# Patient Record
Sex: FEMALE | Race: BLACK OR AFRICAN AMERICAN | NOT HISPANIC OR LATINO | URBAN - METROPOLITAN AREA
[De-identification: names, ages, dates, MRNs, and addresses within clinical notes are randomized per-mention and may not be internally consistent; named-entity substitution may affect disease eponyms.]

---

## 2023-12-24 ENCOUNTER — EMERGENCY (EMERGENCY)
Facility: HOSPITAL | Age: 83
LOS: 1 days | End: 2023-12-24
Attending: EMERGENCY MEDICINE
Payer: MEDICARE

## 2023-12-24 VITALS
RESPIRATION RATE: 20 BRPM | HEIGHT: 66 IN | WEIGHT: 108.91 LBS | HEART RATE: 101 BPM | OXYGEN SATURATION: 99 % | DIASTOLIC BLOOD PRESSURE: 114 MMHG | TEMPERATURE: 98 F | SYSTOLIC BLOOD PRESSURE: 235 MMHG

## 2023-12-24 LAB
ALBUMIN SERPL ELPH-MCNC: 3.4 G/DL — SIGNIFICANT CHANGE UP (ref 3.3–5.2)
ALBUMIN SERPL ELPH-MCNC: 3.4 G/DL — SIGNIFICANT CHANGE UP (ref 3.3–5.2)
ALP SERPL-CCNC: 93 U/L — SIGNIFICANT CHANGE UP (ref 40–120)
ALP SERPL-CCNC: 93 U/L — SIGNIFICANT CHANGE UP (ref 40–120)
ALT FLD-CCNC: 6 U/L — SIGNIFICANT CHANGE UP
ALT FLD-CCNC: 6 U/L — SIGNIFICANT CHANGE UP
ANION GAP SERPL CALC-SCNC: 15 MMOL/L — SIGNIFICANT CHANGE UP (ref 5–17)
ANION GAP SERPL CALC-SCNC: 15 MMOL/L — SIGNIFICANT CHANGE UP (ref 5–17)
ANISOCYTOSIS BLD QL: SLIGHT — SIGNIFICANT CHANGE UP
ANISOCYTOSIS BLD QL: SLIGHT — SIGNIFICANT CHANGE UP
AST SERPL-CCNC: 20 U/L — SIGNIFICANT CHANGE UP
AST SERPL-CCNC: 20 U/L — SIGNIFICANT CHANGE UP
BASOPHILS # BLD AUTO: 0.04 K/UL — SIGNIFICANT CHANGE UP (ref 0–0.2)
BASOPHILS # BLD AUTO: 0.04 K/UL — SIGNIFICANT CHANGE UP (ref 0–0.2)
BASOPHILS NFR BLD AUTO: 0.9 % — SIGNIFICANT CHANGE UP (ref 0–2)
BASOPHILS NFR BLD AUTO: 0.9 % — SIGNIFICANT CHANGE UP (ref 0–2)
BILIRUB SERPL-MCNC: 0.3 MG/DL — LOW (ref 0.4–2)
BILIRUB SERPL-MCNC: 0.3 MG/DL — LOW (ref 0.4–2)
BUN SERPL-MCNC: 72.7 MG/DL — HIGH (ref 8–20)
BUN SERPL-MCNC: 72.7 MG/DL — HIGH (ref 8–20)
CALCIUM SERPL-MCNC: 6 MG/DL — CRITICAL LOW (ref 8.4–10.5)
CALCIUM SERPL-MCNC: 6 MG/DL — CRITICAL LOW (ref 8.4–10.5)
CHLORIDE SERPL-SCNC: 105 MMOL/L — SIGNIFICANT CHANGE UP (ref 96–108)
CHLORIDE SERPL-SCNC: 105 MMOL/L — SIGNIFICANT CHANGE UP (ref 96–108)
CO2 SERPL-SCNC: 19 MMOL/L — LOW (ref 22–29)
CO2 SERPL-SCNC: 19 MMOL/L — LOW (ref 22–29)
CREAT SERPL-MCNC: 4.64 MG/DL — HIGH (ref 0.5–1.3)
CREAT SERPL-MCNC: 4.64 MG/DL — HIGH (ref 0.5–1.3)
EGFR: 9 ML/MIN/1.73M2 — LOW
EGFR: 9 ML/MIN/1.73M2 — LOW
EOSINOPHIL # BLD AUTO: 0.26 K/UL — SIGNIFICANT CHANGE UP (ref 0–0.5)
EOSINOPHIL # BLD AUTO: 0.26 K/UL — SIGNIFICANT CHANGE UP (ref 0–0.5)
EOSINOPHIL NFR BLD AUTO: 5.2 % — SIGNIFICANT CHANGE UP (ref 0–6)
EOSINOPHIL NFR BLD AUTO: 5.2 % — SIGNIFICANT CHANGE UP (ref 0–6)
GLUCOSE SERPL-MCNC: 81 MG/DL — SIGNIFICANT CHANGE UP (ref 70–99)
GLUCOSE SERPL-MCNC: 81 MG/DL — SIGNIFICANT CHANGE UP (ref 70–99)
HCT VFR BLD CALC: 19.3 % — CRITICAL LOW (ref 34.5–45)
HCT VFR BLD CALC: 19.3 % — CRITICAL LOW (ref 34.5–45)
HGB BLD-MCNC: 6.1 G/DL — CRITICAL LOW (ref 11.5–15.5)
HGB BLD-MCNC: 6.1 G/DL — CRITICAL LOW (ref 11.5–15.5)
HYPOCHROMIA BLD QL: SIGNIFICANT CHANGE UP
HYPOCHROMIA BLD QL: SIGNIFICANT CHANGE UP
LYMPHOCYTES # BLD AUTO: 1.79 K/UL — SIGNIFICANT CHANGE UP (ref 1–3.3)
LYMPHOCYTES # BLD AUTO: 1.79 K/UL — SIGNIFICANT CHANGE UP (ref 1–3.3)
LYMPHOCYTES # BLD AUTO: 36 % — SIGNIFICANT CHANGE UP (ref 13–44)
LYMPHOCYTES # BLD AUTO: 36 % — SIGNIFICANT CHANGE UP (ref 13–44)
MACROCYTES BLD QL: SLIGHT — SIGNIFICANT CHANGE UP
MACROCYTES BLD QL: SLIGHT — SIGNIFICANT CHANGE UP
MANUAL SMEAR VERIFICATION: SIGNIFICANT CHANGE UP
MANUAL SMEAR VERIFICATION: SIGNIFICANT CHANGE UP
MCHC RBC-ENTMCNC: 30.2 PG — SIGNIFICANT CHANGE UP (ref 27–34)
MCHC RBC-ENTMCNC: 30.2 PG — SIGNIFICANT CHANGE UP (ref 27–34)
MCHC RBC-ENTMCNC: 31.6 GM/DL — LOW (ref 32–36)
MCHC RBC-ENTMCNC: 31.6 GM/DL — LOW (ref 32–36)
MCV RBC AUTO: 95.5 FL — SIGNIFICANT CHANGE UP (ref 80–100)
MCV RBC AUTO: 95.5 FL — SIGNIFICANT CHANGE UP (ref 80–100)
MONOCYTES # BLD AUTO: 0.35 K/UL — SIGNIFICANT CHANGE UP (ref 0–0.9)
MONOCYTES # BLD AUTO: 0.35 K/UL — SIGNIFICANT CHANGE UP (ref 0–0.9)
MONOCYTES NFR BLD AUTO: 7 % — SIGNIFICANT CHANGE UP (ref 2–14)
MONOCYTES NFR BLD AUTO: 7 % — SIGNIFICANT CHANGE UP (ref 2–14)
NEUTROPHILS # BLD AUTO: 2.53 K/UL — SIGNIFICANT CHANGE UP (ref 1.8–7.4)
NEUTROPHILS # BLD AUTO: 2.53 K/UL — SIGNIFICANT CHANGE UP (ref 1.8–7.4)
NEUTROPHILS NFR BLD AUTO: 50.9 % — SIGNIFICANT CHANGE UP (ref 43–77)
NEUTROPHILS NFR BLD AUTO: 50.9 % — SIGNIFICANT CHANGE UP (ref 43–77)
NT-PROBNP SERPL-SCNC: 2651 PG/ML — HIGH (ref 0–300)
NT-PROBNP SERPL-SCNC: 2651 PG/ML — HIGH (ref 0–300)
PLAT MORPH BLD: NORMAL — SIGNIFICANT CHANGE UP
PLAT MORPH BLD: NORMAL — SIGNIFICANT CHANGE UP
PLATELET # BLD AUTO: 171 K/UL — SIGNIFICANT CHANGE UP (ref 150–400)
PLATELET # BLD AUTO: 171 K/UL — SIGNIFICANT CHANGE UP (ref 150–400)
POLYCHROMASIA BLD QL SMEAR: SLIGHT — SIGNIFICANT CHANGE UP
POLYCHROMASIA BLD QL SMEAR: SLIGHT — SIGNIFICANT CHANGE UP
POTASSIUM SERPL-MCNC: 3.6 MMOL/L — SIGNIFICANT CHANGE UP (ref 3.5–5.3)
POTASSIUM SERPL-MCNC: 3.6 MMOL/L — SIGNIFICANT CHANGE UP (ref 3.5–5.3)
POTASSIUM SERPL-SCNC: 3.6 MMOL/L — SIGNIFICANT CHANGE UP (ref 3.5–5.3)
POTASSIUM SERPL-SCNC: 3.6 MMOL/L — SIGNIFICANT CHANGE UP (ref 3.5–5.3)
PROT SERPL-MCNC: 6.7 G/DL — SIGNIFICANT CHANGE UP (ref 6.6–8.7)
PROT SERPL-MCNC: 6.7 G/DL — SIGNIFICANT CHANGE UP (ref 6.6–8.7)
RAPID RVP RESULT: SIGNIFICANT CHANGE UP
RAPID RVP RESULT: SIGNIFICANT CHANGE UP
RBC # BLD: 2.02 M/UL — LOW (ref 3.8–5.2)
RBC # BLD: 2.02 M/UL — LOW (ref 3.8–5.2)
RBC # FLD: 13.2 % — SIGNIFICANT CHANGE UP (ref 10.3–14.5)
RBC # FLD: 13.2 % — SIGNIFICANT CHANGE UP (ref 10.3–14.5)
RBC BLD AUTO: ABNORMAL
RBC BLD AUTO: ABNORMAL
SARS-COV-2 RNA SPEC QL NAA+PROBE: SIGNIFICANT CHANGE UP
SARS-COV-2 RNA SPEC QL NAA+PROBE: SIGNIFICANT CHANGE UP
SODIUM SERPL-SCNC: 139 MMOL/L — SIGNIFICANT CHANGE UP (ref 135–145)
SODIUM SERPL-SCNC: 139 MMOL/L — SIGNIFICANT CHANGE UP (ref 135–145)
WBC # BLD: 4.98 K/UL — SIGNIFICANT CHANGE UP (ref 3.8–10.5)
WBC # BLD: 4.98 K/UL — SIGNIFICANT CHANGE UP (ref 3.8–10.5)
WBC # FLD AUTO: 4.98 K/UL — SIGNIFICANT CHANGE UP (ref 3.8–10.5)
WBC # FLD AUTO: 4.98 K/UL — SIGNIFICANT CHANGE UP (ref 3.8–10.5)

## 2023-12-24 PROCEDURE — 36415 COLL VENOUS BLD VENIPUNCTURE: CPT

## 2023-12-24 PROCEDURE — 85025 COMPLETE CBC W/AUTO DIFF WBC: CPT

## 2023-12-24 PROCEDURE — 99285 EMERGENCY DEPT VISIT HI MDM: CPT | Mod: 25

## 2023-12-24 PROCEDURE — 80053 COMPREHEN METABOLIC PANEL: CPT

## 2023-12-24 PROCEDURE — 71045 X-RAY EXAM CHEST 1 VIEW: CPT

## 2023-12-24 PROCEDURE — 0225U NFCT DS DNA&RNA 21 SARSCOV2: CPT

## 2023-12-24 PROCEDURE — 71045 X-RAY EXAM CHEST 1 VIEW: CPT | Mod: 26

## 2023-12-24 PROCEDURE — 93010 ELECTROCARDIOGRAM REPORT: CPT

## 2023-12-24 PROCEDURE — 80197 ASSAY OF TACROLIMUS: CPT

## 2023-12-24 PROCEDURE — 93005 ELECTROCARDIOGRAM TRACING: CPT

## 2023-12-24 PROCEDURE — 99285 EMERGENCY DEPT VISIT HI MDM: CPT | Mod: GC

## 2023-12-24 PROCEDURE — 83880 ASSAY OF NATRIURETIC PEPTIDE: CPT

## 2023-12-24 RX ORDER — AMLODIPINE BESYLATE 2.5 MG/1
10 TABLET ORAL ONCE
Refills: 0 | Status: COMPLETED | OUTPATIENT
Start: 2023-12-24 | End: 2023-12-24

## 2023-12-24 RX ORDER — HYDRALAZINE HCL 50 MG
10 TABLET ORAL ONCE
Refills: 0 | Status: COMPLETED | OUTPATIENT
Start: 2023-12-24 | End: 2023-12-24

## 2023-12-24 RX ORDER — ATENOLOL 25 MG/1
50 TABLET ORAL ONCE
Refills: 0 | Status: COMPLETED | OUTPATIENT
Start: 2023-12-24 | End: 2023-12-24

## 2023-12-24 RX ADMIN — AMLODIPINE BESYLATE 10 MILLIGRAM(S): 2.5 TABLET ORAL at 19:10

## 2023-12-24 RX ADMIN — Medication 10 MILLIGRAM(S): at 22:08

## 2023-12-24 RX ADMIN — ATENOLOL 50 MILLIGRAM(S): 25 TABLET ORAL at 19:10

## 2023-12-24 NOTE — ED ADULT TRIAGE NOTE - CHIEF COMPLAINT QUOTE
pt sent from urgent care for evaluation of BP, states originally went there for swollen ankles  A&Ox3, resp wnl, /100, HX HTN, ran out of meds x 2 days

## 2023-12-24 NOTE — ED ADULT NURSE NOTE - NSFALLHARMRISKINTERV_ED_ALL_ED
Assistance OOB with selected safe patient handling equipment if applicable/Assistance with ambulation/Communicate risk of Fall with Harm to all staff, patient, and family/Monitor gait and stability/Provide patient with walking aids/Provide visual cue: red socks, yellow wristband, yellow gown, etc/Reinforce activity limits and safety measures with patient and family/Bed in lowest position, wheels locked, appropriate side rails in place/Call bell, personal items and telephone in reach/Instruct patient to call for assistance before getting out of bed/chair/stretcher/Non-slip footwear applied when patient is off stretcher/[place] to call system/Physically safe environment - no spills, clutter or unnecessary equipment/Purposeful Proactive Rounding/Room/bathroom lighting operational, light cord in reach Assistance OOB with selected safe patient handling equipment if applicable/Assistance with ambulation/Communicate risk of Fall with Harm to all staff, patient, and family/Monitor gait and stability/Provide patient with walking aids/Provide visual cue: red socks, yellow wristband, yellow gown, etc/Reinforce activity limits and safety measures with patient and family/Bed in lowest position, wheels locked, appropriate side rails in place/Call bell, personal items and telephone in reach/Instruct patient to call for assistance before getting out of bed/chair/stretcher/Non-slip footwear applied when patient is off stretcher/[place] to call system/Physically safe environment - no spills, clutter or unnecessary equipment/Purposeful Proactive Rounding/Room/bathroom lighting operational, light cord in reach

## 2023-12-24 NOTE — ED PROVIDER NOTE - OBJECTIVE STATEMENT
84yo F PMHx HTN, HLD, Renal transplant 12 years ago presents w/ HTN 2/2 running out of her antihypertensive medications. Per patient and son at bedside, she has intermittent SOB the past 2-3 days, not SOB currently. Ran out of amlodipine and atenolol at home. Pt is compliant with tacrolimus medication. Pt's son states he noticed her b/l leg swelling but has since gone down. Denies HA, neck pain, vision changes, CP, SOB, abd pain, n/v/d/c, denies urinary sxs, denies LE pain, travel, denies sick contacts. No LE swelling on exam.  NKDA.

## 2023-12-24 NOTE — ED ADULT NURSE REASSESSMENT NOTE - NS ED NURSE REASSESS COMMENT FT1
Assumed care of pt from SHAHZAD Moser RN at 2300. Pt is resting comfortably in stretcher. NAD. Pt is A&Ox4. Respirations are even and unlabored. Color is appropriate for race. Pt awaiting transfer to [facility] as pt is a renal transplant pt. Pt NSR on cardiac monitor. Son at bedside. Pt ambulates with cane. Pt updated on plan of care. Assumed care of pt from SHAHZAD Moser RN at 2300. Pt is resting comfortably in stretcher. NAD. Pt is A&Ox4. Respirations are even and unlabored. Color is appropriate for race. Pt awaiting transfer to [facility] as pt is a renal transplant pt. Pt NSR on cardiac monitor. Son at bedside. Pt ambulates with cane. Pt updated on plan of care.

## 2023-12-24 NOTE — ED PROVIDER NOTE - ATTENDING CONTRIBUTION TO CARE
83-year-old  female status post renal transplant 12 years ago at  [place], now followed by private nephrologist complaining of shortness of breath 2 days ago and lower extremity swelling after running out of of her medications 3 days ago.  Patient's son came to visit from out of state and brought patient to  urgent care for evaluation and subsequently was referred to ED.   on triage patient found to be hypertensive  with /114.   patient denies any associated chest pain, shortness of breath, abdominal pain, nausea, vomiting diaphoresis or syncope.  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  patient with received her p.o. medications which she missed Will check labs controlled blood pressure  and reevaluate 83-year-old  female status post renal transplant 12 years ago at  [place], now followed by private nephrologist complaining of shortness of breath 2 days ago and lower extremity swelling after running out of of her medications 3 days ago.  Patient's son came to visit from out of state and brought patient to  urgent care for evaluation and subsequently was referred to ED.   on triage patient found to be hypertensive  with /114.   patient denies any associated chest pain, shortness of breath, abdominal pain, nausea, vomiting diaphoresis or syncope.  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  patient with received her p.o. medications which she missed Will check labs controlled blood pressure  and reevaluate

## 2023-12-24 NOTE — ED ADULT NURSE NOTE - OBJECTIVE STATEMENT
pt referred from  for elevated BP. SBP 200s. pt has ble edema.  AVfistula on KATHERIN. AO4, NAD. hx htn.

## 2023-12-24 NOTE — ED PROVIDER NOTE - CLINICAL SUMMARY MEDICAL DECISION MAKING FREE TEXT BOX
82yo F PMHx HTN, HLD, Renal transplant 12 years ago presents w/ HTN 2/2 running out of her antihypertensive medications. Per patient and son at bedside, she has intermittent SOB the past 2-3 days, not SOB currently. Ran out of amlodipine and atenolol at home. Pt is compliant with tacrolimus medication. Pt's son states he noticed her b/l leg swelling but has since gone down. Denies HA, neck pain, vision changes, CP, SOB, abd pain, n/v/d/c, denies urinary sxs, denies LE pain, travel, denies sick contacts. No LE swelling on exam.    Pending medications, EKG, labs, imaging. Will reassess.

## 2023-12-25 VITALS
OXYGEN SATURATION: 100 % | HEART RATE: 70 BPM | SYSTOLIC BLOOD PRESSURE: 139 MMHG | DIASTOLIC BLOOD PRESSURE: 67 MMHG | TEMPERATURE: 98 F | RESPIRATION RATE: 18 BRPM

## 2023-12-25 LAB
TACROLIMUS SERPL-MCNC: <2 NG/ML — SIGNIFICANT CHANGE UP
TACROLIMUS SERPL-MCNC: <2 NG/ML — SIGNIFICANT CHANGE UP

## 2024-04-18 ENCOUNTER — INPATIENT (INPATIENT)
Facility: HOSPITAL | Age: 84
LOS: 12 days | Discharge: NURSING, REHAB OR LONG-TERM CARE | DRG: 871 | End: 2024-05-01
Attending: STUDENT IN AN ORGANIZED HEALTH CARE EDUCATION/TRAINING PROGRAM | Admitting: INTERNAL MEDICINE
Payer: MEDICARE

## 2024-04-18 VITALS
DIASTOLIC BLOOD PRESSURE: 39 MMHG | OXYGEN SATURATION: 99 % | SYSTOLIC BLOOD PRESSURE: 72 MMHG | HEART RATE: 81 BPM | RESPIRATION RATE: 16 BRPM

## 2024-04-18 LAB — LACTATE BLDV-MCNC: 5.6 MMOL/L — CRITICAL HIGH (ref 0.5–2)

## 2024-04-18 PROCEDURE — 36556 INSERT NON-TUNNEL CV CATH: CPT | Mod: RT

## 2024-04-18 PROCEDURE — 99291 CRITICAL CARE FIRST HOUR: CPT | Mod: FT,25

## 2024-04-18 RX ORDER — PIPERACILLIN AND TAZOBACTAM 4; .5 G/20ML; G/20ML
3.38 INJECTION, POWDER, LYOPHILIZED, FOR SOLUTION INTRAVENOUS ONCE
Refills: 0 | Status: COMPLETED | OUTPATIENT
Start: 2024-04-18 | End: 2024-04-18

## 2024-04-18 RX ORDER — PANTOPRAZOLE SODIUM 20 MG/1
40 TABLET, DELAYED RELEASE ORAL ONCE
Refills: 0 | Status: COMPLETED | OUTPATIENT
Start: 2024-04-18 | End: 2024-04-18

## 2024-04-18 RX ORDER — SODIUM CHLORIDE 9 MG/ML
1000 INJECTION, SOLUTION INTRAVENOUS ONCE
Refills: 0 | Status: COMPLETED | OUTPATIENT
Start: 2024-04-18 | End: 2024-04-18

## 2024-04-18 RX ORDER — VANCOMYCIN HCL 1 G
1000 VIAL (EA) INTRAVENOUS ONCE
Refills: 0 | Status: COMPLETED | OUTPATIENT
Start: 2024-04-18 | End: 2024-04-18

## 2024-04-18 NOTE — ED PROVIDER NOTE - CLINICAL SUMMARY MEDICAL DECISION MAKING FREE TEXT BOX
ASSESSMENT:   LIYZ JOE is a 82yo F who presented with ams in setting of elevated BUN and CR on outside labs. Hypotense, hypothermic on arrival. Rectal w/ melanotic stool.     PLAN: Emergency release blood now given shock state w/ likely upper GI bleed. Protonix. Hypothermic and altered in setting of immunosuppressant use, will treat empirically w/ abx and send cultures.

## 2024-04-18 NOTE — ED PROVIDER NOTE - ATTENDING CONTRIBUTION TO CARE
Natalee: I performed a face to face evaluation of this patient and performed a full history and physical examination on the patient.  I agree with the resident's history, physical examination, and plan of the patient unless otherwise noted. My brief assessment is as follows: hx htn, hld, ckd on dialysis in past but s/p renal transplant on immunosuppression present for elevated bun to 130 and cr to 4 with ams since monday. pt noted to have dark/melena stool here, hypotense and hypothermic. started on protonix, emergency release blood, inr found to be 12 and pt on coumadin, given kcentra and vitamin k, protonix. icu consulted, gi/renal consults, empiric abx given hypothermia and sick though appears most likely from gi bleed. Natalee: I performed a face to face evaluation of this patient and performed a full history and physical examination on the patient.  I agree with the resident's history, physical examination, and plan of the patient unless otherwise noted. My brief assessment is as follows: hx htn, hld, ckd on dialysis in past but s/p renal transplant on immunosuppression present for elevated bun to 130 and cr to 4 with ams since monday. pt noted to have dark/melena stool here, hypotense and hypothermic. started on protonix, emergency release blood, inr found to be 12 and pt on coumadin, given kcentra and vitamin k, protonix. icu consulted, gi/renal consults, empiric abx given hypothermia and sick though appears most likely from gi bleed.    I independently interpreted the pt's cxr without gross infiltrate/effusion. Natalee: I performed a face to face evaluation of this patient and performed a full history and physical examination on the patient.  I agree with the resident's history, physical examination, and plan of the patient unless otherwise noted. My brief assessment is as follows: hx htn, hld, ckd on dialysis in past but s/p renal transplant on immunosuppression present for elevated bun to 130 and cr to 4 with ams since monday. pt noted to have dark/melena stool here, hypotense and hypothermic. started on protonix, emergency release blood with 2 physician consent given poor mental status and unable to contact family, inr found to be 12 and pt on coumadin, given kcentra and vitamin k, protonix. icu consulted, gi/renal consults, empiric abx given hypothermia and sick though appears most likely from gi bleed.    I independently interpreted the pt's cxr without gross infiltrate/effusion.

## 2024-04-18 NOTE — ED PROVIDER NOTE - PHYSICAL EXAMINATION
Gen: ILL APPEARING  Head: NC/AT  Neck: trachea midline  Resp:  No distress. Lungs clear.   Abd: soft, nd +DIFFUSE TTP  Rectal: +Dark stool   Ext: RUE w/ PICC Line. LUE w/ fistula.   Neuro: lethargic. appears non focal.   Skin: Warm and dry as visualized  Psych: Normal affect and mood

## 2024-04-18 NOTE — ED PROVIDER NOTE - PROGRESS NOTE DETAILS
Angela: INtial blood draw including CBC drawn from fresh line before any fluids or medicine was given. Per lab sample "too dilute" for analysis. Repeat CBC drawn but PT has already been given 1u emergency release blood. Natalee: elevated lactate likely from hypovolemic shock, received emergency prbc. hgb initially unable to be ran, eventually showing 2.7, repeat after emergency 1 unit 4.5, continuing blood. k centra/vitamin k. I spoke to micu team including Dr. Reyes, to be admitted to micu. initially given abx but lower suspicion for sepsis, likely hemorrhagic shock.

## 2024-04-18 NOTE — ED ADULT NURSE NOTE - CHIEF COMPLAINT QUOTE
BIBEMS from [facility] with c/o abnormal labs, dark stool and AMS since monday.  creat 4.87. hypotensive at [facility]. md called to bedside. A+O x0. unable to obtain oral temp in triage.

## 2024-04-18 NOTE — ED ADULT TRIAGE NOTE - CHIEF COMPLAINT QUOTE
BIBEMS from [facility] with c/o abnormal labs, dark stool and AMS since monday.  creat 4.87. hypotensive at [facility]. md called to bedside. A+O x0. unable to obtain oral temp in triage.

## 2024-04-18 NOTE — ED ADULT NURSE NOTE - OBJECTIVE STATEMENT
BIBEMS from [facility] with c/o abnormal labs, dark stool and AMS since monday.  creat 4.87. hypotensive at [facility]. PT currently is alerted responding to verbal stimulus. PT breathing equally and unlabored though lethargic.

## 2024-04-18 NOTE — ED PROVIDER NOTE - OBJECTIVE STATEMENT
LIZY JOE is a 84yo Female with PMH CKD s/p transplant on immunosuppressants, HTN, HLD who was sent in by nursing home elevated BUN and CR and AMS. On arrival pt awake but not answering questions not following commands. LIZY JOE is a 82yo Female with PMH CKD s/p transplant on immunosuppressants, HTN, HLD who was sent in by nursing home elevated BUN and CR and AMS. On arrival pt awake but not speaking or following commands.

## 2024-04-19 DIAGNOSIS — K92.2 GASTROINTESTINAL HEMORRHAGE, UNSPECIFIED: ICD-10-CM

## 2024-04-19 LAB
ABO RH CONFIRMATION: SIGNIFICANT CHANGE UP
ACANTHOCYTES BLD QL SMEAR: SLIGHT — SIGNIFICANT CHANGE UP
ALBUMIN SERPL ELPH-MCNC: 2.6 G/DL — LOW (ref 3.3–5.2)
ALBUMIN SERPL ELPH-MCNC: 2.8 G/DL — LOW (ref 3.3–5.2)
ALBUMIN SERPL ELPH-MCNC: 2.9 G/DL — LOW (ref 3.3–5.2)
ALBUMIN SERPL ELPH-MCNC: 3 G/DL — LOW (ref 3.3–5.2)
ALP SERPL-CCNC: 28 U/L — LOW (ref 40–120)
ALP SERPL-CCNC: 38 U/L — LOW (ref 40–120)
ALP SERPL-CCNC: 39 U/L — LOW (ref 40–120)
ALP SERPL-CCNC: 43 U/L — SIGNIFICANT CHANGE UP (ref 40–120)
ALT FLD-CCNC: 10 U/L — SIGNIFICANT CHANGE UP
ALT FLD-CCNC: 10 U/L — SIGNIFICANT CHANGE UP
ALT FLD-CCNC: 7 U/L — SIGNIFICANT CHANGE UP
ALT FLD-CCNC: <5 U/L — SIGNIFICANT CHANGE UP
ANION GAP SERPL CALC-SCNC: 16 MMOL/L — SIGNIFICANT CHANGE UP (ref 5–17)
ANION GAP SERPL CALC-SCNC: 17 MMOL/L — SIGNIFICANT CHANGE UP (ref 5–17)
ANION GAP SERPL CALC-SCNC: 18 MMOL/L — HIGH (ref 5–17)
ANION GAP SERPL CALC-SCNC: 21 MMOL/L — HIGH (ref 5–17)
ANISOCYTOSIS BLD QL: SLIGHT — SIGNIFICANT CHANGE UP
APPEARANCE UR: ABNORMAL
APTT BLD: 25.5 SEC — SIGNIFICANT CHANGE UP (ref 24.5–35.6)
APTT BLD: 46.5 SEC — HIGH (ref 24.5–35.6)
AST SERPL-CCNC: 11 U/L — SIGNIFICANT CHANGE UP
AST SERPL-CCNC: 15 U/L — SIGNIFICANT CHANGE UP
AST SERPL-CCNC: 17 U/L — SIGNIFICANT CHANGE UP
AST SERPL-CCNC: 18 U/L — SIGNIFICANT CHANGE UP
BACTERIA # UR AUTO: ABNORMAL /HPF
BASE EXCESS BLDV CALC-SCNC: -2.4 MMOL/L — LOW (ref -2–3)
BASOPHILS # BLD AUTO: 0 K/UL — SIGNIFICANT CHANGE UP (ref 0–0.2)
BASOPHILS # BLD AUTO: 0.02 K/UL — SIGNIFICANT CHANGE UP (ref 0–0.2)
BASOPHILS NFR BLD AUTO: 0 % — SIGNIFICANT CHANGE UP (ref 0–2)
BASOPHILS NFR BLD AUTO: 0.2 % — SIGNIFICANT CHANGE UP (ref 0–2)
BILIRUB SERPL-MCNC: 0.2 MG/DL — LOW (ref 0.4–2)
BILIRUB SERPL-MCNC: 0.5 MG/DL — SIGNIFICANT CHANGE UP (ref 0.4–2)
BILIRUB SERPL-MCNC: 0.6 MG/DL — SIGNIFICANT CHANGE UP (ref 0.4–2)
BILIRUB SERPL-MCNC: 0.7 MG/DL — SIGNIFICANT CHANGE UP (ref 0.4–2)
BILIRUB UR-MCNC: NEGATIVE — SIGNIFICANT CHANGE UP
BLD GP AB SCN SERPL QL: SIGNIFICANT CHANGE UP
BLOOD GAS COMMENTS, VENOUS: SIGNIFICANT CHANGE UP
BUN SERPL-MCNC: 122.6 MG/DL — HIGH (ref 8–20)
BUN SERPL-MCNC: 136.6 MG/DL — HIGH (ref 8–20)
BUN SERPL-MCNC: 136.6 MG/DL — HIGH (ref 8–20)
BUN SERPL-MCNC: 139.5 MG/DL — HIGH (ref 8–20)
BURR CELLS BLD QL SMEAR: PRESENT — SIGNIFICANT CHANGE UP
CALCIUM SERPL-MCNC: 7.2 MG/DL — LOW (ref 8.4–10.5)
CALCIUM SERPL-MCNC: 7.3 MG/DL — LOW (ref 8.4–10.5)
CALCIUM SERPL-MCNC: 7.5 MG/DL — LOW (ref 8.4–10.5)
CALCIUM SERPL-MCNC: 7.5 MG/DL — LOW (ref 8.4–10.5)
CAST: 21 /LPF — HIGH (ref 0–4)
CHLORIDE SERPL-SCNC: 100 MMOL/L — SIGNIFICANT CHANGE UP (ref 96–108)
CHLORIDE SERPL-SCNC: 102 MMOL/L — SIGNIFICANT CHANGE UP (ref 96–108)
CO2 SERPL-SCNC: 18 MMOL/L — LOW (ref 22–29)
CO2 SERPL-SCNC: 21 MMOL/L — LOW (ref 22–29)
CO2 SERPL-SCNC: 22 MMOL/L — SIGNIFICANT CHANGE UP (ref 22–29)
CO2 SERPL-SCNC: 22 MMOL/L — SIGNIFICANT CHANGE UP (ref 22–29)
COLOR SPEC: YELLOW — SIGNIFICANT CHANGE UP
CREAT SERPL-MCNC: 4.75 MG/DL — HIGH (ref 0.5–1.3)
CREAT SERPL-MCNC: 4.93 MG/DL — HIGH (ref 0.5–1.3)
CREAT SERPL-MCNC: 5.09 MG/DL — HIGH (ref 0.5–1.3)
CREAT SERPL-MCNC: 5.12 MG/DL — HIGH (ref 0.5–1.3)
DIFF PNL FLD: NEGATIVE — SIGNIFICANT CHANGE UP
EGFR: 8 ML/MIN/1.73M2 — LOW
EGFR: 9 ML/MIN/1.73M2 — LOW
ELLIPTOCYTES BLD QL SMEAR: SLIGHT — SIGNIFICANT CHANGE UP
EOSINOPHIL # BLD AUTO: 0 K/UL — SIGNIFICANT CHANGE UP (ref 0–0.5)
EOSINOPHIL # BLD AUTO: 0.01 K/UL — SIGNIFICANT CHANGE UP (ref 0–0.5)
EOSINOPHIL NFR BLD AUTO: 0 % — SIGNIFICANT CHANGE UP (ref 0–6)
EOSINOPHIL NFR BLD AUTO: 0.1 % — SIGNIFICANT CHANGE UP (ref 0–6)
FERRITIN SERPL-MCNC: 3171 NG/ML — HIGH (ref 13–330)
FLUAV AG NPH QL: SIGNIFICANT CHANGE UP
FLUBV AG NPH QL: SIGNIFICANT CHANGE UP
GAS PNL BLDV: SIGNIFICANT CHANGE UP
GLUCOSE SERPL-MCNC: 106 MG/DL — HIGH (ref 70–99)
GLUCOSE SERPL-MCNC: 141 MG/DL — HIGH (ref 70–99)
GLUCOSE SERPL-MCNC: 85 MG/DL — SIGNIFICANT CHANGE UP (ref 70–99)
GLUCOSE SERPL-MCNC: 87 MG/DL — SIGNIFICANT CHANGE UP (ref 70–99)
GLUCOSE UR QL: NEGATIVE MG/DL — SIGNIFICANT CHANGE UP
HCO3 BLDV-SCNC: 22 MMOL/L — SIGNIFICANT CHANGE UP (ref 22–29)
HCT VFR BLD CALC: 14.6 % — CRITICAL LOW (ref 34.5–45)
HCT VFR BLD CALC: 20.8 % — CRITICAL LOW (ref 34.5–45)
HCT VFR BLD CALC: 21.5 % — LOW (ref 34.5–45)
HCT VFR BLD CALC: 23.2 % — LOW (ref 34.5–45)
HGB BLD-MCNC: 4.7 G/DL — CRITICAL LOW (ref 11.5–15.5)
HGB BLD-MCNC: 7.4 G/DL — LOW (ref 11.5–15.5)
HGB BLD-MCNC: 7.6 G/DL — LOW (ref 11.5–15.5)
HGB BLD-MCNC: 8.2 G/DL — LOW (ref 11.5–15.5)
HOROWITZ INDEX BLDV+IHG-RTO: 0.21 — SIGNIFICANT CHANGE UP
IMM GRANULOCYTES NFR BLD AUTO: 2.3 % — HIGH (ref 0–0.9)
INR BLD: 1.19 RATIO — HIGH (ref 0.85–1.18)
INR BLD: 12.19 RATIO — CRITICAL HIGH (ref 0.85–1.18)
INR BLD: 8.38 RATIO — CRITICAL HIGH (ref 0.85–1.18)
IRON SATN MFR SERPL: 175 UG/DL — HIGH (ref 37–145)
IRON SATN MFR SERPL: 83 % — HIGH (ref 14–50)
KETONES UR-MCNC: NEGATIVE MG/DL — SIGNIFICANT CHANGE UP
LACTATE SERPL-SCNC: 2 MMOL/L — SIGNIFICANT CHANGE UP (ref 0.5–2)
LEUKOCYTE ESTERASE UR-ACNC: ABNORMAL
LYMPHOCYTES # BLD AUTO: 0.35 K/UL — LOW (ref 1–3.3)
LYMPHOCYTES # BLD AUTO: 0.56 K/UL — LOW (ref 1–3.3)
LYMPHOCYTES # BLD AUTO: 4.2 % — LOW (ref 13–44)
LYMPHOCYTES # BLD AUTO: 4.4 % — LOW (ref 13–44)
MACROCYTES BLD QL: SLIGHT — SIGNIFICANT CHANGE UP
MAGNESIUM SERPL-MCNC: 1.4 MG/DL — LOW (ref 1.6–2.6)
MANUAL SMEAR VERIFICATION: SIGNIFICANT CHANGE UP
MCHC RBC-ENTMCNC: 29.7 PG — SIGNIFICANT CHANGE UP (ref 27–34)
MCHC RBC-ENTMCNC: 31.4 PG — SIGNIFICANT CHANGE UP (ref 27–34)
MCHC RBC-ENTMCNC: 31.4 PG — SIGNIFICANT CHANGE UP (ref 27–34)
MCHC RBC-ENTMCNC: 31.7 PG — SIGNIFICANT CHANGE UP (ref 27–34)
MCHC RBC-ENTMCNC: 32.2 GM/DL — SIGNIFICANT CHANGE UP (ref 32–36)
MCHC RBC-ENTMCNC: 35.3 GM/DL — SIGNIFICANT CHANGE UP (ref 32–36)
MCHC RBC-ENTMCNC: 35.3 GM/DL — SIGNIFICANT CHANGE UP (ref 32–36)
MCHC RBC-ENTMCNC: 35.6 GM/DL — SIGNIFICANT CHANGE UP (ref 32–36)
MCV RBC AUTO: 88.1 FL — SIGNIFICANT CHANGE UP (ref 80–100)
MCV RBC AUTO: 88.9 FL — SIGNIFICANT CHANGE UP (ref 80–100)
MCV RBC AUTO: 89.6 FL — SIGNIFICANT CHANGE UP (ref 80–100)
MCV RBC AUTO: 92.4 FL — SIGNIFICANT CHANGE UP (ref 80–100)
MONOCYTES # BLD AUTO: 0.21 K/UL — SIGNIFICANT CHANGE UP (ref 0–0.9)
MONOCYTES # BLD AUTO: 0.88 K/UL — SIGNIFICANT CHANGE UP (ref 0–0.9)
MONOCYTES NFR BLD AUTO: 2.6 % — SIGNIFICANT CHANGE UP (ref 2–14)
MONOCYTES NFR BLD AUTO: 6.6 % — SIGNIFICANT CHANGE UP (ref 2–14)
MRSA PCR RESULT.: SIGNIFICANT CHANGE UP
NEUTROPHILS # BLD AUTO: 11.48 K/UL — HIGH (ref 1.8–7.4)
NEUTROPHILS # BLD AUTO: 7.43 K/UL — HIGH (ref 1.8–7.4)
NEUTROPHILS NFR BLD AUTO: 86.6 % — HIGH (ref 43–77)
NEUTROPHILS NFR BLD AUTO: 93 % — HIGH (ref 43–77)
NITRITE UR-MCNC: NEGATIVE — SIGNIFICANT CHANGE UP
OB PNL STL: POSITIVE
OVALOCYTES BLD QL SMEAR: SLIGHT — SIGNIFICANT CHANGE UP
PCO2 BLDV: 35 MMHG — LOW (ref 39–42)
PH BLDV: 7.41 — SIGNIFICANT CHANGE UP (ref 7.32–7.43)
PH UR: 5.5 — SIGNIFICANT CHANGE UP (ref 5–8)
PHOSPHATE SERPL-MCNC: 4.3 MG/DL — SIGNIFICANT CHANGE UP (ref 2.4–4.7)
PLAT MORPH BLD: NORMAL — SIGNIFICANT CHANGE UP
PLATELET # BLD AUTO: 131 K/UL — LOW (ref 150–400)
PLATELET # BLD AUTO: 138 K/UL — LOW (ref 150–400)
PLATELET # BLD AUTO: 139 K/UL — LOW (ref 150–400)
PLATELET # BLD AUTO: 152 K/UL — SIGNIFICANT CHANGE UP (ref 150–400)
PO2 BLDV: 44 MMHG — SIGNIFICANT CHANGE UP (ref 25–45)
POIKILOCYTOSIS BLD QL AUTO: SLIGHT — SIGNIFICANT CHANGE UP
POLYCHROMASIA BLD QL SMEAR: SLIGHT — SIGNIFICANT CHANGE UP
POTASSIUM SERPL-MCNC: 3.7 MMOL/L — SIGNIFICANT CHANGE UP (ref 3.5–5.3)
POTASSIUM SERPL-MCNC: 4.3 MMOL/L — SIGNIFICANT CHANGE UP (ref 3.5–5.3)
POTASSIUM SERPL-MCNC: 4.5 MMOL/L — SIGNIFICANT CHANGE UP (ref 3.5–5.3)
POTASSIUM SERPL-MCNC: 4.5 MMOL/L — SIGNIFICANT CHANGE UP (ref 3.5–5.3)
POTASSIUM SERPL-SCNC: 3.7 MMOL/L — SIGNIFICANT CHANGE UP (ref 3.5–5.3)
POTASSIUM SERPL-SCNC: 4.3 MMOL/L — SIGNIFICANT CHANGE UP (ref 3.5–5.3)
POTASSIUM SERPL-SCNC: 4.5 MMOL/L — SIGNIFICANT CHANGE UP (ref 3.5–5.3)
POTASSIUM SERPL-SCNC: 4.5 MMOL/L — SIGNIFICANT CHANGE UP (ref 3.5–5.3)
PROT SERPL-MCNC: 3.9 G/DL — LOW (ref 6.6–8.7)
PROT SERPL-MCNC: 4.6 G/DL — LOW (ref 6.6–8.7)
PROT SERPL-MCNC: 4.8 G/DL — LOW (ref 6.6–8.7)
PROT SERPL-MCNC: 4.9 G/DL — LOW (ref 6.6–8.7)
PROT UR-MCNC: 100 MG/DL
PROTHROM AB SERPL-ACNC: 125.8 SEC — HIGH (ref 9.5–13)
PROTHROM AB SERPL-ACNC: 13.1 SEC — HIGH (ref 9.5–13)
PROTHROM AB SERPL-ACNC: 87.4 SEC — HIGH (ref 9.5–13)
RBC # BLD: 1.58 M/UL — LOW (ref 3.8–5.2)
RBC # BLD: 2.36 M/UL — LOW (ref 3.8–5.2)
RBC # BLD: 2.4 M/UL — LOW (ref 3.8–5.2)
RBC # BLD: 2.61 M/UL — LOW (ref 3.8–5.2)
RBC # FLD: 14.1 % — SIGNIFICANT CHANGE UP (ref 10.3–14.5)
RBC # FLD: 14.4 % — SIGNIFICANT CHANGE UP (ref 10.3–14.5)
RBC # FLD: 14.8 % — HIGH (ref 10.3–14.5)
RBC # FLD: 15.2 % — HIGH (ref 10.3–14.5)
RBC BLD AUTO: ABNORMAL
RBC CASTS # UR COMP ASSIST: 1 /HPF — SIGNIFICANT CHANGE UP (ref 0–4)
RSV RNA NPH QL NAA+NON-PROBE: SIGNIFICANT CHANGE UP
S AUREUS DNA NOSE QL NAA+PROBE: SIGNIFICANT CHANGE UP
SAO2 % BLDV: 85.3 % — SIGNIFICANT CHANGE UP
SARS-COV-2 RNA SPEC QL NAA+PROBE: SIGNIFICANT CHANGE UP
SODIUM SERPL-SCNC: 138 MMOL/L — SIGNIFICANT CHANGE UP (ref 135–145)
SODIUM SERPL-SCNC: 139 MMOL/L — SIGNIFICANT CHANGE UP (ref 135–145)
SODIUM SERPL-SCNC: 140 MMOL/L — SIGNIFICANT CHANGE UP (ref 135–145)
SODIUM SERPL-SCNC: 140 MMOL/L — SIGNIFICANT CHANGE UP (ref 135–145)
SP GR SPEC: 1.02 — SIGNIFICANT CHANGE UP (ref 1–1.03)
SQUAMOUS # UR AUTO: 7 /HPF — HIGH (ref 0–5)
T4 AB SER-ACNC: 7.3 UG/DL — SIGNIFICANT CHANGE UP (ref 4.5–12)
TACROLIMUS SERPL-MCNC: <2 NG/ML — SIGNIFICANT CHANGE UP
TIBC SERPL-MCNC: 212 UG/DL — LOW (ref 220–430)
TRANSFERRIN SERPL-MCNC: 150 MG/DL — LOW (ref 192–382)
TSH SERPL-MCNC: 1.72 UIU/ML — SIGNIFICANT CHANGE UP (ref 0.27–4.2)
UROBILINOGEN FLD QL: 0.2 MG/DL — SIGNIFICANT CHANGE UP (ref 0.2–1)
WBC # BLD: 10.85 K/UL — HIGH (ref 3.8–10.5)
WBC # BLD: 13.25 K/UL — HIGH (ref 3.8–10.5)
WBC # BLD: 15.11 K/UL — HIGH (ref 3.8–10.5)
WBC # BLD: 7.99 K/UL — SIGNIFICANT CHANGE UP (ref 3.8–10.5)
WBC # FLD AUTO: 10.85 K/UL — HIGH (ref 3.8–10.5)
WBC # FLD AUTO: 13.25 K/UL — HIGH (ref 3.8–10.5)
WBC # FLD AUTO: 15.11 K/UL — HIGH (ref 3.8–10.5)
WBC # FLD AUTO: 7.99 K/UL — SIGNIFICANT CHANGE UP (ref 3.8–10.5)
WBC UR QL: 5 /HPF — SIGNIFICANT CHANGE UP (ref 0–5)

## 2024-04-19 PROCEDURE — 71045 X-RAY EXAM CHEST 1 VIEW: CPT | Mod: 26,77

## 2024-04-19 PROCEDURE — 76705 ECHO EXAM OF ABDOMEN: CPT | Mod: 26

## 2024-04-19 PROCEDURE — 70450 CT HEAD/BRAIN W/O DYE: CPT | Mod: 26

## 2024-04-19 PROCEDURE — 93010 ELECTROCARDIOGRAM REPORT: CPT

## 2024-04-19 PROCEDURE — 99222 1ST HOSP IP/OBS MODERATE 55: CPT

## 2024-04-19 PROCEDURE — 74176 CT ABD & PELVIS W/O CONTRAST: CPT | Mod: 26

## 2024-04-19 PROCEDURE — 71045 X-RAY EXAM CHEST 1 VIEW: CPT | Mod: 26

## 2024-04-19 RX ORDER — PHYTONADIONE (VIT K1) 5 MG
10 TABLET ORAL ONCE
Refills: 0 | Status: COMPLETED | OUTPATIENT
Start: 2024-04-19 | End: 2024-04-19

## 2024-04-19 RX ORDER — MYCOPHENOLATE MOFETIL 250 MG/1
500 CAPSULE ORAL ONCE
Refills: 0 | Status: COMPLETED | OUTPATIENT
Start: 2024-04-19 | End: 2024-04-19

## 2024-04-19 RX ORDER — TACROLIMUS 5 MG/1
1 CAPSULE ORAL
Refills: 0 | Status: DISCONTINUED | OUTPATIENT
Start: 2024-04-20 | End: 2024-04-22

## 2024-04-19 RX ORDER — SODIUM CHLORIDE 9 MG/ML
1000 INJECTION, SOLUTION INTRAVENOUS
Refills: 0 | Status: DISCONTINUED | OUTPATIENT
Start: 2024-04-19 | End: 2024-04-22

## 2024-04-19 RX ORDER — TACROLIMUS 5 MG/1
2 CAPSULE ORAL
Refills: 0 | Status: DISCONTINUED | OUTPATIENT
Start: 2024-04-19 | End: 2024-04-19

## 2024-04-19 RX ORDER — FERROUS SULFATE 325(65) MG
325 TABLET ORAL DAILY
Refills: 0 | Status: DISCONTINUED | OUTPATIENT
Start: 2024-04-19 | End: 2024-05-01

## 2024-04-19 RX ORDER — FUROSEMIDE 40 MG
60 TABLET ORAL DAILY
Refills: 0 | Status: DISCONTINUED | OUTPATIENT
Start: 2024-04-19 | End: 2024-04-20

## 2024-04-19 RX ORDER — PIPERACILLIN AND TAZOBACTAM 4; .5 G/20ML; G/20ML
3.38 INJECTION, POWDER, LYOPHILIZED, FOR SOLUTION INTRAVENOUS EVERY 12 HOURS
Refills: 0 | Status: DISCONTINUED | OUTPATIENT
Start: 2024-04-19 | End: 2024-04-21

## 2024-04-19 RX ORDER — ALBUMIN HUMAN 25 %
50 VIAL (ML) INTRAVENOUS EVERY 8 HOURS
Refills: 0 | Status: COMPLETED | OUTPATIENT
Start: 2024-04-19 | End: 2024-04-20

## 2024-04-19 RX ORDER — PROTHROMBIN COMPLEX CONCENTRATE (HUMAN) 25.5; 16.5; 24; 22; 22; 26 [IU]/ML; [IU]/ML; [IU]/ML; [IU]/ML; [IU]/ML; [IU]/ML
1500 POWDER, FOR SOLUTION INTRAVENOUS ONCE
Refills: 0 | Status: COMPLETED | OUTPATIENT
Start: 2024-04-19 | End: 2024-04-19

## 2024-04-19 RX ORDER — MYCOPHENOLATE MOFETIL 250 MG/1
500 CAPSULE ORAL EVERY 12 HOURS
Refills: 0 | Status: DISCONTINUED | OUTPATIENT
Start: 2024-04-19 | End: 2024-04-19

## 2024-04-19 RX ORDER — MAGNESIUM SULFATE 500 MG/ML
2 VIAL (ML) INJECTION ONCE
Refills: 0 | Status: COMPLETED | OUTPATIENT
Start: 2024-04-19 | End: 2024-04-19

## 2024-04-19 RX ORDER — PANTOPRAZOLE SODIUM 20 MG/1
40 TABLET, DELAYED RELEASE ORAL
Refills: 0 | Status: DISCONTINUED | OUTPATIENT
Start: 2024-04-19 | End: 2024-04-26

## 2024-04-19 RX ORDER — MYCOPHENOLATE MOFETIL 250 MG/1
250 CAPSULE ORAL
Refills: 0 | Status: DISCONTINUED | OUTPATIENT
Start: 2024-04-19 | End: 2024-04-19

## 2024-04-19 RX ORDER — SODIUM CHLORIDE 9 MG/ML
1000 INJECTION, SOLUTION INTRAVENOUS ONCE
Refills: 0 | Status: COMPLETED | OUTPATIENT
Start: 2024-04-19 | End: 2024-04-19

## 2024-04-19 RX ADMIN — SODIUM CHLORIDE 75 MILLILITER(S): 9 INJECTION, SOLUTION INTRAVENOUS at 15:35

## 2024-04-19 RX ADMIN — PROTHROMBIN COMPLEX CONCENTRATE (HUMAN) 400 INTERNATIONAL UNIT(S): 25.5; 16.5; 24; 22; 22; 26 POWDER, FOR SOLUTION INTRAVENOUS at 00:55

## 2024-04-19 RX ADMIN — PANTOPRAZOLE SODIUM 40 MILLIGRAM(S): 20 TABLET, DELAYED RELEASE ORAL at 17:29

## 2024-04-19 RX ADMIN — TACROLIMUS 2 MILLIGRAM(S): 5 CAPSULE ORAL at 16:30

## 2024-04-19 RX ADMIN — Medication 50 MILLILITER(S): at 21:05

## 2024-04-19 RX ADMIN — PIPERACILLIN AND TAZOBACTAM 25 GRAM(S): 4; .5 INJECTION, POWDER, LYOPHILIZED, FOR SOLUTION INTRAVENOUS at 12:08

## 2024-04-19 RX ADMIN — PANTOPRAZOLE SODIUM 40 MILLIGRAM(S): 20 TABLET, DELAYED RELEASE ORAL at 00:28

## 2024-04-19 RX ADMIN — MYCOPHENOLATE MOFETIL 41.67 MILLIGRAM(S): 250 CAPSULE ORAL at 10:06

## 2024-04-19 RX ADMIN — Medication 60 MILLIGRAM(S): at 20:41

## 2024-04-19 RX ADMIN — Medication 325 MILLIGRAM(S): at 16:23

## 2024-04-19 RX ADMIN — Medication 25 GRAM(S): at 11:11

## 2024-04-19 RX ADMIN — PIPERACILLIN AND TAZOBACTAM 200 GRAM(S): 4; .5 INJECTION, POWDER, LYOPHILIZED, FOR SOLUTION INTRAVENOUS at 00:28

## 2024-04-19 RX ADMIN — SODIUM CHLORIDE 1000 MILLILITER(S): 9 INJECTION, SOLUTION INTRAVENOUS at 11:10

## 2024-04-19 RX ADMIN — Medication 10 MILLIGRAM(S): at 01:56

## 2024-04-19 RX ADMIN — PANTOPRAZOLE SODIUM 40 MILLIGRAM(S): 20 TABLET, DELAYED RELEASE ORAL at 07:52

## 2024-04-19 RX ADMIN — PROTHROMBIN COMPLEX CONCENTRATE (HUMAN) 1500 INTERNATIONAL UNIT(S): 25.5; 16.5; 24; 22; 22; 26 POWDER, FOR SOLUTION INTRAVENOUS at 01:08

## 2024-04-19 RX ADMIN — Medication 1000 MILLIGRAM(S): at 07:49

## 2024-04-19 RX ADMIN — PIPERACILLIN AND TAZOBACTAM 3.38 GRAM(S): 4; .5 INJECTION, POWDER, LYOPHILIZED, FOR SOLUTION INTRAVENOUS at 01:01

## 2024-04-19 RX ADMIN — Medication 250 MILLIGRAM(S): at 00:28

## 2024-04-19 RX ADMIN — Medication 102 MILLIGRAM(S): at 01:06

## 2024-04-19 NOTE — CONSULT NOTE ADULT - SUBJECTIVE AND OBJECTIVE BOX
Patient is a 83y old  Female who presents with a chief complaint of hypotension and hypothermic.    HPI:  83 year old female with a PMH of ESRD s/p renal transplant on mycophenolate and tacrolimus (per nursing home documents), HTN, HLD, anemia, dementia who presented to the ED from [facility] for elevated BUN/Cr on outpatient labs.  Upon arrival to the ED patient was found to be hypotensive and hypothermic.  Workup was significant for profound anemia, elevated INR, lactic acidosis, and an GLO on CKD.  MICU consulted for further management. Pt. non verbal. Unable to provide history herself. Essentially non-verbal. Hb on admission 2.5 grams. + FOBT.      REVIEW OF SYSTEMS:  Unobtainable in this pt.    PAST MEDICAL & SURGICAL HISTORY: Unknown      FAMILY HISTORY: Unknown.      SOCIAL HISTORY:  Unknown.    MEDICATIONS:  MEDICATIONS  (STANDING):  ferrous    sulfate 325 milliGRAM(s) Oral daily  mycophenolate mofetil 250 milliGRAM(s) Oral two times a day  pantoprazole  Injectable 40 milliGRAM(s) IV Push two times a day  piperacillin/tazobactam IVPB.. 3.375 Gram(s) IV Intermittent every 12 hours  predniSONE   Tablet 5 milliGRAM(s) Oral daily    MEDICATIONS  (PRN):      Allergies    No Known Allergies    Intolerances        Vital Signs Last 24 Hrs  T(C): 36.7 (19 Apr 2024 07:25), Max: 36.9 (19 Apr 2024 06:42)  T(F): 98 (19 Apr 2024 07:25), Max: 98.4 (19 Apr 2024 06:42)  HR: 102 (19 Apr 2024 07:25) (81 - 102)  BP: 135/75 (19 Apr 2024 07:25) (72/39 - 138/75)  BP(mean): 94 (19 Apr 2024 07:25) (49 - 94)  RR: 25 (19 Apr 2024 07:25) (16 - 25)  SpO2: 100% (19 Apr 2024 07:25) (98% - 100%)    Parameters below as of 19 Apr 2024 07:25  Patient On (Oxygen Delivery Method): room air            PHYSICAL EXAM:    General: Well developed; malnourished appearing; in no acute distress  HEENT: MMM, conjunctiva pale and sclera anicteric.  Lungs: Clear bilaterally.  Cor: RRR S1, S2 only.  Gastrointestinal: Abdomen: Soft, non-tender non-distended; Normal bowel sounds; No rebound or guarding or HSM.  RANJANA: Brown stool  Extremities: Normal range of motion, No clubbing, cyanosis or edema  Neurological: Alert but somnolent and altered.  Skin: Warm and dry. No obvious rash      LABS:                        4.7    7.99  )-----------( 131      ( 19 Apr 2024 00:50 )             14.6     04-18    139  |  100  |  139.5<H>  ----------------------------<  141<H>  4.3   |  18.0<L>  |  5.09<H>    Ca    7.2<L>      18 Apr 2024 23:25    TPro  3.9<L>  /  Alb  2.6<L>  /  TBili  0.2<L>  /  DBili  x   /  AST  11  /  ALT  <5  /  AlkPhos  28<L>  04-18          RADIOLOGY & ADDITIONAL STUDIES:   < from: CT Abdomen and Pelvis No Cont (04.19.24 @ 04:19) >  ACC: [id number] EXAM:  CT ABDOMEN AND PELVIS   ORDERED BY: MATI VAZQUEZ     PROCEDURE DATE:  04/19/2024    ******PRELIMINARY REPORT******      ******PRELIMINARY REPORT******           INTERPRETATION:  CLINICAL INFORMATION:  GI bleed.    COMPARISON: None.    PROCEDURE:  CT of the Abdomen and Pelvis was performed without intravenous contrast.  Intravenous contrast: None.  Oral contrast: None.  Sagittal and coronal reformats were performed.    PRELIMINARY  IMPRESSION:  Noncontrast study is nondiagnostic for evaluating active GI bleed.  Distended gallbladder containing gallstones. Consider right upper   quadrant ultrasound to exclude gallbladder disease.  Left lower quadrant renal transplant without hydronephrosis or   perinephric collection.  Atrophied bilateral kidneys containing cysts as well as too small to   characterize hypodensities.  Small left pleural effusion. Scattered bilateral sub-cm lung nodules.   Consider nonemergent pulmonary imaging follow-up.  Follow-up official report in the morning.            ******PRELIMINARY REPORT******      ******PRELIMINARY REPORT******         [facility] RADIOLOGIST   This document is a PRELIMINARY interpretation and is pending final   attending approval. Apr 19 2024  5:30AM    < end of copied text >  < from: CT Head No Cont (04.19.24 @ 07:24) >  ACC: [id number] EXAM:  CT BRAIN   ORDERED BY: KENISHA DELUCA     PROCEDURE DATE:  04/19/2024          INTERPRETATION:  .    CLINICAL INFORMATION: Altered mental status with elevated INR    TECHNIQUE: Multiple axial CT images of the head were obtained without   contrast. Sagittal and coronal reconstructed images were acquired from   the source data.    COMPARISON: No prior CT studies of the brain are available for comparison   at this institution.    FINDINGS: There is no acute intracranial hemorrhage, mass effect, shift   of the midline structures, herniation, extra-axial fluid collection, or   hydrocephalus.    There is diffuse cerebral volume loss with prominence of the sulci,   fissures, and cisternal spaces which is normal for the patient's age.   There is mild deep and periventricular white matter hypoattenuation   statistically compatible with microvascular changes given calcific   atherosclerotic disease of the intracranial arteries.    Aerated layering secretions are seen within the right sphenoid sinus.   Otherwise, the paranasal sinuses and tympanomastoid cavities are clear.   The calvarium is intact. There is evidence of bilateral cataract removal.    IMPRESSION: No acute intracranial hemorrhage, mass effect, or shift of   the midline structures.    --- End of Report ---            JUDE FUNG MD; Attending Radiologist  This document has been electronically signed. Apr 19 2024  7:43AM    < end of copied text >

## 2024-04-19 NOTE — SWALLOW BEDSIDE ASSESSMENT ADULT - SWALLOW EVAL: DIAGNOSIS
Oral phase of swallow grossly functional for puree and liquids. Suspect pharyngeal dysphagia w/ all liquid consistencies marked by multiple swallows and weak cough post intake. No overt s/s of penetration/aspiration observed w/ puree. Pt declining solid PO intake @ this time.

## 2024-04-19 NOTE — H&P ADULT - NSHPLABSRESULTS_GEN_ALL_CORE
4.7    7.99  )-----------( 131      ( 19 Apr 2024 00:50 )             14.6     04-18    139  |  100  |  139.5<H>  ----------------------------<  141<H>  4.3   |  18.0<L>  |  5.09<H>    Ca    7.2<L>      18 Apr 2024 23:25    TPro  3.9<L>  /  Alb  2.6<L>  /  TBili  0.2<L>  /  DBili  x   /  AST  11  /  ALT  <5  /  AlkPhos  28<L>  04-18

## 2024-04-19 NOTE — CONSULT NOTE ADULT - ASSESSMENT
Pt with hypotension and hypothermia from sepsis possibly urosepsis and not from her anemia. Pt. unable to provide any history on her own. At some point she should have EGD and colonoscopy if and when she becomes medically optimized for these procedures. Pt with normochromic normocytic indices. Iron studies ordered. IV Pantoprazole for GI mucosal cytoprotection. Transfuse to Hb of 8 grams or higher. Head CT: Negative. Pt with hypotension and hypothermia from sepsis possibly urosepsis and not from her anemia. Pt. unable to provide any history on her own. At some point she should have EGD and colonoscopy if and when she becomes medically optimized for these procedures. Pt with normochromic normocytic indices. Iron studies ordered. IV Pantoprazole for GI mucosal cytoprotection. Transfuse to Hb of 8 grams or higher. Head CT: Negative. Pt on Coumadin for unclear reasons with Coumadin induced coagulopathy. Also with Renal failure.

## 2024-04-19 NOTE — ED ADULT NURSE REASSESSMENT NOTE - NS ED NURSE REASSESS COMMENT FT1
rec pt. from RN HO. pt. is awake, responds to stimuli. 1 unit prbc administered to pt rapidly as pt. was hypotensive. pt. responded well to prbc, no transfusion reactions noted. pending micu consult. safety maintained.
A+O X 3, comfortable.  Brother at bedside.  Respirations even and unlabored, denies pain.  Cardiac monitor showing NSR and stable blood pressure.  No bowel movement or active bleeding since start of shift 0700.  Gee catheter draining cloudy, pale yellow urine 30ml in bag (200ml emptied approximately 1 hr ago)  MICU and nephrology aware of decreased urine output.  IV fluids infusing well through right IJ TLC.  Side rails up, stretcher in lowest position.
Received report and assumed pt care at 0730.  Pt is awake, follows commands and is alert to person place and year.  Turned and positioned.  No active GI bleeding present.  Gee catheter draining cloudy, pale yellow urine 20ml in bag.  Al blood products ordered have infused without incident.  Failed swallow evaluation (see flowsheet).  Side rails up.  Pt awaiting MICU bed.

## 2024-04-19 NOTE — H&P ADULT - NSHPPHYSICALEXAM_GEN_ALL_CORE
T(C): 35.3 (04-18-24 @ 23:15), Max: 35.3 (04-18-24 @ 23:15)  HR: 97 (04-19-24 @ 01:02) (81 - 97)  BP: 110/67 (04-19-24 @ 01:02) (72/39 - 137/104)  RR: 20 (04-19-24 @ 01:02) (16 - 24)  SpO2: 100% (04-19-24 @ 01:02) (98% - 100%)    CONSTITUTIONAL: Elderly female, no acute distress   RESP: No respiratory distress, CTA b/l  CV: RRR  GI: Soft, diffusely tender to palpation   SKIN: No rashes or ulcers noted  NEURO: Somnolent but responsive to painful stimuli, does not follow commands

## 2024-04-19 NOTE — SWALLOW BEDSIDE ASSESSMENT ADULT - SWALLOW EVAL: RECOMMENDED FEEDING/EATING TECHNIQUES
allow for swallow between intakes/crush medication (when feasible)/oral hygiene/position upright (90 degrees)

## 2024-04-19 NOTE — H&P ADULT - CRITICAL CARE ATTENDING COMMENT
Pt seen w PA and agree w above.  Pt is an 84 yo female PMHx sig for esrd failing renal txplt, dementia, anemia, HTN, sent from NH for worsening of renal function and AMS.  Pt found to be profoundly anemic w + occult blood in the stool. Initially hypotensive but responded to PRBC transfusion.  Received 2U PRBC's in ED, 3rd unit being administered as well as FFP.  Pt noted to be coagulopathic w INR >10.  Pt on Coumadin for unclear reason at this time.  Pt received Vit K and Kcentra for reversal.  If bleeding persists pt may require ct angio to localize the source of the bleeding in spite of worsened renal function.  Obtain GI and nephro evals.  Will Monitor in ICU and check serial Hgb levels and transfuse PRBC's PRN. Pt is critically ill and risk of end organ damage worsened renal function.

## 2024-04-19 NOTE — H&P ADULT - ASSESSMENT
83 year old female with a PMH of ESRD s/p renal transplant on mycophenolate and tacrolimus (per nursing home documents), HTN, HLD, anemia, dementia who presented to the ED from [facility] for elevated BUN/Cr on outpatient labs.    Problem list:   ABLA  GI bleed  Elevated INR  Lactic acidosis  GLO on CKD     Neuro: Unknown baseline mental status.  Somnolent on exam.  Likely component of metabolic/uremic encephalopathy.   Avoid sedating or deliriogenic medications.      CV: BP improving after PRBC transfusion.  Low threshold to initiate vasopressors if needed to maintain MAP >65.   Pulm: Saturating well on room air.  Supplemental O2 prn to maintain spO2 >92%.   GI: NPO for now.  Stool occult positive.  IV Protonix BID.  Obtain non contrast CT abd/pel for further eval given tenderness on exam.  GI consult.   Renal: BUN/Cr from 3/29 52/3.87. GLO on CKD likely pre-renal.  Place benson.  Monitor renal function and UOP.  Avoid nephrotoxins.  May require HD if no improvement.    Heme: Profoundly anemic to 2.5 on initial labs (confirmed by ED that patient was not receiving IVF when this was drawn).  S/p 3U PRBC in ED. INR elevated to 12.19 on initial labs.  On Coumadin 5 mg as an outpatient.  Given  KCentra and Vitamin K in ED.  Hold off on AC/chemical DVT prophylaxis for now given ABLA.  Trend H/H.  Transfuse further for hgb <7 or overt bleeding.   ID: Given hypothermia and history of renal transplant on immunosuppressants will cover empirically with zosyn.  Received a dose of vanco in the ED.  F/u blood cx.  Check ua/ucx, MRSA swab.       Endocrine: Check TFTs.  q6 hr fingersticks while NPO.  Goal blood glucose 110-180.    Dispo: Admit to MICU.  Case discussed w/ attending Dr. Og.     50 minutes of critical care time spent assessing presenting problems of acute illness, which pose high probability of life threatening deterioration or end organ damage/dysfunction, as well as medical decision making including initiating plan of care, reviewing data, reviewing radiologic exams, discussing with multidisciplinary team,  discussing goals of care with patient/family, and writing this note.  Non-inclusive of procedures performed.  Date of entry of this note is equal to the date of services rendered.  83 year old female with a PMH of ESRD s/p renal transplant on mycophenolate and tacrolimus (per nursing home documents), HTN, HLD, anemia, dementia who presented to the ED from [facility] for elevated BUN/Cr on outpatient labs.    Problem list:   ABLA  GI bleed  Elevated INR  Thrombocytopenia  Lactic acidosis  GLO on CKD     Neuro: Unknown baseline mental status.  Somnolent on exam.  Likely component of metabolic/uremic encephalopathy.   Avoid sedating or deliriogenic medications.      CV: BP improving after PRBC transfusion.  Low threshold to initiate vasopressors if needed to maintain MAP >65.   Pulm: Saturating well on room air.  Supplemental O2 prn to maintain spO2 >92%.   GI: NPO for now.  Stool occult positive.  IV Protonix BID.  Obtain non contrast CT abd/pel for further eval given tenderness on exam.  GI consult.   Renal: BUN/Cr from 3/29 52/3.87. GLO on CKD likely pre-renal.  Place benson.  Monitor renal function and UOP.  Avoid nephrotoxins.  May require HD if no improvement.    Heme: Profoundly anemic to 2.5 on initial labs (confirmed by ED that patient was not receiving IVF when this was drawn).  S/p 2U PRBC in ED. Ordered for an additional unit of PRBC as well as FFP and platelets.  INR elevated to 12.19 on initial labs.  On Coumadin 5 mg as an outpatient.  Given  KCentra and Vitamin K in ED.  Hold off on AC/chemical DVT prophylaxis for now given ABLA.  Trend H/H.  Transfuse further for hgb <7 or overt bleeding.   ID: Given hypothermia and history of renal transplant on immunosuppressants will cover empirically with zosyn.  Received a dose of vanco in the ED.  F/u blood cx.  Check ua/ucx, MRSA swab.       Endocrine: Check TFTs.  q6 hr fingersticks while NPO.  Goal blood glucose 110-180.    Dispo: Admit to MICU.  Case discussed w/ attending Dr. Og.     50 minutes of critical care time spent assessing presenting problems of acute illness, which pose high probability of life threatening deterioration or end organ damage/dysfunction, as well as medical decision making including initiating plan of care, reviewing data, reviewing radiologic exams, discussing with multidisciplinary team,  discussing goals of care with patient/family, and writing this note.  Non-inclusive of procedures performed.  Date of entry of this note is equal to the date of services rendered.  83 year old female with a PMH of ESRD s/p renal transplant on mycophenolate and tacrolimus (per nursing home documents), HTN, HLD, anemia, dementia who presented to the ED from [facility] for elevated BUN/Cr on outpatient labs.    Problem list:   ABLA  GI bleed  Elevated INR  Thrombocytopenia  Lactic/metabolic acidosis  GLO on CKD     Neuro: Unknown baseline mental status.  Somnolent on exam.  Likely component of metabolic/uremic encephalopathy.   Avoid sedating or deliriogenic medications.      CV: BP improving after PRBC transfusion.  Low threshold to initiate vasopressors if needed to maintain MAP >65.   Pulm: Saturating well on room air.  Supplemental O2 prn to maintain spO2 >92%.   GI: NPO for now.  Stool occult positive.  IV Protonix BID.  Obtain non contrast CT abd/pel for further eval given tenderness on exam.  GI consult.   Renal: BUN/Cr from 3/29 52/3.87. GLO on CKD likely pre-renal.  Place benson.  Monitor renal function and UOP.  Avoid nephrotoxins.  May require HD if no improvement.    Heme: Profoundly anemic to 2.5 on initial labs (confirmed by ED that patient was not receiving IVF when this was drawn).  S/p 2U PRBC in ED. Ordered for an additional unit of PRBC as well as FFP and platelets.  INR elevated to 12.19 on initial labs.  On Coumadin 5 mg as an outpatient.  Given  KCentra and Vitamin K in ED.  Hold off on AC/chemical DVT prophylaxis for now given ABLA.  Trend H/H.  Transfuse further for hgb <7 or overt bleeding.   ID: Given hypothermia and history of renal transplant on immunosuppressants will cover empirically with zosyn.  Received a dose of vanco in the ED.  F/u blood cx.  Check ua/ucx, MRSA swab.       Endocrine: Check TFTs.  q6 hr fingersticks while NPO.  Goal blood glucose 110-180.    Dispo: Admit to MICU.  Case discussed w/ attending Dr. Og.     50 minutes of critical care time spent assessing presenting problems of acute illness, which pose high probability of life threatening deterioration or end organ damage/dysfunction, as well as medical decision making including initiating plan of care, reviewing data, reviewing radiologic exams, discussing with multidisciplinary team,  discussing goals of care with patient/family, and writing this note.  Non-inclusive of procedures performed.  Date of entry of this note is equal to the date of services rendered.

## 2024-04-19 NOTE — CHART NOTE - NSCHARTNOTEFT_GEN_A_CORE
HPI:      Patient assessed at bedside, AOx1 to self and birthdate and responds to commands and ROS questions.    #ABLA  #GI bleed  #Elevated INR  #Thrombocytopenia  #Lactic/metabolic acidosis  #GLO on CKD     - pending nephro consult regarding immunosuppressant medication for kidney, and for renal function  - pending RUQ ultrasound, distended gallbladder seen on CT scan, no pain per patient  - s/p 3 pRBCs, 1FFP, 1 platelets  - s/p Vit K, Kaycentra since patient is on warfarn, INR 12 on admission, now 1.9  - midline to be removed  - c/w zosyn  - c/w LR IVF  - restart tacrolimus and mycophenolate

## 2024-04-19 NOTE — ED ADULT NURSE REASSESSMENT NOTE - TEMPLATE LIST FOR HEAD TO TOE ASSESSMENT
Apply topical creams to the face and the affected areas.  Wash the area once daily keep clean and dry and you may thoroughly dry the skin with a hair dryer on cool setting  Follow-up with your primary care provider if not getting good resolution of new symptoms or concerns arise      Patient Education     Understanding Seborrheic Dermatitis    Seborrheic dermatitis is a common type of rash that causes red, scaly, greasy skin. It occurs on skin that has oil glands. These include the face, upper chest, and scalp, where it is often called dandruff. It tends to last a long time, or go away and come back. Seborrheic dermatitis is not spread from person to person.    How to say it  [id number]  What causes seborrheic dermatitis?  Experts don't know what causes it. It may be partly caused by a type of yeast that grows on skin, along with extra oil production. Experts are still learning more. It s more common in men than women, and it can occur at any age. It happens more often in people with HIV/AIDS, Parkinson disease, alcoholic pancreatitis, hepatitis, or cancer. It can also get worse during times of stress.   Symptoms of seborrheic dermatitis  Symptoms can include skin that is:    Bumpy    Covered with yellow scales or crusts    Cracked    Greasy    Itchy    Leaking fluid    Painful    Red or orange  These symptoms can occur on skin:    Around the nose    Behind the ears    In the beard    In the eyebrows    On the scalp, also known as dandruff    On the upper chest  You may also have acne, inflamed eyelids (blepharitis), or other skin conditions at the same time.   Treatment for seborrheic dermatitis  Treatment can reduce symptoms for a period of time. The types of treatments most often used include:     Antifungal shampoo, body wash, or cream. These contain medicines such as ketoconazole, fluconazole, selenium sulfide, ciclopirox, pyrithione zinc, or tea tree oil.    Corticosteroid cream or ointment. 
These contain medicines such as hydrocortisone.    Calcineurin inhibitor cream or ointment. These contain medicines such as pimecrolimus or tacrolimus.    Shampoo or cream with other medicines. These contain medicines such as coal tar, salicylic acid, or zinc pyrithione.    Sodium sulfacetemide creams and washes. These may also help.  In some cases one treatment will stop working after a while. Switching between treatments every few months may be helpful.   Wash your skin gently. You can remove scales with oil and gentle rubbing or a brush.  Living with seborrheic dermatitis  Seborrheic dermatitis is an ongoing (chronic) condition. It can go away and then come back. You will likely need to use shampoo, cream, or ointment with medicine once or twice a week. This can help to keep symptoms from coming back or getting worse.   When to call your healthcare provider  Call your healthcare provider right away if you have any of these:    Symptoms that don t get better, or get worse    New symptoms  Andreas last reviewed this educational content on 11/1/2019 2000-2021 [employer]. All rights reserved. This information is not intended as a substitute for professional medical care. Always follow your healthcare professional's instructions.           
Neuro

## 2024-04-19 NOTE — SWALLOW BEDSIDE ASSESSMENT ADULT - SLP PERTINENT HISTORY OF CURRENT PROBLEM
As per MD note: "83 year old female with a PMH of ESRD s/p renal transplant on mycophenolate and tacrolimus (per nursing home documents), HTN, HLD, anemia, dementia who presented to the ED from [facility] for elevated BUN/Cr on outpatient labs.  Upon arrival to the ED patient was found to be hypotensive and hypothermic.  Workup was significant for profound anemia, elevated INR, lactic acidosis, and an GLO on CKD.  MICU consulted for further management."

## 2024-04-19 NOTE — H&P ADULT - HISTORY OF PRESENT ILLNESS
83 year old female with a PMH of ESRD s/p renal transplant on mycophenolate and tacrolimus (per nursing home documents), HTN, HLD, anemia, dementia who presented to the ED from [facility] for elevated BUN/Cr on outpatient labs.  Upon arrival to the ED patient was found to be hypotensive and hypothermic.  Workup was significant for profound anemia, elevated INR, lactic acidosis, and an GLO on CKD.  MICU consulted for further management.

## 2024-04-19 NOTE — PHARMACOTHERAPY INTERVENTION NOTE - COMMENTS
Referenced facility paperwork to obtain medication list     Outpatient Medication Review updated.    HOME MEDICATIONS:  amLODIPine 10 mg oral tablet: 1 tab(s) orally once a day (19 Apr 2024 08:19)  bisacodyl 10 mg rectal suppository: 1 suppository(ies) rectally once a day (19 Apr 2024 08:19)  calcitriol 0.25 mcg oral capsule: 2 cap(s) orally once a day (19 Apr 2024 08:19)  calcium acetate 667 mg oral capsule: 3 cap(s) orally 3 times a day (19 Apr 2024 08:19)  carvedilol 6.25 mg oral tablet: 1 tab(s) orally every 12 hours (19 Apr 2024 08:20)  clopidogrel 75 mg oral tablet: 1 tab(s) orally once a day (19 Apr 2024 08:20)  ergocalciferol 1.25 mg (50,000 intl units) oral capsule: 1 cap(s) orally once a week (19 Apr 2024 08:20)  ferrous sulfate 324 mg (65 mg elemental iron) oral tablet: 1 tab(s) orally once a day (19 Apr 2024 08:20)  hydrALAZINE 25 mg oral tablet: 1 tab(s) orally every 8 hours (19 Apr 2024 08:20)  mirtazapine 15 mg oral tablet: 0.5 tab(s) orally once a day (19 Apr 2024 08:20)  mycophenolate mofetil 250 mg oral capsule: 2 cap(s) orally every 12 hours (19 Apr 2024 08:20)  pantoprazole 40 mg oral delayed release tablet: 1 tab(s) orally 2 times a day (19 Apr 2024 08:20)  [place] Milk of Magnesia 1200 mg/15 mL oral liquid: 30 milliliter(s) orally once a day as needed (19 Apr 2024 08:20)  predniSONE 5 mg oral tablet: 1 tab(s) orally once a day (19 Apr 2024 08:20)  Retacrit 10,000 units/mL injectable solution: 2 milliliter(s) subcutaneously once a week (19 Apr 2024 08:20)  sodium bicarbonate 650 mg oral tablet: 3 tab(s) orally 2 times a day (19 Apr 2024 08:20)  tacrolimus 1 mg oral capsule: 2 cap(s) orally every 12 hours (19 Apr 2024 08:20)  Venofer 20 mg/mL intravenous solution: 200 milligram(s) intravenously once a day (19 Apr 2024 08:20)  warfarin 5 mg oral tablet: 1 tab(s) orally once a day (19 Apr 2024 08:20)

## 2024-04-19 NOTE — PATIENT PROFILE ADULT - FALL HARM RISK - HARM RISK INTERVENTIONS
Assistance with ambulation/Assistance OOB with selected safe patient handling equipment/Communicate Risk of Fall with Harm to all staff/Discuss with provider need for PT consult/Monitor gait and stability/Reinforce activity limits and safety measures with patient and family/Tailored Fall Risk Interventions/Visual Cue: Yellow wristband and red socks/Bed in lowest position, wheels locked, appropriate side rails in place/Call bell, personal items and telephone in reach/Instruct patient to call for assistance before getting out of bed or chair/Non-slip footwear when patient is out of bed/[place] to call system/Physically safe environment - no spills, clutter or unnecessary equipment/Purposeful Proactive Rounding/Room/bathroom lighting operational, light cord in reach

## 2024-04-19 NOTE — CONSULT NOTE ADULT - SUBJECTIVE AND OBJECTIVE BOX
Patient is a 83y old  Female who presents with a chief complaint of      HPI:  83 year old female with a PMH of ESRD s/p renal transplant on mycophenolate and tacrolimus (per nursing home documents), HTN, HLD, anemia, dementia who presented to the ED from [facility] for elevated BUN/Cr on outpatient labs.  Upon arrival to the ED patient was found to be hypotensive and hypothermic.  Workup was significant for profound anemia, elevated INR, lactic acidosis, and an GLO on CKD.  MICU consulted for further management. She was found to have GLO on CKD 5 and had renal transplant in the past.  She knows her name.  She is not producing much urine.  Her transplant was done at [place].        PAST MEDICAL & SURGICAL HISTORY:       FAMILY HISTORY:  NC    Social History:Non smoker    MEDICATIONS  (STANDING):  ferrous    sulfate 325 milliGRAM(s) Oral daily  lactated ringers. 1000 milliLiter(s) (75 mL/Hr) IV Continuous <Continuous>  pantoprazole  Injectable 40 milliGRAM(s) IV Push two times a day  piperacillin/tazobactam IVPB.. 3.375 Gram(s) IV Intermittent every 12 hours  predniSONE   Tablet 5 milliGRAM(s) Oral daily  tacrolimus 2 milliGRAM(s) Oral <User Schedule>    MEDICATIONS  (PRN):   Meds reviewed    Allergies    No Known Allergies    Intolerances         REVIEW OF SYSTEMS:    Review of Systems:   Constitutional: Denies fatigue  HEENT: Denies headaches and dizziness  Respiratory: denies SOB, cough, or wheezing  Cardiovascular: denies CP, palpitations  Gastrointestinal: Denies nausea, denies vomiting, diarrhea, constipation, abdominal pain, or bloody stools  Genitourinary: denies painful urination, increased frequency, urgency, or bloody urine  Skin: denies rashes or itching  Musculoskeletal: denies muscle aches, joint swelling  Neurologic: Denies generalized weakness, denies loss of sensation, numbness, or tingling      Vital Signs Last 24 Hrs  T(C): 36.6 (19 Apr 2024 15:08), Max: 36.9 (19 Apr 2024 06:42)  T(F): 97.9 (19 Apr 2024 15:08), Max: 98.4 (19 Apr 2024 06:42)  HR: 90 (19 Apr 2024 15:08) (81 - 102)  BP: 135/74 (19 Apr 2024 15:08) (72/39 - 147/80)  BP(mean): 90 (19 Apr 2024 15:08) (49 - 94)  RR: 18 (19 Apr 2024 15:08) (16 - 25)  SpO2: 100% (19 Apr 2024 15:08) (98% - 100%)    Parameters below as of 19 Apr 2024 15:08  Patient On (Oxygen Delivery Method): room air      Daily     Daily     PHYSICAL EXAM:    GENERAL: frail and ill appearing  HEAD:  Atraumatic, Normocephalic  EYES: EOMI, conjunctiva and sclera clear  ENMT: No Drainage from nares, No drainage from ears  NERVOUS SYSTEM:  Awake and Alert  CHEST/LUNG: Clear to percussion bilaterally  EXTREMITIES:  No Edema  SKIN: No rashes No obvious ecchymosis      LABS:                        7.6    13.25 )-----------( 139      ( 19 Apr 2024 11:30 )             21.5     04-19    140  |  102  |  136.6<H>  ----------------------------<  106<H>  4.5   |  21.0<L>  |  5.12<H>    Ca    7.5<L>      19 Apr 2024 11:30  Phos  4.3     04-19  Mg     1.4     04-19    TPro  4.8<L>  /  Alb  3.0<L>  /  TBili  0.6  /  DBili  x   /  AST  18  /  ALT  10  /  AlkPhos  38<L>  04-19    PT/INR - ( 19 Apr 2024 06:50 )   PT: 13.1 sec;   INR: 1.19 ratio         PTT - ( 19 Apr 2024 06:50 )  PTT:25.5 sec  Urinalysis Basic - ( 19 Apr 2024 11:30 )    Color: x / Appearance: x / SG: x / pH: x  Gluc: 106 mg/dL / Ketone: x  / Bili: x / Urobili: x   Blood: x / Protein: x / Nitrite: x   Leuk Esterase: x / RBC: x / WBC x   Sq Epi: x / Non Sq Epi: x / Bacteria: x      Magnesium: 1.4 mg/dL (04-19 @ 06:50)  Phosphorus: 4.3 mg/dL (04-19 @ 06:50)          RADIOLOGY & ADDITIONAL TESTS:

## 2024-04-19 NOTE — ED PROCEDURE NOTE - ATTENDING CONTRIBUTION TO CARE
Natalee: I was present and available for supervision of the critical portion of the procedure. Natalee: I was present and available for supervision of the critical portion of the procedure. confirmed by cvp, vbg, cxr.

## 2024-04-19 NOTE — CONSULT NOTE ADULT - ASSESSMENT
GLO on CKD5  S/P Renal Transplant  Metabolic Acidosis/Lactic Acidosis  Anemia  GI bleed    -She is nearing the end of the life of her renal transplant  -Suspect her BUN is more from GI bleeding than from GLO and she does not appear uremic  -S/P PRBCs with improvement in hgb, but creatinine relatively unchanged  -Change tacrolimus to 1mg BID  -Hold MMF  -Cont Pred  -Tacrolimus level pending  -Can give dose of lasix to monitor for any response in UOP  -Recommend palliative eval    d/w MICU  Critical Care time 35 minutes between seeing patient, discussing with staff, placing orders and reviewing chart   GLO on CKD5  S/P Renal Transplant  Metabolic Acidosis/Lactic Acidosis  Anemia  GI bleed    -She is nearing the end of the life of her renal transplant  -Suspect her BUN is more from GI bleeding than from GLO and she does not appear uremic  -S/P PRBCs with improvement in hgb, but creatinine relatively unchanged  -Change tacrolimus to 1mg BID  -Hold MMF  -Cont Pred  -Tacrolimus level pending  -Can give dose of lasix to monitor for any response in UOP  -Recommend palliative eval  -No emergent indication for dialysis    d/w MICU  Critical Care time 35 minutes between seeing patient, discussing with staff, placing orders and reviewing chart

## 2024-04-19 NOTE — PROVIDER CONTACT NOTE (MEDICATION) - SITUATION
Per formal Swallow eval results, pt cannot have liquids.  Please change NPO except meds with sips of water/ice chips

## 2024-04-20 LAB
-  COAGULASE NEGATIVE STAPHYLOCOCCUS: SIGNIFICANT CHANGE UP
ALBUMIN SERPL ELPH-MCNC: 2.9 G/DL — LOW (ref 3.3–5.2)
ALP SERPL-CCNC: 37 U/L — LOW (ref 40–120)
ALT FLD-CCNC: 7 U/L — SIGNIFICANT CHANGE UP
ANION GAP SERPL CALC-SCNC: 17 MMOL/L — SIGNIFICANT CHANGE UP (ref 5–17)
AST SERPL-CCNC: 14 U/L — SIGNIFICANT CHANGE UP
BASOPHILS # BLD AUTO: 0.01 K/UL — SIGNIFICANT CHANGE UP (ref 0–0.2)
BASOPHILS NFR BLD AUTO: 0.1 % — SIGNIFICANT CHANGE UP (ref 0–2)
BILIRUB SERPL-MCNC: 0.5 MG/DL — SIGNIFICANT CHANGE UP (ref 0.4–2)
BUN SERPL-MCNC: 120 MG/DL — HIGH (ref 8–20)
CALCIUM SERPL-MCNC: 7.4 MG/DL — LOW (ref 8.4–10.5)
CHLORIDE SERPL-SCNC: 100 MMOL/L — SIGNIFICANT CHANGE UP (ref 96–108)
CO2 SERPL-SCNC: 23 MMOL/L — SIGNIFICANT CHANGE UP (ref 22–29)
CREAT SERPL-MCNC: 4.67 MG/DL — HIGH (ref 0.5–1.3)
CULTURE RESULTS: ABNORMAL
CULTURE RESULTS: NO GROWTH — SIGNIFICANT CHANGE UP
EGFR: 9 ML/MIN/1.73M2 — LOW
EOSINOPHIL # BLD AUTO: 0.03 K/UL — SIGNIFICANT CHANGE UP (ref 0–0.5)
EOSINOPHIL NFR BLD AUTO: 0.3 % — SIGNIFICANT CHANGE UP (ref 0–6)
GLUCOSE SERPL-MCNC: 89 MG/DL — SIGNIFICANT CHANGE UP (ref 70–99)
GRAM STN FLD: ABNORMAL
HCT VFR BLD CALC: 19.3 % — CRITICAL LOW (ref 34.5–45)
HCT VFR BLD CALC: 19.6 % — CRITICAL LOW (ref 34.5–45)
HCT VFR BLD CALC: 22.9 % — LOW (ref 34.5–45)
HGB BLD-MCNC: 6.9 G/DL — CRITICAL LOW (ref 11.5–15.5)
HGB BLD-MCNC: 7.1 G/DL — LOW (ref 11.5–15.5)
HGB BLD-MCNC: 8.2 G/DL — LOW (ref 11.5–15.5)
IMM GRANULOCYTES NFR BLD AUTO: 4.9 % — HIGH (ref 0–0.9)
LYMPHOCYTES # BLD AUTO: 0.63 K/UL — LOW (ref 1–3.3)
LYMPHOCYTES # BLD AUTO: 6.7 % — LOW (ref 13–44)
MAGNESIUM SERPL-MCNC: 1.8 MG/DL — SIGNIFICANT CHANGE UP (ref 1.6–2.6)
MCHC RBC-ENTMCNC: 31.8 PG — SIGNIFICANT CHANGE UP (ref 27–34)
MCHC RBC-ENTMCNC: 31.8 PG — SIGNIFICANT CHANGE UP (ref 27–34)
MCHC RBC-ENTMCNC: 32 PG — SIGNIFICANT CHANGE UP (ref 27–34)
MCHC RBC-ENTMCNC: 35.8 GM/DL — SIGNIFICANT CHANGE UP (ref 32–36)
MCHC RBC-ENTMCNC: 35.8 GM/DL — SIGNIFICANT CHANGE UP (ref 32–36)
MCHC RBC-ENTMCNC: 36.2 GM/DL — HIGH (ref 32–36)
MCV RBC AUTO: 88.3 FL — SIGNIFICANT CHANGE UP (ref 80–100)
MCV RBC AUTO: 88.8 FL — SIGNIFICANT CHANGE UP (ref 80–100)
MCV RBC AUTO: 88.9 FL — SIGNIFICANT CHANGE UP (ref 80–100)
METHOD TYPE: SIGNIFICANT CHANGE UP
MONOCYTES # BLD AUTO: 0.71 K/UL — SIGNIFICANT CHANGE UP (ref 0–0.9)
MONOCYTES NFR BLD AUTO: 7.5 % — SIGNIFICANT CHANGE UP (ref 2–14)
MRSA PCR RESULT.: SIGNIFICANT CHANGE UP
NEUTROPHILS # BLD AUTO: 7.59 K/UL — HIGH (ref 1.8–7.4)
NEUTROPHILS NFR BLD AUTO: 80.5 % — HIGH (ref 43–77)
ORGANISM # SPEC MICROSCOPIC CNT: ABNORMAL
ORGANISM # SPEC MICROSCOPIC CNT: SIGNIFICANT CHANGE UP
PHOSPHATE SERPL-MCNC: 4 MG/DL — SIGNIFICANT CHANGE UP (ref 2.4–4.7)
PLATELET # BLD AUTO: 119 K/UL — LOW (ref 150–400)
PLATELET # BLD AUTO: 127 K/UL — LOW (ref 150–400)
PLATELET # BLD AUTO: 135 K/UL — LOW (ref 150–400)
POTASSIUM SERPL-MCNC: 3.5 MMOL/L — SIGNIFICANT CHANGE UP (ref 3.5–5.3)
POTASSIUM SERPL-SCNC: 3.5 MMOL/L — SIGNIFICANT CHANGE UP (ref 3.5–5.3)
PROT SERPL-MCNC: 4.8 G/DL — LOW (ref 6.6–8.7)
RBC # BLD: 2.17 M/UL — LOW (ref 3.8–5.2)
RBC # BLD: 2.22 M/UL — LOW (ref 3.8–5.2)
RBC # BLD: 2.58 M/UL — LOW (ref 3.8–5.2)
RBC # FLD: 14.9 % — HIGH (ref 10.3–14.5)
RBC # FLD: 14.9 % — HIGH (ref 10.3–14.5)
RBC # FLD: 15.1 % — HIGH (ref 10.3–14.5)
S AUREUS DNA NOSE QL NAA+PROBE: SIGNIFICANT CHANGE UP
SODIUM SERPL-SCNC: 139 MMOL/L — SIGNIFICANT CHANGE UP (ref 135–145)
SPECIMEN SOURCE: SIGNIFICANT CHANGE UP
TACROLIMUS SERPL-MCNC: 4.8 NG/ML — SIGNIFICANT CHANGE UP
WBC # BLD: 8.39 K/UL — SIGNIFICANT CHANGE UP (ref 3.8–10.5)
WBC # BLD: 8.79 K/UL — SIGNIFICANT CHANGE UP (ref 3.8–10.5)
WBC # BLD: 9.43 K/UL — SIGNIFICANT CHANGE UP (ref 3.8–10.5)
WBC # FLD AUTO: 8.39 K/UL — SIGNIFICANT CHANGE UP (ref 3.8–10.5)
WBC # FLD AUTO: 8.79 K/UL — SIGNIFICANT CHANGE UP (ref 3.8–10.5)
WBC # FLD AUTO: 9.43 K/UL — SIGNIFICANT CHANGE UP (ref 3.8–10.5)

## 2024-04-20 PROCEDURE — 99497 ADVNCD CARE PLAN 30 MIN: CPT

## 2024-04-20 PROCEDURE — 93306 TTE W/DOPPLER COMPLETE: CPT | Mod: 26

## 2024-04-20 PROCEDURE — 99232 SBSQ HOSP IP/OBS MODERATE 35: CPT

## 2024-04-20 PROCEDURE — 99233 SBSQ HOSP IP/OBS HIGH 50: CPT | Mod: GC

## 2024-04-20 PROCEDURE — 99233 SBSQ HOSP IP/OBS HIGH 50: CPT

## 2024-04-20 PROCEDURE — 71250 CT THORAX DX C-: CPT | Mod: 26

## 2024-04-20 RX ORDER — MAGNESIUM SULFATE 500 MG/ML
1 VIAL (ML) INJECTION ONCE
Refills: 0 | Status: COMPLETED | OUTPATIENT
Start: 2024-04-20 | End: 2024-04-20

## 2024-04-20 RX ORDER — VANCOMYCIN HCL 1 G
500 VIAL (EA) INTRAVENOUS ONCE
Refills: 0 | Status: COMPLETED | OUTPATIENT
Start: 2024-04-20 | End: 2024-04-20

## 2024-04-20 RX ORDER — AMLODIPINE BESYLATE 2.5 MG/1
10 TABLET ORAL DAILY
Refills: 0 | Status: DISCONTINUED | OUTPATIENT
Start: 2024-04-20 | End: 2024-05-01

## 2024-04-20 RX ORDER — CHLORHEXIDINE GLUCONATE 213 G/1000ML
1 SOLUTION TOPICAL DAILY
Refills: 0 | Status: DISCONTINUED | OUTPATIENT
Start: 2024-04-20 | End: 2024-05-01

## 2024-04-20 RX ADMIN — Medication 50 MILLILITER(S): at 05:09

## 2024-04-20 RX ADMIN — Medication 100 GRAM(S): at 08:39

## 2024-04-20 RX ADMIN — PIPERACILLIN AND TAZOBACTAM 25 GRAM(S): 4; .5 INJECTION, POWDER, LYOPHILIZED, FOR SOLUTION INTRAVENOUS at 00:26

## 2024-04-20 RX ADMIN — Medication 5 MILLIGRAM(S): at 06:29

## 2024-04-20 RX ADMIN — Medication 325 MILLIGRAM(S): at 12:23

## 2024-04-20 RX ADMIN — TACROLIMUS 1 MILLIGRAM(S): 5 CAPSULE ORAL at 17:16

## 2024-04-20 RX ADMIN — Medication 50 MILLILITER(S): at 13:16

## 2024-04-20 RX ADMIN — AMLODIPINE BESYLATE 10 MILLIGRAM(S): 2.5 TABLET ORAL at 16:02

## 2024-04-20 RX ADMIN — PIPERACILLIN AND TAZOBACTAM 25 GRAM(S): 4; .5 INJECTION, POWDER, LYOPHILIZED, FOR SOLUTION INTRAVENOUS at 12:24

## 2024-04-20 RX ADMIN — Medication 100 MILLIGRAM(S): at 06:28

## 2024-04-20 RX ADMIN — PANTOPRAZOLE SODIUM 40 MILLIGRAM(S): 20 TABLET, DELAYED RELEASE ORAL at 05:09

## 2024-04-20 RX ADMIN — PANTOPRAZOLE SODIUM 40 MILLIGRAM(S): 20 TABLET, DELAYED RELEASE ORAL at 17:14

## 2024-04-20 RX ADMIN — TACROLIMUS 1 MILLIGRAM(S): 5 CAPSULE ORAL at 06:41

## 2024-04-20 NOTE — PROGRESS NOTE ADULT - SUBJECTIVE AND OBJECTIVE BOX
Patient is a 83y old  Female who presents with a chief complaint of      HPI:  83 year old female with a PMH of ESRD s/p renal transplant on mycophenolate and tacrolimus (per nursing home documents), HTN, HLD, anemia, dementia who presented to the ED from [facility] for elevated BUN/Cr on outpatient labs.  Upon arrival to the ED patient was found to be hypotensive and hypothermic.  Workup was significant for profound anemia, elevated INR, lactic acidosis, and an GLO on CKD.  MICU consulted for further management. She was found to have GLO on CKD 5 and had renal transplant in the past.  She knows her name.  She is not producing much urine.  Her transplant was done at [place].        More awake    PAST MEDICAL & SURGICAL HISTORY:       FAMILY HISTORY:  NC    Social History:Non smoker    MEDICATIONS  (STANDING):  ferrous    sulfate 325 milliGRAM(s) Oral daily  lactated ringers. 1000 milliLiter(s) (75 mL/Hr) IV Continuous <Continuous>  pantoprazole  Injectable 40 milliGRAM(s) IV Push two times a day  piperacillin/tazobactam IVPB.. 3.375 Gram(s) IV Intermittent every 12 hours  predniSONE   Tablet 5 milliGRAM(s) Oral daily  tacrolimus 2 milliGRAM(s) Oral <User Schedule>    MEDICATIONS  (PRN):   Meds reviewed    Allergies    No Known Allergies    Intolerances         REVIEW OF SYSTEMS:    as above      ICU Vital Signs Last 24 Hrs  T(C): 36.5 (20 Apr 2024 21:00), Max: 36.8 (20 Apr 2024 18:00)  T(F): 97.7 (20 Apr 2024 21:00), Max: 98.2 (20 Apr 2024 18:00)  HR: 89 (20 Apr 2024 21:00) (81 - 98)  BP: 136/74 (20 Apr 2024 21:00) (125/71 - 162/89)  BP(mean): 93 (20 Apr 2024 21:00) (87 - 109)  ABP: --  ABP(mean): --  RR: 21 (20 Apr 2024 21:00) (16 - 23)  SpO2: 100% (20 Apr 2024 21:00) (98% - 100%)    O2 Parameters below as of 20 Apr 2024 20:00  Patient On (Oxygen Delivery Method): room air            PHYSICAL EXAM:    GENERAL: frail and ill appearing  HEAD:  Atraumatic, Normocephalic  EYES: EOMI, conjunctiva and sclera clear  ENMT: No Drainage from nares, No drainage from ears  NERVOUS SYSTEM:  Awake and Alert  CHEST/LUNG: Clear to percussion bilaterally  EXTREMITIES:  No Edema  SKIN: No rashes No obvious ecchymosis      LABS:                                   8.2    8.39  )-----------( 119      ( 20 Apr 2024 16:06 )             22.9     04-20    139  |  100  |  120.0<H>  ----------------------------<  89  3.5   |  23.0  |  4.67<H>    Ca    7.4<L>      20 Apr 2024 04:20  Phos  4.0     04-20  Mg     1.8     04-20    TPro  4.8<L>  /  Alb  2.9<L>  /  TBili  0.5  /  DBili  x   /  AST  14  /  ALT  7   /  AlkPhos  37<L>  04-20    PT/INR - ( 19 Apr 2024 06:50 )   PT: 13.1 sec;   INR: 1.19 ratio         PTT - ( 19 Apr 2024 06:50 )  PTT:25.5 sec  Urinalysis Basic - ( 20 Apr 2024 04:20 )    Color: x / Appearance: x / SG: x / pH: x  Gluc: 89 mg/dL / Ketone: x  / Bili: x / Urobili: x   Blood: x / Protein: x / Nitrite: x   Leuk Esterase: x / RBC: x / WBC x   Sq Epi: x / Non Sq Epi: x / Bacteria: x                  RADIOLOGY & ADDITIONAL TESTS:

## 2024-04-20 NOTE — PROGRESS NOTE ADULT - CONVERSATION DETAILS
plan fo care discussed at length with patient , karthikeyan voss , zhane romero at bedside.   patient mildly confused. patient awake, alert x 2.  family wishes patient to be full code for now.   chest compressions , intubation, MOLST discussed .

## 2024-04-20 NOTE — PROGRESS NOTE ADULT - ASSESSMENT
83 year old female with a PMH of ESRD s/p renal transplant on mycophenolate and tacrolimus (per nursing home documents), HTN, HLD, anemia, dementia who presented to the ED from [facility] for elevated BUN/Cr on outpatient labs.    Problem list:   ABLA  GI bleed  Elevated INR  Thrombocytopenia  Lactic/metabolic acidosis  GLO on CKD     Neuro: Unknown baseline mental status.  Somnolent on exam.  Likely component of metabolic/uremic encephalopathy.   Avoid sedating or deliriogenic medications.      CV: BP improving after PRBC transfusion.  Low threshold to initiate vasopressors if needed to maintain MAP >65.   Pulm: Saturating well on room air.  Supplemental O2 prn to maintain spO2 >92%.   GI: NPO for now.  Stool occult positive.  IV Protonix BID.  Obtain non contrast CT abd/pel for further eval given tenderness on exam.  GI consult.   Renal: BUN/Cr from 3/29 52/3.87. GLO on CKD likely pre-renal.  Place benson.  Monitor renal function and UOP.  Avoid nephrotoxins.  May require HD if no improvement.    Heme: Profoundly anemic to 2.5 on initial labs (confirmed by ED that patient was not receiving IVF when this was drawn).  S/p 2U PRBC in ED. Ordered for an additional unit of PRBC as well as FFP and platelets.  INR elevated to 12.19 on initial labs.  On Coumadin 5 mg as an outpatient.  Given  KCentra and Vitamin K in ED.  Hold off on AC/chemical DVT prophylaxis for now given ABLA.  Trend H/H.  Transfuse further for hgb <7 or overt bleeding.   ID: Given hypothermia and history of renal transplant on immunosuppressants will cover empirically with zosyn.  Received a dose of vanco in the ED.  F/u blood cx.  Check ua/ucx, MRSA swab.       Endocrine: Check TFTs.  q6 hr fingersticks while NPO.  Goal blood glucose 110-180.    Dispo: Admit to MICU.  Case discussed w/ attending Dr. Og.     50 minutes of critical care time spent assessing presenting problems of acute illness, which pose high probability of life threatening deterioration or end organ damage/dysfunction, as well as medical decision making including initiating plan of care, reviewing data, reviewing radiologic exams, discussing with multidisciplinary team,  discussing goals of care with patient/family, and writing this note.  Non-inclusive of procedures performed.  Date of entry of this note is equal to the date of services rendered.  83 year old female with a PMH of ESRD s/p renal transplant on mycophenolate and tacrolimus (per nursing home documents), HTN, HLD, anemia, dementia who presented to the ED from [facility] for elevated BUN/Cr on outpatient labs.    Problem list:   ABLA  GI bleed  Elevated INR  Thrombocytopenia  Lactic/metabolic acidosis  GLO on CKD     Neuro: Unknown baseline mental status.  Somnolent on exam.  Likely component of metabolic/uremic encephalopathy.   Avoid sedating or deliriogenic medications.      CV: BP improving after PRBC transfusion.  Low threshold to initiate vasopressors if needed to maintain MAP >65.   Pulm: Saturating well on room air.  Supplemental O2 prn to maintain spO2 >92%.   GI: NPO for now.  Stool occult positive.  IV Protonix BID.  Obtain non contrast CT abd/pel for further eval given tenderness on exam.  GI consult.   Renal: BUN/Cr from 3/29 52/3.87. GLO on CKD likely pre-renal.  Place benson.  Monitor renal function and UOP.  Avoid nephrotoxins.  May require HD if no improvement.    Heme: Profoundly anemic to 2.5 on initial labs (confirmed by ED that patient was not receiving IVF when this was drawn).  S/p 2U PRBC in ED. Ordered for an additional unit of PRBC as well as FFP and platelets.  INR elevated to 12.19 on initial labs.  On Coumadin 5 mg as an outpatient.  Given  KCentra and Vitamin K in ED.  Hold off on AC/chemical DVT prophylaxis for now given ABLA.  Trend H/H.  Transfuse further for hgb <7 or overt bleeding.   ID: Given hypothermia and history of renal transplant on immunosuppressants will cover empirically with zosyn.  Received a dose of vanco in the ED.  F/u blood cx.  Check ua/ucx, MRSA swab.       Endocrine: Check TFTs.  q6 hr fingersticks while NPO.  Goal blood glucose 110-180.    Dispo: Admit to MICU.  Case discussed w/ attending Dr. Og.     - 1 pRBC  - nephro following  - goc conversation on monday with son if possible  - gallbladder - no pain, s/p RUQ US 83 year old female with a PMH of ESRD s/p renal transplant on mycophenolate and tacrolimus (per nursing home documents), HTN, HLD, anemia, dementia who presented to the ED from [facility] for elevated BUN/Cr on outpatient labs.    Problem list:   ABLA  GI bleed  Elevated INR  Thrombocytopenia  Lactic/metabolic acidosis  GLO on CKD     Neuro:   - Awake, alert and engaging in conversation.     CV:   - off vasopressors,   - will require another unit of pRBCs today.      Pulm:   - Saturating well on room air  - Supplemental O2 prn to maintain spO2 >92%.     GI:  -  Stool occult positive.   - IV Protonix BID.   - CT abd/pelvis demonstrated distended gallbladder.    - RUQ US demonstrated dilated CBD  - no pain on abdominal exam  - GI consult appreciated    Renal:  - GLO on CKD likely pre-renal. Cr is slightly decreased from admission, trending laterally  - benson.    - Monitor renal function and UOP. making urine  - nephrology following, pending GOC conversation with son on Monday    Heme:   - S/p 2U PRBC in ED. Ordered for an additional unit of PRBC as well as FFP and platelets.  INR elevated to 12.19 on initial labs.  On Coumadin 5 mg as an outpatient.  Given  KCentra and Vitamin K in ED.  Hold off on AC/chemical DVT prophylaxis for now given ABLA.    - Hgb 6.9 today and requiring 1 unit of pRBC, consent obtained from son Moe Gomez and agreed by patient    ID:  - s/p Given hypothermia and history of renal transplant on immunosuppressants will cover empirically with zosyn.    - s/p dose of vanco in the ED.    -blood cx + for caogulase positive staph, likely contaminant will repeat BCx tmrw    - ua/ucx negative  - MRSA swab negative         Endocrine:   - Check TFTs  - Diet advanced to puree no liquids  - ISS     DVT ppx: held pending INR improvement  Diet: DASH/TLC puree  Dispo: Acute 83 year old female with a PMH of ESRD s/p renal transplant on mycophenolate and tacrolimus (per nursing home documents), HTN, HLD, anemia, dementia who presented to the ED from [facility] for elevated BUN/Cr on outpatient labs, anemia, and AMS admitted to MICU for hypotension, anemia. Patient resuscitated with vasopressor, pRBCs, and fluids, stable for downgrade to stepdown.    Problem list:   ABLA  GI bleed  Elevated INR  Thrombocytopenia  Lactic/metabolic acidosis  GLO on CKD     Neuro:   - Awake, alert and engaging in conversation.     CV:   - off vasopressors,   - will require another unit of pRBCs today.      Pulm:   - Saturating well on room air  - Supplemental O2 prn to maintain spO2 >92%.     GI:  -  Stool occult positive.   - IV Protonix BID.   - CT abd/pelvis demonstrated distended gallbladder.    - RUQ US demonstrated dilated CBD  - no pain on abdominal exam  - GI consult appreciated    Renal:  - GLO on CKD likely pre-renal. Cr is slightly decreased from admission, trending laterally  - benson.    - Monitor renal function and UOP. making urine  - nephrology following, pending GOC conversation with son on Monday  - pending palliative consult    Heme:   - S/p 2U PRBC in ED. Ordered for an additional unit of PRBC as well as FFP and platelets.  INR elevated to 12.19 on initial labs.  On Coumadin 5 mg as an outpatient.  Given  KCentra and Vitamin K in ED.  Hold off on AC/chemical DVT prophylaxis for now given ABLA.    - Hgb 6.9 today and requiring 1 unit of pRBC, consent obtained from son Moe [place] and agreed by patient  - heme consulted pending    ID:  - s/p Given hypothermia and history of renal transplant on immunosuppressants will cover empirically with zosyn.    - s/p dose of vanco in the ED.    -blood cx + for caogulase positive staph, likely contaminant will repeat BCx tmrw    - ua/ucx negative  - MRSA swab negative         Endocrine:   - Check TFTs  - Diet advanced to puree no liquids  - ISS     DVT ppx: held   Diet: DASH/TLC puree  Dispo: Acute 83 year old female with a PMH of ESRD s/p renal transplant on mycophenolate and tacrolimus (per nursing home documents), HTN, HLD, anemia, dementia who presented to the ED from [facility] for elevated BUN/Cr on outpatient labs, anemia, and AMS admitted to MICU for hypotension, anemia. Patient resuscitated with vasopressor, pRBCs, and fluids, stable for downgrade to stepdown.    Problem list:   ABLA  GI bleed  Elevated INR  Thrombocytopenia  Lactic/metabolic acidosis  GLO on CKD     Neuro:   - Awake, alert and engaging in conversation.     CV:   - off vasopressors,   - will require another unit of pRBCs today.      Pulm:   - Saturating well on room air  - Supplemental O2 prn to maintain spO2 >92%.     GI:  -  Stool occult positive.   - IV Protonix BID.   - CT abd/pelvis demonstrated distended gallbladder.    - RUQ US demonstrated dilated CBD  - no pain on abdominal exam  - GI consult appreciated    Renal:  - GLO on CKD likely pre-renal. Cr is slightly decreased from admission, trending laterally  - benson.    - Monitor renal function and UOP. making urine  - nephrology following, pending GOC conversation with son on Monday  - pending palliative consult  - c/w tacrolimus  - mycophenolate held per nephro    Heme:   - S/p 2U PRBC in ED. Ordered for an additional unit of PRBC as well as FFP and platelets.  INR elevated to 12.19 on initial labs.  On Coumadin 5 mg as an outpatient.  Given  KCentra and Vitamin K in ED.  Hold off on AC/chemical DVT prophylaxis for now given ABLA.    - Hgb 6.9 today and requiring 1 unit of pRBC, consent obtained from son Moe Gomez and agreed by patient  - heme consulted pending    ID:  - s/p Given hypothermia and history of renal transplant on immunosuppressants will cover empirically with zosyn.    - s/p dose of vanco in the ED.    -blood cx + for caogulase positive staph, likely contaminant will repeat BCx tmrw    - ua/ucx negative  - MRSA swab negative         Endocrine:   - Check TFTs  - Diet advanced to puree no liquids  - ISS     DVT ppx: held   Diet: DASH/TLC puree  Dispo: Acute

## 2024-04-20 NOTE — PROGRESS NOTE ADULT - SUBJECTIVE AND OBJECTIVE BOX
INTERVAL HPI/OVERNIGHT EVENTS:    SUBJECTIVE: Patient seen and examined at bedside.     ROS: All negative except as listed above.    VITAL SIGNS:  ICU Vital Signs Last 24 Hrs  T(C): 36.4 (20 Apr 2024 07:00), Max: 36.9 (19 Apr 2024 17:55)  T(F): 97.5 (20 Apr 2024 07:00), Max: 98.4 (19 Apr 2024 17:55)  HR: 91 (20 Apr 2024 07:00) (81 - 96)  BP: 143/86 (20 Apr 2024 07:00) (125/71 - 147/80)  BP(mean): 103 (20 Apr 2024 07:00) (87 - 110)  ABP: --  ABP(mean): --  RR: 19 (20 Apr 2024 07:00) (17 - 26)  SpO2: 100% (20 Apr 2024 07:00) (98% - 100%)    O2 Parameters below as of 20 Apr 2024 04:00  Patient On (Oxygen Delivery Method): room air            Plateau pressure:   P/F ratio:     04-19 @ 07:01  -  04-20 @ 07:00  --------------------------------------------------------  IN: 1200 mL / OUT: 865 mL / NET: 335 mL      CAPILLARY BLOOD GLUCOSE          ECG: reviewed.    PHYSICAL EXAM:    GENERAL: NAD, lying in bed comfortably  HEAD:  Atraumatic, normocephalic  EYES: EOMI, PERRLA, conjunctiva and sclera clear  NECK: Supple, trachea midline, no JVD  HEART: Regular rate and rhythm, no murmurs, rubs, or gallops  LUNGS: Unlabored respirations.  Clear to auscultation bilaterally, no crackles, wheezing, or rhonchi  ABDOMEN: Soft, nontender, nondistended, +BS  EXTREMITIES: 2+ peripheral pulses bilaterally, cap refill<2 secs. No clubbing, cyanosis, or edema  NERVOUS SYSTEM:  A&Ox3, following commands, moving all extremities, no focal deficits   SKIN: No rashes or lesions    MEDICATIONS:  MEDICATIONS  (STANDING):  albumin human 25% IVPB 50 milliLiter(s) IV Intermittent every 8 hours  ferrous    sulfate 325 milliGRAM(s) Oral daily  furosemide   Injectable 60 milliGRAM(s) IV Push daily  lactated ringers. 1000 milliLiter(s) (75 mL/Hr) IV Continuous <Continuous>  pantoprazole  Injectable 40 milliGRAM(s) IV Push two times a day  piperacillin/tazobactam IVPB.. 3.375 Gram(s) IV Intermittent every 12 hours  predniSONE   Tablet 5 milliGRAM(s) Oral daily  tacrolimus    0.5 mG/mL Suspension 1 milliGRAM(s) Oral <User Schedule>    MEDICATIONS  (PRN):      ALLERGIES:  Allergies    No Known Allergies    Intolerances        LABS:                        7.1    9.43  )-----------( 135      ( 20 Apr 2024 04:20 )             19.6     04-20    139  |  100  |  120.0<H>  ----------------------------<  89  3.5   |  23.0  |  4.67<H>    Ca    7.4<L>      20 Apr 2024 04:20  Phos  4.0     04-20  Mg     1.8     04-20    TPro  4.8<L>  /  Alb  2.9<L>  /  TBili  0.5  /  DBili  x   /  AST  14  /  ALT  7   /  AlkPhos  37<L>  04-20    PT/INR - ( 19 Apr 2024 06:50 )   PT: 13.1 sec;   INR: 1.19 ratio         PTT - ( 19 Apr 2024 06:50 )  PTT:25.5 sec  Urinalysis Basic - ( 20 Apr 2024 04:20 )    Color: x / Appearance: x / SG: x / pH: x  Gluc: 89 mg/dL / Ketone: x  / Bili: x / Urobili: x   Blood: x / Protein: x / Nitrite: x   Leuk Esterase: x / RBC: x / WBC x   Sq Epi: x / Non Sq Epi: x / Bacteria: x      ABG:      vBG:    Micro:    Culture - Blood (collected 04-18-24 @ 23:25)  Source: .Blood Blood-Peripheral  Preliminary Report (04-20-24 @ 06:01):    No growth at 24 hours    Culture - Blood (collected 04-18-24 @ 23:20)  Source: .Blood Blood-Peripheral  Gram Stain (04-20-24 @ 04:17):    Growth in aerobic bottle: Gram Positive Cocci in Clusters  Preliminary Report (04-20-24 @ 04:18):    Growth in aerobic bottle: Gram Positive Cocci in Clusters    Direct identification is available within approximately 3-5    hours either by Blood Panel Multiplexed PCR or Direct    MALDI-TOF. Details: [email or web address]  Organism: Blood Culture PCR (04-20-24 @ 06:10)  Organism: Blood Culture PCR (04-20-24 @ 06:10)      Method Type: PCR      -  Coagulase negative Staphylococcus: Detec          RADIOLOGY & ADDITIONAL TESTS: Reviewed. SUBJECTIVE: Patient seen and examined at bedside. She ate a small portion of her breakfast. No acute complaints, no abdominal pain. Improved alertness and engagement from yesterday.     ROS: All negative except as listed above.    VITAL SIGNS:  ICU Vital Signs Last 24 Hrs  T(C): 36.4 (20 Apr 2024 07:00), Max: 36.9 (19 Apr 2024 17:55)  T(F): 97.5 (20 Apr 2024 07:00), Max: 98.4 (19 Apr 2024 17:55)  HR: 91 (20 Apr 2024 07:00) (81 - 96)  BP: 143/86 (20 Apr 2024 07:00) (125/71 - 147/80)  BP(mean): 103 (20 Apr 2024 07:00) (87 - 110)  ABP: --  ABP(mean): --  RR: 19 (20 Apr 2024 07:00) (17 - 26)  SpO2: 100% (20 Apr 2024 07:00) (98% - 100%)    O2 Parameters below as of 20 Apr 2024 04:00  Patient On (Oxygen Delivery Method): room air            Plateau pressure:   P/F ratio:     04-19 @ 07:01  -  04-20 @ 07:00  --------------------------------------------------------  IN: 1200 mL / OUT: 865 mL / NET: 335 mL      CAPILLARY BLOOD GLUCOSE          ECG: reviewed.    PHYSICAL EXAM:    GENERAL: NAD, lying in bed comfortably  HEAD:  Atraumatic, normocephalic  EYES: EOMI, PERRLA, conjunctiva and sclera clear  NECK: Supple, trachea midline, no JVD  HEART: Regular rate and rhythm,   LUNGS: Unlabored respirations.  Clear to auscultation bilaterally, no crackles, wheezing, or rhonchi  ABDOMEN: Soft, nontender, nondistended, +BS  EXTREMITIES: 2+ peripheral pulses bilaterally, cap refill<2 secs. No clubbing, cyanosis, or edema  NERVOUS SYSTEM:  A&Ox2 to person and place, following commands,      MEDICATIONS:  MEDICATIONS  (STANDING):  albumin human 25% IVPB 50 milliLiter(s) IV Intermittent every 8 hours  ferrous    sulfate 325 milliGRAM(s) Oral daily  furosemide   Injectable 60 milliGRAM(s) IV Push daily  lactated ringers. 1000 milliLiter(s) (75 mL/Hr) IV Continuous <Continuous>  pantoprazole  Injectable 40 milliGRAM(s) IV Push two times a day  piperacillin/tazobactam IVPB.. 3.375 Gram(s) IV Intermittent every 12 hours  predniSONE   Tablet 5 milliGRAM(s) Oral daily  tacrolimus    0.5 mG/mL Suspension 1 milliGRAM(s) Oral <User Schedule>    MEDICATIONS  (PRN):      ALLERGIES:  Allergies    No Known Allergies    Intolerances        LABS:                        7.1    9.43  )-----------( 135      ( 20 Apr 2024 04:20 )             19.6     04-20    139  |  100  |  120.0<H>  ----------------------------<  89  3.5   |  23.0  |  4.67<H>    Ca    7.4<L>      20 Apr 2024 04:20  Phos  4.0     04-20  Mg     1.8     04-20    TPro  4.8<L>  /  Alb  2.9<L>  /  TBili  0.5  /  DBili  x   /  AST  14  /  ALT  7   /  AlkPhos  37<L>  04-20    PT/INR - ( 19 Apr 2024 06:50 )   PT: 13.1 sec;   INR: 1.19 ratio         PTT - ( 19 Apr 2024 06:50 )  PTT:25.5 sec  Urinalysis Basic - ( 20 Apr 2024 04:20 )    Color: x / Appearance: x / SG: x / pH: x  Gluc: 89 mg/dL / Ketone: x  / Bili: x / Urobili: x   Blood: x / Protein: x / Nitrite: x   Leuk Esterase: x / RBC: x / WBC x   Sq Epi: x / Non Sq Epi: x / Bacteria: x      ABG:      vBG:    Micro:    Culture - Blood (collected 04-18-24 @ 23:25)  Source: .Blood Blood-Peripheral  Preliminary Report (04-20-24 @ 06:01):    No growth at 24 hours    Culture - Blood (collected 04-18-24 @ 23:20)  Source: .Blood Blood-Peripheral  Gram Stain (04-20-24 @ 04:17):    Growth in aerobic bottle: Gram Positive Cocci in Clusters  Preliminary Report (04-20-24 @ 04:18):    Growth in aerobic bottle: Gram Positive Cocci in Clusters    Direct identification is available within approximately 3-5    hours either by Blood Panel Multiplexed PCR or Direct    MALDI-TOF. Details: [email or web address]  Organism: Blood Culture PCR (04-20-24 @ 06:10)  Organism: Blood Culture PCR (04-20-24 @ 06:10)      Method Type: PCR      -  Coagulase negative Staphylococcus: Detec          RADIOLOGY & ADDITIONAL TESTS: Reviewed. SUBJECTIVE: Patient seen and examined at bedside. She ate a small portion of her breakfast. No acute complaints, no abdominal pain. Improved alertness and engagement from yesterday.     Obtained hx from [employer] regarding warfarin, per them, it was given due to a prior history of DVTs.    ROS: All negative except as listed above.    VITAL SIGNS:  ICU Vital Signs Last 24 Hrs  T(C): 36.4 (20 Apr 2024 07:00), Max: 36.9 (19 Apr 2024 17:55)  T(F): 97.5 (20 Apr 2024 07:00), Max: 98.4 (19 Apr 2024 17:55)  HR: 91 (20 Apr 2024 07:00) (81 - 96)  BP: 143/86 (20 Apr 2024 07:00) (125/71 - 147/80)  BP(mean): 103 (20 Apr 2024 07:00) (87 - 110)  ABP: --  ABP(mean): --  RR: 19 (20 Apr 2024 07:00) (17 - 26)  SpO2: 100% (20 Apr 2024 07:00) (98% - 100%)    O2 Parameters below as of 20 Apr 2024 04:00  Patient On (Oxygen Delivery Method): room air            Plateau pressure:   P/F ratio:     04-19 @ 07:01  -  04-20 @ 07:00  --------------------------------------------------------  IN: 1200 mL / OUT: 865 mL / NET: 335 mL      CAPILLARY BLOOD GLUCOSE          ECG: reviewed.    PHYSICAL EXAM:    GENERAL: NAD, lying in bed comfortably  HEAD:  Atraumatic, normocephalic  EYES: EOMI, PERRLA, conjunctiva and sclera clear  NECK: Supple, trachea midline, no JVD  HEART: Regular rate and rhythm,   LUNGS: Unlabored respirations.  Clear to auscultation bilaterally, no crackles, wheezing, or rhonchi  ABDOMEN: Soft, nontender, nondistended, +BS  EXTREMITIES: 2+ peripheral pulses bilaterally, cap refill<2 secs. No clubbing, cyanosis, or edema  NERVOUS SYSTEM:  A&Ox2 to person and place, following commands,      MEDICATIONS:  MEDICATIONS  (STANDING):  albumin human 25% IVPB 50 milliLiter(s) IV Intermittent every 8 hours  ferrous    sulfate 325 milliGRAM(s) Oral daily  furosemide   Injectable 60 milliGRAM(s) IV Push daily  lactated ringers. 1000 milliLiter(s) (75 mL/Hr) IV Continuous <Continuous>  pantoprazole  Injectable 40 milliGRAM(s) IV Push two times a day  piperacillin/tazobactam IVPB.. 3.375 Gram(s) IV Intermittent every 12 hours  predniSONE   Tablet 5 milliGRAM(s) Oral daily  tacrolimus    0.5 mG/mL Suspension 1 milliGRAM(s) Oral <User Schedule>    MEDICATIONS  (PRN):      ALLERGIES:  Allergies    No Known Allergies    Intolerances        LABS:                        7.1    9.43  )-----------( 135      ( 20 Apr 2024 04:20 )             19.6     04-20    139  |  100  |  120.0<H>  ----------------------------<  89  3.5   |  23.0  |  4.67<H>    Ca    7.4<L>      20 Apr 2024 04:20  Phos  4.0     04-20  Mg     1.8     04-20    TPro  4.8<L>  /  Alb  2.9<L>  /  TBili  0.5  /  DBili  x   /  AST  14  /  ALT  7   /  AlkPhos  37<L>  04-20    PT/INR - ( 19 Apr 2024 06:50 )   PT: 13.1 sec;   INR: 1.19 ratio         PTT - ( 19 Apr 2024 06:50 )  PTT:25.5 sec  Urinalysis Basic - ( 20 Apr 2024 04:20 )    Color: x / Appearance: x / SG: x / pH: x  Gluc: 89 mg/dL / Ketone: x  / Bili: x / Urobili: x   Blood: x / Protein: x / Nitrite: x   Leuk Esterase: x / RBC: x / WBC x   Sq Epi: x / Non Sq Epi: x / Bacteria: x      ABG:      vBG:    Micro:    Culture - Blood (collected 04-18-24 @ 23:25)  Source: .Blood Blood-Peripheral  Preliminary Report (04-20-24 @ 06:01):    No growth at 24 hours    Culture - Blood (collected 04-18-24 @ 23:20)  Source: .Blood Blood-Peripheral  Gram Stain (04-20-24 @ 04:17):    Growth in aerobic bottle: Gram Positive Cocci in Clusters  Preliminary Report (04-20-24 @ 04:18):    Growth in aerobic bottle: Gram Positive Cocci in Clusters    Direct identification is available within approximately 3-5    hours either by Blood Panel Multiplexed PCR or Direct    MALDI-TOF. Details: [email or web address]  Organism: Blood Culture PCR (04-20-24 @ 06:10)  Organism: Blood Culture PCR (04-20-24 @ 06:10)      Method Type: PCR      -  Coagulase negative Staphylococcus: Detec          RADIOLOGY & ADDITIONAL TESTS: Reviewed.

## 2024-04-20 NOTE — PROVIDER CONTACT NOTE (CRITICAL VALUE NOTIFICATION) - TEST AND RESULT REPORTED:
hematocrit 20.8
hematocrit 19.6
+ blood cultures growth in aerobic gram + cocci in clusters
hgb/hct- 6.9/19.3

## 2024-04-20 NOTE — PROGRESS NOTE ADULT - ASSESSMENT
83 year old female with a PMH of ESRD s/p renal transplant on mycophenolate and tacrolimus (per nursing home documents), HTN, HLD, anemia, dementia who presented to the ED from [facility] for elevated BUN/Cr on outpatient labs. GI consulted for anemia.     #anemia  # S/p renal transplant   Hgb on admission 2.5, +FOBT; s/p 3U pRBCs  No reports of any overt GIB  CT A/P (04.19.24) Mild thickening of the distal esophageal wall which can be seen with reflux disease and/or esophagitis. Few small bowel loops are mildly dilated in the right lower quadrant involving the mid to distal ileum. There is no transition point or pneumatosis. No bowel obstruction. There is a segment of sigmoid and   descending colon that is decompressed but also demonstrates diffusely thickened wall  * Distended gallbladder with cholelithiasis.  ** No IV contrast used; unable to evaluate for active GIB  US Abd RUQ (04.19.24) CBD dilated to 10mm. Cholelithiasis. Gallbladder wall thickening. Small pericholecystic fluid.   - Iron studies (ferritin 3171, iron total 175, TIBC 212,% sat 83)  - Patient will need eventual EGD/colonoscopy when medically optimized  - BUN/Cr remain markedly elevated; no plans for HD per nephrology  - Trend CBC, transfuse as needed hgb < 7. Monitor for signs of bleeding.   - Avoid NSAIDs  - IV PPI BID for GI mucosal cytoprotection  _________________________________________________________________  Assessment and recommendations are final when note is signed by the attending physician.

## 2024-04-20 NOTE — PROGRESS NOTE ADULT - ASSESSMENT
83 year old female with a PMH of ESRD s/p renal transplant on mycophenolate and tacrolimus (per nursing home documents), HTN, HLD, anemia, dementia , recent cva approx 2 months ago , was in and out of [facility] couple of times prior to last admission to [facility]. was dcd to [facility] , now presented to the ED from [facility] for elevated BUN/Cr on outpatient labs. a/w ABLA/ likely GI bleed, Elevated INR, Thrombocytopenia, Lactic/metabolic acidosis. GLO on CKD . s/p vasopressors, ivf , 3 units prbcs in micu. gi consulted, nephro consulted. blood cx + for coagulase positive staph, likely contaminant will repeat BCx tmrw  ua/ucx negative. MRSA swab negative . downgraded to medicine 4/20     > acute anemia / likely  blood loss anemia / GIB / with baseline chronic anemia   - FOBT pos    - CT abd/pelvis demonstrated distended gallbladder/ no active bleed   - RUQ US demonstrated dilated CBD  - no pain on abdominal exam  -  Stool occult positive  - s/p 3 units prbcs , ffp and platelets   - c/w IV Protonix BID  - gi consulted   - plan for eventual egd/ colonoscopy when stable   - monitor h/h     > coagulopathy / coumadin induced   - INR elevated to 12.19 on initial labs  - On Coumadin 5 mg as an outpatient  - s/p ffp/ plts and KCentra and Vitamin K in ED  - Hold off on AC/chemical DVT prophylaxis for now given ABLA.    - heme consulted pending      > GLO on CKD likely pre-renal/ Metabolic Acidosis/Lactic Acidosis  - hx of renal transplant / follows at [facility] hosp   - Monitor renal function and UOP  - nephrology following  - elevated bun likely due to GIB   -Change tacrolimus to 1mg BID as per nephro   - monitor taco  levels   -Hold MMF  -Cont Pred  - nephro consulted Recommend palliative eval  -No emergent indication for dialysis    >Thrombocytopenia  - likely due to acute illness   - monitor   - hold off chemical dvt ppx for now     >hx of cva   - has residual facial droop and rt upper ext weakness   - cth neg for any acute changes   - monitor ms   - diet purred / no liquids as per speech     >GP bacteremia / coag neg / likely contaminant   - hypothermia / rule out sepsis  - immunosuppressed   - c/w empiric zosyn.    - s/p dose of vanco in the ED.    -. follow up repeat BCx tmrw    - ua/ucx negative  - MRSA swab negative         >DVT ppx: scds due to gib     > code : full code , palliative consulted     > plan of care discussed with patient , step karthikeyan romero at bedside and karthikeyan Moe on phone [phone]

## 2024-04-20 NOTE — PROGRESS NOTE ADULT - SUBJECTIVE AND OBJECTIVE BOX
Chief Complaint:  Patient is a 83y old  Female who presents with a chief complaint of  hypotension and hypothermic    HPI/ 24 hr events: Patient seen and examined at bedside. Patient minimally verbal. Denies any complains this morning. RN denies any reports of bleeding overnight. Tolerating diet. Denies nausea, vomiting, abdominal pain. Vitals are overall stable, no leukocytosis, Hgb 6.9 this morning.      REVIEW OF SYSTEMS:   General: Negative  HEENT: Negative  CV: Negative  Respiratory: Negative  GI: See HPI  : Negative  MSK: Negative  Hematologic: Negative  Skin: Negative    MEDICATIONS:   MEDICATIONS  (STANDING):  albumin human 25% IVPB 50 milliLiter(s) IV Intermittent every 8 hours  ferrous    sulfate 325 milliGRAM(s) Oral daily  lactated ringers. 1000 milliLiter(s) (75 mL/Hr) IV Continuous <Continuous>  pantoprazole  Injectable 40 milliGRAM(s) IV Push two times a day  piperacillin/tazobactam IVPB.. 3.375 Gram(s) IV Intermittent every 12 hours  predniSONE   Tablet 5 milliGRAM(s) Oral daily  tacrolimus    0.5 mG/mL Suspension 1 milliGRAM(s) Oral <User Schedule>    MEDICATIONS  (PRN):      Plasma Order:  Unit 1, Routine   Indication: Active Bleeding with suspected coagulopathy (in the absence of warfarin)    Infuse Unit : 1 Hour (04-19-24 @ 02:03)  Packed Red Cells Order:  1 Unit  Indication: Hgb <7 gm/dL  Infuse Unit : 3 Hours (04-19-24 @ 02:03)  phytonadione  IVPB: [Known as aquaMEPHYTON IVPB]  10 milliGRAM(s) in dextrose 5% 50 milliLiter(s), IV Intermittent, once, infuse over 30 Minute(s), Stop After 1 Doses  Administration Instructions: This is a High Alert Medication. (04-19-24 @ 00:28)  Packed Red Cells Order:  1 Unit  Indication: Anemia due to Active Hemorrhage - Medical Conditions e.  Infuse Unit : 3 Hours --- emergency release (04-18-24 @ 23:49)  Packed Red Cells Order:  1 Unit  Indication: Anemia due to Active Hemorrhage - Medical Conditions e.  Infuse Unit : 3 Hours --- emergency release (04-18-24 @ 23:49)        DIET:  Diet, Pureed:   DASH/TLC Sodium & Cholesterol Restricted (DASH)  No Liquids (NOLIQUIDS) (04-19-24 @ 18:56) [Active]          ALLERGIES:   Allergies    No Known Allergies    Intolerances        VITAL SIGNS:   Vital Signs Last 24 Hrs  T(C): 36.4 (20 Apr 2024 09:00), Max: 36.9 (19 Apr 2024 17:55)  T(F): 97.5 (20 Apr 2024 09:00), Max: 98.4 (19 Apr 2024 17:55)  HR: 98 (20 Apr 2024 09:00) (81 - 98)  BP: 150/74 (20 Apr 2024 09:00) (125/71 - 150/74)  BP(mean): 95 (20 Apr 2024 09:00) (87 - 110)  RR: 20 (20 Apr 2024 09:00) (17 - 26)  SpO2: 99% (20 Apr 2024 09:00) (98% - 100%)    Parameters below as of 20 Apr 2024 08:00  Patient On (Oxygen Delivery Method): room air      I&O's Summary    19 Apr 2024 07:01  -  20 Apr 2024 07:00  --------------------------------------------------------  IN: 1200 mL / OUT: 865 mL / NET: 335 mL    20 Apr 2024 07:01  -  20 Apr 2024 09:44  --------------------------------------------------------  IN: 250 mL / OUT: 225 mL / NET: 25 mL        PHYSICAL EXAM:   GENERAL:  No acute distress  HEENT:  NC/AT, conjunctiva clear, sclera anicteric  CHEST:  No increased effort  HEART:  Regular rate  ABDOMEN:  Soft, non-tender, non-distended, normoactive bowel sounds, no rebound or guarding  EXTREMITIES: No edema  SKIN:  Warm, dry  NEURO:  Calm, cooperative    LABS:                        6.9    8.79  )-----------( 127      ( 20 Apr 2024 08:45 )             19.3     Hemoglobin: 6.9 g/dL (04-20-24 @ 08:45)  Hemoglobin: 7.1 g/dL (04-20-24 @ 04:20)  Hemoglobin: 7.4 g/dL (04-19-24 @ 21:09)  Hemoglobin: 7.6 g/dL (04-19-24 @ 11:30)  Hemoglobin: 8.2 g/dL (04-19-24 @ 06:50)  Hemoglobin: 4.7 g/dL (04-19-24 @ 00:50)  Hemoglobin: 2.5 g/dL (04-18-24 @ 23:25)    04-20    139  |  100  |  120.0<H>  ----------------------------<  89  3.5   |  23.0  |  4.67<H>    Ca    7.4<L>      20 Apr 2024 04:20  Phos  4.0     04-20  Mg     1.8     04-20    TPro  4.8<L>  /  Alb  2.9<L>  /  TBili  0.5  /  DBili  x   /  AST  14  /  ALT  7   /  AlkPhos  37<L>  04-20    LIVER FUNCTIONS - ( 20 Apr 2024 04:20 )  Alb: 2.9 g/dL / Pro: 4.8 g/dL / ALK PHOS: 37 U/L / ALT: 7 U/L / AST: 14 U/L / GGT: x             PT/INR - ( 19 Apr 2024 06:50 )   PT: 13.1 sec;   INR: 1.19 ratio         PTT - ( 19 Apr 2024 06:50 )  PTT:25.5 sec    Culture - Blood (collected 18 Apr 2024 23:25)  Source: .Blood Blood-Peripheral  Preliminary Report (20 Apr 2024 06:01):    No growth at 24 hours    Culture - Blood (collected 18 Apr 2024 23:20)  Source: .Blood Blood-Peripheral  Gram Stain (20 Apr 2024 04:17):    Growth in aerobic bottle: Gram Positive Cocci in Clusters  Preliminary Report (20 Apr 2024 04:18):    Growth in aerobic bottle: Gram Positive Cocci in Clusters    Direct identification is available within approximately 3-5    hours either by Blood Panel Multiplexed PCR or Direct    MALDI-TOF. Details: [email or web address]  Organism: Blood Culture PCR (20 Apr 2024 06:10)  Organism: Blood Culture PCR (20 Apr 2024 06:10)    RADIOLOGY & ADDITIONAL STUDIES:      ACC: [id number] EXAM:  CT ABDOMEN AND PELVIS   ORDERED BY: MATI VAZQUEZ     PROCEDURE DATE:  04/19/2024          INTERPRETATION:  CLINICAL INFORMATION:  GI bleed.    COMPARISON: No prior imaging available for comparison.    PROCEDURE:  CT of the Abdomen and Pelvis was performed without intravenous contrast.  Intravenous contrast: None.  Oral contrast: None.  Sagittal and coronal reformats were performed.    FINDINGS:  LOWER CHEST: Small left pleural effusion. Innumerable pulmonary nodules   in all visualized lung fields up to 6 mm. cardiomegaly. Mitral annular   valve calcifications and coronary artery calcifications. Anemia.    LIVER: Subcapsular segment 8 liver lesion measuring 1.8 cm.  BILE DUCTS: Normal caliber.  GALLBLADDER: Dilatation of the gallbladder. Multiple gallstones are   present. No wall thickening or edema.  SPLEEN: Within normal limits.  PANCREAS: Limited evaluation due to lack of IV contrast  ADRENALS: Within normal limits.  KIDNEYS/URETERS: Hypertrophy of the renal parenchyma bilaterally. Left   renal cysts. No hydronephrosis. Left iliac fossa renal transplant without   hydronephrosis.    BLADDER: Within normal limits.  REPRODUCTIVE ORGANS: Hysterectomy.    BOWEL: Mild thickening of the distal esophageal wall which can be seen   with reflux disease and/or esophagitis. Stomach is collapsed and   demonstrates no gross abnormality within the limits of noncontrast   imaging. Few small bowel loops are mildly dilated in the right lower   quadrant involving the midto distal ileum. There is no transition point   or pneumatosis. No bowel obstruction. There is a segment of sigmoid and   descending colon that is decompressed but also demonstrates diffusely   thickened wall, image 3:112 suggesting edema and colitis. Appendix is not   visualized.  PERITONEUM: No ascites.  VESSELS: Atherosclerosis.  RETROPERITONEUM/LYMPH NODES: No lymphadenopathy.  ABDOMINAL WALL: Anasarca.  BONES: Sacroiliac joint degeneration and thickening heterogeneous   increased bone mineral density scan is seen with renal osseous dystrophy.   Discogenic degenerative disease.    IMPRESSION:    Please note that the evaluation for active GI bleed  requires IV contrast.    Distended gallbladder with cholelithiasis. If there is right upper   quadrant pain, consider right upper quadrant ultrasound to rule out acute   cholecystitis.    Decompressed colon with additional suggestion of submucosal edema   involving the descending and sigmoid colon suggesting colitis, ischemic   colitis cannot be excluded.    Cardiomegaly and left pleural effusion and anasarca.    Disseminated innumerable pulmonary nodules are worrisome for metastatic   disease, a infectious etiology is a differential consideration but felt   less likely.        Noncontrast study is nondiagnostic for evaluating active GI bleed.  Distended gallbladder containing gallstones. Consider right upper   quadrant ultrasound to exclude gallbladder disease.  Left lower quadrant renal transplant without hydronephrosis or   perinephric collection.  Atrophied bilateral kidneys containing cysts as well as too small to   characterize hypodensities.  Small left pleural effusion. Scattered bilateral sub-cm lung nodules.   Consider nonemergent pulmonary imaging follow-up.  Follow-upofficial report in the morning    --- End of Report ---            PATRICIO GONZALES MD; Attending Radiologist  This document has been electronically signed. Apr 19 2024  9:33AM  04-19-24 @ 04:19    -- -- --   ACC: [id number] EXAM:  US ABDOMEN RT UPR QUADRANT   ORDERED BY: YOEL DELANEY     PROCEDURE DATE:  04/19/2024          INTERPRETATION:  CLINICAL INFORMATION: Distended gallbladder    COMPARISON: CT dated 4/19/2024    TECHNIQUE: Sonography of the right upper quadrant.    FINDINGS:    Limited study due to patient condition.    Liver: Not well evaluated by sonography.  Bile ducts: CBD dilated to 10 mm.  Gallbladder: Cholelithiasis. Gallbladder wall thickening. Small   pericholecystic fluid. Unable to evaluate sonographic Brown's sign due   to patient condition (nonverbal).  Pancreas: Poorly visualized.  Right kidney: Nonvisualized.    IMPRESSION:  Limited exam.    Cholelithiasis. Gallbladder wall thickening. Small pericholecystic fluid.   Unable to evaluate sonographic Brown's sign due to patient condition   (nonverbal). Recommend clinical correlation and consider HIDA scan as   warranted to evaluate for acute cholecystitis. CBD dilated to 10 mm.   Correlate with LFTs.    --- End of Report ---            YANICK MCMILLAN MD; Attending Radiologist  This document has been electronically signed. Apr 19 2024  7:50PM

## 2024-04-20 NOTE — PROGRESS NOTE ADULT - SUBJECTIVE AND OBJECTIVE BOX
Patient is a 83y old  Female who presents with a chief complaint of     Patient seen and examined at bedside.    micu course/ h&p: 83 year old female with a PMH of ESRD s/p renal transplant on mycophenolate and tacrolimus (per nursing home documents), HTN, HLD, anemia, dementia who presented to the ED from [facility] for elevated BUN/Cr on outpatient labs.  a/w ABLA/ likely GI bleed, Elevated INR, Thrombocytopenia, Lactic/metabolic acidosis. GLO on CKD . s/p 3 units prbcs in micu. gi consulted, nephro consulted. blood cx + for caogulase positive staph, likely contaminant will repeat BCx tmrw  ua/ucx negative. MRSA swab negative       downgraded to medicine 4/20       ALLERGIES:  No Known Allergies    MEDICATIONS  (STANDING):  amLODIPine   Tablet 10 milliGRAM(s) Oral daily  chlorhexidine 2% Cloths 1 Application(s) Topical daily  ferrous    sulfate 325 milliGRAM(s) Oral daily  lactated ringers. 1000 milliLiter(s) (75 mL/Hr) IV Continuous <Continuous>  pantoprazole  Injectable 40 milliGRAM(s) IV Push two times a day  piperacillin/tazobactam IVPB.. 3.375 Gram(s) IV Intermittent every 12 hours  predniSONE   Tablet 5 milliGRAM(s) Oral daily  tacrolimus    0.5 mG/mL Suspension 1 milliGRAM(s) Oral <User Schedule>    MEDICATIONS  (PRN):    Vital Signs Last 24 Hrs  T(F): 97.9 (20 Apr 2024 14:00), Max: 98.4 (19 Apr 2024 17:55)  HR: 95 (20 Apr 2024 14:00) (81 - 98)  BP: 152/83 (20 Apr 2024 14:00) (125/71 - 162/89)  RR: 22 (20 Apr 2024 14:00) (17 - 26)  SpO2: 100% (20 Apr 2024 14:00) (98% - 100%)  I&O's Summary    19 Apr 2024 07:01  -  20 Apr 2024 07:00  --------------------------------------------------------  IN: 1200 mL / OUT: 865 mL / NET: 335 mL    20 Apr 2024 07:01  -  20 Apr 2024 15:28  --------------------------------------------------------  IN: 910 mL / OUT: 475 mL / NET: 435 mL      GENERAL: NAD, lying in bed comfortably  HEAD:  Atraumatic, normocephalic  EYES: EOMI, PERRLA, conjunctiva and sclera clear  NECK: Supple, trachea midline, no JVD  HEART: Regular rate and rhythm,   LUNGS: Unlabored respirations.  Clear to auscultation bilaterally, no crackles, wheezing, or rhonchi  ABDOMEN: Soft, nontender, nondistended, +BS  EXTREMITIES: 2+ peripheral pulses bilaterally, cap refill<2 secs. No clubbing, cyanosis, or edema  NERVOUS SYSTEM:  A&Ox2 to person and place, facial droop noted , rt upper ext motor weakness 3/5 , chronic as per son at bedside since recent cva 2 months ago.  following commands        LABS:                        6.9    8.79  )-----------( 127      ( 20 Apr 2024 08:45 )             19.3     04-20    139  |  100  |  120.0  ----------------------------<  89  3.5   |  23.0  |  4.67    Ca    7.4      20 Apr 2024 04:20  Phos  4.0     04-20  Mg     1.8     04-20    TPro  4.8  /  Alb  2.9  /  TBili  0.5  /  DBili  x   /  AST  14  /  ALT  7   /  AlkPhos  37  04-20          PT/INR - ( 19 Apr 2024 06:50 )   PT: 13.1 sec;   INR: 1.19 ratio         PTT - ( 19 Apr 2024 06:50 )  PTT:25.5 sec  Lactate, Blood: 2.0 mmol/L (04-19 @ 03:30)            TSH 1.72   TSH with FT4 reflex --  Total T3 --    03:10 - VBG - pH: 7.410 | pCO2: 35    | pO2: 44    | Lactate:        23:25 - VBG - pH:       | pCO2:       | pO2:       | Lactate: 5.60                 Urinalysis Basic - ( 20 Apr 2024 04:20 )    Color: x / Appearance: x / SG: x / pH: x  Gluc: 89 mg/dL / Ketone: x  / Bili: x / Urobili: x   Blood: x / Protein: x / Nitrite: x   Leuk Esterase: x / RBC: x / WBC x   Sq Epi: x / Non Sq Epi: x / Bacteria: x        Culture - Blood (collected 18 Apr 2024 23:25)  Source: .Blood Blood-Peripheral  Preliminary Report (20 Apr 2024 06:01):    No growth at 24 hours    Culture - Blood (collected 18 Apr 2024 23:20)  Source: .Blood Blood-Peripheral  Gram Stain (20 Apr 2024 04:17):    Growth in aerobic bottle: Gram Positive Cocci in Clusters  Preliminary Report (20 Apr 2024 04:18):    Growth in aerobic bottle: Gram Positive Cocci in Clusters    Direct identification is available within approximately 3-5    hours either by Blood Panel Multiplexed PCR or Direct    MALDI-TOF. Details: [email or web address]  Organism: Blood Culture PCR (20 Apr 2024 06:10)  Organism: Blood Culture PCR (20 Apr 2024 06:10)      Method Type: PCR      -  Coagulase negative Staphylococcus: Detec    Culture - Urine (collected 18 Apr 2024 09:30)  Source: Clean Catch Clean Catch (Midstream)  Final Report (20 Apr 2024 11:09):    No growth        RADIOLOGY & ADDITIONAL TESTS:    Care Discussed with Consultants/Other Providers:     < from: CT Head No Cont (04.19.24 @ 07:24) >  No acute intracranial hemorrhage, mass effect, or shift of   the midline structures.    < end of copied text >     Patient is a 83y old  Female who presents with a chief complaint of     Patient seen and examined at bedside.    micu course/ h&p: 83 year old female with a PMH of ESRD s/p renal transplant on mycophenolate and tacrolimus (per nursing home documents), HTN, HLD, anemia, dementia who presented to the ED from [facility] for elevated BUN/Cr on outpatient labs.  a/w ABLA/ likely GI bleed, Elevated INR, Thrombocytopenia, Lactic/metabolic acidosis. GLO on CKD . s/p 3 units prbcs in micu. gi consulted, nephro consulted. blood cx + for caogulase positive staph, likely contaminant will repeat BCx tmrw  ua/ucx negative. MRSA swab negative       downgraded to medicine 4/20       ALLERGIES:  No Known Allergies    MEDICATIONS  (STANDING):  amLODIPine   Tablet 10 milliGRAM(s) Oral daily  chlorhexidine 2% Cloths 1 Application(s) Topical daily  ferrous    sulfate 325 milliGRAM(s) Oral daily  lactated ringers. 1000 milliLiter(s) (75 mL/Hr) IV Continuous <Continuous>  pantoprazole  Injectable 40 milliGRAM(s) IV Push two times a day  piperacillin/tazobactam IVPB.. 3.375 Gram(s) IV Intermittent every 12 hours  predniSONE   Tablet 5 milliGRAM(s) Oral daily  tacrolimus    0.5 mG/mL Suspension 1 milliGRAM(s) Oral <User Schedule>    MEDICATIONS  (PRN):    Vital Signs Last 24 Hrs  T(F): 97.9 (20 Apr 2024 14:00), Max: 98.4 (19 Apr 2024 17:55)  HR: 95 (20 Apr 2024 14:00) (81 - 98)  BP: 152/83 (20 Apr 2024 14:00) (125/71 - 162/89)  RR: 22 (20 Apr 2024 14:00) (17 - 26)  SpO2: 100% (20 Apr 2024 14:00) (98% - 100%)  I&O's Summary    19 Apr 2024 07:01  -  20 Apr 2024 07:00  --------------------------------------------------------  IN: 1200 mL / OUT: 865 mL / NET: 335 mL    20 Apr 2024 07:01  -  20 Apr 2024 15:28  --------------------------------------------------------  IN: 910 mL / OUT: 475 mL / NET: 435 mL      GENERAL: NAD, lying in bed comfortably  HEAD:  Atraumatic, normocephalic  EYES: EOMI, PERRLA, conjunctiva and sclera clear  NECK: Supple, trachea midline, no JVD, rt IJ central line   HEART: Regular rate and rhythm,   LUNGS: Unlabored respirations.  Clear to auscultation bilaterally, no crackles, wheezing, or rhonchi  ABDOMEN: Soft, nontender, nondistended, +BS  EXTREMITIES: 2+ peripheral pulses bilaterally, cap refill<2 secs. No clubbing, cyanosis, or edema  NERVOUS SYSTEM:  A&Ox2 to person and place, facial droop noted , rt upper ext motor weakness 3/5 , chronic as per son at bedside since recent cva 2 months ago.  following commands        LABS:                        6.9    8.79  )-----------( 127      ( 20 Apr 2024 08:45 )             19.3     04-20    139  |  100  |  120.0  ----------------------------<  89  3.5   |  23.0  |  4.67    Ca    7.4      20 Apr 2024 04:20  Phos  4.0     04-20  Mg     1.8     04-20    TPro  4.8  /  Alb  2.9  /  TBili  0.5  /  DBili  x   /  AST  14  /  ALT  7   /  AlkPhos  37  04-20          PT/INR - ( 19 Apr 2024 06:50 )   PT: 13.1 sec;   INR: 1.19 ratio         PTT - ( 19 Apr 2024 06:50 )  PTT:25.5 sec  Lactate, Blood: 2.0 mmol/L (04-19 @ 03:30)            TSH 1.72   TSH with FT4 reflex --  Total T3 --    03:10 - VBG - pH: 7.410 | pCO2: 35    | pO2: 44    | Lactate:        23:25 - VBG - pH:       | pCO2:       | pO2:       | Lactate: 5.60                 Urinalysis Basic - ( 20 Apr 2024 04:20 )    Color: x / Appearance: x / SG: x / pH: x  Gluc: 89 mg/dL / Ketone: x  / Bili: x / Urobili: x   Blood: x / Protein: x / Nitrite: x   Leuk Esterase: x / RBC: x / WBC x   Sq Epi: x / Non Sq Epi: x / Bacteria: x        Culture - Blood (collected 18 Apr 2024 23:25)  Source: .Blood Blood-Peripheral  Preliminary Report (20 Apr 2024 06:01):    No growth at 24 hours    Culture - Blood (collected 18 Apr 2024 23:20)  Source: .Blood Blood-Peripheral  Gram Stain (20 Apr 2024 04:17):    Growth in aerobic bottle: Gram Positive Cocci in Clusters  Preliminary Report (20 Apr 2024 04:18):    Growth in aerobic bottle: Gram Positive Cocci in Clusters    Direct identification is available within approximately 3-5    hours either by Blood Panel Multiplexed PCR or Direct    MALDI-TOF. Details: [email or web address]  Organism: Blood Culture PCR (20 Apr 2024 06:10)  Organism: Blood Culture PCR (20 Apr 2024 06:10)      Method Type: PCR      -  Coagulase negative Staphylococcus: Detec    Culture - Urine (collected 18 Apr 2024 09:30)  Source: Clean Catch Clean Catch (Midstream)  Final Report (20 Apr 2024 11:09):    No growth        RADIOLOGY & ADDITIONAL TESTS:    Care Discussed with Consultants/Other Providers:     < from: CT Head No Cont (04.19.24 @ 07:24) >  No acute intracranial hemorrhage, mass effect, or shift of   the midline structures.    < end of copied text >

## 2024-04-20 NOTE — CHART NOTE - NSCHARTNOTEFT_GEN_A_CORE
Right midline was removed without issue and minimal to no loss of blood. Pressure and gauze was applied to the site for 15 minutes afterwards. No bleeding noted and band aid was placed.  Catheter was visualized on CXR on admission, based on collateral obtained from [facility], midline was initially placed on 4/18.    Patient tolerated the procedure well. Right midline was removed without issue and minimal to no loss of blood. Pressure and gauze was applied to the site for 15 minutes afterwards. No bleeding noted and band aid was placed.  Catheter was visualized on CXR on admission, based on collateral obtained from [facility], midline was initially placed on 4/18 at the nursing home.    Patient tolerated the procedure well.

## 2024-04-20 NOTE — PROGRESS NOTE ADULT - ASSESSMENT
GLO on CKD5  S/P Renal Transplant  Metabolic Acidosis/Lactic Acidosis  Anemia  GI bleed    -She is nearing the end of the life of her renal transplant  -Suspect her BUN is more from GI bleeding than from GLO and she does not appear uremic  -S/P PRBCs with improvement in hgb, but creatinine relatively unchanged  -Change tacrolimus to 1mg BID  -Hold MMF  -Cont Pred  -Tacrolimus level pending  -Can give dose of lasix to monitor for any response in UOP  -Recommend palliative eval  -No emergent indication for dialysis  -PRBCs again today  -UOP improving    d/w MICU  Critical Care time 32 minutes between seeing patient, discussing with staff, placing orders and reviewing chart

## 2024-04-21 LAB
ALBUMIN SERPL ELPH-MCNC: 3.2 G/DL — LOW (ref 3.3–5.2)
ALP SERPL-CCNC: 33 U/L — LOW (ref 40–120)
ALT FLD-CCNC: 5 U/L — SIGNIFICANT CHANGE UP
ANION GAP SERPL CALC-SCNC: 16 MMOL/L — SIGNIFICANT CHANGE UP (ref 5–17)
AST SERPL-CCNC: 12 U/L — SIGNIFICANT CHANGE UP
BILIRUB SERPL-MCNC: 0.7 MG/DL — SIGNIFICANT CHANGE UP (ref 0.4–2)
BUN SERPL-MCNC: 101.9 MG/DL — HIGH (ref 8–20)
CALCIUM SERPL-MCNC: 7.5 MG/DL — LOW (ref 8.4–10.5)
CHLORIDE SERPL-SCNC: 103 MMOL/L — SIGNIFICANT CHANGE UP (ref 96–108)
CO2 SERPL-SCNC: 23 MMOL/L — SIGNIFICANT CHANGE UP (ref 22–29)
CREAT SERPL-MCNC: 4.64 MG/DL — HIGH (ref 0.5–1.3)
EGFR: 9 ML/MIN/1.73M2 — LOW
FIBRINOGEN PPP-MCNC: 173 MG/DL — LOW (ref 200–450)
FOLATE SERPL-MCNC: 4.9 NG/ML — SIGNIFICANT CHANGE UP
GLUCOSE BLDC GLUCOMTR-MCNC: 92 MG/DL — SIGNIFICANT CHANGE UP (ref 70–99)
GLUCOSE SERPL-MCNC: 86 MG/DL — SIGNIFICANT CHANGE UP (ref 70–99)
HAPTOGLOB SERPL-MCNC: 105 MG/DL — SIGNIFICANT CHANGE UP (ref 34–200)
HCT VFR BLD CALC: 22.7 % — LOW (ref 34.5–45)
HCT VFR BLD CALC: 23 % — LOW (ref 34.5–45)
HGB BLD-MCNC: 8.1 G/DL — LOW (ref 11.5–15.5)
HGB BLD-MCNC: 8.1 G/DL — LOW (ref 11.5–15.5)
LDH SERPL L TO P-CCNC: 244 U/L — HIGH (ref 98–192)
MAGNESIUM SERPL-MCNC: 1.9 MG/DL — SIGNIFICANT CHANGE UP (ref 1.8–2.6)
MCHC RBC-ENTMCNC: 30.8 PG — SIGNIFICANT CHANGE UP (ref 27–34)
MCHC RBC-ENTMCNC: 31.6 PG — SIGNIFICANT CHANGE UP (ref 27–34)
MCHC RBC-ENTMCNC: 35.2 GM/DL — SIGNIFICANT CHANGE UP (ref 32–36)
MCHC RBC-ENTMCNC: 35.7 GM/DL — SIGNIFICANT CHANGE UP (ref 32–36)
MCV RBC AUTO: 87.5 FL — SIGNIFICANT CHANGE UP (ref 80–100)
MCV RBC AUTO: 88.7 FL — SIGNIFICANT CHANGE UP (ref 80–100)
PHOSPHATE SERPL-MCNC: 3.1 MG/DL — SIGNIFICANT CHANGE UP (ref 2.4–4.7)
PLATELET # BLD AUTO: 111 K/UL — LOW (ref 150–400)
PLATELET # BLD AUTO: 133 K/UL — LOW (ref 150–400)
POTASSIUM SERPL-MCNC: 3.1 MMOL/L — LOW (ref 3.5–5.3)
POTASSIUM SERPL-SCNC: 3.1 MMOL/L — LOW (ref 3.5–5.3)
PROT SERPL-MCNC: 4.9 G/DL — LOW (ref 6.6–8.7)
RBC # BLD: 2.56 M/UL — LOW (ref 3.8–5.2)
RBC # BLD: 2.59 M/UL — LOW (ref 3.8–5.2)
RBC # BLD: 2.63 M/UL — LOW (ref 3.8–5.2)
RBC # FLD: 14.9 % — HIGH (ref 10.3–14.5)
RBC # FLD: 15.1 % — HIGH (ref 10.3–14.5)
RETICS #: 74.6 K/UL — SIGNIFICANT CHANGE UP (ref 25–125)
RETICS/RBC NFR: 2.9 % — HIGH (ref 0.5–2.5)
SODIUM SERPL-SCNC: 142 MMOL/L — SIGNIFICANT CHANGE UP (ref 135–145)
TACROLIMUS SERPL-MCNC: 3.6 NG/ML — SIGNIFICANT CHANGE UP
VIT B12 SERPL-MCNC: 510 PG/ML — SIGNIFICANT CHANGE UP (ref 232–1245)
WBC # BLD: 7.75 K/UL — SIGNIFICANT CHANGE UP (ref 3.8–10.5)
WBC # BLD: 8.66 K/UL — SIGNIFICANT CHANGE UP (ref 3.8–10.5)
WBC # FLD AUTO: 7.75 K/UL — SIGNIFICANT CHANGE UP (ref 3.8–10.5)
WBC # FLD AUTO: 8.66 K/UL — SIGNIFICANT CHANGE UP (ref 3.8–10.5)

## 2024-04-21 PROCEDURE — 99232 SBSQ HOSP IP/OBS MODERATE 35: CPT

## 2024-04-21 PROCEDURE — 99222 1ST HOSP IP/OBS MODERATE 55: CPT

## 2024-04-21 RX ORDER — POTASSIUM CHLORIDE 20 MEQ
40 PACKET (EA) ORAL EVERY 4 HOURS
Refills: 0 | Status: COMPLETED | OUTPATIENT
Start: 2024-04-21 | End: 2024-04-21

## 2024-04-21 RX ADMIN — PANTOPRAZOLE SODIUM 40 MILLIGRAM(S): 20 TABLET, DELAYED RELEASE ORAL at 18:33

## 2024-04-21 RX ADMIN — PANTOPRAZOLE SODIUM 40 MILLIGRAM(S): 20 TABLET, DELAYED RELEASE ORAL at 06:17

## 2024-04-21 RX ADMIN — Medication 40 MILLIEQUIVALENT(S): at 11:08

## 2024-04-21 RX ADMIN — CHLORHEXIDINE GLUCONATE 1 APPLICATION(S): 213 SOLUTION TOPICAL at 11:17

## 2024-04-21 RX ADMIN — Medication 40 MILLIEQUIVALENT(S): at 08:53

## 2024-04-21 RX ADMIN — AMLODIPINE BESYLATE 10 MILLIGRAM(S): 2.5 TABLET ORAL at 06:17

## 2024-04-21 RX ADMIN — Medication 5 MILLIGRAM(S): at 06:17

## 2024-04-21 RX ADMIN — TACROLIMUS 1 MILLIGRAM(S): 5 CAPSULE ORAL at 18:33

## 2024-04-21 RX ADMIN — SODIUM CHLORIDE 75 MILLILITER(S): 9 INJECTION, SOLUTION INTRAVENOUS at 20:31

## 2024-04-21 RX ADMIN — PIPERACILLIN AND TAZOBACTAM 25 GRAM(S): 4; .5 INJECTION, POWDER, LYOPHILIZED, FOR SOLUTION INTRAVENOUS at 00:42

## 2024-04-21 RX ADMIN — Medication 325 MILLIGRAM(S): at 11:08

## 2024-04-21 RX ADMIN — TACROLIMUS 1 MILLIGRAM(S): 5 CAPSULE ORAL at 06:17

## 2024-04-21 RX ADMIN — PIPERACILLIN AND TAZOBACTAM 25 GRAM(S): 4; .5 INJECTION, POWDER, LYOPHILIZED, FOR SOLUTION INTRAVENOUS at 11:08

## 2024-04-21 NOTE — DIETITIAN INITIAL EVALUATION ADULT - OTHER INFO
83 year old female with a PMH of ESRD s/p renal transplant on mycophenolate and tacrolimus (per nursing home documents), HTN, HLD, anemia, dementia who presented to the ED from [facility] for elevated BUN/Cr on outpatient labs, anemia, and AMS admitted to MICU for hypotension, anemia. Patient resuscitated with vasopressor, pRBCs, and fluids.

## 2024-04-21 NOTE — PROGRESS NOTE ADULT - ASSESSMENT
GLO on CKD5  S/P Renal Transplant  Metabolic Acidosis/Lactic Acidosis  Anemia  GI bleed    -She is nearing the end of the life of her renal transplant  -Suspect her BUN is more from GI bleeding than from GLO and she does not appear uremic  -S/P PRBCs with improvement in hgb, but creatinine relatively unchanged  -Change tacrolimus to 1mg BID  -Hold MMF  -Cont Pred  -Tacrolimus level at goal  -Palliative eval pending  -No emergent indication for dialysis  -UOP improving, creatinine stabilizing

## 2024-04-21 NOTE — DIETITIAN NUTRITION RISK NOTIFICATION - TREATMENT: THE FOLLOWING DIET HAS BEEN RECOMMENDED
Diet, Pureed:   DASH/TLC {Sodium & Cholesterol Restricted} (DASH)  No Liquids (NOLIQUIDS) (04-19-24 @ 18:56) [Active]

## 2024-04-21 NOTE — DIETITIAN INITIAL EVALUATION ADULT - ORAL INTAKE PTA/DIET HISTORY
Consult received for MST Score >/ 2, triggered for uncertain weight loss. Nutrition assessment completed. Pt AxOx1, unable to provide nutrition hx. Pt currently on a pureed diet with no liquids per SLP recommendations on 4/19. Breakfast tray observed at bedside, <50% consumed per plate waste. NFPE conducted. Noted with low K+, repletion in place. Palliative consulted for GOC. RD to follow up as feasible.

## 2024-04-21 NOTE — DIETITIAN NUTRITION RISK NOTIFICATION - ADDITIONAL COMMENTS/DIETITIAN RECOMMENDATIONS
Continue diet as tolerated/per SLP recommendations. Will provide Greek yogurt TID to provide an additional 110 kcal and 12g protein per serving. Continue ferrous sulfate supplementation, add MVI daily. Encourage po intake, monitor diet tolerance, and provide assistance at meals as needed. Obtain daily weights to monitor trends.

## 2024-04-21 NOTE — DIETITIAN INITIAL EVALUATION ADULT - NS FNS DIET ORDER
Diet, Pureed:   DASH/TLC {Sodium & Cholesterol Restricted} (DASH)  No Liquids (NOLIQUIDS) (04-19-24 @ 18:56)

## 2024-04-21 NOTE — DIETITIAN INITIAL EVALUATION ADULT - PERTINENT LABORATORY DATA
04-21    142  |  103  |  101.9<H>  ----------------------------<  86  3.1<L>   |  23.0  |  4.64<H>    Ca    7.5<L>      21 Apr 2024 04:20  Phos  3.1     04-21  Mg     1.9     04-21    TPro  4.9<L>  /  Alb  3.2<L>  /  TBili  0.7  /  DBili  x   /  AST  12  /  ALT  5   /  AlkPhos  33<L>  04-21

## 2024-04-21 NOTE — CONSULT NOTE ADULT - SUBJECTIVE AND OBJECTIVE BOX
REASON FOR CONSULTATION:     HPI:  83 year old female with a PMH of ESRD s/p renal transplant on mycophenolate and tacrolimus (per nursing home documents), HTN, HLD, anemia, dementia who presented to the ED from [facility] for elevated BUN/Cr on outpatient labs.  Upon arrival to the ED patient was found to be hypotensive and hypothermic.  Workup was significant for profound anemia, elevated INR, lactic acidosis, and an GLO on CKD.  MICU course a/w ABLA/ likely GI bleed, Elevated INR, Thrombocytopenia, Lactic/metabolic acidosis. GLO on CKD .  INR elevated to 12.19 on initial labs.  On Coumadin 5 mg as an outpatient.  Given  KCentra and Vitamin K in ED.   s/p 3 units prbcs in micu, FFP, and plts. gi consulted, nephro consulted. blood cx + for caogulase positive staph, likely contaminant will repeat BCx tmrw  ua/ucx negative. MRSA swab negative       downgraded to medicine 4/20       REVIEW OF SYSTEMS:  Constitutional, Eyes, ENT, Cardiovascular, Respiratory, Gastrointestinal, Genitourinary, Musculoskeletal, Integumentary, Neurological, Psychiatric, Endocrine, Heme/Lymph, and Allergic/Immunologic review of systems are otherwise negative except as noted in the HPI.    PAST MEDICAL & SURGICAL HISTORY:      FAMILY HISTORY:      SOCIAL HISTORY:    Allergies    No Known Allergies    Intolerances        MEDICATIONS  (STANDING):  amLODIPine   Tablet 10 milliGRAM(s) Oral daily  chlorhexidine 2% Cloths 1 Application(s) Topical daily  ferrous    sulfate 325 milliGRAM(s) Oral daily  lactated ringers. 1000 milliLiter(s) (75 mL/Hr) IV Continuous <Continuous>  pantoprazole  Injectable 40 milliGRAM(s) IV Push two times a day  piperacillin/tazobactam IVPB.. 3.375 Gram(s) IV Intermittent every 12 hours  potassium chloride   Powder 40 milliEquivalent(s) Oral every 4 hours  predniSONE   Tablet 5 milliGRAM(s) Oral daily  tacrolimus    0.5 mG/mL Suspension 1 milliGRAM(s) Oral <User Schedule>    MEDICATIONS  (PRN):      Vital Signs Last 24 Hrs  T(C): 36.6 (21 Apr 2024 09:00), Max: 36.8 (20 Apr 2024 18:00)  T(F): 97.9 (21 Apr 2024 09:00), Max: 98.2 (20 Apr 2024 18:00)  HR: 93 (21 Apr 2024 09:00) (79 - 98)  BP: 128/75 (21 Apr 2024 09:00) (121/59 - 162/89)  BP(mean): 90 (21 Apr 2024 09:00) (78 - 109)  RR: 20 (21 Apr 2024 09:00) (14 - 23)  SpO2: 99% (21 Apr 2024 08:00) (98% - 100%)    Parameters below as of 21 Apr 2024 08:00  Patient On (Oxygen Delivery Method): room air        PHYSICAL EXAM:    GENERAL: NAD, well-groomed, well-developed  HEAD:  Atraumatic, Normocephalic  EYES: EOMI, PERRLA, conjunctiva and sclera clear  ENMT: No tonsillar erythema, exudates, or enlargement; Moist mucous membranes, Good dentition, No lesions  NECK: Supple, No JVD, Normal thyroid  NERVOUS SYSTEM:  Alert & Oriented X3, Good concentration; Motor Strength 5/5 B/L upper and lower extremities; DTRs 2+ intact and symmetric  CHEST/LUNG: Clear to auscultation bilaterally; No rales, rhonchi, wheezing, or rubs  HEART: Regular rate and rhythm; No murmurs, rubs, or gallops  ABDOMEN: Soft, Nontender, Nondistended; Bowel sounds present  EXTREMITIES:  2+ Peripheral Pulses, No clubbing, cyanosis, or edema  LYMPH: No lymphadenopathy noted  SKIN: No rashes or lesions      LABS:                        8.1    7.75  )-----------( 111      ( 21 Apr 2024 04:20 )             22.7     04-21    142  |  103  |  101.9<H>  ----------------------------<  86  3.1<L>   |  23.0  |  4.64<H>    Ca    7.5<L>      21 Apr 2024 04:20  Phos  3.1     04-21  Mg     1.9     04-21    TPro  4.9<L>  /  Alb  3.2<L>  /  TBili  0.7  /  DBili  x   /  AST  12  /  ALT  5   /  AlkPhos  33<L>  04-21      Urinalysis Basic - ( 21 Apr 2024 04:20 )    Color: x / Appearance: x / SG: x / pH: x  Gluc: 86 mg/dL / Ketone: x  / Bili: x / Urobili: x   Blood: x / Protein: x / Nitrite: x   Leuk Esterase: x / RBC: x / WBC x   Sq Epi: x / Non Sq Epi: x / Bacteria: x          RADIOLOGY & ADDITIONAL STUDIES:    PATHOLOGY:

## 2024-04-21 NOTE — DIETITIAN INITIAL EVALUATION ADULT - OBTAIN DAILY WEIGHT
Encounter documentation reviewed by NILESH Pereira CNM 3/29/2024 12:39 PM. OB physical to be performed at next prenatal visit.   Will watch for prior delivery record for review, if not obtained by NOV will re-request.   ): No  Previously given birth to an infant weighing [id number] (4000g) or more: No  History of hypertension: No  HDL < 35mg/dL and/or a triglyceride level greater than 250 mg/dL: patient states she has never had checked  History of polycystic ovarian syndrome: No  A1c greater than or equal to 5.7%: patient states she has never had checked    Risk Factors for Pre-Eclampsia:    Patient is at high risk if 1 or more factors present.   Prior preeclampsia: No  Multiple gestation: No  Chronic hypertension: No  Type 1 or 2 diabetes: No  Renal disease: No  Autoimmune disease: No  (Lupus, APLS)    Patient is at moderate risk is several (2+) risk factors are present:  Nulliparity: No  Obesity: (BMI > 30) No  Family history of preeclampsia: No  (Mother, sister)  Sociodemographic characteristics: Yes  (AA, low socioeconomic status)  Age > 35: Yes  Personal history factor: Yes  (Previous SGA, > 10 years from last pregnancy)        ASSESSMENT/EDUCATION:     The following were addressed during this visit:    Trimester 1  - Blood Product Preferences   - Depression Screening   - Environmental/Occupational Hazards   - Genetic Discussion   - Immunizations/Disease Testing   - Medications   - Prenatal Care Expectations   - When to call your Doctor in Pregnancy   - Early Glucola (perform 16-18)     Education Trimester 1  - Infectious Disease Education   - Nutrition/Weight Gain   - Travel   - Lifestyle   - Prenatal Care and Practice Information   - Diet and Weight Gain   - Domestic Abuse Screen   - Breastfeeding Education: First Trimester   - Discuss Share Everywhere and MyChart with Patient   - Travel   - Infectious Disease Education   - Nutrition/Weight Gain        Pregnancy at 10w5d   She was counseled on office policies. She was educated about the anticipated course of prenatal care and routine prenatal labs.   Patient has consented to HIV testing and urine drug screen: Yes  Initial Pathways Screen was completed:  yes

## 2024-04-21 NOTE — DIETITIAN INITIAL EVALUATION ADULT - PERTINENT MEDS FT
MEDICATIONS  (STANDING):  amLODIPine   Tablet 10 milliGRAM(s) Oral daily  ferrous    sulfate 325 milliGRAM(s) Oral daily  lactated ringers. 1000 milliLiter(s) (75 mL/Hr) IV Continuous <Continuous>  pantoprazole  Injectable 40 milliGRAM(s) IV Push two times a day  piperacillin/tazobactam IVPB.. 3.375 Gram(s) IV Intermittent every 12 hours  potassium chloride   Powder 40 milliEquivalent(s) Oral every 4 hours  predniSONE   Tablet 5 milliGRAM(s) Oral daily  tacrolimus    0.5 mG/mL Suspension 1 milliGRAM(s) Oral <User Schedule>

## 2024-04-21 NOTE — PROGRESS NOTE ADULT - SUBJECTIVE AND OBJECTIVE BOX
Patient is a 83y old  Female who presents with a chief complaint of      HPI:  83 year old female with a PMH of ESRD s/p renal transplant on mycophenolate and tacrolimus (per nursing home documents), HTN, HLD, anemia, dementia who presented to the ED from [facility] for elevated BUN/Cr on outpatient labs.  Upon arrival to the ED patient was found to be hypotensive and hypothermic.  Workup was significant for profound anemia, elevated INR, lactic acidosis, and an GLO on CKD.  MICU consulted for further management. She was found to have GLO on CKD 5 and had renal transplant in the past.  She knows her name.  She is not producing much urine.  Her transplant was done at [facility].        More awake    PAST MEDICAL & SURGICAL HISTORY:       FAMILY HISTORY:  NC    Social History:Non smoker    MEDICATIONS  (STANDING):  ferrous    sulfate 325 milliGRAM(s) Oral daily  lactated ringers. 1000 milliLiter(s) (75 mL/Hr) IV Continuous <Continuous>  pantoprazole  Injectable 40 milliGRAM(s) IV Push two times a day  piperacillin/tazobactam IVPB.. 3.375 Gram(s) IV Intermittent every 12 hours  predniSONE   Tablet 5 milliGRAM(s) Oral daily  tacrolimus 2 milliGRAM(s) Oral <User Schedule>    MEDICATIONS  (PRN):   Meds reviewed    Allergies    No Known Allergies    Intolerances         REVIEW OF SYSTEMS:    as above      ICU Vital Signs Last 24 Hrs  T(C): 36.7 (21 Apr 2024 16:09), Max: 36.7 (21 Apr 2024 06:00)  T(F): 98.1 (21 Apr 2024 16:09), Max: 98.1 (21 Apr 2024 06:00)  HR: 97 (21 Apr 2024 18:00) (79 - 103)  BP: 146/88 (21 Apr 2024 18:00) (109/88 - 147/94)  BP(mean): 93 (21 Apr 2024 14:00) (78 - 109)  ABP: --  ABP(mean): --  RR: 21 (21 Apr 2024 18:00) (14 - 23)  SpO2: 99% (21 Apr 2024 18:00) (98% - 100%)    O2 Parameters below as of 21 Apr 2024 18:00  Patient On (Oxygen Delivery Method): room air              PHYSICAL EXAM:    GENERAL: frail and ill appearing  HEAD:  Atraumatic, Normocephalic  EYES: EOMI, conjunctiva and sclera clear  ENMT: No Drainage from nares, No drainage from ears  NERVOUS SYSTEM:  Awake and Alert  CHEST/LUNG: Clear to percussion bilaterally  EXTREMITIES:  No Edema  SKIN: No rashes No obvious ecchymosis      LABS:                                               8.1    7.75  )-----------( 111      ( 21 Apr 2024 04:20 )             22.7     04-21    142  |  103  |  101.9<H>  ----------------------------<  86  3.1<L>   |  23.0  |  4.64<H>    Ca    7.5<L>      21 Apr 2024 04:20  Phos  3.1     04-21  Mg     1.9     04-21    TPro  4.9<L>  /  Alb  3.2<L>  /  TBili  0.7  /  DBili  x   /  AST  12  /  ALT  5   /  AlkPhos  33<L>  04-21      Urinalysis Basic - ( 21 Apr 2024 04:20 )    Color: x / Appearance: x / SG: x / pH: x  Gluc: 86 mg/dL / Ketone: x  / Bili: x / Urobili: x   Blood: x / Protein: x / Nitrite: x   Leuk Esterase: x / RBC: x / WBC x   Sq Epi: x / Non Sq Epi: x / Bacteria: x                        RADIOLOGY & ADDITIONAL TESTS:

## 2024-04-21 NOTE — PROGRESS NOTE ADULT - ASSESSMENT
83 year old female with a PMH of ESRD s/p renal transplant on mycophenolate and tacrolimus (per nursing home documents), HTN, HLD, anemia, dementia , recent cva approx 2 months ago presented to the ED from [facility] for GLO. found to have hypothermia and hypotensive on presentation. found to have acute severe anemia, likely GI bleed, Elevated INR, Thrombocytopenia, Lactic/metabolic acidosis. GLO on CKD . s/p vasopressors, ivf , 3 units prbcs in micu. Hb stable at 8 now. blood cx + for coagulase positive staph, likely contaminant.     #. acute anemia   Hb has stabilized now at 8. source is likely GI bleed. she is having dark colored stool.  - FOBT pos    - CT abd/pelvis demonstrated distended gallbladder/ no active bleed   - RUQ US demonstrated dilated CBD  - c/w IV Protonix BID  - gi will do EGD/colo when more stable.        #. supratherapeutic INR. resolved. s/p FFP, vitamin K and Kcentra   - INR elevated to 12.19 on initial labs  - On Coumadin 5 mg as an outpatient. patient does not know why she is on coumadin.  - Hold off on AC/chemical DVT prophylaxis for now given ABLA.        > GLO on CKD likely pre-renal/ Metabolic Acidosis/Lactic Acidosis  - hx of renal transplant / follows at [facility]   creatinine has started to trend down now  -C/w tacrolimus 1mg BID    -Hold MMF  -Cont Pred  -No emergent indication for dialysis    >Thrombocytopenia  - likely due to acute illness. Plt >100K. s/p Plt transfusion   - monitor   - hold off chemical dvt ppx for now     >hx of cva   - has residual facial droop and rt upper ext weakness   - cth neg for any acute changes   - monitor ms   - diet purred / no liquids as per speech     #. contaminated blood cultures. dc Abx.     #.DVT ppx: scds due to gib     Palliative care also following.

## 2024-04-21 NOTE — PROGRESS NOTE ADULT - SUBJECTIVE AND OBJECTIVE BOX
[facility] Division of Hospital Medicine    Chief Complaint:      SUBJECTIVE / OVERNIGHT EVENTS:    Patient denies any complaints. as per RN patient had a black colored BM.     MEDICATIONS  (STANDING):  amLODIPine   Tablet 10 milliGRAM(s) Oral daily  chlorhexidine 2% Cloths 1 Application(s) Topical daily  ferrous    sulfate 325 milliGRAM(s) Oral daily  lactated ringers. 1000 milliLiter(s) (75 mL/Hr) IV Continuous <Continuous>  multivitamin 1 Tablet(s) Oral daily  pantoprazole  Injectable 40 milliGRAM(s) IV Push two times a day  piperacillin/tazobactam IVPB.. 3.375 Gram(s) IV Intermittent every 12 hours  predniSONE   Tablet 5 milliGRAM(s) Oral daily  tacrolimus    0.5 mG/mL Suspension 1 milliGRAM(s) Oral <User Schedule>    MEDICATIONS  (PRN):        I&O's Summary    20 Apr 2024 07:01  -  21 Apr 2024 07:00  --------------------------------------------------------  IN: 2335 mL / OUT: 1270 mL / NET: 1065 mL    21 Apr 2024 07:01  -  21 Apr 2024 12:25  --------------------------------------------------------  IN: 495 mL / OUT: 120 mL / NET: 375 mL        PHYSICAL EXAM:  Vital Signs Last 24 Hrs  T(C): 36.6 (21 Apr 2024 11:00), Max: 36.8 (20 Apr 2024 18:00)  T(F): 97.9 (21 Apr 2024 11:00), Max: 98.2 (20 Apr 2024 18:00)  HR: 81 (21 Apr 2024 11:00) (79 - 98)  BP: 122/71 (21 Apr 2024 11:00) (119/75 - 162/89)  BP(mean): 87 (21 Apr 2024 11:00) (78 - 109)  RR: 20 (21 Apr 2024 11:00) (14 - 23)  SpO2: 100% (21 Apr 2024 11:00) (98% - 100%)    Parameters below as of 21 Apr 2024 12:00  Patient On (Oxygen Delivery Method): room air            CONSTITUTIONAL: NAD,   ENMT: normocephalic, atraumatic  RESPIRATORY: Normal respiratory effort; lungs are clear to auscultation bilaterally  CARDIOVASCULAR: Regular rate and rhythm, normal S1 and S2, no murmur/rub/gallop  ABDOMEN: Nontender to palpation, no rebound/guarding; No hepatosplenomegaly  MUSCLOSKELETAL:  No edema  PSYCH: A+O to person, place, and time; affect appropriate        LABS:                        8.1    7.75  )-----------( 111      ( 21 Apr 2024 04:20 )             22.7     04-21    142  |  103  |  101.9<H>  ----------------------------<  86  3.1<L>   |  23.0  |  4.64<H>    Ca    7.5<L>      21 Apr 2024 04:20  Phos  3.1     04-21  Mg     1.9     04-21    TPro  4.9<L>  /  Alb  3.2<L>  /  TBili  0.7  /  DBili  x   /  AST  12  /  ALT  5   /  AlkPhos  33<L>  04-21          Urinalysis Basic - ( 21 Apr 2024 04:20 )    Color: x / Appearance: x / SG: x / pH: x  Gluc: 86 mg/dL / Ketone: x  / Bili: x / Urobili: x   Blood: x / Protein: x / Nitrite: x   Leuk Esterase: x / RBC: x / WBC x   Sq Epi: x / Non Sq Epi: x / Bacteria: x        Culture - Blood (collected 18 Apr 2024 23:25)  Source: .Blood Blood-Peripheral  Preliminary Report (21 Apr 2024 06:01):    No growth at 48 Hours    Culture - Blood (collected 18 Apr 2024 23:20)  Source: .Blood Blood-Peripheral  Gram Stain (20 Apr 2024 04:17):    Growth in aerobic bottle: Gram Positive Cocci in Clusters  Final Report (20 Apr 2024 19:56):    Growth in aerobic bottle: Staphylococcus hominis    Isolation of Coagulase negative Staphylococcus from single blood culture    sets may represent    contamination. Contact the Microbiology Department at [phone] if    susceptibility testing is    clinically indicated.    Direct identification is available within approximately 3-5    hours either by Blood Panel Multiplexed PCR or Direct    MALDI-TOF. Details: [email or web address]  Organism: Blood Culture PCR (20 Apr 2024 19:56)  Organism: Blood Culture PCR (20 Apr 2024 19:56)      CAPILLARY BLOOD GLUCOSE            RADIOLOGY & ADDITIONAL TESTS:  Results Reviewed:   Imaging Personally Reviewed:  Electrocardiogram Personally Reviewed:

## 2024-04-21 NOTE — CONSULT NOTE ADULT - ASSESSMENT
83 year old female with a PMH of ESRD s/p renal transplant on mycophenolate and tacrolimus (per nursing home documents), HTN, HLD, anemia, dementia who presented to the ED from [facility] for elevated BUN/Cr on outpatient labs. Hematology consulted for anemia and coagulpathy with elevated INR of ~12 on admission, now improved      -Anemia: likely multifactorial given immunosuppression with elevated INR in the setting of coumadin contributing to possible GI bleed. HGB now stable ~8, continue to trend CBC, transfuse as needed hgb < 7. Monitor for signs of bleeding.   Agree with GI workup with EGD/Colonscopy. Agree with holding AC/chemical DVT prophylaxis  given ABLA, till hgb stable 83 year old female with a PMH of ESRD s/p renal transplant on mycophenolate and tacrolimus (per nursing home documents), HTN, HLD, anemia, dementia who presented to the ED from [facility] for elevated BUN/Cr on outpatient labs. Hematology consulted for anemia and coagulpathy with elevated INR of ~12 on admission, now improved      -Anemia: likely multifactorial given immunosuppression, CKD, elevated INR in the setting of coumadin contributing to possible GI bleed. HGB now stable ~8, INR improved, continue to trend CBC, transfuse as needed hgb < 7,  Monitor coags and for signs of bleeding.  Agree with GI workup with EGD/Colonscopy. Agree with holding AC/chemical DVT prophylaxis  given ABLA, till hgb stable  83 year old female with a PMH of ESRD s/p renal transplant on mycophenolate and tacrolimus (per nursing home documents), HTN, HLD, anemia, dementia who presented to the ED from [facility] for elevated BUN/Cr on outpatient labs. Hematology consulted for anemia and coagulpathy with elevated INR of ~12 on admission, now improved      -Anemia: likely multifactorial given immunosuppression, CKD, elevated INR in the setting of coumadin contributing to possible GI bleed. HGB now stable ~8, INR improved s/p Kcentra/vitamin K, continue to trend CBC, transfuse as needed hgb < 7,  Monitor coags and for signs of bleeding.  Agree with GI workup with EGD/Colonscopy. Agree with holding AC/chemical DVT prophylaxis  given ABLA, till hgb stable. Reason for coumadin is not clear, obtain records from [place] to further elucidate

## 2024-04-21 NOTE — DIETITIAN INITIAL EVALUATION ADULT - ADD RECOMMEND
Will provide Greek yogurt TID to provide an additional 110 kcal and 12g protein per serving. Continue ferrous sulfate supplementation, add MVI daily. Encourage po intake, monitor diet tolerance, and provide assistance at meals as needed. Obtain daily weights to monitor trends.

## 2024-04-22 LAB
ANION GAP SERPL CALC-SCNC: 18 MMOL/L — HIGH (ref 5–17)
BUN SERPL-MCNC: 89.3 MG/DL — HIGH (ref 8–20)
CALCIUM SERPL-MCNC: 7.8 MG/DL — LOW (ref 8.4–10.5)
CHLORIDE SERPL-SCNC: 105 MMOL/L — SIGNIFICANT CHANGE UP (ref 96–108)
CO2 SERPL-SCNC: 19 MMOL/L — LOW (ref 22–29)
CREAT SERPL-MCNC: 4.2 MG/DL — HIGH (ref 0.5–1.3)
EGFR: 10 ML/MIN/1.73M2 — LOW
GLUCOSE BLDC GLUCOMTR-MCNC: 73 MG/DL — SIGNIFICANT CHANGE UP (ref 70–99)
GLUCOSE BLDC GLUCOMTR-MCNC: 90 MG/DL — SIGNIFICANT CHANGE UP (ref 70–99)
GLUCOSE SERPL-MCNC: 74 MG/DL — SIGNIFICANT CHANGE UP (ref 70–99)
HCT VFR BLD CALC: 29.5 % — LOW (ref 34.5–45)
HGB BLD-MCNC: 10.2 G/DL — LOW (ref 11.5–15.5)
MCHC RBC-ENTMCNC: 31.6 PG — SIGNIFICANT CHANGE UP (ref 27–34)
MCHC RBC-ENTMCNC: 34.6 GM/DL — SIGNIFICANT CHANGE UP (ref 32–36)
MCV RBC AUTO: 91.3 FL — SIGNIFICANT CHANGE UP (ref 80–100)
PLATELET # BLD AUTO: 138 K/UL — LOW (ref 150–400)
POTASSIUM SERPL-MCNC: 3.9 MMOL/L — SIGNIFICANT CHANGE UP (ref 3.5–5.3)
POTASSIUM SERPL-SCNC: 3.9 MMOL/L — SIGNIFICANT CHANGE UP (ref 3.5–5.3)
RBC # BLD: 3.23 M/UL — LOW (ref 3.8–5.2)
RBC # FLD: 15.5 % — HIGH (ref 10.3–14.5)
SODIUM SERPL-SCNC: 142 MMOL/L — SIGNIFICANT CHANGE UP (ref 135–145)
WBC # BLD: 7.74 K/UL — SIGNIFICANT CHANGE UP (ref 3.8–10.5)
WBC # FLD AUTO: 7.74 K/UL — SIGNIFICANT CHANGE UP (ref 3.8–10.5)

## 2024-04-22 PROCEDURE — 99223 1ST HOSP IP/OBS HIGH 75: CPT

## 2024-04-22 PROCEDURE — 99233 SBSQ HOSP IP/OBS HIGH 50: CPT

## 2024-04-22 PROCEDURE — 99232 SBSQ HOSP IP/OBS MODERATE 35: CPT

## 2024-04-22 RX ORDER — TACROLIMUS 5 MG/1
2 CAPSULE ORAL EVERY 12 HOURS
Refills: 0 | Status: DISCONTINUED | OUTPATIENT
Start: 2024-04-22 | End: 2024-05-01

## 2024-04-22 RX ORDER — SODIUM BICARBONATE 1 MEQ/ML
0.25 SYRINGE (ML) INTRAVENOUS
Qty: 150 | Refills: 0 | Status: DISCONTINUED | OUTPATIENT
Start: 2024-04-22 | End: 2024-04-24

## 2024-04-22 RX ADMIN — AMLODIPINE BESYLATE 10 MILLIGRAM(S): 2.5 TABLET ORAL at 05:07

## 2024-04-22 RX ADMIN — TACROLIMUS 1 MILLIGRAM(S): 5 CAPSULE ORAL at 05:08

## 2024-04-22 RX ADMIN — CHLORHEXIDINE GLUCONATE 1 APPLICATION(S): 213 SOLUTION TOPICAL at 12:32

## 2024-04-22 RX ADMIN — Medication 325 MILLIGRAM(S): at 12:31

## 2024-04-22 RX ADMIN — PANTOPRAZOLE SODIUM 40 MILLIGRAM(S): 20 TABLET, DELAYED RELEASE ORAL at 05:07

## 2024-04-22 RX ADMIN — Medication 5 MILLIGRAM(S): at 05:07

## 2024-04-22 RX ADMIN — PANTOPRAZOLE SODIUM 40 MILLIGRAM(S): 20 TABLET, DELAYED RELEASE ORAL at 17:27

## 2024-04-22 RX ADMIN — Medication 1 TABLET(S): at 12:32

## 2024-04-22 RX ADMIN — SODIUM CHLORIDE 75 MILLILITER(S): 9 INJECTION, SOLUTION INTRAVENOUS at 10:53

## 2024-04-22 RX ADMIN — Medication 75 MEQ/KG/HR: at 12:42

## 2024-04-22 RX ADMIN — TACROLIMUS 2 MILLIGRAM(S): 5 CAPSULE ORAL at 17:27

## 2024-04-22 NOTE — CONSULT NOTE ADULT - SUBJECTIVE AND OBJECTIVE BOX
[facility] PALLIATIVE MEDICINE CONSULT    CC: Patient is a 83y old  Female who presents with a chief complaint of Gastrointestinal hemorrhage  (21 Apr 2024 09:34)    HPI:  Pt is a poor medical historian, info from chart and staff.     83 year old female with a PMH of ESRD s/p renal transplant on mycophenolate and tacrolimus (per nursing home documents), HTN, HLD, anemia, dementia who presented to the ED from [facility] for elevated BUN/Cr on outpatient labs.  Upon arrival to the ED patient was found to be hypotensive and hypothermic.  Workup was significant for profound anemia, elevated INR, lactic acidosis, and an GLO on CKD.  MICU consulted for further management.     (19 Apr 2024 02:22)    Mrs. Gomez is an 83 year old female with PMHx of ESRD s/p renal transplant with subsequent progression to CKD, HTN, HLD, dementia, on coumadin for unclear reason sent from [facility] for worsening BUN/Cr on outpatient lab work. In the ER, found to be hypotensive and hypothermia with initial workup notable for profound acute on chronic anemia (hgb 2.5), positive occult stool, supratherapeutic INR (12.19), GLO on CKD, elevated lactate level. Admitted to MICU for ongoing medical management. S/P transfusion this admission with improvement and evaluated by GI with recommendations for eventual endoscopic evaluation when medically optimized. Hematology and Nephrology also following. Palliative consulted for support and to assist with goals of care.     Pt seen at bedside, resting comfortably. Wakes easily to name, oriented to self, "[facility]" [facility], name of son but unable to relay reason for admission. She did acknowledged prior renal transplant dating to "a long time ago."      Present Symptoms:     Dyspnea:  no  Nausea/Vomiting:  No  Anxiety:   No  Depression:  No  Fatigue: Yes   Loss of appetite: Yes    Constipation:  None documented     Pain: No             Character-            Duration-            Effect-            Factors-            Frequency-            Location-            Severity-    Pain AD Score:  [email or web address] (press ctrl + left click to view)    Review of Systems: Limited due to mentation/fatigue                     Negative: no chest pain or dyspnea at rest                     Positive: +weakness, see above  All others negative       PERTINENT PMH REVIEWED: Yes      PAST MEDICAL & SURGICAL HISTORY:    FAMILY HISTORY: Limited due to mentation/dementia       Allergies    No Known Allergies    Intolerances        SOCIAL HISTORY:                      Substance history:                    Admitted from:  [facility]                    Children: 1 son Pranay                    Druze/spirituality:                    Cultural concerns:       DECISION MAKER(s):[] Health Care Proxy(s)  [] Surrogate(s)  [] Guardian           - surrogate is karthikeyan Pires [phone]    ADVANCE DIRECTIVES/TREATMENT PREFERENCES:  DNR YES NO  Completed on:                     MOLST  YES NO   Completed on:  Living Will  YES NO   Completed on:    Karnofsky/Palliative Performance Status Version 2:  %  http://npcrc.org/files/news/palliative_performance_scale_ppsv2.pdf    Baseline ADLs (prior to admission): n/a      MEDICATIONS  (STANDING):  amLODIPine   Tablet 10 milliGRAM(s) Oral daily  chlorhexidine 2% Cloths 1 Application(s) Topical daily  ferrous    sulfate 325 milliGRAM(s) Oral daily  multivitamin 1 Tablet(s) Oral daily  pantoprazole  Injectable 40 milliGRAM(s) IV Push two times a day  predniSONE   Tablet 5 milliGRAM(s) Oral daily  sodium bicarbonate  Infusion 0.247 mEq/kG/Hr (75 mL/Hr) IV Continuous <Continuous>  tacrolimus 2 milliGRAM(s) Oral every 12 hours    MEDICATIONS  (PRN):      PHYSICAL EXAM:    Vital Signs Last 24 Hrs  T(C): 37 (22 Apr 2024 08:00), Max: 37 (22 Apr 2024 08:00)  T(F): 98.6 (22 Apr 2024 08:00), Max: 98.6 (22 Apr 2024 08:00)  HR: 96 (22 Apr 2024 10:00) (83 - 105)  BP: 131/74 (22 Apr 2024 10:00) (131/74 - 146/88)  BP(mean): 78 (22 Apr 2024 04:00) (78 - 105)  RR: 17 (22 Apr 2024 10:00) (17 - 21)  SpO2: 94% (22 Apr 2024 10:00) (94% - 99%)    Parameters below as of 22 Apr 2024 10:00  Patient On (Oxygen Delivery Method): room air    General: resting comfortably and no acute distress.     HEENT: mucous membrane moist     Lungs: comfortable nonlabored      CV: S1/S2. Regular rate and rhythm    GI: +BS abdomen soft, nondistended, nontender      : benson    MSK: moves all 4 extremities, no cyanosis or edema + weakness       Neuro: nonfocal. wakes easily to name, oriented to self, hospital, name of son, aware of some medical information    Skin: warm and dry.      LABS:                        10.2   7.74  )-----------( 138      ( 22 Apr 2024 07:14 )             29.5     04-22    142  |  105  |  89.3<H>  ----------------------------<  74  3.9   |  19.0<L>  |  4.20<H>    Ca    7.8<L>      22 Apr 2024 07:14  Phos  3.1     04-21  Mg     1.9     04-21    TPro  4.9<L>  /  Alb  3.2<L>  /  TBili  0.7  /  DBili  x   /  AST  12  /  ALT  5   /  AlkPhos  33<L>  04-21      Urinalysis Basic - ( 22 Apr 2024 07:14 )    Color: x / Appearance: x / SG: x / pH: x  Gluc: 74 mg/dL / Ketone: x  / Bili: x / Urobili: x   Blood: x / Protein: x / Nitrite: x   Leuk Esterase: x / RBC: x / WBC x   Sq Epi: x / Non Sq Epi: x / Bacteria: x      I&O's Summary    21 Apr 2024 07:01  -  22 Apr 2024 07:00  --------------------------------------------------------  IN: 1920 mL / OUT: 645 mL / NET: 1275 mL    RADIOLOGY & ADDITIONAL STUDIES:    CXR    ACC: [id number] EXAM:  XR CHEST PORTABLE URGENT 1V   ORDERED BY: MATI VAZQUEZ     PROCEDURE DATE:  04/19/2024          INTERPRETATION:  TECHNIQUE: Single portable view of the chest.    COMPARISON:  12/24/2023    CLINICAL HISTORY: Sepsis    FINDINGS:    Single frontal view of the chest demonstrates the lungs to be clear. The   cardiomediastinal silhouette is unremarkable. No acute osseous   abnormalities. Overlying EKG leads and wires are noted. Left axillary   vascular stent.    IMPRESSION: No acute cardiopulmonary disease process.    --- End of Report ---  YASSER BEBETO MD; Attending Radiologist  This document has been electronically signed. Apr 19 2024 10:43AM    CT Head    ACC: [id number] EXAM:  CT BRAIN   ORDERED BY: KENISHA LUONG SANDRINE     PROCEDURE DATE:  04/19/2024          INTERPRETATION:  .    CLINICAL INFORMATION: Altered mental status with elevated INR    TECHNIQUE: Multiple axial CT images of the head were obtained without   contrast. Sagittal and coronal reconstructed images were acquired from   the source data.    COMPARISON: No prior CT studies of the brain are available for comparison   at this institution.    FINDINGS: There is no acute intracranial hemorrhage, mass effect, shift   of the midline structures, herniation, extra-axial fluid collection, or   hydrocephalus.    There is diffuse cerebral volume loss with prominence of the sulci,   fissures, and cisternal spaces which is normal for the patient's age.   There is mild deep and periventricular white matter hypoattenuation   statistically compatible with microvascular changes given calcific   atherosclerotic disease of the intracranial arteries.    Aerated layering secretions are seen within the right sphenoid sinus.   Otherwise, the paranasal sinuses and tympanomastoid cavities are clear.   The calvarium is intact. There is evidence of bilateral cataract removal.    IMPRESSION: No acute intracranial hemorrhage, mass effect, or shift of   the midline structures.    --- End of Report ---  JUDE FUNG MD; Attending Radiologist  This document has been electronically signed. Apr 19 2024  7:43AM    CT AP    ACC: [id number] EXAM:  CT ABDOMEN AND PELVIS   ORDERED BY: MATI VAZQUEZ     PROCEDURE DATE:  04/19/2024          INTERPRETATION:  CLINICAL INFORMATION:  GI bleed.    COMPARISON: No prior imaging available for comparison.    PROCEDURE:  CT of the Abdomen and Pelvis was performed without intravenous contrast.  Intravenous contrast: None.  Oral contrast: None.  Sagittal and coronal reformats were performed.    FINDINGS:  LOWER CHEST: Small left pleural effusion. Innumerable pulmonary nodules   in all visualized lung fields up to 6 mm. cardiomegaly. Mitral annular   valve calcifications and coronary artery calcifications. Anemia.    LIVER: Subcapsular segment 8 liver lesion measuring 1.8 cm.  BILE DUCTS: Normal caliber.  GALLBLADDER: Dilatation of the gallbladder. Multiple gallstones are   present. No wall thickening or edema.  SPLEEN: Within normal limits.  PANCREAS: Limited evaluation due to lack of IV contrast  ADRENALS: Within normal limits.  KIDNEYS/URETERS: Hypertrophy of the renal parenchyma bilaterally. Left   renal cysts. No hydronephrosis. Left iliac fossa renal transplant without   hydronephrosis.    BLADDER: Within normal limits.  REPRODUCTIVE ORGANS: Hysterectomy.    BOWEL: Mild thickening of the distal esophageal wall which can be seen   with reflux disease and/or esophagitis. Stomach is collapsed and   demonstrates no gross abnormality within the limits of noncontrast   imaging. Few small bowel loops are mildly dilated in the right lower   quadrant involving the mid to distal ileum. There is no transition point   or pneumatosis. No bowel obstruction. There is a segment of sigmoid and   descending colon that is decompressed but also demonstrates diffusely   thickened wall, image 3:112 suggesting edema and colitis. Appendix is not   visualized.  PERITONEUM: No ascites.  VESSELS: Atherosclerosis.  RETROPERITONEUM/LYMPH NODES: No lymphadenopathy.  ABDOMINAL WALL: Anasarca.  BONES: Sacroiliac joint degeneration and thickening heterogeneous   increased bone mineral density scan is seen with renal osseous dystrophy.   Discogenic degenerative disease.    IMPRESSION:    Please note that the evaluation for active GI bleed  requires IV contrast.    Distended gallbladder with cholelithiasis. If there is right upper   quadrant pain, consider right upper quadrant ultrasound to rule out acute   cholecystitis.    Decompressed colon with additional suggestion of submucosal edema   involving the descending and sigmoid colon suggesting colitis, ischemic   colitis cannot be excluded.    Cardiomegaly and left pleural effusion and anasarca.    Disseminated innumerable pulmonary nodules are worrisome for metastatic   disease, a infectious etiology is a differential consideration but felt   less likely.    Noncontrast study is nondiagnostic for evaluating active GI bleed.  Distended gallbladder containing gallstones. Consider right upper   quadrant ultrasound to exclude gallbladder disease.  Left lower quadrant renal transplant without hydronephrosis or   perinephric collection.  Atrophied bilateral kidneys containing cysts as well as too small to   characterize hypodensities.  Small left pleural effusion. Scattered bilateral sub-cm lung nodules.   Consider nonemergent pulmonary imaging follow-up.  Follow-up official report in the morning    --- End of Report ---  PATRICIO GONZALES MD; Attending Radiologist  This document has been electronically signed. Apr 19 2024  9:33AM    CT Chest    ACC: [id number] EXAM:  CT CHEST   ORDERED BY: YOEL EMMANUEL DELANEY     PROCEDURE DATE:  04/20/2024          INTERPRETATION:  CT CHEST WITHOUT CONTRAST    INDICATION: Pulmonary nodules.    TECHNIQUE: Unenhanced helical images were obtained of the chest. Coronal   and sagittal images were reconstructed. Maximum intensity projection   images were generated.        COMPARISON: CT abdomen pelvis 4/19/2024. Chest radiograph 4/19/2024.    FINDINGS:    Tubes/Lines: None.    Lungs, airways and pleura: There are bilateral pulmonary nodules in the   upper lobes, right middle lobe, and lower lobes. The largest nodule   measures:  *  In the right middle lobe is a 7 mm noncalcified nodule (series 3 image   87).  *  In the left lower lobe is a 9 x 6 mm noncalcified nodule (series 3   image 91).  The airways are normal. No pneumothorax. Small bilateral pleural   effusions and lower lobe passive atelectasis.    Mediastinum: The thyroid gland is heterogeneous with bilobar nodules and   calcifications. The esophagus is unremarkable. Cardiomegaly. Small   pericardial effusion. Coronary artery calcifications. Mitral annular   calcification. Aorta is tortuous. Aortic calcifications.    Upper Abdomen: Hypodense hepatic lesion is unchanged and measures 1.8 cm.   Upper abdominal ascites. Cholelithiasis. Atrophic kidneys. Retained   contrast in the kidneys is secondary to the known history of renal   dysfunction. Bilateral hypodense renal lesions likely represent cysts.    Bones And Soft Tissues: Spondylosis of the thoracic spine. Sclerosis of   the superior and inferior endplates of the vertebral bodies and of the   sternum can occur in the setting of renal osteodystrophy.  Body wall   anasarca.      IMPRESSION:    1.  The bilateral pulmonary nodules are indeterminate.  2.  Small bilateral pleural effusions and atelectasis.    --- End of Report ---    TRAE THORNTON MD; Attending Radiologist  This document has been electronically signed. Apr 21 2024 10:44AM    NEUROLOGICAL MEDICATIONS/OPIOIDS/BENZODIAZEPINE OVER PAST 24 HOURS:     [facility] PALLIATIVE MEDICINE CONSULT    CC: Patient is a 83y old  Female who presents with a chief complaint of Gastrointestinal hemorrhage  (21 Apr 2024 09:34)    HPI:  Pt is a poor medical historian, info from chart and staff.     83 year old female with a PMH of ESRD s/p renal transplant on mycophenolate and tacrolimus (per nursing home documents), HTN, HLD, anemia, dementia who presented to the ED from [facility] for elevated BUN/Cr on outpatient labs.  Upon arrival to the ED patient was found to be hypotensive and hypothermic.  Workup was significant for profound anemia, elevated INR, lactic acidosis, and an GLO on CKD.  MICU consulted for further management.     (19 Apr 2024 02:22)    Mrs. Gomez is an 83 year old female with PMHx of ESRD s/p renal transplant with subsequent progression to CKD, HTN, HLD, dementia, on coumadin for unclear reason sent from [facility] for worsening BUN/Cr on outpatient lab work. In the ER, found to be hypotensive and hypothermia with initial workup notable for profound acute on chronic anemia (hgb 2.5), positive occult stool, supratherapeutic INR (12.19), GLO on CKD, elevated lactate level. Admitted to MICU for ongoing medical management. S/P transfusion this admission with improvement and evaluated by GI with recommendations for eventual endoscopic evaluation when medically optimized. Hematology and Nephrology also following. Palliative consulted for support and to assist with goals of care.     Pt seen at bedside, resting comfortably. Wakes easily to name, oriented to self, "[facility]" hospital, name of son but unable to relay reason for admission. She did acknowledged prior renal transplant dating to "a long time ago."      Present Symptoms:     Dyspnea:  no  Nausea/Vomiting:  No  Anxiety:   No  Depression:  No  Fatigue: Yes   Loss of appetite: Yes    Constipation:  None documented     Pain: No             Character-            Duration-            Effect-            Factors-            Frequency-            Location-            Severity-    Pain AD Score:  [email or web address] (press ctrl + left click to view)    Review of Systems: Limited due to mentation/fatigue                     Negative: no chest pain or dyspnea at rest                     Positive: +weakness, see above  All others negative       PERTINENT PMH REVIEWED: Yes      PAST MEDICAL & SURGICAL HISTORY:    FAMILY HISTORY: Limited due to mentation/dementia       Allergies    No Known Allergies    Intolerances        SOCIAL HISTORY:                      Substance history:                    Admitted from:  [facility]                    Children: 1 son Pranay                    Yazidism/spirituality:                    Cultural concerns:       DECISION MAKER(s):[] Health Care Proxy(s)  [] Surrogate(s)  [] Guardian           - surrogate is karthikeyan Pires [phone]    ADVANCE DIRECTIVES/TREATMENT PREFERENCES:  DNR YES NO  Completed on:                     MOLST  YES NO   Completed on:  Living Will  YES NO   Completed on:    Karnofsky/Palliative Performance Status Version 2:  %  http://npcrc.org/files/news/palliative_performance_scale_ppsv2.pdf    Baseline ADLs (prior to admission): n/a      MEDICATIONS  (STANDING):  amLODIPine   Tablet 10 milliGRAM(s) Oral daily  chlorhexidine 2% Cloths 1 Application(s) Topical daily  ferrous    sulfate 325 milliGRAM(s) Oral daily  multivitamin 1 Tablet(s) Oral daily  pantoprazole  Injectable 40 milliGRAM(s) IV Push two times a day  predniSONE   Tablet 5 milliGRAM(s) Oral daily  sodium bicarbonate  Infusion 0.247 mEq/kG/Hr (75 mL/Hr) IV Continuous <Continuous>  tacrolimus 2 milliGRAM(s) Oral every 12 hours    MEDICATIONS  (PRN):      PHYSICAL EXAM:    Vital Signs Last 24 Hrs  T(C): 37 (22 Apr 2024 08:00), Max: 37 (22 Apr 2024 08:00)  T(F): 98.6 (22 Apr 2024 08:00), Max: 98.6 (22 Apr 2024 08:00)  HR: 96 (22 Apr 2024 10:00) (83 - 105)  BP: 131/74 (22 Apr 2024 10:00) (131/74 - 146/88)  BP(mean): 78 (22 Apr 2024 04:00) (78 - 105)  RR: 17 (22 Apr 2024 10:00) (17 - 21)  SpO2: 94% (22 Apr 2024 10:00) (94% - 99%)    Parameters below as of 22 Apr 2024 10:00  Patient On (Oxygen Delivery Method): room air    General: resting comfortably and no acute distress.     HEENT: mucous membrane moist     Lungs: comfortable nonlabored      CV: S1/S2. Regular rate and rhythm    GI: +BS abdomen soft, nondistended, nontender      : benson    MSK: moves all 4 extremities, no cyanosis or edema + weakness       Neuro: nonfocal. wakes easily to name, oriented to self, hospital, name of son, appears sleepy    Skin: warm and dry.      LABS:                        10.2   7.74  )-----------( 138      ( 22 Apr 2024 07:14 )             29.5     04-22    142  |  105  |  89.3<H>  ----------------------------<  74  3.9   |  19.0<L>  |  4.20<H>    Ca    7.8<L>      22 Apr 2024 07:14  Phos  3.1     04-21  Mg     1.9     04-21    TPro  4.9<L>  /  Alb  3.2<L>  /  TBili  0.7  /  DBili  x   /  AST  12  /  ALT  5   /  AlkPhos  33<L>  04-21      Urinalysis Basic - ( 22 Apr 2024 07:14 )    Color: x / Appearance: x / SG: x / pH: x  Gluc: 74 mg/dL / Ketone: x  / Bili: x / Urobili: x   Blood: x / Protein: x / Nitrite: x   Leuk Esterase: x / RBC: x / WBC x   Sq Epi: x / Non Sq Epi: x / Bacteria: x      I&O's Summary    21 Apr 2024 07:01  -  22 Apr 2024 07:00  --------------------------------------------------------  IN: 1920 mL / OUT: 645 mL / NET: 1275 mL    RADIOLOGY & ADDITIONAL STUDIES:    CXR    ACC: [id number] EXAM:  XR CHEST PORTABLE URGENT 1V   ORDERED BY: MATI VAZQUEZ     PROCEDURE DATE:  04/19/2024          INTERPRETATION:  TECHNIQUE: Single portable view of the chest.    COMPARISON:  12/24/2023    CLINICAL HISTORY: Sepsis    FINDINGS:    Single frontal view of the chest demonstrates the lungs to be clear. The   cardiomediastinal silhouette is unremarkable. No acute osseous   abnormalities. Overlying EKG leads and wires are noted. Left axillary   vascular stent.    IMPRESSION: No acute cardiopulmonary disease process.    --- End of Report ---  MEL TATE MD; Attending Radiologist  This document has been electronically signed. Apr 19 2024 10:43AM    CT Head    ACC: [id number] EXAM:  CT BRAIN   ORDERED BY: KENISHA DELUCA     PROCEDURE DATE:  04/19/2024          INTERPRETATION:  .    CLINICAL INFORMATION: Altered mental status with elevated INR    TECHNIQUE: Multiple axial CT images of the head were obtained without   contrast. Sagittal and coronal reconstructed images were acquired from   the source data.    COMPARISON: No prior CT studies of the brain are available for comparison   at this institution.    FINDINGS: There is no acute intracranial hemorrhage, mass effect, shift   of the midline structures, herniation, extra-axial fluid collection, or   hydrocephalus.    There is diffuse cerebral volume loss with prominence of the sulci,   fissures, and cisternal spaces which is normal for the patient's age.   There is mild deep and periventricular white matter hypoattenuation   statistically compatible with microvascular changes given calcific   atherosclerotic disease of the intracranial arteries.    Aerated layering secretions are seen within the right sphenoid sinus.   Otherwise, the paranasal sinuses and tympanomastoid cavities are clear.   The calvarium is intact. There is evidence of bilateral cataract removal.    IMPRESSION: No acute intracranial hemorrhage, mass effect, or shift of   the midline structures.    --- End of Report ---  JUDE FUNG MD; Attending Radiologist  This document has been electronically signed. Apr 19 2024  7:43AM    CT AP    ACC: [id number] EXAM:  CT ABDOMEN AND PELVIS   ORDERED BY: MATI VAZQUEZ     PROCEDURE DATE:  04/19/2024          INTERPRETATION:  CLINICAL INFORMATION:  GI bleed.    COMPARISON: No prior imaging available for comparison.    PROCEDURE:  CT of the Abdomen and Pelvis was performed without intravenous contrast.  Intravenous contrast: None.  Oral contrast: None.  Sagittal and coronal reformats were performed.    FINDINGS:  LOWER CHEST: Small left pleural effusion. Innumerable pulmonary nodules   in all visualized lung fields up to 6 mm. cardiomegaly. Mitral annular   valve calcifications and coronary artery calcifications. Anemia.    LIVER: Subcapsular segment 8 liver lesion measuring 1.8 cm.  BILE DUCTS: Normal caliber.  GALLBLADDER: Dilatation of the gallbladder. Multiple gallstones are   present. No wall thickening or edema.  SPLEEN: Within normal limits.  PANCREAS: Limited evaluation due to lack of IV contrast  ADRENALS: Within normal limits.  KIDNEYS/URETERS: Hypertrophy of the renal parenchyma bilaterally. Left   renal cysts. No hydronephrosis. Left iliac fossa renal transplant without   hydronephrosis.    BLADDER: Within normal limits.  REPRODUCTIVE ORGANS: Hysterectomy.    BOWEL: Mild thickening of the distal esophageal wall which can be seen   with reflux disease and/or esophagitis. Stomach is collapsed and   demonstrates no gross abnormality within the limits of noncontrast   imaging. Few small bowel loops are mildly dilated in the right lower   quadrant involving the mid to distal ileum. There is no transition point   or pneumatosis. No bowel obstruction. There is a segment of sigmoid and   descending colon that is decompressed but also demonstrates diffusely   thickened wall, image 3:112 suggesting edema and colitis. Appendix is not   visualized.  PERITONEUM: No ascites.  VESSELS: Atherosclerosis.  RETROPERITONEUM/LYMPH NODES: No lymphadenopathy.  ABDOMINAL WALL: Anasarca.  BONES: Sacroiliac joint degeneration and thickening heterogeneous   increased bone mineral density scan is seen with renal osseous dystrophy.   Discogenic degenerative disease.    IMPRESSION:    Please note that the evaluation for active GI bleed  requires IV contrast.    Distended gallbladder with cholelithiasis. If there is right upper   quadrant pain, consider right upper quadrant ultrasound to rule out acute   cholecystitis.    Decompressed colon with additional suggestion of submucosal edema   involving the descending and sigmoid colon suggesting colitis, ischemic   colitis cannot be excluded.    Cardiomegaly and left pleural effusion and anasarca.    Disseminated innumerable pulmonary nodules are worrisome for metastatic   disease, a infectious etiology is a differential consideration but felt   less likely.    Noncontrast study is nondiagnostic for evaluating active GI bleed.  Distended gallbladder containing gallstones. Consider right upper   quadrant ultrasound to exclude gallbladder disease.  Left lower quadrant renal transplant without hydronephrosis or   perinephric collection.  Atrophied bilateral kidneys containing cysts as well as too small to   characterize hypodensities.  Small left pleural effusion. Scattered bilateral sub-cm lung nodules.   Consider nonemergent pulmonary imaging follow-up.  Follow-up official report in the morning    --- End of Report ---  PATRICIO GONZALES MD; Attending Radiologist  This document has been electronically signed. Apr 19 2024  9:33AM    CT Chest    ACC: [id number] EXAM:  CT CHEST   ORDERED BY: YOEL EMMANUEL DELANEY     PROCEDURE DATE:  04/20/2024          INTERPRETATION:  CT CHEST WITHOUT CONTRAST    INDICATION: Pulmonary nodules.    TECHNIQUE: Unenhanced helical images were obtained of the chest. Coronal   and sagittal images were reconstructed. Maximum intensity projection   images were generated.        COMPARISON: CT abdomen pelvis 4/19/2024. Chest radiograph 4/19/2024.    FINDINGS:    Tubes/Lines: None.    Lungs, airways and pleura: There are bilateral pulmonary nodules in the   upper lobes, right middle lobe, and lower lobes. The largest nodule   measures:  *  In the right middle lobe is a 7 mm noncalcified nodule (series 3 image   87).  *  In the left lower lobe is a 9 x 6 mm noncalcified nodule (series 3   image 91).  The airways are normal. No pneumothorax. Small bilateral pleural   effusions and lower lobe passive atelectasis.    Mediastinum: The thyroid gland is heterogeneous with bilobar nodules and   calcifications. The esophagus is unremarkable. Cardiomegaly. Small   pericardial effusion. Coronary artery calcifications. Mitral annular   calcification. Aorta is tortuous. Aortic calcifications.    Upper Abdomen: Hypodense hepatic lesion is unchanged and measures 1.8 cm.   Upper abdominal ascites. Cholelithiasis. Atrophic kidneys. Retained   contrast in the kidneys is secondary to the known history of renal   dysfunction. Bilateral hypodense renal lesions likely represent cysts.    Bones And Soft Tissues: Spondylosis of the thoracic spine. Sclerosis of   the superior and inferior endplates of the vertebral bodies and of the   sternum can occur in the setting of renal osteodystrophy.  Body wall   anasarca.      IMPRESSION:    1.  The bilateral pulmonary nodules are indeterminate.  2.  Small bilateral pleural effusions and atelectasis.    --- End of Report ---    TRAE THORNTON MD; Attending Radiologist  This document has been electronically signed. Apr 21 2024 10:44AM    NEUROLOGICAL MEDICATIONS/OPIOIDS/BENZODIAZEPINE OVER PAST 24 HOURS:

## 2024-04-22 NOTE — PROGRESS NOTE ADULT - ASSESSMENT
GLO on CKD5  S/P Renal Transplant  Metabolic Acidosis/Lactic Acidosis  Anemia  GI bleed    -She is nearing the end of the life of her renal transplant  -Suspect her BUN is more from GI bleeding than from GLO and she does not appear uremic  -S/P PRBCs with improvement in hgb, but creatinine relatively unchanged  -C/w tacrolimus 1mg BID (Tacrolimus level at goal)  -Hold MMF  -Cont Pred  -Palliative eval pending  -UOP improving, but still oliguric, creatinine stabilizing  -No emergent indication for dialysis  -IVF changed to Bicarb @ 75cc/hr GLO on CKD5  S/P Renal Transplant  Metabolic Acidosis/Lactic Acidosis  Anemia  GI bleed    pt with advanced CKD  -S/P PRBCs with improvement in hgb, but creatinine relatively unchanged  -C/w tacrolimus 1mg BID (Tacrolimus level at goal)  -Hold MMF  -Cont Pred  -Palliative eval pending  -UOP improving, but still oliguric, creatinine stabilizing  -No emergent indication for dialysis  -IVF changed to Bicarb @ 75cc/hr

## 2024-04-22 NOTE — CONSULT NOTE ADULT - TIME BILLING
D/W pt, RN, hospitalist Dr Salcido, POLA Arriaga, MICU DR Tian     Total time also includes discussion during interdisciplinary team rounds, chart review including but limited to prior admissions/ GOC discussions, review of established ACP documentations ( ex. living will, HCP, MOLST form),  review of medications/ labs/ imaging, examination, care coordination with other health care professionals, documentation EXCLUDING current goals of care or advance care planning discussions.
Reviewing CT images and lab work.
MDM

## 2024-04-22 NOTE — PROGRESS NOTE ADULT - SUBJECTIVE AND OBJECTIVE BOX
[facility] DIVISION OF KIDNEY DISEASES AND HYPERTENSION -- FOLLOW UP NOTE  --------------------------------------------------------------------------------  HPI:  83 year old female with a PMH of ESRD s/p renal transplant on mycophenolate and tacrolimus (per nursing home documents), HTN, HLD, anemia, dementia who presented to the ED from [facility] for elevated BUN/Cr on outpatient labs.  Upon arrival to the ED patient was found to be hypotensive and hypothermic.  Workup was significant for profound anemia, elevated INR, lactic acidosis, and an GLO on CKD.  MICU consulted for further management. She was found to have GLO on CKD 5 and had renal transplant in the past.  She knows her name.  She is not producing much urine.  Her transplant was done at [place].     24 hour events/subjective:  Lethargic, otherwise offers no acute complaints. Denies abdominal pain, chest pain , or SOB.      PAST HISTORY  --------------------------------------------------------------------------------  No significant changes to PMH, PSH, FHx, SHx, unless otherwise noted    ALLERGIES & MEDICATIONS  --------------------------------------------------------------------------------  Allergies    No Known Allergies    Intolerances      Standing Inpatient Medications  amLODIPine   Tablet 10 milliGRAM(s) Oral daily  chlorhexidine 2% Cloths 1 Application(s) Topical daily  ferrous    sulfate 325 milliGRAM(s) Oral daily  multivitamin 1 Tablet(s) Oral daily  pantoprazole  Injectable 40 milliGRAM(s) IV Push two times a day  predniSONE   Tablet 5 milliGRAM(s) Oral daily  sodium bicarbonate  Infusion 0.247 mEq/kG/Hr IV Continuous <Continuous>  tacrolimus 2 milliGRAM(s) Oral every 12 hours    PRN Inpatient Medications      REVIEW OF SYSTEMS  --------------------------------------------------------------------------------  As noted above    VITALS/PHYSICAL EXAM  --------------------------------------------------------------------------------  T(C): 37 (04-22-24 @ 08:00), Max: 37 (04-22-24 @ 08:00)  HR: 96 (04-22-24 @ 10:00) (83 - 105)  BP: 131/74 (04-22-24 @ 10:00) (109/88 - 146/88)  RR: 17 (04-22-24 @ 10:00) (17 - 23)  SpO2: 94% (04-22-24 @ 10:00) (94% - 99%)  Wt(kg): --        04-21-24 @ 07:01  -  04-22-24 @ 07:00  --------------------------------------------------------  IN: 1920 mL / OUT: 645 mL / NET: 1275 mL      Physical Exam:  	Gen: Thin & frail appearing   	HEENT: PERRL, supple neck, clear oropharynx  	Pulm: CTA B/L  	CV: RRR, S1S2; no rub  	Abd: +BS, soft, nontender/nondistended. Transplant kidney in LLQ, non-tender  	: No suprapubic tenderness  	UE: Warm, FROM, no clubbing, intact strength; no edema; no asterixis  	LE: Warm, FROM, no clubbing, intact strength; no edema  	Neuro: No focal deficits  	Vascular access: LUE Graft    LABS/STUDIES  --------------------------------------------------------------------------------              10.2   7.74  >-----------<  138      [04-22-24 @ 07:14]              29.5     142  |  105  |  89.3  ----------------------------<  74      [04-22-24 @ 07:14]  3.9   |  19.0  |  4.20        Ca     7.8     [04-22-24 @ 07:14]      Mg     1.9     [04-21-24 @ 04:20]      Phos  3.1     [04-21-24 @ 04:20]    TPro  4.9  /  Alb  3.2  /  TBili  0.7  /  DBili  x   /  AST  12  /  ALT  5   /  AlkPhos  33  [04-21-24 @ 04:20]              [04-21-24 @ 04:20]    Creatinine Trend:  SCr 4.20 [04-22 @ 07:14]  SCr 4.64 [04-21 @ 04:20]  SCr 4.67 [04-20 @ 04:20]  SCr 4.75 [04-19 @ 21:09]  SCr 5.12 [04-19 @ 11:30]    Urinalysis - [04-22-24 @ 07:14]      Color  / Appearance  / SG  / pH       Gluc 74 / Ketone   / Bili  / Urobili        Blood  / Protein  / Leuk Est  / Nitrite       RBC  / WBC  / Hyaline  / Gran  / Sq Epi  / Non Sq Epi  / Bacteria       Iron 175, TIBC 212, %sat 83      [04-19-24 @ 06:50]  Ferritin 3171      [04-19-24 @ 06:50]  TSH 1.72      [04-19-24 @ 06:50]       [facility] DIVISION OF KIDNEY DISEASES AND HYPERTENSION -- FOLLOW UP NOTE  --------------------------------------------------------------------------------  HPI:  83 year old female with a PMH of ESRD s/p renal transplant on mycophenolate and tacrolimus (per nursing home documents), HTN, HLD, anemia, dementia who presented to the ED from [facility] for elevated BUN/Cr on outpatient labs.  Upon arrival to the ED patient was found to be hypotensive and hypothermic.  Workup was significant for profound anemia, elevated INR, lactic acidosis, and an GLO on CKD.  MICU consulted for further management. She was found to have GOL on CKD 5 and had renal transplant in the past.  She knows her name.  She is not producing much urine.  Her transplant was done at [place].     24 hour events/subjective:  Lethargic, otherwise offers no acute complaints. Denies abdominal pain, chest pain , or SOB.      PAST HISTORY  --------------------------------------------------------------------------------  No significant changes to PMH, PSH, FHx, SHx, unless otherwise noted    ALLERGIES & MEDICATIONS  --------------------------------------------------------------------------------  Allergies    No Known Allergies        Standing Inpatient Medications  amLODIPine   Tablet 10 milliGRAM(s) Oral daily  chlorhexidine 2% Cloths 1 Application(s) Topical daily  ferrous    sulfate 325 milliGRAM(s) Oral daily  multivitamin 1 Tablet(s) Oral daily  pantoprazole  Injectable 40 milliGRAM(s) IV Push two times a day  predniSONE   Tablet 5 milliGRAM(s) Oral daily  sodium bicarbonate  Infusion 0.247 mEq/kG/Hr IV Continuous <Continuous>  tacrolimus 2 milliGRAM(s) Oral every 12 hours    PRN Inpatient Medications      REVIEW OF SYSTEMS  --------------------------------------------------------------------------------  As noted above    VITALS/PHYSICAL EXAM  --------------------------------------------------------------------------------  T(C): 37 (04-22-24 @ 08:00), Max: 37 (04-22-24 @ 08:00)  HR: 96 (04-22-24 @ 10:00) (83 - 105)  BP: 131/74 (04-22-24 @ 10:00) (109/88 - 146/88)  RR: 17 (04-22-24 @ 10:00) (17 - 23)  SpO2: 94% (04-22-24 @ 10:00) (94% - 99%)  Wt(kg): --        04-21-24 @ 07:01  -  04-22-24 @ 07:00  --------------------------------------------------------  IN: 1920 mL / OUT: 645 mL / NET: 1275 mL      Physical Exam:  	Gen: Thin & frail appearing woman  	HEENT: , supple neck, clear oropharynx  	Pulm: CTA B/L  	CV: RRR, S1S2; no rub  	Abd: +BS, soft, nontender/nondistended. Transplant kidney in LLQ, non-tender  	: No suprapubic tenderness  	UE: Warm, no edema  	LE: Warm, no edema  	Neuro: No focal deficit  	Vascular access: LUE AVG    LABS/STUDIES  --------------------------------------------------------------------------------              10.2   7.74  >-----------<  138      [04-22-24 @ 07:14]              29.5     142  |  105  |  89.3  ----------------------------<  74      [04-22-24 @ 07:14]  3.9   |  19.0  |  4.20        Ca     7.8     [04-22-24 @ 07:14]      Mg     1.9     [04-21-24 @ 04:20]      Phos  3.1     [04-21-24 @ 04:20]    TPro  4.9  /  Alb  3.2  /  TBili  0.7  /  DBili  x   /  AST  12  /  ALT  5   /  AlkPhos  33  [04-21-24 @ 04:20]              [04-21-24 @ 04:20]    Creatinine Trend:  SCr 4.20 [04-22 @ 07:14]  SCr 4.64 [04-21 @ 04:20]  SCr 4.67 [04-20 @ 04:20]  SCr 4.75 [04-19 @ 21:09]  SCr 5.12 [04-19 @ 11:30]    Urinalysis - [04-22-24 @ 07:14]      Color  / Appearance  / SG  / pH       Gluc 74 / Ketone   / Bili  / Urobili        Blood  / Protein  / Leuk Est  / Nitrite       RBC  / WBC  / Hyaline  / Gran  / Sq Epi  / Non Sq Epi  / Bacteria       Iron 175, TIBC 212, %sat 83      [04-19-24 @ 06:50]  Ferritin 3171      [04-19-24 @ 06:50]  TSH 1.72      [04-19-24 @ 06:50]

## 2024-04-22 NOTE — PROGRESS NOTE ADULT - ASSESSMENT
83 year old female with a PMH of ESRD s/p renal transplant on mycophenolate and tacrolimus (per nursing home documents), HTN, HLD, anemia, dementia , recent cva approx 2 months ago presented to the ED from [facility] for GLO. found to have hypothermia and hypotensive on presentation. found to have acute severe anemia, likely GI bleed, Elevated INR, Thrombocytopenia, Lactic/metabolic acidosis. GLO on CKD . s/p vasopressors, ivf , 3 units prbcs in micu. Hb stable at 8 now. blood cx + for coagulase positive staph, likely contaminant.     #. anemia. acute element has resolved.   Hb has stabilized now and is 10 today. source is likely GI bleed. she had dark colored stool yesterday as per RN.  - FOBT pos    - CT abd/pelvis demonstrated distended gallbladder/ no active bleed   - RUQ US demonstrated dilated CBD  - c/w IV Protonix BID  - gi will do EGD/colo when more stable.        #. supratherapeutic INR. resolved. s/p FFP, vitamin K and Kcentra   - INR elevated to 12.19 on initial labs  - On Coumadin 5 mg as an outpatient. patient does not know why she is on coumadin.  - Hold off on AC/chemical DVT prophylaxis for now given ABLA.        > GLO on CKD likely pre-renal/ Metabolic Acidosis/Lactic Acidosis  - hx of renal transplant / follows at [facility]   creatinine trending down.  -C/w tacrolimus 1mg BID    -Hold MMF  -Cont Pred      >Thrombocytopenia  - likely due to acute illness. Plt >100K. s/p Plt transfusion   - hold off chemical dvt ppx for now     >hx of cva   - has residual left nasolabial flattening and left upper ext weakness   - cth neg for any acute changes     #. contaminated blood cultures.     #.DVT ppx: scds due to gib     Palliative care also following.

## 2024-04-22 NOTE — CONSULT NOTE ADULT - ASSESSMENT
83 year old female with hx of ESRD s/p renal transplant now progressed to CKD, HTN, HLD, dementia sent from [facility] for GLO on outpatient lab workup, found to have profound anemia with positive occult stool/GI bleed, supra therapeutic INR and thrombocytopenia, GLO on CKD, lactic and metabolic acidosis. Admitted initially to MICU and subsequently downgraded to SDU. Palliative consulted for support and to assist with ongoing goals of care.    Acute on Chronic Anemia  Rule out GI Bleeding  - pt was on coumadin on outpatient med rec, unclear for what reason   - initial Hgb on admission 2.5 s/p multiple transfusion with improvement, stable around Hgb ~8 with last transfusion 4/20  - positive occult stool  - GI input appreciated, eventual endoscopic studies when medically optimized  - continue with PPI, iron supplementation, serial CBC and transfuse PRN    GLO on CKD  - hx of ESRD s/p renal transplant at [facility], per renal "she is nearing the end of the life of her renal transplant"  - renal indices improving with likely GI Bleed contributing to elevated BUN  - oliguric but making urine with no current emergent need for dialysis  - continue immunosuppressant tacrolimus and prednisone  - per MICU team discussion with son this admission, he was amendable to resuming HD if warranted    Dementia  - unclear baseline and will need to establish collateral from son  - supportive care, reorientation , sleep hygiene     Advance Care Planning  Palliative Care Encounter  - FULL CODE  - surrogate is karthikeyan Pires [phone]    Met with pt to introduce role and scope of palliative care team as supportive in the setting of complex comorbidities/serious illnesses, to assist and navigate with complex medical decision making and symptoms during their hospital stay here. She wakes easily to verbal stimuli and was oriented to self, hospital and son's name but limited insight into her medical conditions. However, she did acknowledged her history of renal transplant "a long time ago." When writer asked of her thoughts on future dialysis if her kidney function worsens she stated "i guess so." She did not appear to fully appreciate the extent and severity of her complex medical conditions. Writer inquired about her support system and the person that assist her in making medical decisions. She states its her son Moe who is suppose to be here later today. Per chart, son is returning from [place] today. Discussed with hospitalist, pt continues to be medically optimized and making slow improvements renally with no emergent need for HD. Palliative team will support and to follow up with son for collateral info and to assist with ongoing goals of care.

## 2024-04-22 NOTE — PROGRESS NOTE ADULT - SUBJECTIVE AND OBJECTIVE BOX
[facility] Division of Hospital Medicine    Chief Complaint:      SUBJECTIVE / OVERNIGHT EVENTS:    Patient denies any complaints.     MEDICATIONS  (STANDING):  amLODIPine   Tablet 10 milliGRAM(s) Oral daily  chlorhexidine 2% Cloths 1 Application(s) Topical daily  ferrous    sulfate 325 milliGRAM(s) Oral daily  multivitamin 1 Tablet(s) Oral daily  pantoprazole  Injectable 40 milliGRAM(s) IV Push two times a day  predniSONE   Tablet 5 milliGRAM(s) Oral daily  sodium bicarbonate  Infusion 0.247 mEq/kG/Hr (75 mL/Hr) IV Continuous <Continuous>  tacrolimus 2 milliGRAM(s) Oral every 12 hours    MEDICATIONS  (PRN):        I&O's Summary    21 Apr 2024 07:01  -  22 Apr 2024 07:00  --------------------------------------------------------  IN: 1920 mL / OUT: 645 mL / NET: 1275 mL        PHYSICAL EXAM:  Vital Signs Last 24 Hrs  T(C): 36.8 (22 Apr 2024 15:00), Max: 37 (22 Apr 2024 08:00)  T(F): 98.3 (22 Apr 2024 15:00), Max: 98.6 (22 Apr 2024 08:00)  HR: 96 (22 Apr 2024 10:00) (83 - 105)  BP: 131/74 (22 Apr 2024 10:00) (131/74 - 146/88)  BP(mean): 78 (22 Apr 2024 04:00) (78 - 105)  RR: 17 (22 Apr 2024 10:00) (17 - 21)  SpO2: 94% (22 Apr 2024 10:00) (94% - 99%)    Parameters below as of 22 Apr 2024 10:00  Patient On (Oxygen Delivery Method): room air            CONSTITUTIONAL: NAD,   ENMT: normocephalic, atraumatic  RESPIRATORY: Normal respiratory effort; lungs are clear to auscultation bilaterally  CARDIOVASCULAR: Regular rate and rhythm, normal S1 and S2, no murmur/rub/gallop  ABDOMEN: Nontender to palpation, no rebound/guarding; No hepatosplenomegaly  MUSCLOSKELETAL:  No edema  PSYCH: A+O to person, place, and time; left nasolabial fold flattening.    LABS:                        10.2   7.74  )-----------( 138      ( 22 Apr 2024 07:14 )             29.5     04-22    142  |  105  |  89.3<H>  ----------------------------<  74  3.9   |  19.0<L>  |  4.20<H>    Ca    7.8<L>      22 Apr 2024 07:14  Phos  3.1     04-21  Mg     1.9     04-21    TPro  4.9<L>  /  Alb  3.2<L>  /  TBili  0.7  /  DBili  x   /  AST  12  /  ALT  5   /  AlkPhos  33<L>  04-21          Urinalysis Basic - ( 22 Apr 2024 07:14 )    Color: x / Appearance: x / SG: x / pH: x  Gluc: 74 mg/dL / Ketone: x  / Bili: x / Urobili: x   Blood: x / Protein: x / Nitrite: x   Leuk Esterase: x / RBC: x / WBC x   Sq Epi: x / Non Sq Epi: x / Bacteria: x        CAPILLARY BLOOD GLUCOSE      POCT Blood Glucose.: 73 mg/dL (22 Apr 2024 05:06)  POCT Blood Glucose.: 90 mg/dL (22 Apr 2024 01:09)  POCT Blood Glucose.: 92 mg/dL (21 Apr 2024 18:32)        RADIOLOGY & ADDITIONAL TESTS:  Results Reviewed:   Imaging Personally Reviewed:  Electrocardiogram Personally Reviewed:

## 2024-04-23 PROCEDURE — 99232 SBSQ HOSP IP/OBS MODERATE 35: CPT | Mod: FS

## 2024-04-23 PROCEDURE — 99232 SBSQ HOSP IP/OBS MODERATE 35: CPT

## 2024-04-23 PROCEDURE — 99233 SBSQ HOSP IP/OBS HIGH 50: CPT

## 2024-04-23 RX ORDER — POLYETHYLENE GLYCOL 3350 17 G/17G
17 POWDER, FOR SOLUTION ORAL
Refills: 0 | Status: COMPLETED | OUTPATIENT
Start: 2024-04-23 | End: 2024-04-24

## 2024-04-23 RX ADMIN — Medication 325 MILLIGRAM(S): at 11:19

## 2024-04-23 RX ADMIN — Medication 75 MEQ/KG/HR: at 04:20

## 2024-04-23 RX ADMIN — Medication 5 MILLIGRAM(S): at 06:11

## 2024-04-23 RX ADMIN — AMLODIPINE BESYLATE 10 MILLIGRAM(S): 2.5 TABLET ORAL at 06:11

## 2024-04-23 RX ADMIN — PANTOPRAZOLE SODIUM 40 MILLIGRAM(S): 20 TABLET, DELAYED RELEASE ORAL at 06:09

## 2024-04-23 RX ADMIN — TACROLIMUS 2 MILLIGRAM(S): 5 CAPSULE ORAL at 06:11

## 2024-04-23 RX ADMIN — Medication 1 TABLET(S): at 11:19

## 2024-04-23 RX ADMIN — TACROLIMUS 2 MILLIGRAM(S): 5 CAPSULE ORAL at 17:59

## 2024-04-23 RX ADMIN — CHLORHEXIDINE GLUCONATE 1 APPLICATION(S): 213 SOLUTION TOPICAL at 11:22

## 2024-04-23 RX ADMIN — PANTOPRAZOLE SODIUM 40 MILLIGRAM(S): 20 TABLET, DELAYED RELEASE ORAL at 18:09

## 2024-04-23 NOTE — PROGRESS NOTE ADULT - SUBJECTIVE AND OBJECTIVE BOX
[facility] PALLIATIVE MEDICINE     CC: FOLLOW UP VISIT + GOC    INTERVAL HPI/OVERNIGHT EVENTS:  Source if other than patient:  []Family   []Team     No acute events overnight.     PRESENT SYMPTOMS:     Dyspnea: no  Nausea/Vomiting:  No  Anxiety:   No  Depression:  No  Fatigue: Yes    Loss of appetite: Yes    Constipation:  None documented     Pain: no            Character-            Duration-            Effect-            Factors-            Frequency-            Location-            Severity-    Pain AD Score:  [email or web address]/[email or web address] (press ctrl + left click to view)    Review of Systems: Limited due to mentation     MEDICATIONS  (STANDING):  amLODIPine   Tablet 10 milliGRAM(s) Oral daily  chlorhexidine 2% Cloths 1 Application(s) Topical daily  ferrous    sulfate 325 milliGRAM(s) Oral daily  multivitamin 1 Tablet(s) Oral daily  pantoprazole  Injectable 40 milliGRAM(s) IV Push two times a day  polyethylene glycol 3350 17 Gram(s) Oral two times a day  predniSONE   Tablet 5 milliGRAM(s) Oral daily  sodium bicarbonate  Infusion 0.247 mEq/kG/Hr (75 mL/Hr) IV Continuous <Continuous>  tacrolimus 2 milliGRAM(s) Oral every 12 hours    MEDICATIONS  (PRN):      PHYSICAL EXAM:    Vital Signs Last 24 Hrs  T(C): 36.4 (23 Apr 2024 08:47), Max: 37.3 (22 Apr 2024 20:00)  T(F): 97.5 (23 Apr 2024 08:47), Max: 99.1 (22 Apr 2024 20:00)  HR: 78 (23 Apr 2024 08:47) (69 - 103)  BP: 150/63 (23 Apr 2024 08:47) (131/80 - 156/66)  BP(mean): 102 (22 Apr 2024 20:00) (102 - 102)  RR: 18 (23 Apr 2024 08:47) (16 - 18)  SpO2: 92% (23 Apr 2024 08:47) (92% - 98%)    Parameters below as of 23 Apr 2024 08:47  Patient On (Oxygen Delivery Method): room air    Karnofsky:  30%    GEN: resting comfortably and no acute distress    HEENT: mucous membrane moist     Lungs: comfortable, nonlabored      CV: +s1/s2 regular rate and rhythm     GI: +BS, abdomen soft, nontender, nondistended      :    benson    MSK:   no cyanosis or edema +weakness    NEURO: nonfocal. wakes to verbal stimuli. answer simple questions    Skin: warm and dry.      Psych: flat affect    LABS:                          10.2   7.74  )-----------( 138      ( 22 Apr 2024 07:14 )             29.5     04-22    142  |  105  |  89.3<H>  ----------------------------<  74  3.9   |  19.0<L>  |  4.20<H>    Ca    7.8<L>      22 Apr 2024 07:14        Urinalysis Basic - ( 22 Apr 2024 07:14 )    Color: x / Appearance: x / SG: x / pH: x  Gluc: 74 mg/dL / Ketone: x  / Bili: x / Urobili: x   Blood: x / Protein: x / Nitrite: x   Leuk Esterase: x / RBC: x / WBC x   Sq Epi: x / Non Sq Epi: x / Bacteria: x      I&O's Summary    22 Apr 2024 07:01  -  23 Apr 2024 07:00  --------------------------------------------------------  IN: 1725 mL / OUT: 300 mL / NET: 1425 mL    23 Apr 2024 07:01  -  23 Apr 2024 14:28  --------------------------------------------------------  IN: 0 mL / OUT: 400 mL / NET: -400 mL    RADIOLOGY & ADDITIONAL STUDIES: Reviewed     ADVANCE DIRECTIVES/TREATMENT PREFERENCES: FULL CODE  DNR YES NO  Completed on:                     MOLST  YES NO   Completed on:  Living Will  YES NO   Completed on:    NEUROLOGICAL MEDICATIONS/OPIOIDS/BENZODIAZEPINE IN PAST 24 HOURS

## 2024-04-23 NOTE — PROGRESS NOTE ADULT - SUBJECTIVE AND OBJECTIVE BOX
Heme/Onc Progress note    INTERVAL HPI/OVERNIGHT EVENTS:  Patient S&E at bedside. No o/n events, patient resting comfortably. stated having chills unclear if had fever. Patient denies dizziness, weakness, CP, palpitations, SOB, cough, N/V/D/C, dysuria, changes in bowel movements, LE edema.    VITAL SIGNS:  T(F): 97.5 (04-23-24 @ 08:47)  HR: 78 (04-23-24 @ 08:47)  BP: 150/63 (04-23-24 @ 08:47)  RR: 18 (04-23-24 @ 08:47)  SpO2: 92% (04-23-24 @ 08:47)  Wt(kg): --    PHYSICAL EXAM:    Constitutional: NAD  Eyes: EOMI, sclera non-icteric  Neck: supple, no masses, no JVD  Respiratory: CTA b/l, good air entry b/l  Cardiovascular: RRR, no M/R/G  Gastrointestinal: soft, NTND, no masses palpable, + BS,   Extremities: no c/c/e  Neurological: AAOx2-3    MEDICATIONS  (STANDING):  amLODIPine   Tablet 10 milliGRAM(s) Oral daily  chlorhexidine 2% Cloths 1 Application(s) Topical daily  ferrous    sulfate 325 milliGRAM(s) Oral daily  multivitamin 1 Tablet(s) Oral daily  pantoprazole  Injectable 40 milliGRAM(s) IV Push two times a day  predniSONE   Tablet 5 milliGRAM(s) Oral daily  sodium bicarbonate  Infusion 0.247 mEq/kG/Hr (75 mL/Hr) IV Continuous <Continuous>  tacrolimus 2 milliGRAM(s) Oral every 12 hours    MEDICATIONS  (PRN):      Allergies    No Known Allergies    Intolerances        LABS:                        10.2   7.74  )-----------( 138      ( 22 Apr 2024 07:14 )             29.5     04-22    142  |  105  |  89.3<H>  ----------------------------<  74  3.9   |  19.0<L>  |  4.20<H>    Ca    7.8<L>      22 Apr 2024 07:14        Urinalysis Basic - ( 22 Apr 2024 07:14 )    Color: x / Appearance: x / SG: x / pH: x  Gluc: 74 mg/dL / Ketone: x  / Bili: x / Urobili: x   Blood: x / Protein: x / Nitrite: x   Leuk Esterase: x / RBC: x / WBC x   Sq Epi: x / Non Sq Epi: x / Bacteria: x        RADIOLOGY & ADDITIONAL TESTS:  Studies reviewed.  < from: CT Chest No Cont (04.20.24 @ 17:44) >  ACC: [id number] EXAM:  CT CHEST   ORDERED BY: YOEL DELANEY     PROCEDURE DATE:  04/20/2024          INTERPRETATION:  CT CHEST WITHOUT CONTRAST    INDICATION: Pulmonary nodules.    TECHNIQUE: Unenhanced helical images were obtained of the chest. Coronal   and sagittal images were reconstructed. Maximum intensity projection   images were generated.        COMPARISON: CT abdomen pelvis 4/19/2024. Chest radiograph 4/19/2024.    FINDINGS:    Tubes/Lines: None.    Lungs, airways and pleura: There are bilateral pulmonary nodules in the   upper lobes, right middle lobe, and lower lobes. The largest nodule   measures:  *  In the right middle lobe is a 7 mm noncalcified nodule (series 3 image   87).  *  In the left lower lobe is a 9 x 6 mm noncalcifiednodule (series 3   image 91).  The airways are normal. No pneumothorax. Small bilateral pleural   effusions and lower lobe passive atelectasis.    Mediastinum: The thyroid gland is heterogeneous with bilobar nodules and   calcifications. The esophagus is unremarkable. Cardiomegaly. Small   pericardial effusion. Coronary artery calcifications. Mitral annular   calcification. Aorta is tortuous. Aortic calcifications.    Upper Abdomen: Hypodense hepatic lesion is unchanged and measures 1.8 cm.   Upper abdominal ascites. Cholelithiasis. Atrophic kidneys. Retained   contrast in the kidneys is secondary to the known history of renal   dysfunction. Bilateral hypodense renal lesions likely represent cysts.    Bones And Soft Tissues: Spondylosis of the thoracic spine. Sclerosis of   the superior and inferior endplates of the vertebral bodies and of the   sternum can occur in the setting of renal osteodystrophy.  Body wall   anasarca.      IMPRESSION:    1.  The bilateral pulmonary nodules are indeterminate.  2.  Small bilateral pleural effusions and atelectasis.    --- End of Report ---                < from: CT Abdomen and Pelvis No Cont (04.19.24 @ 04:19) >  ACC: [id number] EXAM:  CT ABDOMEN AND PELVIS   ORDERED BY: MATI VAZQUEZ     PROCEDURE DATE:  04/19/2024          INTERPRETATION:  CLINICAL INFORMATION:  GI bleed.    COMPARISON: No prior imaging available for comparison.    PROCEDURE:  CT of the Abdomen and Pelvis was performed without intravenous contrast.  Intravenous contrast: None.  Oral contrast: None.  Sagittal and coronal reformats were performed.    FINDINGS:  LOWER CHEST: Small left pleural effusion. Innumerable pulmonary nodules   in all visualized lung fields up to 6 mm. cardiomegaly. Mitral annular   valve calcifications and coronary artery calcifications. Anemia.    LIVER: Subcapsular segment 8 liver lesion measuring 1.8 cm.  BILE DUCTS: Normal caliber.  GALLBLADDER: Dilatation of the gallbladder. Multiple gallstones are   present. No wall thickening or edema.  SPLEEN: Within normal limits.  PANCREAS: Limited evaluation due to lack of IV contrast  ADRENALS: Within normal limits.  KIDNEYS/URETERS: Hypertrophy of the renal parenchyma bilaterally. Left   renal cysts. No hydronephrosis. Left iliac fossa renal transplant without   hydronephrosis.    BLADDER: Within normal limits.  REPRODUCTIVE ORGANS: Hysterectomy.    BOWEL: Mild thickening of the distal esophageal wall which can be seen   with reflux disease and/or esophagitis. Stomach is collapsed and   demonstrates no gross abnormality within the limits of noncontrast   imaging. Few small bowel loops are mildly dilated in the right lower   quadrant involving the midto distal ileum. There is no transition point   or pneumatosis. No bowel obstruction. There is a segment of sigmoid and   descending colon that is decompressed but also demonstrates diffusely   thickened wall, image 3:112 suggesting edema and colitis. Appendix is not   visualized.  PERITONEUM: No ascites.  VESSELS: Atherosclerosis.  RETROPERITONEUM/LYMPH NODES: No lymphadenopathy.  ABDOMINAL WALL: Anasarca.  BONES: Sacroiliac joint degeneration and thickening heterogeneous   increased bone mineral density scan is seen with renal osseous dystrophy.   Discogenic degenerative disease.    IMPRESSION:    Please note that the evaluation for active GI bleed  requires IV contrast.    Distended gallbladder with cholelithiasis. If there is right upper   quadrant pain, consider right upper quadrant ultrasound to rule out acute   cholecystitis.    Decompressed colon with additional suggestion of submucosal edema   involving the descending and sigmoid colon suggesting colitis, ischemic   colitis cannot be excluded.    Cardiomegaly and left pleural effusion and anasarca.    Disseminated innumerable pulmonary nodules are worrisome for metastatic   disease, a infectious etiology is a differential consideration but felt   less likely.        Noncontrast study is nondiagnostic for evaluating active GI bleed.  Distended gallbladder containing gallstones. Consider right upper   quadrant ultrasound to exclude gallbladder disease.  Left lower quadrant renal transplant without hydronephrosis or   perinephric collection.  Atrophied bilateral kidneys containing cysts as well as too small to   characterize hypodensities.  Small left pleural effusion. Scattered bilateral sub-cm lung nodules.   Consider nonemergent pulmonary imaging follow-up.  Follow-upofficial report in the morning    --- End of Report ---

## 2024-04-23 NOTE — PHYSICAL THERAPY INITIAL EVALUATION ADULT - ADDITIONAL COMMENTS
pt did not offer information regarding her prior disposition and level of function. per [facility] assessment, pt was at [facility].

## 2024-04-23 NOTE — PROGRESS NOTE ADULT - SUBJECTIVE AND OBJECTIVE BOX
[facility] Division of Hospital Medicine    Chief Complaint:      SUBJECTIVE / OVERNIGHT EVENTS:    Patient denies any complaints.    MEDICATIONS  (STANDING):  amLODIPine   Tablet 10 milliGRAM(s) Oral daily  chlorhexidine 2% Cloths 1 Application(s) Topical daily  ferrous    sulfate 325 milliGRAM(s) Oral daily  multivitamin 1 Tablet(s) Oral daily  pantoprazole  Injectable 40 milliGRAM(s) IV Push two times a day  polyethylene glycol 3350 17 Gram(s) Oral two times a day  predniSONE   Tablet 5 milliGRAM(s) Oral daily  sodium bicarbonate  Infusion 0.247 mEq/kG/Hr (75 mL/Hr) IV Continuous <Continuous>  tacrolimus 2 milliGRAM(s) Oral every 12 hours    MEDICATIONS  (PRN):        I&O's Summary    22 Apr 2024 07:01  -  23 Apr 2024 07:00  --------------------------------------------------------  IN: 1725 mL / OUT: 300 mL / NET: 1425 mL    23 Apr 2024 07:01  -  23 Apr 2024 15:59  --------------------------------------------------------  IN: 0 mL / OUT: 400 mL / NET: -400 mL        PHYSICAL EXAM:  Vital Signs Last 24 Hrs  T(C): 36.4 (23 Apr 2024 08:47), Max: 37.3 (22 Apr 2024 20:00)  T(F): 97.5 (23 Apr 2024 08:47), Max: 99.1 (22 Apr 2024 20:00)  HR: 78 (23 Apr 2024 08:47) (69 - 103)  BP: 150/63 (23 Apr 2024 08:47) (131/80 - 156/66)  BP(mean): 102 (22 Apr 2024 20:00) (102 - 102)  RR: 18 (23 Apr 2024 08:47) (16 - 18)  SpO2: 92% (23 Apr 2024 08:47) (92% - 98%)    Parameters below as of 23 Apr 2024 08:47  Patient On (Oxygen Delivery Method): room air            CONSTITUTIONAL: NAD,   ENMT: normocephalic, atraumatic  RESPIRATORY: Normal respiratory effort; lungs are clear to auscultation bilaterally  CARDIOVASCULAR: Regular rate and rhythm, normal S1 and S2, no murmur/rub/gallop  ABDOMEN: Nontender to palpation, no rebound/guarding; No hepatosplenomegaly  MUSCLOSKELETAL:  No edema  PSYCH: A+O to person, place, and time; left nasolabial fold flattening.    LABS:                        10.2   7.74  )-----------( 138      ( 22 Apr 2024 07:14 )             29.5     04-22    142  |  105  |  89.3<H>  ----------------------------<  74  3.9   |  19.0<L>  |  4.20<H>    Ca    7.8<L>      22 Apr 2024 07:14            Urinalysis Basic - ( 22 Apr 2024 07:14 )    Color: x / Appearance: x / SG: x / pH: x  Gluc: 74 mg/dL / Ketone: x  / Bili: x / Urobili: x   Blood: x / Protein: x / Nitrite: x   Leuk Esterase: x / RBC: x / WBC x   Sq Epi: x / Non Sq Epi: x / Bacteria: x        CAPILLARY BLOOD GLUCOSE            RADIOLOGY & ADDITIONAL TESTS:  Results Reviewed:   Imaging Personally Reviewed:  Electrocardiogram Personally Reviewed:

## 2024-04-23 NOTE — PHYSICAL THERAPY INITIAL EVALUATION ADULT - MANUAL MUSCLE TESTING RESULTS, REHAB EVAL
strength testing limited by pt positioning in bed and declines to change position. moves limbs spontaneously.

## 2024-04-23 NOTE — PHYSICAL THERAPY INITIAL EVALUATION ADULT - NEUROVASCULAR ASSESSMENT RUE
Patient presents to clinic for follow up with Medication Management with refills.    Previous A1C is not at goal of <8  No results found for: A1C  Urine microalbumin:creatine: NA  Foot exam 12/26/17  Eye exam will be on 09/13/18    Tobacco User no  Patient is not on a daily aspirin  Patient is on a Statin.  Blood pressure today of:     BP Readings from Last 1 Encounters:   09/06/18 116/68      is at the goal of <139/89 for diabetics.    Neeta Alfonso LPN............9/6/2018 10:17 AM    
warm/no discoloration

## 2024-04-23 NOTE — PROGRESS NOTE ADULT - ASSESSMENT
83 year old female with a PMH of ESRD s/p renal transplant on mycophenolate and tacrolimus (per nursing home documents), HTN, HLD, anemia, dementia , recent cva approx 2 months ago presented to the ED from [facility] for GLO. found to have hypothermia and hypotensive on presentation. found to have acute severe anemia, likely GI bleed, Elevated INR, Thrombocytopenia, Lactic/metabolic acidosis. GLO on CKD . s/p vasopressors, ivf , 3 units prbcs in micu. Hb stable at 8 now. blood cx + for coagulase positive staph, likely contaminant.     #. anemia. acute element has resolved.   Hb has stabilized now. source is likely GI bleed. she had dark colored stool yesterday as per RN.  - FOBT pos    - CT abd/pelvis demonstrated distended gallbladder/ no active bleed   - RUQ US demonstrated dilated CBD  - c/w IV Protonix BID  - gi plan to do EGD/colo on Thursday.        #. supratherapeutic INR. resolved. s/p FFP, vitamin K and Kcentra   - INR elevated to 12.19 on initial labs  - On Coumadin 5 mg as an outpatient for "clot in arm and stroke" as per son. patient does not know why she is on coumadin.  - Hold off on AC/chemical DVT prophylaxis for now given ABLA.        > GLO on CKD likely pre-renal/ Metabolic Acidosis/Lactic Acidosis  - hx of renal transplant / follows at [facility]   creatinine trending down. no labs drawn today  -C/w tacrolimus 1mg BID    -Hold MMF  -Cont Pred      >Thrombocytopenia  - likely due to acute illness. Plt >100K. s/p Plt transfusion   - hold off chemical dvt ppx for now     >hx of cva   - has residual left nasolabial flattening and left upper ext weakness   - cth neg for any acute changes     #. contaminated blood cultures.     #.DVT ppx: scds due to gib     D/W patient's son at bedside. also discussed with Dr. Summers (palliative care)

## 2024-04-23 NOTE — PHYSICAL THERAPY INITIAL EVALUATION ADULT - PERTINENT HX OF CURRENT PROBLEM, REHAB EVAL
83 year old female with a PMH of ESRD s/p renal transplant on mycophenolate and tacrolimus (per nursing home documents), HTN, HLD, anemia, dementia , recent cva approx 2 months ago presented to the ED from [facility] for GLO. found to have hypothermia and hypotensive on presentation. found to have acute severe anemia, likely GI bleed, Elevated INR, Thrombocytopenia, Lactic/metabolic acidosis. GLO on CKD . s/p vasopressors, ivf , 3 units prbcs in micu. Hb stable at 8 now. blood cx + for coagulase positive staph, likely contaminant.

## 2024-04-23 NOTE — PROGRESS NOTE ADULT - ASSESSMENT
82 y/o F with PMHx ESRD s/p renal transplant on mycophenolate and tacrolimus (per nursing home documents), HTN, HLD, anemia, dementia who presented to the ED from [facility] for elevated BUN/Cr on outpatient labs. GI consulted for anemia.     #Anemia  Hgb on admission 2.5, s/p 4U pRBCs  No reports of any overt GIB  CT A/P No Cont (04.19.24)- Subcapsulariver lesion measuring 1.8 cm, Mild thickening of the distal esophageal wall which can be seen   with reflux disease and/or esophagitis, Decompressed colon with additional suggestion of submucosal edema involving the descending and sigmoid colon suggesting colitis  US Abd RUQ (04.19.24)- CBD dilated to 10mm. Cholelithiasis. Gallbladder wall thickening. Small pericholecystic fluid.     - Plan for EGD/colonoscopy on Thursday 4/25/24   - Clear liquid diet today and tomorrow. Golytely prep tomorrow   - Trend CBC, transfuse as needed hgb < 7. Monitor for signs of bleeding.   - Avoid NSAIDs  - IV PPI BID for GI mucosal cytoprotection  _________________________________________________________________  Assessment and recommendations are final when note is signed by the attending physician.

## 2024-04-23 NOTE — PROGRESS NOTE ADULT - SUBJECTIVE AND OBJECTIVE BOX
[facility] DIVISION OF KIDNEY DISEASES AND HYPERTENSION -- FOLLOW UP NOTE  --------------------------------------------------------------------------------  HPI:  83 year old female with a PMH of ESRD s/p renal transplant on mycophenolate and tacrolimus (per nursing home documents), HTN, HLD, anemia, dementia who presented to the ED from [facility] for elevated BUN/Cr on outpatient labs.  Upon arrival to the ED patient was found to be hypotensive and hypothermic.  Workup was significant for profound anemia, elevated INR, lactic acidosis, and an GLO on CKD.  MICU consulted for further management. She was found to have GLO on CKD 5 and had renal transplant in the past.  She knows her name.  She is not producing much urine.  Her transplant was done at [place].    24 hour events/subjective:  Offers no acute complaints. Denies abdominal pain, chest pain , or SOB. Ongoing [facility] conversation, awaiting arrival of son from [place].     PAST HISTORY  --------------------------------------------------------------------------------  No significant changes to PMH, PSH, FHx, SHx, unless otherwise noted    ALLERGIES & MEDICATIONS  --------------------------------------------------------------------------------  Allergies    No Known Allergies    Intolerances      Standing Inpatient Medications  amLODIPine   Tablet 10 milliGRAM(s) Oral daily  chlorhexidine 2% Cloths 1 Application(s) Topical daily  ferrous    sulfate 325 milliGRAM(s) Oral daily  multivitamin 1 Tablet(s) Oral daily  pantoprazole  Injectable 40 milliGRAM(s) IV Push two times a day  predniSONE   Tablet 5 milliGRAM(s) Oral daily  sodium bicarbonate  Infusion 0.247 mEq/kG/Hr IV Continuous <Continuous>  tacrolimus 2 milliGRAM(s) Oral every 12 hours    PRN Inpatient Medications      REVIEW OF SYSTEMS  --------------------------------------------------------------------------------  As noted above    VITALS/PHYSICAL EXAM  --------------------------------------------------------------------------------  T(C): 36.4 (04-23-24 @ 08:47), Max: 37.3 (04-22-24 @ 20:00)  HR: 78 (04-23-24 @ 08:47) (69 - 105)  BP: 150/63 (04-23-24 @ 08:47) (128/76 - 156/66)  RR: 18 (04-23-24 @ 08:47) (16 - 18)  SpO2: 92% (04-23-24 @ 08:47) (92% - 98%)  Wt(kg): --        04-22-24 @ 07:01  -  04-23-24 @ 07:00  --------------------------------------------------------  IN: 1725 mL / OUT: 300 mL / NET: 1425 mL      Physical Exam:  GENERAL: Thin & frail appearing woman  HEENT: , supple neck, clear oropharynx  NECK: Supple  CHEST/LUNG: Clear to auscultation bilaterally; No rales, rhonchi, wheezing, or rubs. Unlabored respirations  HEART: Regular rate and rhythm;  ABDOMEN: BSx4; Soft, nontender, nondistended, Transplant kidney in LLQ, non-tender  EXTREMITIES:  No pedal edema  NERVOUS SYSTEM:  No focal deficits   Vascular access: MARTINA MARTINEZ    LABS/STUDIES  --------------------------------------------------------------------------------              10.2   7.74  >-----------<  138      [04-22-24 @ 07:14]              29.5     142  |  105  |  89.3  ----------------------------<  74      [04-22-24 @ 07:14]  3.9   |  19.0  |  4.20        Ca     7.8     [04-22-24 @ 07:14]            Creatinine Trend:  SCr 4.20 [04-22 @ 07:14]  SCr 4.64 [04-21 @ 04:20]  SCr 4.67 [04-20 @ 04:20]  SCr 4.75 [04-19 @ 21:09]  SCr 5.12 [04-19 @ 11:30]    Urinalysis - [04-22-24 @ 07:14]      Color  / Appearance  / SG  / pH       Gluc 74 / Ketone   / Bili  / Urobili        Blood  / Protein  / Leuk Est  / Nitrite       RBC  / WBC  / Hyaline  / Gran  / Sq Epi  / Non Sq Epi  / Bacteria       Iron 175, TIBC 212, %sat 83      [04-19-24 @ 06:50]  Ferritin 3171      [04-19-24 @ 06:50]  TSH 1.72      [04-19-24 @ 06:50]       [facility] DIVISION OF KIDNEY DISEASES AND HYPERTENSION -- FOLLOW UP NOTE  --------------------------------------------------------------------------------  HPI:  83 year old female with a PMH of ESRD s/p renal transplant on mycophenolate and tacrolimus (per nursing home documents), HTN, HLD, anemia, dementia who presented to the ED from [facility] for elevated BUN/Cr on outpatient labs.  Upon arrival to the ED patient was found to be hypotensive and hypothermic.  Workup was significant for profound anemia, elevated INR, lactic acidosis, and an GLO on CKD.  MICU consulted for further management. She was found to have GLO on CKD 5 and had renal transplant in the past.  She knows her name.  She is not producing much urine.  Her transplant was done at [place].    24 hour events/subjective:  Offers no acute complaints. Denies abdominal pain, chest pain , or SOB. Ongoing [facility] conversation, awaiting arrival of son from [place].     PAST HISTORY  --------------------------------------------------------------------------------  No significant changes to PMH, PSH, FHx, SHx, unless otherwise noted    ALLERGIES & MEDICATIONS  --------------------------------------------------------------------------------  Allergies  No Known Allergies        Standing Inpatient Medications  amLODIPine   Tablet 10 milliGRAM(s) Oral daily  chlorhexidine 2% Cloths 1 Application(s) Topical daily  ferrous    sulfate 325 milliGRAM(s) Oral daily  multivitamin 1 Tablet(s) Oral daily  pantoprazole  Injectable 40 milliGRAM(s) IV Push two times a day  predniSONE   Tablet 5 milliGRAM(s) Oral daily  sodium bicarbonate  Infusion 0.247 mEq/kG/Hr IV Continuous <Continuous>  tacrolimus 2 milliGRAM(s) Oral every 12 hours    PRN Inpatient Medications      REVIEW OF SYSTEMS  --------------------------------------------------------------------------------  As noted above    VITALS/PHYSICAL EXAM  --------------------------------------------------------------------------------  T(C): 36.4 (04-23-24 @ 08:47), Max: 37.3 (04-22-24 @ 20:00)  HR: 78 (04-23-24 @ 08:47) (69 - 105)  BP: 150/63 (04-23-24 @ 08:47) (128/76 - 156/66)  RR: 18 (04-23-24 @ 08:47) (16 - 18)  SpO2: 92% (04-23-24 @ 08:47) (92% - 98%)  Wt(kg): --        04-22-24 @ 07:01  -  04-23-24 @ 07:00  --------------------------------------------------------  IN: 1725 mL / OUT: 300 mL / NET: 1425 mL      Physical Exam:  GENERAL: Thin & frail appearing woman  HEENT: , supple neck, clear oropharynx  NECK: Supple  CHEST/LUNG: Clear to auscultation bilaterally; No rales, rhonchi, wheezing, or rubs. Unlabored respirations  HEART: Regular rate and rhythm;  ABDOMEN: BSx4; Soft, nontender, nondistended, Transplant kidney in LLQ, non-tender  EXTREMITIES:  No pedal edema  NERVOUS SYSTEM:  No focal deficits   Vascular access: MARTINA MARTINEZ    LABS/STUDIES  --------------------------------------------------------------------------------              10.2   7.74  >-----------<  138      [04-22-24 @ 07:14]              29.5     142  |  105  |  89.3  ----------------------------<  74      [04-22-24 @ 07:14]  3.9   |  19.0  |  4.20        Ca     7.8     [04-22-24 @ 07:14]            Creatinine Trend:  SCr 4.20 [04-22 @ 07:14]  SCr 4.64 [04-21 @ 04:20]  SCr 4.67 [04-20 @ 04:20]  SCr 4.75 [04-19 @ 21:09]  SCr 5.12 [04-19 @ 11:30]    Urinalysis - [04-22-24 @ 07:14]      Color  / Appearance  / SG  / pH       Gluc 74 / Ketone   / Bili  / Urobili        Blood  / Protein  / Leuk Est  / Nitrite       RBC  / WBC  / Hyaline  / Gran  / Sq Epi  / Non Sq Epi  / Bacteria       Iron 175, TIBC 212, %sat 83      [04-19-24 @ 06:50]  Ferritin 3171      [04-19-24 @ 06:50]  TSH 1.72      [04-19-24 @ 06:50]

## 2024-04-23 NOTE — PROGRESS NOTE ADULT - ASSESSMENT
incomplete     83 year old female with a PMH of ESRD s/p renal transplant on mycophenolate and tacrolimus (per nursing home documents), HTN, HLD, anemia, dementia who presented to the ED from [facility] for elevated BUN/Cr on outpatient labs. Hematology consulted for anemia and coagulpathy with elevated INR of ~12 on admission, now improved.    4/19/24- CT A/P-Distended gallbladder with cholelithiasis.Decompressed colon with additional suggestion of submucosal edema involving the descending and sigmoid colon suggesting colitis, ischemic colitis cannot be excluded. Disseminated innumerable pulmonary nodules are worrisome for metastatic disease. Subcapsular segment 8 liver lesion measuring 1.8 cm.  4/20/24- CT-C- The bilateral pulmonary nodules are indeterminate ( right middle lobe is a 7 mm noncalcified nodule, left lower lobe is a 9 x 6 mm noncalcified nodule)Small bilateral pleural effusions and atelectasis. thyroid gland is heterogeneous with bilobar nodules and   calcifications.        #Anemia  # elevated INR  -likely multifactorial given immunosuppression, CKD  - in the setting of coumadin contributing to possible GI bleed.   -HGB now stable ~8, INR improved s/p Kcentra/vitamin K  -GI onboard plan for EGD / colonoscopy when medically optimized   RECS  -continue to trend CBC  - transfuse as needed hgb < 7,  -Monitor coags and for signs of bleeding.   -Agree with holding AC/chemical DVT prophylaxis  given ABLA, till hgb stable.  - Reason for coumadin is not clear, obtain records from [place] to further elucidate    #Lung nodules  #Liver lesion  -CT A/P concerning for met disease, CT-C ruled indeterminant  incomplete     83 year old female with a PMH of ESRD s/p renal transplant on mycophenolate and tacrolimus (per nursing home documents), HTN, HLD, anemia, dementia who presented to the ED from [facility] for elevated BUN/Cr on outpatient labs. Hematology consulted for anemia and coagulpathy with elevated INR of ~12 on admission, now improved.    4/19/24- CT A/P-Distended gallbladder with cholelithiasis. Decompressed colon with additional suggestion of submucosal edema involving the descending and sigmoid colon suggesting colitis, ischemic colitis cannot be excluded. Disseminated innumerable pulmonary nodules are worrisome for metastatic disease. Subcapsular segment 8 liver lesion measuring 1.8 cm.  4/20/24- CT-C- The bilateral pulmonary nodules are indeterminate ( right middle lobe is a 7 mm noncalcified nodule, left lower lobe is a 9 x 6 mm noncalcified nodule)Small bilateral pleural effusions and atelectasis. thyroid gland is heterogeneous with bilobar nodules and   calcifications.        #Anemia  # elevated INR  -likely multifactorial given immunosuppression, CKD  - in the setting of coumadin contributing to possible GI bleed.   -HGB now stable ~8, INR improved s/p Kcentra/vitamin K  -GI onboard plan for EGD / colonoscopy when medically optimized   RECS  -continue to trend CBC  - transfuse as needed hgb < 7,  -Monitor coags and for signs of bleeding.   -Agree with holding AC/chemical DVT prophylaxis  given ABLA, till hgb stable.  - Reason for coumadin is not clear, obtain records from [place] to further elucidate    #Lung nodules  #Liver lesion  -CT A/P concerning for met disease, CT-C ruled indeterminant     *Note not finalized until signed by Attending Physician    Thank you for the referral. Will continue to monitor the patient.  Please call with any questions [phone]  Above reviewed with Attending Dr. Colon    [facility]  [address] [place] [address]  [phone]  incomplete     83 year old female with a PMH of ESRD s/p renal transplant on mycophenolate and tacrolimus (per nursing home documents), HTN, HLD, anemia, dementia who presented to the ED from [facility] for elevated BUN/Cr on outpatient labs. Hematology consulted for anemia and coagulpathy with elevated INR of ~12 on admission, now improved.    4/19/24- CT A/P-Distended gallbladder with cholelithiasis. Decompressed colon with additional suggestion of submucosal edema involving the descending and sigmoid colon suggesting colitis, ischemic colitis cannot be excluded. Disseminated innumerable pulmonary nodules are worrisome for metastatic disease. Subcapsular segment 8 liver lesion measuring 1.8 cm.  4/20/24- CT-C- The bilateral pulmonary nodules are indeterminate ( right middle lobe is a 7 mm noncalcified nodule, left lower lobe is a 9 x 6 mm noncalcified nodule)Small bilateral pleural effusions and atelectasis. thyroid gland is heterogeneous with bilobar nodules and   calcifications.        #Anemia  # elevated INR  -likely multifactorial given immunosuppression, CKD  - in the setting of coumadin contributing to possible GI bleed.   -HGB now stable ~8, INR improved s/p Kcentra/vitamin K  -GI onboard plan for EGD / colonoscopy when medically optimized (tentatively 4/25)  RECS  -continue to trend CBC  - transfuse as needed hgb < 7,  -Monitor coags and for signs of bleeding.   -Agree with holding AC/chemical DVT prophylaxis  given ABLA, till hgb stable.  - Reason for coumadin is not clear, obtain records from [place] to further elucidate    #Lung nodules  #Liver lesion  -CT A/P concerning for met disease, CT-C ruled indeterminant     *Note not finalized until signed by Attending Physician    Thank you for the referral. Will continue to monitor the patient.  Please call with any questions [phone]  Above reviewed with Attending Dr. Colon    [facility]  [address] [place] [address]  [phone]    83 year old female with a PMH of ESRD s/p renal transplant on mycophenolate and tacrolimus (per nursing home documents), HTN, HLD, anemia, dementia who presented to the ED from [facility] for elevated BUN/Cr on outpatient labs. Hematology consulted for anemia and coagulpathy with elevated INR of ~12 on admission, now improved.    4/19/24- CT A/P-Distended gallbladder with cholelithiasis. Decompressed colon with additional suggestion of submucosal edema involving the descending and sigmoid colon suggesting colitis, ischemic colitis cannot be excluded. Disseminated innumerable pulmonary nodules are worrisome for metastatic disease. Subcapsular segment 8 liver lesion measuring 1.8 cm.  4/20/24- CT-C- The bilateral pulmonary nodules are indeterminate ( right middle lobe is a 7 mm noncalcified nodule, left lower lobe is a 9 x 6 mm noncalcified nodule)Small bilateral pleural effusions and atelectasis. thyroid gland is heterogeneous with bilobar nodules and   calcifications.        #Anemia  # elevated INR  -likely multifactorial given immunosuppression, CKD  - in the setting of coumadin contributing to possible GI bleed.   -HGB now stable ~8, INR improved s/p Kcentra/vitamin K  -GI onboard plan for EGD / colonoscopy when medically optimized (tentatively 4/25)  RECS  -continue to trend CBC  - transfuse as needed hgb < 7,  -Monitor coags and for signs of bleeding.   -Agree with holding AC/chemical DVT prophylaxis  given ABLA, till hgb stable.  - Reason for coumadin is not clear, obtain records from [place] to further elucidate    #Lung nodules  -CT A/P concerning for met disease, CT-C ruled indeterminant   -palliative onboard for GOC  RECS  -can consider IR eval for potential  BX  if in line w/ GOC    *Note not finalized until signed by Attending Physician    Thank you for the referral. Will continue to monitor the patient.  Please call with any questions [phone]  Above reviewed with Attending Dr. Colon    [facility]  [address] [place] [address]  [phone]    83 year old female with a PMH of ESRD s/p renal transplant on mycophenolate and tacrolimus (per nursing home documents), HTN, HLD, anemia, dementia who presented to the ED from [facility] for elevated BUN/Cr on outpatient labs. Hematology consulted for anemia and coagulpathy with elevated INR of ~12 on admission, now improved.    4/19/24- CT A/P-Distended gallbladder with cholelithiasis. Decompressed colon with additional suggestion of submucosal edema involving the descending and sigmoid colon suggesting colitis, ischemic colitis cannot be excluded. Disseminated innumerable pulmonary nodules are worrisome for metastatic disease. Subcapsular segment 8 liver lesion measuring 1.8 cm.  4/20/24- CT-C- The bilateral pulmonary nodules are indeterminate ( right middle lobe is a 7 mm noncalcified nodule, left lower lobe is a 9 x 6 mm noncalcified nodule)Small bilateral pleural effusions and atelectasis. thyroid gland is heterogeneous with bilobar nodules and   calcifications.        #Anemia  # elevated INR  -likely multifactorial given immunosuppression, CKD  - in the setting of coumadin contributing to possible GI bleed.   -HGB now stable ~8, INR improved s/p Kcentra/vitamin K  -GI onboard plan for EGD / colonoscopy when medically optimized (tentatively 4/25)  RECS  -continue to trend CBC  - transfuse as needed hgb < 7,  -Monitor coags and for signs of bleeding.   -Agree with holding AC/chemical DVT prophylaxis  given ABLA, till hgb stable.  - Reason for coumadin is not clear, obtain records from [place] to further elucidate    #Lung nodules  #liver lesion  -CT A/P concerning for met disease, CT-C ruled indeterminant   -palliative onboard for GOC  RECS  -lung nodules are suspicious -  IR eval for BX  if in line w/ GOC    *Note not finalized until signed by Attending Physician    Thank you for the referral. Will continue to monitor the patient.  Please call with any questions [phone]  Above reviewed with Attending Dr. Colon    [facility]  [address] [place] [address]  [phone]

## 2024-04-23 NOTE — PHYSICAL THERAPY INITIAL EVALUATION ADULT - DID THE PATIENT HAVE SURGERY?
Patient Instructions by Deidra Ibanez RN at 11/30/18 01:32 PM     Author:  Deidra Ibanez RN Service:  (none) Author Type:  Registered Nurse     Filed:  11/30/18 02:09 PM Encounter Date:  11/30/2018 Status:  Addendum     :  Deidra Ibanez RN (Registered Nurse)              PATIENT INFORMATION  Follow-Up  - Return for your 9 year well child visit.    8 years old Health and Safety Tips - The following hyperlinks are available to access via MyChart    B[employer] Futures 8 Years Old Parent Education    [employer]illantes 8 años de edad (Educación para los padres) - Español    Additional Educational Resources:  For additional resources regarding your symptoms, diagnosis, or further health information, please visit the Discover a Healthier You section on [email or web address] or the Online Health Resources section in [employer].    PATIENT INFORMATION    Immunizations Given:  Your child received the following vaccinations today:   Influenza    Common side effects include: low grade fever, pain, redness or swelling at the injection site.  Redness and swelling usually lasts 2 to 3 days and can be about the size of a half dollar.  This is usually helped by cool compresses applied to the area. Some bleeding is not unusual.  You may leave the bandages in place until you arrive home.    Call the office if you have any concerns or questions.      · Acetaminophen (Tylenol, etc.) can be given every 4 hours.  The dose for your child is: Children's Elixir 160mg/5ml for 36-47lbs - 7.5 ml  · Ibuprofen can be given every 6 hours.  The dose for your child is: Children's Elixir 100mg/5ml for 36-47lbs - 1 ½  tsp        Follow-Up    - Return in one year for your next complete exam.    Additional Educational Resources:  For additional resources regarding your symptoms, diagnosis, or further health information, please visit the Health Resources section on [email or web address] or the Online Health Resources section in [employer].         Revision History        User Key Date/Time User Provider Type Action    > [N/A] 11/30/18 02:09 PM Deidra Ibanez RN Registered Nurse Addend     [N/A] 11/30/18 01:32 PM Belem Ross MD Physician Sign             n/a

## 2024-04-23 NOTE — PROGRESS NOTE ADULT - ASSESSMENT
83 year old female with hx of ESRD s/p renal transplant now progressed to CKD, HTN, HLD, dementia sent from [facility] for GLO on outpatient lab workup, found to have profound anemia with positive occult stool/GI bleed, supra therapeutic INR and thrombocytopenia, GLO on CKD, lactic and metabolic acidosis. Admitted initially to MICU and subsequently downgraded to SDU. Palliative consulted for support and to assist with ongoing goals of care.    Acute on Chronic Anemia  Rule out GI Bleeding  - pt was on coumadin on outpatient med rec, unclear for what reason   - initial Hgb on admission 2.5 s/p multiple transfusion with improvement, stable around Hgb ~8 with last transfusion 4/20  - positive occult stool  - GI following and planned for EGD/colonoscopy on 4/25  - continue with PPI, iron supplementation, serial CBC and transfuse PRN    GLO on CKD  - hx of ESRD s/p renal transplant at [facility], per renal "she is nearing the end of the life of her renal transplant"  - renal indices improving with likely GI Bleed contributing to elevated BUN  - oliguric but still making urine with no current emergent need for dialysis  - continue immunosuppressant tacrolimus and prednisone  - per MICU team discussion with son this admission, he was amendable to resuming HD if warranted    ?Cognitive Impairment/Dementia, Debility  - per hospitalist discussion with son, pt was fully independent and living with beatriz prior to February where she was hospitalized for "heart attack" and then again for acute stroke, back and forth from rehab  - supportive care, reorientation , sleep hygiene     Pulmonary Nodules  - CT reviewed, notable for bilateral multiple lung nodules concerning for mets  - oncology input appreciated, recommend IR biopsy if in line with goals of care  - recommend workup to rule out malignancy given GOC by hospitalist with son today    Palliative Care Encounter  - FULL CODE  - surrogate is karthikeyan Pires [phone] --> no family present at bedside and called a few times today with no answer    Discussed with hospitalist, who spoke with karthikeyan Pires at bedside earlier today regarding hospital course and goals of care. He shared that mother has been severely debilitated after multiple hospitalizations starting in February initially for heart attack then for acute stroke and now for anemia/GI bleed. His goals is to medically optimize mother, get her to rehab for strengthening and deconditioning with eventual return home where she resided with a stepson. Of note, son had reported that mother was fully independent prior to February. He wishes to continue with all medical interventions without limitations including moving forward with EGD and FULL CODE; this is consistent with goals of care documented with MICU team.     GOC and advance directives established. There is also no overt terminal diagnosis to be eligible for hospice services nor is it in line with family's current goals of care. Palliative team will sign off. Please reconsult if goals of care should change.

## 2024-04-23 NOTE — PROGRESS NOTE ADULT - SUBJECTIVE AND OBJECTIVE BOX
Chief Complaint:  Patient is a 83y old  Female who presents with a chief complaint of Gastrointestinal hemorrhage     (21 Apr 2024 09:34)      HPI/ 24 hr events: Patient seen and examined at bedside. She is without acute complaints. She ate some of her breakfast. No reported bleeding or BMs. Denies nausea, vomiting, diarrhea, abdominal pain. Vitals are overall stable, no labs performed today. H/H stable yesterday, Cr elevated but slightly improving.       REVIEW OF SYSTEMS:   General: Negative  HEENT: Negative  CV: Negative  Respiratory: Negative  GI: See HPI  : Negative  MSK: Negative  Hematologic: Negative  Skin: Negative    MEDICATIONS:   MEDICATIONS  (STANDING):  amLODIPine   Tablet 10 milliGRAM(s) Oral daily  chlorhexidine 2% Cloths 1 Application(s) Topical daily  ferrous    sulfate 325 milliGRAM(s) Oral daily  multivitamin 1 Tablet(s) Oral daily  pantoprazole  Injectable 40 milliGRAM(s) IV Push two times a day  predniSONE   Tablet 5 milliGRAM(s) Oral daily  sodium bicarbonate  Infusion 0.247 mEq/kG/Hr (75 mL/Hr) IV Continuous <Continuous>  tacrolimus 2 milliGRAM(s) Oral every 12 hours    MEDICATIONS  (PRN):        DIET:  Diet, Soft and Bite Sized:   DASH/TLC Sodium & Cholesterol Restricted (DASH) (04-22-24 @ 15:39) [Active]          ALLERGIES:   Allergies    No Known Allergies    Intolerances        VITAL SIGNS:   Vital Signs Last 24 Hrs  T(C): 36.4 (23 Apr 2024 08:47), Max: 37.3 (22 Apr 2024 20:00)  T(F): 97.5 (23 Apr 2024 08:47), Max: 99.1 (22 Apr 2024 20:00)  HR: 78 (23 Apr 2024 08:47) (69 - 103)  BP: 150/63 (23 Apr 2024 08:47) (131/80 - 156/66)  BP(mean): 102 (22 Apr 2024 20:00) (102 - 102)  RR: 18 (23 Apr 2024 08:47) (16 - 18)  SpO2: 92% (23 Apr 2024 08:47) (92% - 98%)    Parameters below as of 23 Apr 2024 08:47  Patient On (Oxygen Delivery Method): room air      I&O's Summary    22 Apr 2024 07:01  -  23 Apr 2024 07:00  --------------------------------------------------------  IN: 1725 mL / OUT: 300 mL / NET: 1425 mL    23 Apr 2024 07:01  -  23 Apr 2024 12:56  --------------------------------------------------------  IN: 0 mL / OUT: 400 mL / NET: -400 mL        PHYSICAL EXAM:   GENERAL:  No acute distress  HEENT:  NC/AT, conjunctiva clear, sclera anicteric  CHEST:  No increased effort  HEART:  Regular rate  ABDOMEN:  Soft, non-tender, non-distended, no rebound or guarding  SKIN:  Warm, dry  NEURO:  Calm, cooperative    LABS:                        10.2   7.74  )-----------( 138      ( 22 Apr 2024 07:14 )             29.5     Hemoglobin: 10.2 g/dL (04-22-24 @ 07:14)  Hemoglobin: 8.1 g/dL (04-21-24 @ 04:20)  Hemoglobin: 8.1 g/dL (04-21-24 @ 00:45)  Hemoglobin: 8.2 g/dL (04-20-24 @ 16:06)    04-22    142  |  105  |  89.3<H>  ----------------------------<  74  3.9   |  19.0<L>  |  4.20<H>    Ca    7.8<L>      22 Apr 2024 07:14                                                  RADIOLOGY & ADDITIONAL STUDIES:

## 2024-04-24 LAB
ANION GAP SERPL CALC-SCNC: 13 MMOL/L — SIGNIFICANT CHANGE UP (ref 5–17)
ANION GAP SERPL CALC-SCNC: 14 MMOL/L — SIGNIFICANT CHANGE UP (ref 5–17)
BUN SERPL-MCNC: 70 MG/DL — HIGH (ref 8–20)
BUN SERPL-MCNC: 72.1 MG/DL — HIGH (ref 8–20)
CALCIUM SERPL-MCNC: 7.1 MG/DL — LOW (ref 8.4–10.5)
CALCIUM SERPL-MCNC: 7.2 MG/DL — LOW (ref 8.4–10.5)
CHLORIDE SERPL-SCNC: 102 MMOL/L — SIGNIFICANT CHANGE UP (ref 96–108)
CHLORIDE SERPL-SCNC: 105 MMOL/L — SIGNIFICANT CHANGE UP (ref 96–108)
CO2 SERPL-SCNC: 26 MMOL/L — SIGNIFICANT CHANGE UP (ref 22–29)
CO2 SERPL-SCNC: 27 MMOL/L — SIGNIFICANT CHANGE UP (ref 22–29)
CREAT SERPL-MCNC: 3.75 MG/DL — HIGH (ref 0.5–1.3)
CREAT SERPL-MCNC: 3.88 MG/DL — HIGH (ref 0.5–1.3)
CULTURE RESULTS: SIGNIFICANT CHANGE UP
EGFR: 11 ML/MIN/1.73M2 — LOW
EGFR: 12 ML/MIN/1.73M2 — LOW
GLUCOSE SERPL-MCNC: 84 MG/DL — SIGNIFICANT CHANGE UP (ref 70–99)
GLUCOSE SERPL-MCNC: 96 MG/DL — SIGNIFICANT CHANGE UP (ref 70–99)
HCT VFR BLD CALC: 30 % — LOW (ref 34.5–45)
HGB BLD-MCNC: 10.1 G/DL — LOW (ref 11.5–15.5)
MCHC RBC-ENTMCNC: 31.1 PG — SIGNIFICANT CHANGE UP (ref 27–34)
MCHC RBC-ENTMCNC: 33.7 GM/DL — SIGNIFICANT CHANGE UP (ref 32–36)
MCV RBC AUTO: 92.3 FL — SIGNIFICANT CHANGE UP (ref 80–100)
PLATELET # BLD AUTO: 136 K/UL — LOW (ref 150–400)
POTASSIUM SERPL-MCNC: 3 MMOL/L — LOW (ref 3.5–5.3)
POTASSIUM SERPL-MCNC: 3.8 MMOL/L — SIGNIFICANT CHANGE UP (ref 3.5–5.3)
POTASSIUM SERPL-SCNC: 3 MMOL/L — LOW (ref 3.5–5.3)
POTASSIUM SERPL-SCNC: 3.8 MMOL/L — SIGNIFICANT CHANGE UP (ref 3.5–5.3)
RBC # BLD: 3.25 M/UL — LOW (ref 3.8–5.2)
RBC # FLD: 15.7 % — HIGH (ref 10.3–14.5)
SODIUM SERPL-SCNC: 143 MMOL/L — SIGNIFICANT CHANGE UP (ref 135–145)
SODIUM SERPL-SCNC: 144 MMOL/L — SIGNIFICANT CHANGE UP (ref 135–145)
SPECIMEN SOURCE: SIGNIFICANT CHANGE UP
WBC # BLD: 6.62 K/UL — SIGNIFICANT CHANGE UP (ref 3.8–10.5)
WBC # FLD AUTO: 6.62 K/UL — SIGNIFICANT CHANGE UP (ref 3.8–10.5)

## 2024-04-24 PROCEDURE — 99233 SBSQ HOSP IP/OBS HIGH 50: CPT | Mod: FS

## 2024-04-24 PROCEDURE — 99232 SBSQ HOSP IP/OBS MODERATE 35: CPT

## 2024-04-24 RX ORDER — SODIUM CHLORIDE 9 MG/ML
1000 INJECTION, SOLUTION INTRAVENOUS
Refills: 0 | Status: DISCONTINUED | OUTPATIENT
Start: 2024-04-24 | End: 2024-04-27

## 2024-04-24 RX ORDER — POTASSIUM CHLORIDE 20 MEQ
40 PACKET (EA) ORAL EVERY 4 HOURS
Refills: 0 | Status: DISCONTINUED | OUTPATIENT
Start: 2024-04-24 | End: 2024-04-24

## 2024-04-24 RX ORDER — POTASSIUM CHLORIDE 20 MEQ
10 PACKET (EA) ORAL
Refills: 0 | Status: COMPLETED | OUTPATIENT
Start: 2024-04-24 | End: 2024-04-24

## 2024-04-24 RX ORDER — SOD SULF/SODIUM/NAHCO3/KCL/PEG
4000 SOLUTION, RECONSTITUTED, ORAL ORAL ONCE
Refills: 0 | Status: COMPLETED | OUTPATIENT
Start: 2024-04-24 | End: 2024-04-24

## 2024-04-24 RX ADMIN — Medication 100 MILLIEQUIVALENT(S): at 11:29

## 2024-04-24 RX ADMIN — PANTOPRAZOLE SODIUM 40 MILLIGRAM(S): 20 TABLET, DELAYED RELEASE ORAL at 17:16

## 2024-04-24 RX ADMIN — TACROLIMUS 2 MILLIGRAM(S): 5 CAPSULE ORAL at 17:16

## 2024-04-24 RX ADMIN — Medication 1 TABLET(S): at 11:29

## 2024-04-24 RX ADMIN — Medication 100 MILLIEQUIVALENT(S): at 12:39

## 2024-04-24 RX ADMIN — PANTOPRAZOLE SODIUM 40 MILLIGRAM(S): 20 TABLET, DELAYED RELEASE ORAL at 05:31

## 2024-04-24 RX ADMIN — POLYETHYLENE GLYCOL 3350 17 GRAM(S): 17 POWDER, FOR SOLUTION ORAL at 05:24

## 2024-04-24 RX ADMIN — Medication 100 MILLIEQUIVALENT(S): at 13:52

## 2024-04-24 RX ADMIN — AMLODIPINE BESYLATE 10 MILLIGRAM(S): 2.5 TABLET ORAL at 05:26

## 2024-04-24 RX ADMIN — CHLORHEXIDINE GLUCONATE 1 APPLICATION(S): 213 SOLUTION TOPICAL at 12:33

## 2024-04-24 RX ADMIN — Medication 5 MILLIGRAM(S): at 05:25

## 2024-04-24 RX ADMIN — Medication 4000 MILLILITER(S): at 18:30

## 2024-04-24 RX ADMIN — Medication 325 MILLIGRAM(S): at 11:30

## 2024-04-24 RX ADMIN — TACROLIMUS 2 MILLIGRAM(S): 5 CAPSULE ORAL at 05:25

## 2024-04-24 NOTE — PROGRESS NOTE ADULT - SUBJECTIVE AND OBJECTIVE BOX
[facility] Division of Hospital Medicine    Chief Complaint:      SUBJECTIVE / OVERNIGHT EVENTS:    Patient denies any complaints.    MEDICATIONS  (STANDING):  amLODIPine   Tablet 10 milliGRAM(s) Oral daily  chlorhexidine 2% Cloths 1 Application(s) Topical daily  ferrous    sulfate 325 milliGRAM(s) Oral daily  lactated ringers. 1000 milliLiter(s) (75 mL/Hr) IV Continuous <Continuous>  multivitamin 1 Tablet(s) Oral daily  pantoprazole  Injectable 40 milliGRAM(s) IV Push two times a day  potassium chloride  10 mEq/100 mL IVPB 10 milliEquivalent(s) IV Intermittent every 1 hour  predniSONE   Tablet 5 milliGRAM(s) Oral daily  tacrolimus 2 milliGRAM(s) Oral every 12 hours    MEDICATIONS  (PRN):        I&O's Summary    23 Apr 2024 07:01  -  24 Apr 2024 07:00  --------------------------------------------------------  IN: 0 mL / OUT: 400 mL / NET: -400 mL        PHYSICAL EXAM:  Vital Signs Last 24 Hrs  T(C): 37 (24 Apr 2024 10:56), Max: 37.1 (24 Apr 2024 00:30)  T(F): 98.6 (24 Apr 2024 10:56), Max: 98.7 (24 Apr 2024 00:30)  HR: 98 (24 Apr 2024 10:56) (94 - 100)  BP: 147/74 (24 Apr 2024 10:56) (125/85 - 157/78)  BP(mean): --  RR: 18 (24 Apr 2024 10:56) (18 - 18)  SpO2: 95% (24 Apr 2024 10:56) (94% - 96%)    Parameters below as of 24 Apr 2024 10:56  Patient On (Oxygen Delivery Method): room air            CONSTITUTIONAL: NAD,   ENMT: normocephalic, atraumatic  RESPIRATORY: Normal respiratory effort; lungs are clear to auscultation bilaterally  CARDIOVASCULAR: Regular rate and rhythm, normal S1 and S2, no murmur/rub/gallop  ABDOMEN: Nontender to palpation, no rebound/guarding; No hepatosplenomegaly  MUSCLOSKELETAL:  No edema  PSYCH: A+O to person, place, and time; left nasolabial fold flattening.    LABS:                        10.1   6.62  )-----------( 136      ( 24 Apr 2024 07:11 )             30.0     04-24    143  |  102  |  72.1<H>  ----------------------------<  84  3.0<L>   |  27.0  |  3.88<H>    Ca    7.1<L>      24 Apr 2024 07:11            Urinalysis Basic - ( 24 Apr 2024 07:11 )    Color: x / Appearance: x / SG: x / pH: x  Gluc: 84 mg/dL / Ketone: x  / Bili: x / Urobili: x   Blood: x / Protein: x / Nitrite: x   Leuk Esterase: x / RBC: x / WBC x   Sq Epi: x / Non Sq Epi: x / Bacteria: x        CAPILLARY BLOOD GLUCOSE            RADIOLOGY & ADDITIONAL TESTS:  Results Reviewed:   Imaging Personally Reviewed:  Electrocardiogram Personally Reviewed:

## 2024-04-24 NOTE — PROGRESS NOTE ADULT - SUBJECTIVE AND OBJECTIVE BOX
Chief Complaint:  Patient is a 83y old  Female who presents with a chief complaint of Gastrointestinal hemorrhage     (21 Apr 2024 09:34)      HPI/ 24 hr events: Patient seen and examined at bedside. Per bedside RN patient had several BMs overnight that were dark in color. Patient not eating much per RN and will need time to drink prep. Denies nausea, vomiting, abdominal pain. Vitals are overall stable, H/H stable, K 3.0.       REVIEW OF SYSTEMS:   General: Negative  HEENT: Negative  CV: Negative  Respiratory: Negative  GI: See HPI  : Negative  MSK: Negative  Hematologic: Negative  Skin: Negative    MEDICATIONS:   MEDICATIONS  (STANDING):  amLODIPine   Tablet 10 milliGRAM(s) Oral daily  chlorhexidine 2% Cloths 1 Application(s) Topical daily  ferrous    sulfate 325 milliGRAM(s) Oral daily  lactated ringers. 1000 milliLiter(s) (75 mL/Hr) IV Continuous <Continuous>  multivitamin 1 Tablet(s) Oral daily  pantoprazole  Injectable 40 milliGRAM(s) IV Push two times a day  potassium chloride  10 mEq/100 mL IVPB 10 milliEquivalent(s) IV Intermittent every 1 hour  predniSONE   Tablet 5 milliGRAM(s) Oral daily  tacrolimus 2 milliGRAM(s) Oral every 12 hours    MEDICATIONS  (PRN):        DIET:  Diet, Clear Liquid (04-23-24 @ 16:08) [Active]          ALLERGIES:   Allergies    No Known Allergies    Intolerances        VITAL SIGNS:   Vital Signs Last 24 Hrs  T(C): 37 (24 Apr 2024 10:56), Max: 37.1 (24 Apr 2024 00:30)  T(F): 98.6 (24 Apr 2024 10:56), Max: 98.7 (24 Apr 2024 00:30)  HR: 98 (24 Apr 2024 10:56) (94 - 100)  BP: 147/74 (24 Apr 2024 10:56) (125/85 - 157/78)  BP(mean): --  RR: 18 (24 Apr 2024 10:56) (18 - 18)  SpO2: 95% (24 Apr 2024 10:56) (94% - 96%)    Parameters below as of 24 Apr 2024 10:56  Patient On (Oxygen Delivery Method): room air      I&O's Summary    23 Apr 2024 07:01  -  24 Apr 2024 07:00  --------------------------------------------------------  IN: 0 mL / OUT: 400 mL / NET: -400 mL        PHYSICAL EXAM:   GENERAL:  No acute distress  HEENT:  NC/AT, conjunctiva clear, sclera anicteric  CHEST:  No increased effort  HEART:  Regular rate  ABDOMEN:  Soft, non-tender, non-distended, normoactive bowel sounds, no rebound or guarding  EXTREMITIES: No edema  SKIN:  Warm, dry  NEURO:  Calm, cooperative    LABS:                        10.1   6.62  )-----------( 136      ( 24 Apr 2024 07:11 )             30.0     Hemoglobin: 10.1 g/dL (04-24-24 @ 07:11)  Hemoglobin: 10.2 g/dL (04-22-24 @ 07:14)    04-24    143  |  102  |  72.1<H>  ----------------------------<  84  3.0<L>   |  27.0  |  3.88<H>    Ca    7.1<L>      24 Apr 2024 07:11                                                  RADIOLOGY & ADDITIONAL STUDIES:                 Chief Complaint:  Patient is a 83y old  Female who presents with a chief complaint of Gastrointestinal hemorrhage     (21 Apr 2024 09:34)      HPI/ 24 hr events: Patient seen and examined at bedside. Per bedside RN patient had several BMs overnight that were dark in color. Patient not eating much per RN and will need time to drink prep. Denies nausea, vomiting, abdominal pain. Vitals are overall stable, H/H stable, K 3.0.       REVIEW OF SYSTEMS:   General: Negative  HEENT: Negative  CV: Negative  Respiratory: Negative  GI: See HPI  : Negative  MSK: Negative  Hematologic: Negative  Skin: Negative    MEDICATIONS:   MEDICATIONS  (STANDING):  amLODIPine   Tablet 10 milliGRAM(s) Oral daily  chlorhexidine 2% Cloths 1 Application(s) Topical daily  ferrous    sulfate 325 milliGRAM(s) Oral daily  lactated ringers. 1000 milliLiter(s) (75 mL/Hr) IV Continuous <Continuous>  multivitamin 1 Tablet(s) Oral daily  pantoprazole  Injectable 40 milliGRAM(s) IV Push two times a day  potassium chloride  10 mEq/100 mL IVPB 10 milliEquivalent(s) IV Intermittent every 1 hour  predniSONE   Tablet 5 milliGRAM(s) Oral daily  tacrolimus 2 milliGRAM(s) Oral every 12 hours    MEDICATIONS  (PRN):        DIET:  Diet, Clear Liquid (04-23-24 @ 16:08) [Active]          ALLERGIES:   Allergies    No Known Allergies    Intolerances        VITAL SIGNS:   Vital Signs Last 24 Hrs  T(C): 37 (24 Apr 2024 10:56), Max: 37.1 (24 Apr 2024 00:30)  T(F): 98.6 (24 Apr 2024 10:56), Max: 98.7 (24 Apr 2024 00:30)  HR: 98 (24 Apr 2024 10:56) (94 - 100)  BP: 147/74 (24 Apr 2024 10:56) (125/85 - 157/78)  BP(mean): --  RR: 18 (24 Apr 2024 10:56) (18 - 18)  SpO2: 95% (24 Apr 2024 10:56) (94% - 96%)    Parameters below as of 24 Apr 2024 10:56  Patient On (Oxygen Delivery Method): room air      I&O's Summary    23 Apr 2024 07:01  -  24 Apr 2024 07:00  --------------------------------------------------------  IN: 0 mL / OUT: 400 mL / NET: -400 mL        PHYSICAL EXAM:   GENERAL:  No acute distress  HEENT:  NC/AT, conjunctiva clear, sclera anicteric  CHEST:  No increased effort  HEART:  Regular rate  ABDOMEN:  Soft, non-tender, non-distended, normoactive bowel sounds, no rebound or guarding  EXTREMITIES: No edema  SKIN:  Warm, dry  NEURO:  Calm, cooperative    LABS:                        10.1   6.62  )-----------( 136      ( 24 Apr 2024 07:11 )             30.0     Hemoglobin: 10.1 g/dL (04-24-24 @ 07:11)  Hemoglobin: 10.2 g/dL (04-22-24 @ 07:14)    04-24    143  |  102  |  72.1<H>  ----------------------------<  84  3.0<L>   |  27.0  |  3.88<H>    Ca    7.1<L>      24 Apr 2024 07:11                                            < from: CT Abdomen and Pelvis No Cont (04.19.24 @ 04:19) >  Distended gallbladder with cholelithiasis. If there is right upper   quadrant pain, consider right upper quadrant ultrasound to rule out acute   cholecystitis.    Decompressed colon with additional suggestion of submucosal edema   involving the descending and sigmoid colon suggesting colitis, ischemic   colitis cannot be excluded.    Cardiomegaly and left pleural effusion and anasarca.    Disseminated innumerable pulmonary nodules are worrisome for metastatic   disease, a infectious etiology is a differential consideration but felt   less likely.        Noncontrast study is nondiagnostic for evaluating active GI bleed.  Distended gallbladder containing gallstones. Consider right upper     < end of copied text >        RADIOLOGY & ADDITIONAL STUDIES:

## 2024-04-24 NOTE — CHART NOTE - NSCHARTNOTEFT_GEN_A_CORE
Source: Patient [ ]  Family [ ]   other [x ]  83 year old female with a PMH of ESRD s/p renal transplant on mycophenolate and tacrolimus (per nursing home documents), HTN, HLD, anemia, dementia , recent cva approx 2 months ago presented to the ED from [facility] for GLO. found to have hypothermia and hypotensive on presentation. found to have acute severe anemia, likely GI bleed, Elevated INR, Thrombocytopenia, Lactic/metabolic acidosis. GLO on CKD . s/p vasopressors, ivf , 3 units prbcs in micu. Hb stable at 8 now. blood cx + for coagulase positive staph, likely contaminant.    anemia. acute element has resolved.   Hb has stabilized now. source is likely GI bleed.     Current Diet:   Diet, Clear Liquid (04-23-24 @ 16:08)      Patient reports [ ] nausea  [ ] vomiting [ ] diarrhea [ ] constipation  [ ]chewing problems [ ] swallowing issues  [ ] other:     PO intake:  < 50% [x ]   50-75%  [ ]   %  [ ]  other :    Source for PO intake [ ] Patient [ ] family [ ] chart [x ] staff [ ] other    Enteral /Parenteral Nutrition:     Current Weight:   4/24 53.5 kg  4/21 47.8 kg  4/20 46.6 kg  % Weight Change     Pertinent Medications: MEDICATIONS  (STANDING):  amLODIPine   Tablet 10 milliGRAM(s) Oral daily  chlorhexidine 2% Cloths 1 Application(s) Topical daily  ferrous    sulfate 325 milliGRAM(s) Oral daily  lactated ringers. 1000 milliLiter(s) (75 mL/Hr) IV Continuous <Continuous>  multivitamin 1 Tablet(s) Oral daily  pantoprazole  Injectable 40 milliGRAM(s) IV Push two times a day  polyethylene glycol/electrolyte Solution. 4000 milliLiter(s) Oral once  predniSONE   Tablet 5 milliGRAM(s) Oral daily  tacrolimus 2 milliGRAM(s) Oral every 12 hours    MEDICATIONS  (PRN):    Pertinent Labs: CBC Full  -  ( 24 Apr 2024 07:11 )  WBC Count : 6.62 K/uL  RBC Count : 3.25 M/uL  Hemoglobin : 10.1 g/dL  Hematocrit : 30.0 %  Platelet Count - Automated : 136 K/uL  Mean Cell Volume : 92.3 fl  Mean Cell Hemoglobin : 31.1 pg  Mean Cell Hemoglobin Concentration : 33.7 gm/dL          Skin:   IAD    Nutrition focused physical exam conducted - found signs of malnutrition [ ]absent [x ]present    Subcutaneous fat loss: [ x] Orbital fat pads region, [ ]Buccal fat region, [ ]Triceps region,  [ ]Ribs region    Muscle wasting: [x ]Temples region, [x ]Clavicle region, [ ]Shoulder region, [ ]Scapula region, [ ]Interosseous region,  [ ]thigh region, [ ]Calf region    Estimated Needs:   [ ] no change since previous assessment  [ ] recalculated:     Current Nutrition Diagnosis:  Pt presents at risk secondary to Malnutrition, severe, chronic  related to inability to meet sufficient protein-energy in setting of advanced age and dementia  as evidenced by severe muscle/fat loss and suspect meeting <75% nutrient needs >1 month. Pt remains on clears for EGD/ colonoscopy sandra.     Recommendations:   1) Resume diet when feasible  2) Daily weight  3) Add ensure BID    Monitoring and Evaluation:   [ ] PO intake [ ] Tolerance to diet prescription [X] Weights  [X] Follow up per protocol [X] Labs:

## 2024-04-24 NOTE — PROGRESS NOTE ADULT - ASSESSMENT
GLO on CKD5  S/P Renal Transplant in 2012 ([facility])  Metabolic Acidosis/Lactic Acidosis  Anemia  GI bleed    pt with advanced CKD  -S/P PRBCs with improvement in hgb, but creatinine relatively unchanged  -C/w tacrolimus 1mg BID (Tacrolimus level at goal)  -Hold MMF  -Cont Pred  -UOP, Bicarb & creatinine improving  -No emergent indication for dialysis  -c/w IVF LR

## 2024-04-24 NOTE — PROGRESS NOTE ADULT - ASSESSMENT
82 y/o F with PMHx ESRD s/p renal transplant on mycophenolate and tacrolimus (per nursing home documents), HTN, HLD, anemia, dementia who presented to the ED from [facility] for elevated BUN/Cr on outpatient labs. GI consulted for anemia.     #Anemia  Hgb on admission 2.5, s/p 4U pRBCs  No reports of any overt GIB  CT A/P No Cont (04.19.24)- Subcapsulariver lesion measuring 1.8 cm, Mild thickening of the distal esophageal wall which can be seen   with reflux disease and/or esophagitis, Decompressed colon with additional suggestion of submucosal edema involving the descending and sigmoid colon suggesting colitis  US Abd RUQ (04.19.24)- CBD dilated to 10mm. Cholelithiasis. Gallbladder wall thickening. Small pericholecystic fluid.     - Plan for EGD/colonoscopy on Friday 4/25/24   - Continue clear liquid diet today/tomorrow. Golytely prep slowly throughout today/tomorrow as long as electrolytes improve  - Trend CBC, transfuse as needed. Monitor for signs of bleeding.   - Avoid NSAIDs  - IV PPI BID for GI mucosal cytoprotection  _________________________________________________________________  Assessment and recommendations are final when note is signed by the attending physician.

## 2024-04-24 NOTE — PROGRESS NOTE ADULT - ASSESSMENT
83 year old female with a PMH of ESRD s/p renal transplant on mycophenolate and tacrolimus (per nursing home documents), HTN, HLD, anemia, dementia , recent cva approx 2 months ago presented to the ED from [facility] for GLO. found to have hypothermia and hypotensive on presentation. found to have acute severe anemia, likely GI bleed, Elevated INR, Thrombocytopenia, Lactic/metabolic acidosis. GLO on CKD . s/p vasopressors, ivf , 3 units prbcs in micu. Hb stable at 8 now. blood cx + for coagulase positive staph, likely contaminant.     #. anemia. acute element has resolved.   Hb has stabilized now. source is likely GI bleed.   - FOBT pos    - CT abd/pelvis demonstrated distended gallbladder/ no active bleed   - RUQ US demonstrated dilated CBD  - c/w IV Protonix BID  - gi plan to do EGD/colo on Friday now to give her more time to ingest the bowel prep.    #Hypokalemia. replaced IV. will check BMP this afternoon          > GLO on CKD likely pre-renal/ Metabolic Acidosis/Lactic Acidosis  - hx of renal transplant / follows at [facility]   creatinine trending down and is 3.8 today  -C/w tacrolimus 1mg BID    -Hold MMF  -Cont Pred    #. supratherapeutic INR. resolved. s/p FFP, vitamin K and Kcentra   - INR elevated to 12.19 on initial labs  - On Coumadin 5 mg as an outpatient for "clot in arm and stroke" as per son. patient does not know why she is on coumadin.  - Hold off on AC/chemical DVT prophylaxis for now given ABLA.     >Thrombocytopenia  - likely due to acute illness. Plt >100K. s/p Plt transfusion   - hold off chemical dvt ppx for now     >hx of cva   - has residual left nasolabial flattening and left upper ext weakness   - cth neg for any acute changes     #. contaminated blood cultures.     #.DVT ppx: scds due to gib

## 2024-04-24 NOTE — PROGRESS NOTE ADULT - NS ATTEST RISK PROBLEM GEN_ALL_CORE FT
Patient with a history of renal transplant, GLO, severe anemia      Abdominal exam–positive bowel sounds soft nontender    Patient with severe anemia on admission hemoglobin 2.5  As per nursing no overt bleeding.  Tending to have loose stools  Patient's mental status very lethargic.  CT my interpretation mildly thickened esophagus.  Probably understood distended colon in the left colon.  Patient being slowly prep for colonoscopy endoscopy to be done on Friday.  Hemoglobin stable at 10.2.  CBC ordered for tomorrow  Renal note reviewed–history of renal transplant.  Continue tacrolimus

## 2024-04-24 NOTE — PROGRESS NOTE ADULT - SUBJECTIVE AND OBJECTIVE BOX
[facility] DIVISION OF KIDNEY DISEASES AND HYPERTENSION -- FOLLOW UP NOTE  --------------------------------------------------------------------------------  HPI:  83 year old female with a PMH of ESRD s/p renal transplant on mycophenolate and tacrolimus (per nursing home documents), HTN, HLD, anemia, dementia who presented to the ED from [facility] for elevated BUN/Cr on outpatient labs.  Upon arrival to the ED patient was found to be hypotensive and hypothermic.  Workup was significant for profound anemia, elevated INR, lactic acidosis, and an GLO on CKD.  MICU consulted for further management. She was found to have GLO on CKD 5 and had renal transplant in the past.  She knows her name.  She is not producing much urine.  Her transplant was done at [place].    24 hour events/subjective:  Offers no acute complaints. Denies abdominal pain, chest pain , or SOB.      PAST HISTORY  --------------------------------------------------------------------------------  No significant changes to PMH, PSH, FHx, SHx, unless otherwise noted    ALLERGIES & MEDICATIONS  --------------------------------------------------------------------------------  Allergies  No Known Allergies    Standing Inpatient Medications  amLODIPine   Tablet 10 milliGRAM(s) Oral daily  chlorhexidine 2% Cloths 1 Application(s) Topical daily  ferrous    sulfate 325 milliGRAM(s) Oral daily  lactated ringers. 1000 milliLiter(s) IV Continuous <Continuous>  multivitamin 1 Tablet(s) Oral daily  pantoprazole  Injectable 40 milliGRAM(s) IV Push two times a day  potassium chloride  10 mEq/100 mL IVPB 10 milliEquivalent(s) IV Intermittent every 1 hour  predniSONE   Tablet 5 milliGRAM(s) Oral daily  tacrolimus 2 milliGRAM(s) Oral every 12 hours      REVIEW OF SYSTEMS  --------------------------------------------------------------------------------  As noted above    VITALS/PHYSICAL EXAM  --------------------------------------------------------------------------------  T(C): 37 (04-24-24 @ 10:56), Max: 37.1 (04-24-24 @ 00:30)  HR: 98 (04-24-24 @ 10:56) (94 - 100)  BP: 147/74 (04-24-24 @ 10:56) (125/85 - 157/78)  RR: 18 (04-24-24 @ 10:56) (18 - 18)  SpO2: 95% (04-24-24 @ 10:56) (94% - 96%)  Wt(kg): --        04-23-24 @ 07:01  -  04-24-24 @ 07:00  --------------------------------------------------------  IN: 0 mL / OUT: 400 mL / NET: -400 mL      Physical Exam:  GENERAL: Thin & frail appearing woman  HEENT: , supple neck, clear oropharynx  NECK: Supple  CHEST/LUNG: Clear to auscultation bilaterally; No rales, rhonchi, wheezing, or rubs. Unlabored respirations  HEART: Regular rate and rhythm;  ABDOMEN: BSx4; Soft, nontender, nondistended, Transplant kidney in LLQ, non-tender  EXTREMITIES:  No pedal edema  NERVOUS SYSTEM:  No focal deficits   Vascular access: MARTINA MARTINEZ    LABS/STUDIES  --------------------------------------------------------------------------------              10.1   6.62  >-----------<  136      [04-24-24 @ 07:11]              30.0     143  |  102  |  72.1  ----------------------------<  84      [04-24-24 @ 07:11]  3.0   |  27.0  |  3.88        Ca     7.1     [04-24-24 @ 07:11]      Creatinine Trend:  SCr 3.88 [04-24 @ 07:11]  SCr 4.20 [04-22 @ 07:14]  SCr 4.64 [04-21 @ 04:20]  SCr 4.67 [04-20 @ 04:20]  SCr 4.75 [04-19 @ 21:09]    Urinalysis - [04-24-24 @ 07:11]      Color  / Appearance  / SG  / pH       Gluc 84 / Ketone   / Bili  / Urobili        Blood  / Protein  / Leuk Est  / Nitrite       RBC  / WBC  / Hyaline  / Gran  / Sq Epi  / Non Sq Epi  / Bacteria       Iron 175, TIBC 212, %sat 83      [04-19-24 @ 06:50]  Ferritin 3171      [04-19-24 @ 06:50]  TSH 1.72      [04-19-24 @ 06:50]

## 2024-04-25 LAB
ANION GAP SERPL CALC-SCNC: 14 MMOL/L — SIGNIFICANT CHANGE UP (ref 5–17)
BUN SERPL-MCNC: 66 MG/DL — HIGH (ref 8–20)
CALCIUM SERPL-MCNC: 7.1 MG/DL — LOW (ref 8.4–10.5)
CHLORIDE SERPL-SCNC: 103 MMOL/L — SIGNIFICANT CHANGE UP (ref 96–108)
CO2 SERPL-SCNC: 24 MMOL/L — SIGNIFICANT CHANGE UP (ref 22–29)
CREAT SERPL-MCNC: 3.58 MG/DL — HIGH (ref 0.5–1.3)
EGFR: 12 ML/MIN/1.73M2 — LOW
GLUCOSE SERPL-MCNC: 78 MG/DL — SIGNIFICANT CHANGE UP (ref 70–99)
HCT VFR BLD CALC: 33 % — LOW (ref 34.5–45)
HGB BLD-MCNC: 10.7 G/DL — LOW (ref 11.5–15.5)
MCHC RBC-ENTMCNC: 31.1 PG — SIGNIFICANT CHANGE UP (ref 27–34)
MCHC RBC-ENTMCNC: 32.4 GM/DL — SIGNIFICANT CHANGE UP (ref 32–36)
MCV RBC AUTO: 95.9 FL — SIGNIFICANT CHANGE UP (ref 80–100)
PLATELET # BLD AUTO: 132 K/UL — LOW (ref 150–400)
POTASSIUM SERPL-MCNC: 3.6 MMOL/L — SIGNIFICANT CHANGE UP (ref 3.5–5.3)
POTASSIUM SERPL-SCNC: 3.6 MMOL/L — SIGNIFICANT CHANGE UP (ref 3.5–5.3)
RBC # BLD: 3.44 M/UL — LOW (ref 3.8–5.2)
RBC # FLD: 15.8 % — HIGH (ref 10.3–14.5)
SODIUM SERPL-SCNC: 141 MMOL/L — SIGNIFICANT CHANGE UP (ref 135–145)
WBC # BLD: 6.41 K/UL — SIGNIFICANT CHANGE UP (ref 3.8–10.5)
WBC # FLD AUTO: 6.41 K/UL — SIGNIFICANT CHANGE UP (ref 3.8–10.5)

## 2024-04-25 PROCEDURE — 99232 SBSQ HOSP IP/OBS MODERATE 35: CPT

## 2024-04-25 RX ORDER — SOD SULF/SODIUM/NAHCO3/KCL/PEG
4000 SOLUTION, RECONSTITUTED, ORAL ORAL ONCE
Refills: 0 | Status: COMPLETED | OUTPATIENT
Start: 2024-04-25 | End: 2024-04-25

## 2024-04-25 RX ADMIN — Medication 5 MILLIGRAM(S): at 05:31

## 2024-04-25 RX ADMIN — Medication 1 TABLET(S): at 12:19

## 2024-04-25 RX ADMIN — TACROLIMUS 2 MILLIGRAM(S): 5 CAPSULE ORAL at 05:31

## 2024-04-25 RX ADMIN — SODIUM CHLORIDE 75 MILLILITER(S): 9 INJECTION, SOLUTION INTRAVENOUS at 03:26

## 2024-04-25 RX ADMIN — Medication 4000 MILLILITER(S): at 18:45

## 2024-04-25 RX ADMIN — PANTOPRAZOLE SODIUM 40 MILLIGRAM(S): 20 TABLET, DELAYED RELEASE ORAL at 18:41

## 2024-04-25 RX ADMIN — TACROLIMUS 2 MILLIGRAM(S): 5 CAPSULE ORAL at 18:41

## 2024-04-25 RX ADMIN — Medication 325 MILLIGRAM(S): at 12:19

## 2024-04-25 RX ADMIN — PANTOPRAZOLE SODIUM 40 MILLIGRAM(S): 20 TABLET, DELAYED RELEASE ORAL at 05:32

## 2024-04-25 RX ADMIN — AMLODIPINE BESYLATE 10 MILLIGRAM(S): 2.5 TABLET ORAL at 05:31

## 2024-04-25 NOTE — PROGRESS NOTE ADULT - ASSESSMENT
GLO on CKD5  S/P Renal Transplant in 2012 ([facility])  Metabolic Acidosis/Lactic Acidosis  Anemia  GI bleed    pt with advanced CKD  -S/P PRBCs with improvement in hgb, but creatinine relatively unchanged  -C/w tacrolimus 1mg BID (Tacrolimus level at goal)  -Hold MMF  -Cont Pred  -UOP, Bicarb & creatinine improving  -No emergent indication for dialysis  -c/w IVF LR   GLO on CKD5  S/P Renal Transplant in 2012 ([facility])  Metabolic Acidosis/Lactic Acidosis  Anemia  GI bleed    pt with advanced CKD  -S/P PRBCs with improvement in hgb, but creatinine relatively unchanged  -C/w tacrolimus 1mg BID (Tacrolimus level at goal)  -Cont Pred  -UOP, Bicarb & creatinine improving  -No emergent indication for dialysis  -c/w IVF LR

## 2024-04-25 NOTE — PROGRESS NOTE ADULT - TIME BILLING
D/W pt, hospitalist Dr Salcido    Total time also includes discussion during interdisciplinary team rounds, chart review including but limited to prior admissions/ GOC discussions, review of established ACP documentations ( ex. living will, HCP, MOLST form),  review of medications/ labs/ imaging, examination, care coordination with other health care professionals, documentation EXCLUDING current goals of care or advance care planning discussions.
Reviewing CT images and lab work here including but not limited to iron studies.
Reviewing her previous imaging studies and lab work.
mdm
patient care , labs , meds, chart review
review of chart, labs, independent review of imaging, bedside assessment, coordination of care with residents/nursing and documentation

## 2024-04-25 NOTE — PROGRESS NOTE ADULT - ASSESSMENT
82 y/o F with PMHx ESRD s/p renal transplant on mycophenolate and tacrolimus (per nursing home documents), HTN, HLD, anemia, dementia who presented to the ED from [facility] for elevated BUN/Cr on outpatient labs. GI consulted for anemia.     #Anemia  Hgb on admission 2.5, s/p 4U pRBCs  No reports of any overt GIB  CT A/P No Cont (04.19.24)- Subcapsulariver lesion measuring 1.8 cm, Mild thickening of the distal esophageal wall which can be seen   with reflux disease and/or esophagitis, Decompressed colon with additional suggestion of submucosal edema involving the descending and sigmoid colon suggesting colitis  US Abd RUQ (04.19.24)- CBD dilated to 10mm. Cholelithiasis. Gallbladder wall thickening. Small pericholecystic fluid.   INR 12.19 on admission; patient was on coumadin 5 mg for DVT per son; s/p FFP, vitamin K and Kcentra   BUN/Cr remain elevated- nephrology following; no plans for HD at this time     Last INR 1.19  - Hgb 10.7 today, no overt signs of GIB  - Plan for EGD/colonoscopy on Friday 4/26   - Clear liquid diet today. Golytely today  - NPO at midnight  - Trend CBC, transfuse as needed. Monitor for signs of bleeding.   - Avoid NSAIDs  - Monitor electrolytes, replete as needed.  - IV PPI BID for GI mucosal cytoprotection  _________________________________________________________________  Assessment and recommendations are final when note is signed by the attending physician.

## 2024-04-25 NOTE — PROGRESS NOTE ADULT - SUBJECTIVE AND OBJECTIVE BOX
[facility] Division of Hospital Medicine    Chief Complaint:      SUBJECTIVE / OVERNIGHT EVENTS:    Patient denies any complaints     MEDICATIONS  (STANDING):  amLODIPine   Tablet 10 milliGRAM(s) Oral daily  chlorhexidine 2% Cloths 1 Application(s) Topical daily  ferrous    sulfate 325 milliGRAM(s) Oral daily  lactated ringers. 1000 milliLiter(s) (75 mL/Hr) IV Continuous <Continuous>  multivitamin 1 Tablet(s) Oral daily  pantoprazole  Injectable 40 milliGRAM(s) IV Push two times a day  polyethylene glycol/electrolyte Solution. 4000 milliLiter(s) Oral once  predniSONE   Tablet 5 milliGRAM(s) Oral daily  tacrolimus 2 milliGRAM(s) Oral every 12 hours    MEDICATIONS  (PRN):        I&O's Summary    24 Apr 2024 07:01  -  25 Apr 2024 07:00  --------------------------------------------------------  IN: 1130 mL / OUT: 600 mL / NET: 530 mL        PHYSICAL EXAM:  Vital Signs Last 24 Hrs  T(C): 37.1 (25 Apr 2024 08:23), Max: 37.1 (25 Apr 2024 07:44)  T(F): 98.8 (25 Apr 2024 08:23), Max: 98.8 (25 Apr 2024 07:44)  HR: 90 (25 Apr 2024 08:23) (90 - 98)  BP: 153/84 (25 Apr 2024 08:23) (141/84 - 158/90)  BP(mean): --  RR: 18 (25 Apr 2024 08:23) (18 - 18)  SpO2: 93% (25 Apr 2024 08:23) (92% - 95%)    Parameters below as of 25 Apr 2024 08:23  Patient On (Oxygen Delivery Method): room air        CONSTITUTIONAL: NAD,   ENMT: normocephalic, atraumatic  RESPIRATORY: Normal respiratory effort; lungs are clear to auscultation bilaterally  CARDIOVASCULAR: Regular rate and rhythm, normal S1 and S2, no murmur/rub/gallop  ABDOMEN: Nontender to palpation, no rebound/guarding; No hepatosplenomegaly  MUSCLOSKELETAL:  No edema  PSYCH: A+O to person, place, and time; left nasolabial fold flattening    LABS:                        10.7   6.41  )-----------( 132      ( 25 Apr 2024 06:51 )             33.0     04-25    141  |  103  |  66.0<H>  ----------------------------<  78  3.6   |  24.0  |  3.58<H>    Ca    7.1<L>      25 Apr 2024 06:51            Urinalysis Basic - ( 25 Apr 2024 06:51 )    Color: x / Appearance: x / SG: x / pH: x  Gluc: 78 mg/dL / Ketone: x  / Bili: x / Urobili: x   Blood: x / Protein: x / Nitrite: x   Leuk Esterase: x / RBC: x / WBC x   Sq Epi: x / Non Sq Epi: x / Bacteria: x        CAPILLARY BLOOD GLUCOSE            RADIOLOGY & ADDITIONAL TESTS:  Results Reviewed:   Imaging Personally Reviewed:  Electrocardiogram Personally Reviewed:

## 2024-04-25 NOTE — PROGRESS NOTE ADULT - ASSESSMENT
83 year old female with a PMH of ESRD s/p renal transplant on mycophenolate and tacrolimus (per nursing home documents), HTN, HLD, anemia, dementia , recent cva approx 2 months ago presented to the ED from [facility] for GLO. found to have hypothermia and hypotensive on presentation. found to have acute severe anemia, likely GI bleed, Elevated INR, Thrombocytopenia, Lactic/metabolic acidosis. GLO on CKD . s/p vasopressors, ivf , 3 units prbcs in micu. Hb stable at 8 now. blood cx + for coagulase positive staph, likely contaminant.     #. anemia. acute element has resolved.   Hb has stabilized now. source is likely GI bleed.   - FOBT pos    - CT abd/pelvis demonstrated distended gallbladder/ no active bleed   - RUQ US demonstrated dilated CBD  - c/w IV Protonix BID  - gi plan to do EGD/colo on Friday     #Hypokalemia. resolved      > GLO on CKD likely pre-renal/ Metabolic Acidosis/Lactic Acidosis  - hx of renal transplant / follows at [facility]   creatinine trending down and is 3.5 today  -C/w tacrolimus 1mg BID    -Hold MMF  -Cont Pred    #. supratherapeutic INR. resolved. s/p FFP, vitamin K and Kcentra   - INR elevated to 12.19 on initial labs  - On Coumadin 5 mg as an outpatient for "clot in arm and stroke" as per son. patient does not know why she is on coumadin.  - Hold off on AC/chemical DVT prophylaxis for now given ABLA.     >Thrombocytopenia  - likely due to acute illness. Plt >100K. s/p Plt transfusion   - hold off chemical dvt ppx for now     >hx of cva   - has residual left nasolabial flattening and left upper ext weakness   - cth neg for any acute changes     #. contaminated blood cultures.     #.DVT ppx: scds due to gib

## 2024-04-25 NOTE — PROGRESS NOTE ADULT - SUBJECTIVE AND OBJECTIVE BOX
[facility] DIVISION OF KIDNEY DISEASES AND HYPERTENSION -- FOLLOW UP NOTE  --------------------------------------------------------------------------------  HPI:  83 year old female with a PMH of ESRD s/p renal transplant on mycophenolate and tacrolimus (per nursing home documents), HTN, HLD, anemia, dementia who presented to the ED from [facility] for elevated BUN/Cr on outpatient labs.  Upon arrival to the ED patient was found to be hypotensive and hypothermic.  Workup was significant for profound anemia, elevated INR, lactic acidosis, and an GLO on CKD.  MICU consulted for further management. She was found to have GLO on CKD 5 and had renal transplant in the past.  She knows her name.  She is not producing much urine.  Her transplant was done at [place].      24 hour events/subjective:  Offers no acute complaints. No overnight events.    PAST HISTORY  --------------------------------------------------------------------------------  No significant changes to PMH, PSH, FHx, SHx, unless otherwise noted    ALLERGIES & MEDICATIONS  --------------------------------------------------------------------------------  Allergies    No Known Allergies    Intolerances      Standing Inpatient Medications  amLODIPine   Tablet 10 milliGRAM(s) Oral daily  chlorhexidine 2% Cloths 1 Application(s) Topical daily  ferrous    sulfate 325 milliGRAM(s) Oral daily  lactated ringers. 1000 milliLiter(s) IV Continuous <Continuous>  multivitamin 1 Tablet(s) Oral daily  pantoprazole  Injectable 40 milliGRAM(s) IV Push two times a day  polyethylene glycol/electrolyte Solution. 4000 milliLiter(s) Oral once  predniSONE   Tablet 5 milliGRAM(s) Oral daily  tacrolimus 2 milliGRAM(s) Oral every 12 hours    PRN Inpatient Medications      REVIEW OF SYSTEMS  --------------------------------------------------------------------------------  As noted above    VITALS/PHYSICAL EXAM  --------------------------------------------------------------------------------  T(C): 37.1 (04-25-24 @ 08:23), Max: 37.1 (04-25-24 @ 07:44)  HR: 90 (04-25-24 @ 08:23) (90 - 98)  BP: 153/84 (04-25-24 @ 08:23) (141/84 - 158/90)  RR: 18 (04-25-24 @ 08:23) (18 - 18)  SpO2: 93% (04-25-24 @ 08:23) (92% - 95%)  Wt(kg): --        04-24-24 @ 07:01  -  04-25-24 @ 07:00  --------------------------------------------------------  IN: 1130 mL / OUT: 600 mL / NET: 530 mL      Physical Exam:  GENERAL: Thin & frail appearing woman  HEENT: , supple neck, clear oropharynx  NECK: Supple  CHEST/LUNG: Clear to auscultation bilaterally; No rales, rhonchi, wheezing, or rubs. Unlabored respirations  HEART: Regular rate and rhythm;  ABDOMEN: BSx4; Soft, nontender, nondistended, Transplant kidney in LLQ, non-tender  EXTREMITIES:  No pedal edema  NERVOUS SYSTEM:  No focal deficits   Vascular access: LUE EDG    LABS/STUDIES  --------------------------------------------------------------------------------              10.7   6.41  >-----------<  132      [04-25-24 @ 06:51]              33.0     141  |  103  |  66.0  ----------------------------<  78      [04-25-24 @ 06:51]  3.6   |  24.0  |  3.58        Ca     7.1     [04-25-24 @ 06:51]            Creatinine Trend:  SCr 3.58 [04-25 @ 06:51]  SCr 3.75 [04-24 @ 13:56]  SCr 3.88 [04-24 @ 07:11]  SCr 4.20 [04-22 @ 07:14]  SCr 4.64 [04-21 @ 04:20]    Urinalysis - [04-25-24 @ 06:51]      Color  / Appearance  / SG  / pH       Gluc 78 / Ketone   / Bili  / Urobili        Blood  / Protein  / Leuk Est  / Nitrite       RBC  / WBC  / Hyaline  / Gran  / Sq Epi  / Non Sq Epi  / Bacteria       Iron 175, TIBC 212, %sat 83      [04-19-24 @ 06:50]  Ferritin 3171      [04-19-24 @ 06:50]  TSH 1.72      [04-19-24 @ 06:50]       [facility] DIVISION OF KIDNEY DISEASES AND HYPERTENSION -- FOLLOW UP NOTE  --------------------------------------------------------------------------------  HPI:  83 year old female with a PMH of ESRD s/p renal transplant on mycophenolate and tacrolimus (per nursing home documents), HTN, HLD, anemia, dementia who presented to the ED from [facility] for elevated BUN/Cr on outpatient labs.  Upon arrival to the ED patient was found to be hypotensive and hypothermic.  Workup was significant for profound anemia, elevated INR, lactic acidosis, and an GLO on CKD.  MICU consulted for further management. She was found to have GLO on CKD 5 and had renal transplant in the past.  She knows her name.  She is not producing much urine.  Her transplant was done at [place].      24 hour events/subjective:  Offers no acute complaints. No overnight events.    PAST HISTORY  --------------------------------------------------------------------------------  No significant changes to PMH, PSH, FHx, SHx, unless otherwise noted    ALLERGIES & MEDICATIONS  --------------------------------------------------------------------------------  Allergies  No Known Allergies        Standing Inpatient Medications  amLODIPine   Tablet 10 milliGRAM(s) Oral daily  chlorhexidine 2% Cloths 1 Application(s) Topical daily  ferrous    sulfate 325 milliGRAM(s) Oral daily  lactated ringers. 1000 milliLiter(s) IV Continuous <Continuous>  multivitamin 1 Tablet(s) Oral daily  pantoprazole  Injectable 40 milliGRAM(s) IV Push two times a day  polyethylene glycol/electrolyte Solution. 4000 milliLiter(s) Oral once  predniSONE   Tablet 5 milliGRAM(s) Oral daily  tacrolimus 2 milliGRAM(s) Oral every 12 hours    PRN Inpatient Medications      REVIEW OF SYSTEMS  --------------------------------------------------------------------------------  As noted above    VITALS/PHYSICAL EXAM  --------------------------------------------------------------------------------  T(C): 37.1 (04-25-24 @ 08:23), Max: 37.1 (04-25-24 @ 07:44)  HR: 90 (04-25-24 @ 08:23) (90 - 98)  BP: 153/84 (04-25-24 @ 08:23) (141/84 - 158/90)  RR: 18 (04-25-24 @ 08:23) (18 - 18)  SpO2: 93% (04-25-24 @ 08:23) (92% - 95%)  Wt(kg): --        04-24-24 @ 07:01  -  04-25-24 @ 07:00  --------------------------------------------------------  IN: 1130 mL / OUT: 600 mL / NET: 530 mL      Physical Exam:  GENERAL: Thin & frail appearing woman  HEENT: , supple neck, clear oropharynx  NECK: Supple  CHEST/LUNG: Clear to auscultation bilaterally; No rales, rhonchi, wheezing, or rubs. Unlabored respirations  HEART: Regular rate and rhythm;  ABDOMEN: BSx4; Soft, nontender, nondistended, Transplant kidney in LLQ, non-tender  EXTREMITIES:  No pedal edema  NERVOUS SYSTEM:  No focal deficits   Vascular access: LUE EDG    LABS/STUDIES  --------------------------------------------------------------------------------              10.7   6.41  >-----------<  132      [04-25-24 @ 06:51]              33.0     141  |  103  |  66.0  ----------------------------<  78      [04-25-24 @ 06:51]  3.6   |  24.0  |  3.58        Ca     7.1     [04-25-24 @ 06:51]            Creatinine Trend:  SCr 3.58 [04-25 @ 06:51]  SCr 3.75 [04-24 @ 13:56]  SCr 3.88 [04-24 @ 07:11]  SCr 4.20 [04-22 @ 07:14]  SCr 4.64 [04-21 @ 04:20]    Urinalysis - [04-25-24 @ 06:51]      Color  / Appearance  / SG  / pH       Gluc 78 / Ketone   / Bili  / Urobili        Blood  / Protein  / Leuk Est  / Nitrite       RBC  / WBC  / Hyaline  / Gran  / Sq Epi  / Non Sq Epi  / Bacteria       Iron 175, TIBC 212, %sat 83      [04-19-24 @ 06:50]  Ferritin 3171      [04-19-24 @ 06:50]  TSH 1.72      [04-19-24 @ 06:50]

## 2024-04-25 NOTE — PROGRESS NOTE ADULT - SUBJECTIVE AND OBJECTIVE BOX
Chief Complaint:  Patient is a 83y old  Female who presents with a chief complaint of GIB (24 Apr 2024 11:20)    HPI/ 24 hr events: Patient seen and examined at bedside. Patient minimally verbal. No acute signs/symptoms of distress present. Denies nausea, vomiting, abdominal pain.  Vitals are overall stable.    REVIEW OF SYSTEMS:   General: Negative  HEENT: Negative  CV: Negative  Respiratory: Negative  GI: See HPI  : Negative  MSK: Negative  Hematologic: Negative  Skin: Negative    MEDICATIONS:   MEDICATIONS  (STANDING):  amLODIPine   Tablet 10 milliGRAM(s) Oral daily  chlorhexidine 2% Cloths 1 Application(s) Topical daily  ferrous    sulfate 325 milliGRAM(s) Oral daily  lactated ringers. 1000 milliLiter(s) (75 mL/Hr) IV Continuous <Continuous>  multivitamin 1 Tablet(s) Oral daily  pantoprazole  Injectable 40 milliGRAM(s) IV Push two times a day  predniSONE   Tablet 5 milliGRAM(s) Oral daily  tacrolimus 2 milliGRAM(s) Oral every 12 hours    MEDICATIONS  (PRN):      Packed Red Cells Order:  1 Unit  Indication: Hgb <7 gm/dL  Infuse Unit : 3.5 Hours (04-20-24 @ 10:42)  Plasma Order:  Unit 1, Routine   Indication: Active Bleeding with suspected coagulopathy (in the absence of warfarin)    Infuse Unit : 1 Hour (04-19-24 @ 02:03)  Packed Red Cells Order:  1 Unit  Indication: Hgb <7 gm/dL  Infuse Unit : 3 Hours (04-19-24 @ 02:03)  phytonadione  IVPB: [Known as aquaMEPHYTON IVPB]  10 milliGRAM(s) in dextrose 5% 50 milliLiter(s), IV Intermittent, once, infuse over 30 Minute(s), Stop After 1 Doses  Administration Instructions: This is a High Alert Medication. (04-19-24 @ 00:28)  Packed Red Cells Order:  1 Unit  Indication: Anemia due to Active Hemorrhage - Medical Conditions e.  Infuse Unit : 3 Hours --- emergency release (04-18-24 @ 23:49)  Packed Red Cells Order:  1 Unit  Indication: Anemia due to Active Hemorrhage - Medical Conditions e.  Infuse Unit : 3 Hours --- emergency release (04-18-24 @ 23:49)        DIET:  Diet, Clear Liquid (04-23-24 @ 16:08) [Active]          ALLERGIES:   Allergies    No Known Allergies    Intolerances        VITAL SIGNS:   Vital Signs Last 24 Hrs  T(C): 37.1 (25 Apr 2024 08:23), Max: 37.1 (25 Apr 2024 07:44)  T(F): 98.8 (25 Apr 2024 08:23), Max: 98.8 (25 Apr 2024 07:44)  HR: 90 (25 Apr 2024 08:23) (81 - 98)  BP: 153/84 (25 Apr 2024 08:23) (141/84 - 158/90)  BP(mean): --  RR: 18 (25 Apr 2024 08:23) (18 - 18)  SpO2: 93% (25 Apr 2024 08:23) (91% - 95%)    Parameters below as of 25 Apr 2024 08:23  Patient On (Oxygen Delivery Method): room air      I&O's Summary    24 Apr 2024 07:01  -  25 Apr 2024 07:00  --------------------------------------------------------  IN: 1130 mL / OUT: 600 mL / NET: 530 mL        PHYSICAL EXAM:   GENERAL:  No acute distress  HEENT:  NC/AT, conjunctiva clear, sclera anicteric  CHEST:  No increased effort  HEART:  Regular rate  ABDOMEN:  Soft, non-tender, non-distended, normoactive bowel sounds, no rebound or guarding  EXTREMITIES: No edema  SKIN:  Warm, dry  NEURO:  Calm, cooperative    LABS:                        10.7   6.41  )-----------( 132      ( 25 Apr 2024 06:51 )             33.0     Hemoglobin: 10.7 g/dL (04-25-24 @ 06:51)  Hemoglobin: 10.1 g/dL (04-24-24 @ 07:11)    04-25    141  |  103  |  66.0<H>  ----------------------------<  78  3.6   |  24.0  |  3.58<H>    Ca    7.1<L>      25 Apr 2024 06:51      RADIOLOGY & ADDITIONAL STUDIES:    < from: CT Abdomen and Pelvis No Cont (04.19.24 @ 04:19) >    ACC: [id number] EXAM:  CT ABDOMEN AND PELVIS   ORDERED BY: MATI VAZQUEZ     PROCEDURE DATE:  04/19/2024          INTERPRETATION:  CLINICAL INFORMATION:  GI bleed.    COMPARISON: No prior imaging available for comparison.    PROCEDURE:  CT of the Abdomen and Pelvis was performed without intravenous contrast.  Intravenous contrast: None.  Oral contrast: None.  Sagittal and coronal reformats were performed.    FINDINGS:  LOWER CHEST: Small left pleural effusion. Innumerable pulmonary nodules   in all visualized lung fields up to 6 mm. cardiomegaly. Mitral annular   valve calcifications and coronary artery calcifications. Anemia.    LIVER: Subcapsular segment 8 liver lesion measuring 1.8 cm.  BILE DUCTS: Normal caliber.  GALLBLADDER: Dilatation of the gallbladder. Multiple gallstones are   present. No wall thickening or edema.  SPLEEN: Within normal limits.  PANCREAS: Limited evaluation due to lack of IV contrast  ADRENALS: Within normal limits.  KIDNEYS/URETERS: Hypertrophy of the renal parenchyma bilaterally. Left   renal cysts. No hydronephrosis. Left iliac fossa renal transplant without   hydronephrosis.    BLADDER: Within normal limits.  REPRODUCTIVE ORGANS: Hysterectomy.    BOWEL: Mild thickening of the distal esophageal wall which can be seen   with reflux disease and/or esophagitis. Stomach is collapsed and   demonstrates no gross abnormality within the limits of noncontrast   imaging. Few small bowel loops are mildly dilated in the right lower   quadrant involving the midto distal ileum. There is no transition point   or pneumatosis. No bowel obstruction. There is a segment of sigmoid and   descending colon that is decompressed but also demonstrates diffusely   thickened wall, image 3:112 suggesting edema and colitis. Appendix is not   visualized.  PERITONEUM: No ascites.  VESSELS: Atherosclerosis.  RETROPERITONEUM/LYMPH NODES: No lymphadenopathy.  ABDOMINAL WALL: Anasarca.  BONES: Sacroiliac joint degeneration and thickening heterogeneous   increased bone mineral density scan is seen with renal osseous dystrophy.   Discogenic degenerative disease.    IMPRESSION:    Please note that the evaluation for active GI bleed  requires IV contrast.    Distended gallbladder with cholelithiasis. If there is right upper   quadrant pain, consider right upper quadrant ultrasound to rule out acute   cholecystitis.    Decompressed colon with additional suggestion of submucosal edema   involving the descending and sigmoid colon suggesting colitis, ischemic   colitis cannot be excluded.    Cardiomegaly and left pleural effusion and anasarca.    Disseminated innumerable pulmonary nodules are worrisome for metastatic   disease, a infectious etiology is a differential consideration but felt   less likely.        Noncontrast study is nondiagnostic for evaluating active GI bleed.  Distended gallbladder containing gallstones. Consider right upper   quadrant ultrasound to exclude gallbladder disease.  Left lower quadrant renal transplant without hydronephrosis or   perinephric collection.  Atrophied bilateral kidneys containing cysts as well as too small to   characterize hypodensities.  Small left pleural effusion. Scattered bilateral sub-cm lung nodules.   Consider nonemergent pulmonary imaging follow-up.  Follow-upofficial report in the morning    --- End of Report ---            PATRICIO GONZALES MD; Attending Radiologist  This document has been electronically signed. Apr 19 2024  9:33AM    < end of copied text >

## 2024-04-26 PROCEDURE — 99232 SBSQ HOSP IP/OBS MODERATE 35: CPT

## 2024-04-26 PROCEDURE — 99231 SBSQ HOSP IP/OBS SF/LOW 25: CPT | Mod: FS

## 2024-04-26 RX ORDER — PANTOPRAZOLE SODIUM 20 MG/1
40 TABLET, DELAYED RELEASE ORAL
Refills: 0 | Status: DISCONTINUED | OUTPATIENT
Start: 2024-04-27 | End: 2024-05-01

## 2024-04-26 RX ADMIN — Medication 1 TABLET(S): at 11:10

## 2024-04-26 RX ADMIN — SODIUM CHLORIDE 75 MILLILITER(S): 9 INJECTION, SOLUTION INTRAVENOUS at 05:24

## 2024-04-26 RX ADMIN — TACROLIMUS 2 MILLIGRAM(S): 5 CAPSULE ORAL at 17:56

## 2024-04-26 RX ADMIN — TACROLIMUS 2 MILLIGRAM(S): 5 CAPSULE ORAL at 05:16

## 2024-04-26 RX ADMIN — PANTOPRAZOLE SODIUM 40 MILLIGRAM(S): 20 TABLET, DELAYED RELEASE ORAL at 05:16

## 2024-04-26 RX ADMIN — AMLODIPINE BESYLATE 10 MILLIGRAM(S): 2.5 TABLET ORAL at 05:16

## 2024-04-26 RX ADMIN — SODIUM CHLORIDE 75 MILLILITER(S): 9 INJECTION, SOLUTION INTRAVENOUS at 21:25

## 2024-04-26 RX ADMIN — Medication 5 MILLIGRAM(S): at 05:16

## 2024-04-26 RX ADMIN — Medication 325 MILLIGRAM(S): at 11:10

## 2024-04-26 NOTE — PROGRESS NOTE ADULT - ATTENDING COMMENTS
Pt with DDRT in 2012 at [facility]-   Pt reports h/o worsening kidney disease and admits to poor medical follow up.  LGIB; + FOBT, Hb now stable. Plan for scope as per GI when pt stable  Scr elevated- electrolytes grossly wnl. no indication for HD at this time.  Continue immunosuppression as above. Ok to resume MMF.  metabolic acidosis; sp bicarb gtt, co2 levels improved.  Continue IV hydration with LR.
Pt with DDRT in 2012 at [facility]-   Pt reports h/o worsening kidney disease and admits to poor medical follow up.  LGIB; + FOBT, Hb now stable. Plan for scope as per GI  Scr elevated/ no indication for HD at this time.  Continue immunosuppression as above. MMF on hold.  metabolic acidosis; bicarb gtt
Pt with DDRT in 2012 at [facility]-   Pt reports h/o worsening kidney disease and admits to poor medical follow up over the past few years.  She now presents with LGIB; + FOBT, Hb now stable. Plan for scope as per GI on Friday  Scr impoving- electrolytes grossly wnl. no indication for HD  Continue immunosuppression as above. Ok to resume MMF.  metabolic acidosis; sp bicarb gtt, co2 levels improved.  Continue IV hydration with LR.  Bp stable- continue amlodipine
Pt with DDRT in 2012 at [facility]-   Pt reports h/o worsening kidney disease and admits to poor medical follow up.  LGIB; + FOBT, Hb now stable. Plan for scope as per GI when pt stable  Scr elevated/ no indication for HD at this time.  Continue immunosuppression as above. MMF on hold.  metabolic acidosis; continue bicarb gtt .  No labs available for review today. Will order for tomorrow Am
Pt with DDRT in 2012 at [facility]-   Pt reports h/o worsening kidney disease and admits to poor medical follow up over the past few years.  She now presents with LGIB; + FOBT, Hb now stable.   Scr improving- electrolytes grossly wnl on latest set of labs.  Continue immunosuppression as above. Ok to resume MMF.  metabolic acidosis; sp bicarb gtt, co2 levels improved.  Continue IV hydration with LR.  Bp stable- continue amlodipine .
Late note entry; pt seen and examined with the resident during AM rounds 4/20.  83F with ESRD s/p renal transplant on tacrolimus/mycophenolate and prednisone, HTN, HLD, dementia, coumadin for unknown reason referred to the ED from [facility] due to acute renal failure, lethargy and reported dark stools. Pt found to be anemic to 2.5 on presentation with INR elevated to 12. Pt started on empiric PPI for presumed GI bleed. CT Abd/pelvis non contrast showed distended gallbladder with cholelithiasis, possible colitis and multiple pulmonary nodules and no hydronephrosis of the transplant kidney. CT head negative for acute pathology. Received 3u pRBC, 1 FFP and 1plt with improvement to Hgb 8 and received kcentra/vitamin K for reversal. Pt initially somnolent on exam but awake and interactive today. Pt with slow drift down in Hgb to 6.9 this AM and given additional 1u pRBC but has had no reported overt bleeding in hospital. Anemia workup sent and hematology consulted. Pt's Cr overall downtrending. Yesterday with poor urine output despite blood and fluids and given diuretic challenge with improvement in output. Nephrology was consulted for management of immunosuppresives and will continue prednsione and tacro at reduced dose of 1 mg BID and follow trough with mycophenolate held. Seen by GI with no plan for immediate endoscopy and plan to treat conservatively. RUQ sono with gallbladder wall thickening/cholelithiasis. No RUQ pain on exam and bilis normal. Noted with out of hospital midline removed today. Per discussion with son, if renal failure were to progress, he believes she would be amenable to resuming HD. No emergent need for HD as uremia likely partly secondary to bleeding but at risk of needing in near future. Palliative care consulted. Will obtain CT chest for further eval of nodules, concerning for metastatic process. Blood cx with coag neg Staph likely contaminant. Started on empiric zosyn pending cultures given hypothermia and immunocompromise, but afebrile with resolved leukocytosis. F/u cultures. As pt clinically improved, will downgrade to medicine for further care.

## 2024-04-26 NOTE — PROGRESS NOTE ADULT - ASSESSMENT
GLO on CKD5  S/P Renal Transplant in 2012 ([facility])  Metabolic Acidosis/Lactic Acidosis  Anemia  GI bleed    pt with advanced CKD  -S/P PRBCs with improvement in hgb, but creatinine relatively unchanged  -C/w tacrolimus 1mg BID (Tacrolimus level at goal)  -Cont Pred, restart MMF  -UOP, Bicarb & creatinine improving  - EGD/Colonoscopy today   -No emergent indication for dialysis  -c/w IVF LR  - C/w Amlodipine

## 2024-04-26 NOTE — PROGRESS NOTE ADULT - ASSESSMENT
84 y/o F with PMHx ESRD s/p renal transplant on mycophenolate and tacrolimus (per nursing home documents), HTN, HLD, anemia, dementia who presented to the ED from [facility] for elevated BUN/Cr on outpatient labs. GI consulted for anemia.     #Anemia  Hgb on admission 2.5, s/p 4U pRBCs  No reports of any overt GIB  CT A/P No Cont (04.19.24)- Subcapsular liver lesion measuring 1.8 cm, Mild thickening of distal esophageal wall which can be seen   with reflux disease and/or esophagitis, Decompressed colon with additional suggestion of submucosal edema involving the descending and sigmoid colon suggesting colitis  US Abd RUQ (04.19.24)- CBD dilated to 10mm. Cholelithiasis. Gallbladder wall thickening. Small pericholecystic fluid.     - Unable to tolerate preparation for colonoscopy  - Recommend goals of care discussion with family, no plans for urgent endoscopic intervention at this time   - Advance diet as tolerated   - Trend CBC, transfuse as needed. Monitor for signs of bleeding.   - Avoid NSAIDs  - IV PPI BID for GI mucosal cytoprotection  _________________________________________________________________  Assessment and recommendations are final when note is signed by the attending physician.

## 2024-04-26 NOTE — DISCHARGE NOTE PROVIDER - NSDCCPCAREPLAN_GEN_ALL_CORE_FT
PRINCIPAL DISCHARGE DIAGNOSIS  Diagnosis: Hemorrhagic shock  Assessment and Plan of Treatment:       SECONDARY DISCHARGE DIAGNOSES  Diagnosis: Anemia  Assessment and Plan of Treatment:     Diagnosis: Supratherapeutic INR  Assessment and Plan of Treatment:     Diagnosis: Hemorrhagic shock  Assessment and Plan of Treatment:     Diagnosis: Deep vein thrombosis (DVT)  Assessment and Plan of Treatment:     Diagnosis: Lesion of lung  Assessment and Plan of Treatment:

## 2024-04-26 NOTE — PROGRESS NOTE ADULT - PROBLEM SELECTOR PLAN 1
Patient with a history of renal transplant  Patient with severe anemia on admission hemoglobin 2.5  As per nursing no overt bleeding.  Tending to have loose stools  Patient's mental status very lethargic.  CT my interpretation mildly thickened esophagus.  Probably understood distended colon in the left colon.  Patient being slowly prep for colonoscopy endoscopy to be done on Friday.  Hemoglobin stable at 10.2.  CBC ordered for tomorrow  Renal note reviewed–history of renal transplant.  Continue tacrolimus
Patient with a history of renal transplant  Patient with severe anemia on admission hemoglobin 2.5  As per nursing no overt bleeding.  Tending to have loose stools  Patient's mental status very lethargic.  CT my interpretation mildly thickened esophagus.  Probably understood distended colon in the left colon.  Patient being slowly prep for colonoscopy endoscopy to be done on Friday.  Hemoglobin stable at 10.2.  CBC ordered for tomorrow  Renal note reviewed–history of renal transplant.  Continue tacrolimus

## 2024-04-26 NOTE — PROGRESS NOTE ADULT - SUBJECTIVE AND OBJECTIVE BOX
[facility] Division of Hospital Medicine    Chief Complaint:      SUBJECTIVE / OVERNIGHT EVENTS:    Patient denies any complaints.    MEDICATIONS  (STANDING):  amLODIPine   Tablet 10 milliGRAM(s) Oral daily  chlorhexidine 2% Cloths 1 Application(s) Topical daily  ferrous    sulfate 325 milliGRAM(s) Oral daily  lactated ringers. 1000 milliLiter(s) (75 mL/Hr) IV Continuous <Continuous>  multivitamin 1 Tablet(s) Oral daily  pantoprazole  Injectable 40 milliGRAM(s) IV Push two times a day  predniSONE   Tablet 5 milliGRAM(s) Oral daily  tacrolimus 2 milliGRAM(s) Oral every 12 hours    MEDICATIONS  (PRN):        I&O's Summary    25 Apr 2024 07:01  -  26 Apr 2024 07:00  --------------------------------------------------------  IN: 1130 mL / OUT: 200 mL / NET: 930 mL        PHYSICAL EXAM:  Vital Signs Last 24 Hrs  T(C): 36.5 (26 Apr 2024 12:21), Max: 37 (25 Apr 2024 17:26)  T(F): 97.7 (26 Apr 2024 12:21), Max: 98.6 (25 Apr 2024 17:26)  HR: 99 (26 Apr 2024 12:21) (91 - 101)  BP: 150/81 (26 Apr 2024 12:21) (126/77 - 167/79)  BP(mean): --  RR: 17 (26 Apr 2024 12:21) (17 - 18)  SpO2: 93% (26 Apr 2024 12:21) (93% - 95%)    Parameters below as of 26 Apr 2024 12:21  Patient On (Oxygen Delivery Method): room air              CONSTITUTIONAL: NAD,   ENMT: normocephalic, atraumatic  RESPIRATORY: Normal respiratory effort; lungs are clear to auscultation bilaterally  CARDIOVASCULAR: Regular rate and rhythm, normal S1 and S2, no murmur/rub/gallop  ABDOMEN: Nontender to palpation, no rebound/guarding; No hepatosplenomegaly  MUSCLOSKELETAL:  No edema  PSYCH: A+O to person, place, and time; left nasolabial fold flattening    LABS:                        10.7   6.41  )-----------( 132      ( 25 Apr 2024 06:51 )             33.0     04-25    141  |  103  |  66.0<H>  ----------------------------<  78  3.6   |  24.0  |  3.58<H>    Ca    7.1<L>      25 Apr 2024 06:51            Urinalysis Basic - ( 25 Apr 2024 06:51 )    Color: x / Appearance: x / SG: x / pH: x  Gluc: 78 mg/dL / Ketone: x  / Bili: x / Urobili: x   Blood: x / Protein: x / Nitrite: x   Leuk Esterase: x / RBC: x / WBC x   Sq Epi: x / Non Sq Epi: x / Bacteria: x        CAPILLARY BLOOD GLUCOSE            RADIOLOGY & ADDITIONAL TESTS:  Results Reviewed:   Imaging Personally Reviewed:  Electrocardiogram Personally Reviewed:

## 2024-04-26 NOTE — DISCHARGE NOTE PROVIDER - NSDCMRMEDTOKEN_GEN_ALL_CORE_FT
amLODIPine 10 mg oral tablet: 1 tab(s) orally once a day  bisacodyl 10 mg rectal suppository: 1 suppository(ies) rectally once a day  calcitriol 0.25 mcg oral capsule: 2 cap(s) orally once a day  calcium acetate 667 mg oral capsule: 3 cap(s) orally 3 times a day  carvedilol 6.25 mg oral tablet: 1 tab(s) orally every 12 hours  clopidogrel 75 mg oral tablet: 1 tab(s) orally once a day  ergocalciferol 1.25 mg (50,000 intl units) oral capsule: 1 cap(s) orally once a week  ferrous sulfate 324 mg (65 mg elemental iron) oral tablet: 1 tab(s) orally once a day  hydrALAZINE 25 mg oral tablet: 1 tab(s) orally every 8 hours  mirtazapine 15 mg oral tablet: 0.5 tab(s) orally once a day  mycophenolate mofetil 250 mg oral capsule: 2 cap(s) orally every 12 hours  pantoprazole 40 mg oral delayed release tablet: 1 tab(s) orally 2 times a day  Handley Milk of Magnesia 1200 mg/15 mL oral liquid: 30 milliliter(s) orally once a day as needed  predniSONE 5 mg oral tablet: 1 tab(s) orally once a day  Retacrit 10,000 units/mL injectable solution: 2 milliliter(s) subcutaneously once a week  sodium bicarbonate 650 mg oral tablet: 3 tab(s) orally 2 times a day  tacrolimus 1 mg oral capsule: 2 cap(s) orally every 12 hours  Venofer 20 mg/mL intravenous solution: 200 milligram(s) intravenously once a day  warfarin 5 mg oral tablet: 1 tab(s) orally once a day   amLODIPine 10 mg oral tablet: 1 tab(s) orally once a day  bisacodyl 10 mg rectal suppository: 1 suppository(ies) rectally once a day  calcitriol 0.25 mcg oral capsule: 2 cap(s) orally once a day  carvedilol 6.25 mg oral tablet: 1 tab(s) orally every 12 hours  clopidogrel 75 mg oral tablet: 1 tab(s) orally once a day  ergocalciferol 1.25 mg (50,000 intl units) oral capsule: 1 cap(s) orally once a week  ferrous sulfate 324 mg (65 mg elemental iron) oral tablet: 1 tab(s) orally once a day  mirtazapine 15 mg oral tablet: 0.5 tab(s) orally once a day  Multiple Vitamins oral tablet: 1 tab(s) orally once a day  mycophenolate mofetil 250 mg oral capsule: 2 cap(s) orally every 12 hours  pantoprazole 40 mg oral delayed release tablet: 1 tab(s) orally 2 times a day  Handley Milk of Magnesia 1200 mg/15 mL oral liquid: 30 milliliter(s) orally once a day as needed  predniSONE 5 mg oral tablet: 1 tab(s) orally once a day  sodium bicarbonate 650 mg oral tablet: 3 tab(s) orally 2 times a day  tacrolimus 1 mg oral capsule: 2 cap(s) orally every 12 hours  tamsulosin 0.4 mg oral capsule: 1 cap(s) orally once a day (at bedtime)

## 2024-04-26 NOTE — PROGRESS NOTE ADULT - ASSESSMENT
83 year old female with a PMH of ESRD s/p renal transplant on mycophenolate and tacrolimus (per nursing home documents), HTN, HLD, anemia, dementia , recent cva approx 2 months ago presented to the ED from [facility] for LGO. found to have hypothermia and hypotensive on presentation. found to have acute severe anemia, likely GI bleed, Elevated INR, Thrombocytopenia, Lactic/metabolic acidosis. GLO on CKD . s/p vasopressors, ivf , 3 units prbcs in micu. Hb stable at 8 now. blood cx + for coagulase positive staph, likely contaminant.     #. anemia. acute element has resolved.   Hb has stabilized now. source is likely GI bleed.   - FOBT pos    - CT abd/pelvis demonstrated distended gallbladder/ no active bleed   - RUQ US demonstrated dilated CBD  patient did not finish bowel prep so EGD/colo canceled and no plan to pursue them for now.     #Hypokalemia. resolved      > GLO on CKD likely pre-renal/ Metabolic Acidosis/Lactic Acidosis  - hx of renal transplant / follows at [facility]   creatinine trending down. phlebotomist could not draw labs today  -C/w tacrolimus 1mg BID    -Hold MMF  -Cont Pred    #. supratherapeutic INR. resolved. s/p FFP, vitamin K and Kcentra   - INR elevated to 12.19 on initial labs  - On Coumadin 5 mg as an outpatient for "clot in arm and stroke" as per son. patient does not know why she is on coumadin.  - Hold off on AC/chemical DVT prophylaxis for now given ABLA.     >Thrombocytopenia  - likely due to acute illness. Plt >100K. s/p Plt transfusion   - hold off chemical dvt ppx for now     >hx of cva   - has residual left nasolabial flattening and left upper ext weakness   - cth neg for any acute changes     #. contaminated blood cultures.     #.DVT ppx: scds due to gib     Dispo: [facility] pending placement. medically ready.    updated son via phone call

## 2024-04-26 NOTE — DISCHARGE NOTE PROVIDER - CARE PROVIDER_API CALL
Orin Moon)  [address] [address]  Phone: [phone]  Fax: [phone]  Follow Up Time:

## 2024-04-26 NOTE — PROGRESS NOTE ADULT - SUBJECTIVE AND OBJECTIVE BOX
Chief Complaint:  Patient is a 84y old  Female who presents with a chief complaint of GIB (25 Apr 2024 16:40)      HPI/ 24 hr events: Patient seen and examined at bedside. She is sitting in brown stool. Per bedside RN patient was unable to complete most of prep and had few BMs over past 2 days. Vitals are overall stable, no labs performed today. H/H stable yesterday.       REVIEW OF SYSTEMS:   General: Negative  HEENT: Negative  CV: Negative  Respiratory: Negative  GI: See HPI  : Negative  MSK: Negative  Hematologic: Negative  Skin: Negative    MEDICATIONS:   MEDICATIONS  (STANDING):  amLODIPine   Tablet 10 milliGRAM(s) Oral daily  chlorhexidine 2% Cloths 1 Application(s) Topical daily  ferrous    sulfate 325 milliGRAM(s) Oral daily  lactated ringers. 1000 milliLiter(s) (75 mL/Hr) IV Continuous <Continuous>  multivitamin 1 Tablet(s) Oral daily  pantoprazole  Injectable 40 milliGRAM(s) IV Push two times a day  predniSONE   Tablet 5 milliGRAM(s) Oral daily  tacrolimus 2 milliGRAM(s) Oral every 12 hours    MEDICATIONS  (PRN):      Packed Red Cells Order:  1 Unit  Indication: Hgb <7 gm/dL  Infuse Unit : 3.5 Hours (04-20-24 @ 10:42)        DIET:  Diet, NPO after Midnight:      NPO Start Date: 25-Apr-2024,   NPO Start Time: 23:59  Except Medications (04-25-24 @ 09:42) [Active]  Diet, Clear Liquid (04-23-24 @ 16:08) [Active]          ALLERGIES:   Allergies    No Known Allergies    Intolerances        VITAL SIGNS:   Vital Signs Last 24 Hrs  T(C): 36.5 (26 Apr 2024 12:21), Max: 37 (25 Apr 2024 17:26)  T(F): 97.7 (26 Apr 2024 12:21), Max: 98.6 (25 Apr 2024 17:26)  HR: 99 (26 Apr 2024 12:21) (91 - 101)  BP: 150/81 (26 Apr 2024 12:21) (126/77 - 167/79)  BP(mean): --  RR: 17 (26 Apr 2024 12:21) (17 - 18)  SpO2: 93% (26 Apr 2024 12:21) (93% - 95%)    Parameters below as of 26 Apr 2024 12:21  Patient On (Oxygen Delivery Method): room air      I&O's Summary    25 Apr 2024 07:01  -  26 Apr 2024 07:00  --------------------------------------------------------  IN: 1130 mL / OUT: 200 mL / NET: 930 mL        PHYSICAL EXAM:   GENERAL:  No acute distress  HEENT:  NC/AT, conjunctiva clear, sclera anicteric  CHEST:  No increased effort  HEART:  Regular rate  ABDOMEN:  Soft, non-tender, non-distended, normoactive bowel sounds, no rebound or guarding  SKIN:  Warm, dry  NEURO:  Calm, cooperative    LABS:                        10.7   6.41  )-----------( 132      ( 25 Apr 2024 06:51 )             33.0     Hemoglobin: 10.7 g/dL (04-25-24 @ 06:51)  Hemoglobin: 10.1 g/dL (04-24-24 @ 07:11)    04-25    141  |  103  |  66.0<H>  ----------------------------<  78  3.6   |  24.0  |  3.58<H>    Ca    7.1<L>      25 Apr 2024 06:51                                                  RADIOLOGY & ADDITIONAL STUDIES:

## 2024-04-26 NOTE — DISCHARGE NOTE PROVIDER - DETAILS OF MALNUTRITION DIAGNOSIS/DIAGNOSES
This patient has been assessed with a concern for Malnutrition and was treated during this hospitalization for the following Nutrition diagnosis/diagnoses:     -  04/21/2024: Severe protein-calorie malnutrition   -  04/21/2024: Underweight (BMI < 19)

## 2024-04-26 NOTE — DISCHARGE NOTE PROVIDER - ATTENDING DISCHARGE PHYSICAL EXAMINATION:
VITALS:   T(C): 36.8 (05-01-24 @ 09:20), Max: 36.8 (05-01-24 @ 09:20)  HR: 74 (05-01-24 @ 09:20) (74 - 103)  BP: 147/86 (05-01-24 @ 09:20) (136/73 - 156/90)  RR: 18 (05-01-24 @ 09:20) (18 - 18)  SpO2: 92% (05-01-24 @ 09:20) (92% - 96%)    CONSTITUTIONAL: awake and alert  RESPIRATORY: Normal respiratory effort; lungs are clear to auscultation bilaterally  CARDIOVASCULAR: Regular rate and rhythm; No lower extremity edema  ABDOMEN: Nontender to palpation, normoactive bowel sounds  PSYCH: A+O x3

## 2024-04-26 NOTE — PROGRESS NOTE ADULT - SUBJECTIVE AND OBJECTIVE BOX
[facility] DIVISION OF KIDNEY DISEASES AND HYPERTENSION -- FOLLOW UP NOTE  --------------------------------------------------------------------------------  HPI:  83 year old female with a PMH of ESRD s/p renal transplant on mycophenolate and tacrolimus (per nursing home documents), HTN, HLD, anemia, dementia who presented to the ED from [facility] for elevated BUN/Cr on outpatient labs.  Upon arrival to the ED patient was found to be hypotensive and hypothermic.  Workup was significant for profound anemia, elevated INR, lactic acidosis, and an GLO on CKD.  MICU consulted for further management. She was found to have GLO on CKD 5 and had renal transplant in the past.  She knows her name.  She is not producing much urine.  Her transplant was done at [facility].      24 hour events/subjective:  Offers no acute complaints. No overnight events.      PAST HISTORY  --------------------------------------------------------------------------------  No significant changes to PMH, PSH, FHx, SHx, unless otherwise noted    ALLERGIES & MEDICATIONS  --------------------------------------------------------------------------------  Allergies    No Known Allergies    Intolerances      Standing Inpatient Medications  amLODIPine   Tablet 10 milliGRAM(s) Oral daily  chlorhexidine 2% Cloths 1 Application(s) Topical daily  ferrous    sulfate 325 milliGRAM(s) Oral daily  lactated ringers. 1000 milliLiter(s) IV Continuous <Continuous>  multivitamin 1 Tablet(s) Oral daily  pantoprazole  Injectable 40 milliGRAM(s) IV Push two times a day  predniSONE   Tablet 5 milliGRAM(s) Oral daily  tacrolimus 2 milliGRAM(s) Oral every 12 hours    PRN Inpatient Medications      REVIEW OF SYSTEMS  --------------------------------------------------------------------------------  As noted above    VITALS/PHYSICAL EXAM  --------------------------------------------------------------------------------  T(C): 36.7 (04-26-24 @ 08:34), Max: 37 (04-25-24 @ 17:26)  HR: 101 (04-26-24 @ 08:34) (91 - 101)  BP: 126/77 (04-26-24 @ 08:34) (126/77 - 167/79)  RR: 17 (04-26-24 @ 08:34) (17 - 18)  SpO2: 94% (04-26-24 @ 08:34) (94% - 95%)  Wt(kg): --        04-25-24 @ 07:01  -  04-26-24 @ 07:00  --------------------------------------------------------  IN: 1130 mL / OUT: 200 mL / NET: 930 mL      Physical Exam:  GENERAL: Thin & frail appearing woman  HEENT: , supple neck, clear oropharynx  NECK: Supple  CHEST/LUNG: Clear to auscultation bilaterally; No rales, rhonchi, wheezing, or rubs. Unlabored respirations  HEART: Regular rate and rhythm;  ABDOMEN: BSx4; Soft, nontender, nondistended, Transplant kidney in LLQ, non-tender  EXTREMITIES:  No pedal edema  NERVOUS SYSTEM:  No focal deficits   Vascular access: MARTINA MARTINEZ    LABS/STUDIES  --------------------------------------------------------------------------------              10.7   6.41  >-----------<  132      [04-25-24 @ 06:51]              33.0     141  |  103  |  66.0  ----------------------------<  78      [04-25-24 @ 06:51]  3.6   |  24.0  |  3.58        Ca     7.1     [04-25-24 @ 06:51]            Creatinine Trend:  SCr 3.58 [04-25 @ 06:51]  SCr 3.75 [04-24 @ 13:56]  SCr 3.88 [04-24 @ 07:11]  SCr 4.20 [04-22 @ 07:14]  SCr 4.64 [04-21 @ 04:20]    Urinalysis - [04-25-24 @ 06:51]      Color  / Appearance  / SG  / pH       Gluc 78 / Ketone   / Bili  / Urobili        Blood  / Protein  / Leuk Est  / Nitrite       RBC  / WBC  / Hyaline  / Gran  / Sq Epi  / Non Sq Epi  / Bacteria       Iron 175, TIBC 212, %sat 83      [04-19-24 @ 06:50]  Ferritin 3171      [04-19-24 @ 06:50]  TSH 1.72      [04-19-24 @ 06:50]

## 2024-04-27 LAB
ANION GAP SERPL CALC-SCNC: 15 MMOL/L — SIGNIFICANT CHANGE UP (ref 5–17)
ANISOCYTOSIS BLD QL: SLIGHT — SIGNIFICANT CHANGE UP
BASOPHILS # BLD AUTO: 0.14 K/UL — SIGNIFICANT CHANGE UP (ref 0–0.2)
BASOPHILS NFR BLD AUTO: 1.7 % — SIGNIFICANT CHANGE UP (ref 0–2)
BUN SERPL-MCNC: 56.4 MG/DL — HIGH (ref 8–20)
BURR CELLS BLD QL SMEAR: PRESENT — SIGNIFICANT CHANGE UP
CALCIUM SERPL-MCNC: 7.2 MG/DL — LOW (ref 8.4–10.5)
CHLORIDE SERPL-SCNC: 106 MMOL/L — SIGNIFICANT CHANGE UP (ref 96–108)
CO2 SERPL-SCNC: 22 MMOL/L — SIGNIFICANT CHANGE UP (ref 22–29)
CREAT SERPL-MCNC: 3.76 MG/DL — HIGH (ref 0.5–1.3)
DACRYOCYTES BLD QL SMEAR: SLIGHT — SIGNIFICANT CHANGE UP
EGFR: 11 ML/MIN/1.73M2 — LOW
EOSINOPHIL # BLD AUTO: 0.07 K/UL — SIGNIFICANT CHANGE UP (ref 0–0.5)
EOSINOPHIL NFR BLD AUTO: 0.9 % — SIGNIFICANT CHANGE UP (ref 0–6)
GIANT PLATELETS BLD QL SMEAR: PRESENT — SIGNIFICANT CHANGE UP
GLUCOSE SERPL-MCNC: 91 MG/DL — SIGNIFICANT CHANGE UP (ref 70–99)
HCT VFR BLD CALC: 34.5 % — SIGNIFICANT CHANGE UP (ref 34.5–45)
HGB BLD-MCNC: 11.2 G/DL — LOW (ref 11.5–15.5)
LYMPHOCYTES # BLD AUTO: 0.35 K/UL — LOW (ref 1–3.3)
LYMPHOCYTES # BLD AUTO: 4.3 % — LOW (ref 13–44)
MACROCYTES BLD QL: SLIGHT — SIGNIFICANT CHANGE UP
MANUAL SMEAR VERIFICATION: SIGNIFICANT CHANGE UP
MCHC RBC-ENTMCNC: 30.5 PG — SIGNIFICANT CHANGE UP (ref 27–34)
MCHC RBC-ENTMCNC: 32.5 GM/DL — SIGNIFICANT CHANGE UP (ref 32–36)
MCV RBC AUTO: 94 FL — SIGNIFICANT CHANGE UP (ref 80–100)
METAMYELOCYTES # FLD: 0.9 % — HIGH (ref 0–0)
MICROCYTES BLD QL: SLIGHT — SIGNIFICANT CHANGE UP
MONOCYTES # BLD AUTO: 0.21 K/UL — SIGNIFICANT CHANGE UP (ref 0–0.9)
MONOCYTES NFR BLD AUTO: 2.6 % — SIGNIFICANT CHANGE UP (ref 2–14)
NEUTROPHILS # BLD AUTO: 7.3 K/UL — SIGNIFICANT CHANGE UP (ref 1.8–7.4)
NEUTROPHILS NFR BLD AUTO: 87 % — HIGH (ref 43–77)
NEUTS BAND # BLD: 2.6 % — SIGNIFICANT CHANGE UP (ref 0–8)
OVALOCYTES BLD QL SMEAR: SLIGHT — SIGNIFICANT CHANGE UP
PLAT MORPH BLD: NORMAL — SIGNIFICANT CHANGE UP
PLATELET # BLD AUTO: 179 K/UL — SIGNIFICANT CHANGE UP (ref 150–400)
POIKILOCYTOSIS BLD QL AUTO: SLIGHT — SIGNIFICANT CHANGE UP
POLYCHROMASIA BLD QL SMEAR: SIGNIFICANT CHANGE UP
POTASSIUM SERPL-MCNC: 3.4 MMOL/L — LOW (ref 3.5–5.3)
POTASSIUM SERPL-SCNC: 3.4 MMOL/L — LOW (ref 3.5–5.3)
RBC # BLD: 3.67 M/UL — LOW (ref 3.8–5.2)
RBC # FLD: 15.5 % — HIGH (ref 10.3–14.5)
RBC BLD AUTO: ABNORMAL
SODIUM SERPL-SCNC: 143 MMOL/L — SIGNIFICANT CHANGE UP (ref 135–145)
WBC # BLD: 8.15 K/UL — SIGNIFICANT CHANGE UP (ref 3.8–10.5)
WBC # FLD AUTO: 8.15 K/UL — SIGNIFICANT CHANGE UP (ref 3.8–10.5)

## 2024-04-27 PROCEDURE — 93970 EXTREMITY STUDY: CPT | Mod: 26

## 2024-04-27 PROCEDURE — 99232 SBSQ HOSP IP/OBS MODERATE 35: CPT

## 2024-04-27 RX ORDER — MYCOPHENOLATE MOFETIL 250 MG/1
500 CAPSULE ORAL
Refills: 0 | Status: DISCONTINUED | OUTPATIENT
Start: 2024-04-27 | End: 2024-05-01

## 2024-04-27 RX ADMIN — TACROLIMUS 2 MILLIGRAM(S): 5 CAPSULE ORAL at 22:08

## 2024-04-27 RX ADMIN — AMLODIPINE BESYLATE 10 MILLIGRAM(S): 2.5 TABLET ORAL at 05:29

## 2024-04-27 RX ADMIN — CHLORHEXIDINE GLUCONATE 1 APPLICATION(S): 213 SOLUTION TOPICAL at 11:06

## 2024-04-27 RX ADMIN — Medication 1 TABLET(S): at 11:05

## 2024-04-27 RX ADMIN — TACROLIMUS 2 MILLIGRAM(S): 5 CAPSULE ORAL at 05:29

## 2024-04-27 RX ADMIN — PANTOPRAZOLE SODIUM 40 MILLIGRAM(S): 20 TABLET, DELAYED RELEASE ORAL at 05:29

## 2024-04-27 RX ADMIN — Medication 5 MILLIGRAM(S): at 05:29

## 2024-04-27 RX ADMIN — Medication 325 MILLIGRAM(S): at 11:05

## 2024-04-27 RX ADMIN — MYCOPHENOLATE MOFETIL 500 MILLIGRAM(S): 250 CAPSULE ORAL at 22:09

## 2024-04-27 NOTE — PROGRESS NOTE ADULT - SUBJECTIVE AND OBJECTIVE BOX
Khushi Babcock M.D.    Patient is a 84y old  Female who presents with a chief complaint of GIb (26 Apr 2024 12:04)      SUBJECTIVE / OVERNIGHT EVENTS: no event overnight.     Patient denies chest pain, SOB, abd pain, N/V, fever, chills, dysuria or any other complaints. All remainder ROS negative.     MEDICATIONS  (STANDING):  amLODIPine   Tablet 10 milliGRAM(s) Oral daily  chlorhexidine 2% Cloths 1 Application(s) Topical daily  ferrous    sulfate 325 milliGRAM(s) Oral daily  multivitamin 1 Tablet(s) Oral daily  pantoprazole    Tablet 40 milliGRAM(s) Oral before breakfast  predniSONE   Tablet 5 milliGRAM(s) Oral daily  tacrolimus 2 milliGRAM(s) Oral every 12 hours    MEDICATIONS  (PRN):      I&O's Summary    26 Apr 2024 07:01  -  27 Apr 2024 07:00  --------------------------------------------------------  IN: 0 mL / OUT: 100 mL / NET: -100 mL        PHYSICAL EXAM:  Vital Signs Last 24 Hrs  T(C): 36.4 (27 Apr 2024 14:18), Max: 36.9 (27 Apr 2024 00:37)  T(F): 97.5 (27 Apr 2024 14:18), Max: 98.5 (27 Apr 2024 00:37)  HR: 81 (27 Apr 2024 14:18) (81 - 100)  BP: 141/84 (27 Apr 2024 14:18) (136/88 - 169/93)  BP(mean): --  RR: 18 (27 Apr 2024 14:18) (16 - 18)  SpO2: 97% (27 Apr 2024 14:18) (90% - 97%)    Parameters below as of 27 Apr 2024 14:18  Patient On (Oxygen Delivery Method): room air      CONSTITUTIONAL: lethargic, but awake and alert  RESPIRATORY: Normal respiratory effort; lungs are clear to auscultation bilaterally  CARDIOVASCULAR: Regular rate and rhythm; No lower extremity edema  ABDOMEN: Nontender to palpation, normoactive bowel sounds  PSYCH: A+O x3    LABS:                        11.2   8.15  )-----------( 179      ( 27 Apr 2024 06:00 )             34.5     04-27    143  |  106  |  56.4<H>  ----------------------------<  91  3.4<L>   |  22.0  |  3.76<H>    Ca    7.2<L>      27 Apr 2024 06:00            Urinalysis Basic - ( 27 Apr 2024 06:00 )    Color: x / Appearance: x / SG: x / pH: x  Gluc: 91 mg/dL / Ketone: x  / Bili: x / Urobili: x   Blood: x / Protein: x / Nitrite: x   Leuk Esterase: x / RBC: x / WBC x   Sq Epi: x / Non Sq Epi: x / Bacteria: x        CAPILLARY BLOOD GLUCOSE          RADIOLOGY & ADDITIONAL TESTS:  Results Reviewed:   Imaging Personally Reviewed:  Electrocardiogram Personally Reviewed:

## 2024-04-27 NOTE — PROGRESS NOTE ADULT - ASSESSMENT
83 year old female with a PMH of ESRD s/p renal transplant on mycophenolate and tacrolimus (per nursing home documents), HTN, HLD, anemia, dementia , recent cva approx 2 months ago presented to the ED from [facility] for GLO. found to have hypothermia and hypotensive on presentation. found to have acute severe anemia, likely GI bleed, Elevated INR, Thrombocytopenia, Lactic/metabolic acidosis. GLO on CKD . s/p vasopressors, ivf , 3 units prbcs in micu. Hb stable at 8 now. blood cx + for coagulase positive staph, likely contaminant.     #Acute blood loss anemia - improved  Hb has stabilized now. source is likely GI bleed.   FOBT pos    CT abd/pelvis demonstrated distended gallbladder/ no active bleed   RUQ US demonstrated dilated CBD  patient did not finish bowel prep so EGD/colo canceled and no plan to pursue them for now, per GI, to f/u outpatient  was on clear, started on pureed, pending speech eval    #GLO on CKD likely pre-renal/ Metabolic Acidosis/Lactic Acidosis  hx of renal transplant / follows at [facility]   C/w tacrolimus 1mg BID    resume MMF and pred    #Supratherapeutic INR  resolved, s/p FFP, vitamin K and Kcentra   on coumadin outpatient, unclear reason  per prior provider son reports this is for clot in arm and stroke, unable to reach son on 4/27  f/u repeat doppler to eval presence of DVT    #Thrombocytopenia  likely due to acute illness. Plt >100K. s/p Plt transfusion   hold off chemical dvt ppx for now     #hx of cva   has residual left nasolabial flattening and left upper ext weakness   cth neg for any acute changes   swallow eval pending     #contaminated blood cultures  only in one bottle  no abx, pt remains clinically stable    DVT ppx: scds  Dispo: [facility] pending once diet advanced and pending family collateral about coumadin  83 year old female with a PMH of ESRD s/p renal transplant on mycophenolate and tacrolimus (per nursing home documents), HTN, HLD, anemia, dementia , recent cva approx 2 months ago presented to the ED from [facility] for GLO. found to have hypothermia and hypotensive on presentation. found to have acute severe anemia, likely GI bleed, Elevated INR, Thrombocytopenia, Lactic/metabolic acidosis. GLO on CKD . s/p vasopressors, ivf , 3 units prbcs in micu. Hb stable at 8 now. blood cx + for coagulase positive staph, likely contaminant.     #Acute blood loss anemia - improved  Hb has stabilized now. source is likely GI bleed.   FOBT pos    CT abd/pelvis demonstrated distended gallbladder/ no active bleed   RUQ US demonstrated dilated CBD  patient did not finish bowel prep so EGD/colo canceled and no plan to pursue them for now, per GI, to f/u outpatient  was on clear, started on pureed, pending speech eval    #GLO on CKD likely pre-renal/ Metabolic Acidosis/Lactic Acidosis  hx of renal transplant / follows at [facility]   C/w tacrolimus 1mg BID    resume MMF and pred    #Supratherapeutic INR  resolved, s/p FFP, vitamin K and Kcentra   on coumadin outpatient, unclear reason  per prior provider son reports this is for clot in arm and stroke, unable to reach son on 4/27  f/u repeat doppler to eval presence of DVT    #Thrombocytopenia  likely due to acute illness. Plt >100K. s/p Plt transfusion   hold off chemical dvt ppx for now     #hx of cva   has residual left nasolabial flattening and left upper ext weakness   cth neg for any acute changes   swallow eval pending     #contaminated blood cultures  only in one bottle  no abx, pt remains clinically stable    DVT ppx: scds  Dispo: [facility] pending once diet advanced and pending family collateral about coumadin     Attempted to call son 4/27, did not

## 2024-04-28 LAB
ANION GAP SERPL CALC-SCNC: 13 MMOL/L — SIGNIFICANT CHANGE UP (ref 5–17)
BUN SERPL-MCNC: 55.6 MG/DL — HIGH (ref 8–20)
CALCIUM SERPL-MCNC: 7.2 MG/DL — LOW (ref 8.4–10.5)
CHLORIDE SERPL-SCNC: 107 MMOL/L — SIGNIFICANT CHANGE UP (ref 96–108)
CO2 SERPL-SCNC: 23 MMOL/L — SIGNIFICANT CHANGE UP (ref 22–29)
CREAT SERPL-MCNC: 3.88 MG/DL — HIGH (ref 0.5–1.3)
EGFR: 11 ML/MIN/1.73M2 — LOW
GLUCOSE SERPL-MCNC: 74 MG/DL — SIGNIFICANT CHANGE UP (ref 70–99)
HCT VFR BLD CALC: 32.2 % — LOW (ref 34.5–45)
HGB BLD-MCNC: 10.4 G/DL — LOW (ref 11.5–15.5)
MCHC RBC-ENTMCNC: 30.1 PG — SIGNIFICANT CHANGE UP (ref 27–34)
MCHC RBC-ENTMCNC: 32.3 GM/DL — SIGNIFICANT CHANGE UP (ref 32–36)
MCV RBC AUTO: 93.3 FL — SIGNIFICANT CHANGE UP (ref 80–100)
PLATELET # BLD AUTO: 177 K/UL — SIGNIFICANT CHANGE UP (ref 150–400)
POTASSIUM SERPL-MCNC: 3.2 MMOL/L — LOW (ref 3.5–5.3)
POTASSIUM SERPL-SCNC: 3.2 MMOL/L — LOW (ref 3.5–5.3)
RBC # BLD: 3.45 M/UL — LOW (ref 3.8–5.2)
RBC # FLD: 15.5 % — HIGH (ref 10.3–14.5)
SODIUM SERPL-SCNC: 143 MMOL/L — SIGNIFICANT CHANGE UP (ref 135–145)
WBC # BLD: 7.75 K/UL — SIGNIFICANT CHANGE UP (ref 3.8–10.5)
WBC # FLD AUTO: 7.75 K/UL — SIGNIFICANT CHANGE UP (ref 3.8–10.5)

## 2024-04-28 PROCEDURE — 99232 SBSQ HOSP IP/OBS MODERATE 35: CPT

## 2024-04-28 RX ORDER — ZINC OXIDE 200 MG/G
1 OINTMENT TOPICAL
Refills: 0 | Status: DISCONTINUED | OUTPATIENT
Start: 2024-04-28 | End: 2024-05-01

## 2024-04-28 RX ORDER — TAMSULOSIN HYDROCHLORIDE 0.4 MG/1
0.4 CAPSULE ORAL AT BEDTIME
Refills: 0 | Status: DISCONTINUED | OUTPATIENT
Start: 2024-04-28 | End: 2024-05-01

## 2024-04-28 RX ADMIN — Medication 5 MILLIGRAM(S): at 06:06

## 2024-04-28 RX ADMIN — TACROLIMUS 2 MILLIGRAM(S): 5 CAPSULE ORAL at 17:10

## 2024-04-28 RX ADMIN — AMLODIPINE BESYLATE 10 MILLIGRAM(S): 2.5 TABLET ORAL at 06:06

## 2024-04-28 RX ADMIN — MYCOPHENOLATE MOFETIL 500 MILLIGRAM(S): 250 CAPSULE ORAL at 17:10

## 2024-04-28 RX ADMIN — MYCOPHENOLATE MOFETIL 500 MILLIGRAM(S): 250 CAPSULE ORAL at 09:29

## 2024-04-28 RX ADMIN — TAMSULOSIN HYDROCHLORIDE 0.4 MILLIGRAM(S): 0.4 CAPSULE ORAL at 21:37

## 2024-04-28 RX ADMIN — CHLORHEXIDINE GLUCONATE 1 APPLICATION(S): 213 SOLUTION TOPICAL at 11:52

## 2024-04-28 RX ADMIN — PANTOPRAZOLE SODIUM 40 MILLIGRAM(S): 20 TABLET, DELAYED RELEASE ORAL at 06:06

## 2024-04-28 RX ADMIN — Medication 325 MILLIGRAM(S): at 11:51

## 2024-04-28 RX ADMIN — Medication 1 TABLET(S): at 11:51

## 2024-04-28 RX ADMIN — TACROLIMUS 2 MILLIGRAM(S): 5 CAPSULE ORAL at 09:29

## 2024-04-28 NOTE — PROGRESS NOTE ADULT - ASSESSMENT
83 year old female with a PMH of ESRD s/p renal transplant on mycophenolate and tacrolimus (per nursing home documents), HTN, HLD, anemia, dementia , recent cva approx 2 months ago presented to the ED from [facility] for GLO. found to have hypothermia and hypotensive on presentation. found to have acute severe anemia, likely GI bleed, Elevated INR, Thrombocytopenia, Lactic/metabolic acidosis. GLO on CKD . s/p vasopressors, ivf , 3 units prbcs in micu. Hb stable at 8 now. blood cx + for coagulase positive staph, likely contaminant.     #Acute blood loss anemia - improved  Hb has stabilized now. source is likely GI bleed.   FOBT pos    CT abd/pelvis demonstrated distended gallbladder/ no active bleed   RUQ US demonstrated dilated CBD  patient did not finish bowel prep so EGD/colo canceled and no plan to pursue them for now, per GI, to f/u outpatient  diet advancement per speech    #GLO on CKD likely pre-renal/ Metabolic Acidosis/Lactic Acidosis  hx of renal transplant / follows at [facility]   C/w tacrolimus 1mg BID    resume MMF and pred    #Supratherapeutic INR  resolved, s/p FFP, vitamin K and Kcentra   on coumadin outpatient, unclear reason  per prior provider son reports this is for clot in arm and stroke, unable to reach son on 4/27  repeat doppler + R axillary and brachial DVT noted, likely 2/2 ?malignancy  will need to d/w families in person about pro/con of AC -- Son to come in person 4/29 AM    #Lung nodules  noted on CT from 4/20  d/w results with son 4/28, pending him to talk with families to see if biopsy is desired    #Thrombocytopenia  likely due to acute illness. Plt >100K. s/p Plt transfusion   hold off chemical dvt ppx for now     #hx of cva   has residual left nasolabial flattening and left upper ext weakness   cth neg for any acute changes   swallow eval nabil     #contaminated blood cultures  only in one bottle  no abx, pt remains clinically stable    DVT ppx: scds  Dispo: [facility] pending once diet advanced and pending family decision about IR biopsy of lung nodules/discussion about pro/con of AC    Son updated of the plan of care 4/28

## 2024-04-28 NOTE — PROGRESS NOTE ADULT - SUBJECTIVE AND OBJECTIVE BOX
Khushi Babcock M.D.    Patient is a 84y old  Female who presents with a chief complaint of GIb (26 Apr 2024 12:04)      SUBJECTIVE / OVERNIGHT EVENTS: no event overnight.     Patient denies chest pain, SOB, abd pain, N/V, fever, chills, dysuria or any other complaints. All remainder ROS negative.     MEDICATIONS  (STANDING):  amLODIPine   Tablet 10 milliGRAM(s) Oral daily  chlorhexidine 2% Cloths 1 Application(s) Topical daily  ferrous    sulfate 325 milliGRAM(s) Oral daily  multivitamin 1 Tablet(s) Oral daily  mycophenolate mofetil 500 milliGRAM(s) Oral two times a day  pantoprazole    Tablet 40 milliGRAM(s) Oral before breakfast  predniSONE   Tablet 5 milliGRAM(s) Oral daily  tacrolimus 2 milliGRAM(s) Oral every 12 hours  tamsulosin 0.4 milliGRAM(s) Oral at bedtime    MEDICATIONS  (PRN):  zinc oxide 40% Paste 1 Application(s) Topical two times a day PRN rash due to incontinence      I&O's Summary    28 Apr 2024 07:01  -  28 Apr 2024 13:52  --------------------------------------------------------  IN: 0 mL / OUT: 500 mL / NET: -500 mL        PHYSICAL EXAM:  Vital Signs Last 24 Hrs  T(C): 36.8 (28 Apr 2024 13:37), Max: 36.8 (28 Apr 2024 04:50)  T(F): 98.3 (28 Apr 2024 13:37), Max: 98.3 (28 Apr 2024 13:37)  HR: 98 (28 Apr 2024 13:37) (81 - 99)  BP: 128/79 (28 Apr 2024 13:37) (125/78 - 158/88)  BP(mean): --  RR: 18 (28 Apr 2024 13:37) (17 - 18)  SpO2: 94% (28 Apr 2024 13:37) (92% - 97%)    Parameters below as of 28 Apr 2024 13:37  Patient On (Oxygen Delivery Method): room air    CONSTITUTIONAL: lethargic, but awake and alert  RESPIRATORY: Normal respiratory effort; lungs are clear to auscultation bilaterally  CARDIOVASCULAR: Regular rate and rhythm; No lower extremity edema  ABDOMEN: Nontender to palpation, normoactive bowel sounds  PSYCH: A+O x3    LABS:                        10.4   7.75  )-----------( 177      ( 28 Apr 2024 05:05 )             32.2     04-28    143  |  107  |  55.6<H>  ----------------------------<  74  3.2<L>   |  23.0  |  3.88<H>    Ca    7.2<L>      28 Apr 2024 05:05            Urinalysis Basic - ( 28 Apr 2024 05:05 )    Color: x / Appearance: x / SG: x / pH: x  Gluc: 74 mg/dL / Ketone: x  / Bili: x / Urobili: x   Blood: x / Protein: x / Nitrite: x   Leuk Esterase: x / RBC: x / WBC x   Sq Epi: x / Non Sq Epi: x / Bacteria: x        CAPILLARY BLOOD GLUCOSE          RADIOLOGY & ADDITIONAL TESTS:  Results Reviewed:   Imaging Personally Reviewed:  Electrocardiogram Personally Reviewed:

## 2024-04-29 LAB
ANION GAP SERPL CALC-SCNC: 15 MMOL/L — SIGNIFICANT CHANGE UP (ref 5–17)
BUN SERPL-MCNC: 54.2 MG/DL — HIGH (ref 8–20)
CALCIUM SERPL-MCNC: 7 MG/DL — LOW (ref 8.4–10.5)
CHLORIDE SERPL-SCNC: 106 MMOL/L — SIGNIFICANT CHANGE UP (ref 96–108)
CO2 SERPL-SCNC: 23 MMOL/L — SIGNIFICANT CHANGE UP (ref 22–29)
CREAT SERPL-MCNC: 3.89 MG/DL — HIGH (ref 0.5–1.3)
EGFR: 11 ML/MIN/1.73M2 — LOW
GLUCOSE SERPL-MCNC: 89 MG/DL — SIGNIFICANT CHANGE UP (ref 70–99)
HCT VFR BLD CALC: 32.2 % — LOW (ref 34.5–45)
HGB BLD-MCNC: 10.5 G/DL — LOW (ref 11.5–15.5)
MCHC RBC-ENTMCNC: 30.9 PG — SIGNIFICANT CHANGE UP (ref 27–34)
MCHC RBC-ENTMCNC: 32.6 GM/DL — SIGNIFICANT CHANGE UP (ref 32–36)
MCV RBC AUTO: 94.7 FL — SIGNIFICANT CHANGE UP (ref 80–100)
PLATELET # BLD AUTO: 165 K/UL — SIGNIFICANT CHANGE UP (ref 150–400)
POTASSIUM SERPL-MCNC: 3.4 MMOL/L — LOW (ref 3.5–5.3)
POTASSIUM SERPL-SCNC: 3.4 MMOL/L — LOW (ref 3.5–5.3)
RBC # BLD: 3.4 M/UL — LOW (ref 3.8–5.2)
RBC # FLD: 15.3 % — HIGH (ref 10.3–14.5)
SODIUM SERPL-SCNC: 144 MMOL/L — SIGNIFICANT CHANGE UP (ref 135–145)
WBC # BLD: 7.01 K/UL — SIGNIFICANT CHANGE UP (ref 3.8–10.5)
WBC # FLD AUTO: 7.01 K/UL — SIGNIFICANT CHANGE UP (ref 3.8–10.5)

## 2024-04-29 PROCEDURE — 99232 SBSQ HOSP IP/OBS MODERATE 35: CPT

## 2024-04-29 PROCEDURE — 99233 SBSQ HOSP IP/OBS HIGH 50: CPT | Mod: FS

## 2024-04-29 RX ORDER — POTASSIUM CHLORIDE 20 MEQ
40 PACKET (EA) ORAL ONCE
Refills: 0 | Status: COMPLETED | OUTPATIENT
Start: 2024-04-29 | End: 2024-04-29

## 2024-04-29 RX ADMIN — Medication 5 MILLIGRAM(S): at 06:12

## 2024-04-29 RX ADMIN — CHLORHEXIDINE GLUCONATE 1 APPLICATION(S): 213 SOLUTION TOPICAL at 12:02

## 2024-04-29 RX ADMIN — Medication 40 MILLIEQUIVALENT(S): at 09:22

## 2024-04-29 RX ADMIN — PANTOPRAZOLE SODIUM 40 MILLIGRAM(S): 20 TABLET, DELAYED RELEASE ORAL at 06:13

## 2024-04-29 RX ADMIN — TACROLIMUS 2 MILLIGRAM(S): 5 CAPSULE ORAL at 17:35

## 2024-04-29 RX ADMIN — TACROLIMUS 2 MILLIGRAM(S): 5 CAPSULE ORAL at 06:11

## 2024-04-29 RX ADMIN — TAMSULOSIN HYDROCHLORIDE 0.4 MILLIGRAM(S): 0.4 CAPSULE ORAL at 21:43

## 2024-04-29 RX ADMIN — Medication 1 TABLET(S): at 12:00

## 2024-04-29 RX ADMIN — MYCOPHENOLATE MOFETIL 500 MILLIGRAM(S): 250 CAPSULE ORAL at 06:11

## 2024-04-29 RX ADMIN — MYCOPHENOLATE MOFETIL 500 MILLIGRAM(S): 250 CAPSULE ORAL at 17:35

## 2024-04-29 RX ADMIN — Medication 325 MILLIGRAM(S): at 12:00

## 2024-04-29 RX ADMIN — AMLODIPINE BESYLATE 10 MILLIGRAM(S): 2.5 TABLET ORAL at 06:12

## 2024-04-29 NOTE — PROGRESS NOTE ADULT - NS ATTEND OPT1 GEN_ALL_CORE

## 2024-04-29 NOTE — PROGRESS NOTE ADULT - ASSESSMENT
incomplete   83 year old female with a PMH of ESRD s/p renal transplant on mycophenolate and tacrolimus (per nursing home documents), HTN, HLD, anemia, dementia who presented to the ED from [facility] for elevated BUN/Cr on outpatient labs. Hematology consulted for anemia and coagulpathy with elevated INR of ~12 on admission, now improved.    4/19/24- CT A/P-Distended gallbladder with cholelithiasis. Decompressed colon with additional suggestion of submucosal edema involving the descending and sigmoid colon suggesting colitis, ischemic colitis cannot be excluded. Disseminated innumerable pulmonary nodules are worrisome for metastatic disease. Subcapsular segment 8 liver lesion measuring 1.8 cm.  4/20/24- CT-C- The bilateral pulmonary nodules are indeterminate ( right middle lobe is a 7 mm noncalcified nodule, left lower lobe is a 9 x 6 mm noncalcified nodule)Small bilateral pleural effusions and atelectasis. thyroid gland is heterogeneous with bilobar nodules and   calcifications.  4/27/24- US b/l UE- occlusive thrombus in the right axillary and upper right brachial veins.Right upper cephalic vein thrombosis.Left cephalic vein thrombosed as outflow tract of the AV fistula.        #Anemia  # elevated INR  #DVT  -likely multifactorial given immunosuppression, CKD  - in the setting of coumadin contributing to possible GI bleed.   -HGB now stable ~8, INR improved s/p Kcentra/vitamin K  -GI onboard plan was for  EGD / colonoscopy but patient unable to tolerate prep.  -now showing RUE DVT as per family presnet at [facility] and why initially started on coumadin   RECS  -continue to trend CBC  - transfuse as needed hgb < 7,  -Monitor coags and for signs of bleeding.         #Lung nodules  #liver lesion  -CT A/P concerning for met disease, CT-C ruled indeterminant   -palliative onboard for GOC  RECS  -lung nodules are suspicious -  IR eval for BX  if in line w/ GOC    *Note not finalized until signed by Attending Physician      Please call with any questions [phone]  Above reviewed with Attending Dr. Morrissey     [facility]  [address] [place] [address]  [phone]    83 year old female with a PMH of ESRD s/p renal transplant on mycophenolate and tacrolimus (per nursing home documents), HTN, HLD, anemia, dementia who presented to the ED from [facility] for elevated BUN/Cr on outpatient labs. Hematology consulted for anemia and coagulpathy with elevated INR of ~12 on admission, now improved.    4/19/24- CT A/P-Distended gallbladder with cholelithiasis. Decompressed colon with additional suggestion of submucosal edema involving the descending and sigmoid colon suggesting colitis, ischemic colitis cannot be excluded. Disseminated innumerable pulmonary nodules are worrisome for metastatic disease. Subcapsular segment 8 liver lesion measuring 1.8 cm.  4/20/24- CT-C- The bilateral pulmonary nodules are indeterminate ( right middle lobe is a 7 mm noncalcified nodule, left lower lobe is a 9 x 6 mm noncalcified nodule)Small bilateral pleural effusions and atelectasis. thyroid gland is heterogeneous with bilobar nodules and   calcifications.  4/27/24- US b/l UE- occlusive thrombus in the right axillary and upper right brachial veins.Right upper cephalic vein thrombosis.Left cephalic vein thrombosed as outflow tract of the AV fistula.        #Anemia  # elevated INR  #DVT  -likely multifactorial given immunosuppression, CKD  - in the setting of coumadin contributing to possible GI bleed.   -HGB now stable ~8, INR improved s/p Kcentra/vitamin K  -GI onboard plan was for  EGD / colonoscopy but patient unable to tolerate prep.  -now showing RUE DVT as per family present at [facility] and why initially started on coumadin   RECS  -patient w/ new DVT which would normally require full dose AC, given recent GIB would defer to GI if patient would be cleared to restart trial of hep gtt  -continue to trend CBC  - transfuse as needed hgb < 7,  -Monitor coags and for signs of bleeding.         #Lung nodules  #liver lesion  -CT A/P concerning for met disease, CT-C ruled indeterminant   -palliative onboard - GOC established  now signed off   RECS  -lung nodules are suspicious -  IR eval for BX  if in line w/ GOC    *Note not finalized until signed by Attending Physician      Please call with any questions [phone]  Above reviewed with Attending Dr. Morrissey     [place] [address] [place] [address]  [phone]    83 year old female with a PMH of ESRD s/p renal transplant on mycophenolate and tacrolimus (per nursing home documents), HTN, HLD, anemia, dementia who presented to the ED from [facility] for elevated BUN/Cr on outpatient labs. Hematology consulted for anemia and coagulpathy with elevated INR of ~12 on admission, now improved.    4/19/24- CT A/P-Distended gallbladder with cholelithiasis. Decompressed colon with additional suggestion of submucosal edema involving the descending and sigmoid colon suggesting colitis, ischemic colitis cannot be excluded. Disseminated innumerable pulmonary nodules are worrisome for metastatic disease. Subcapsular segment 8 liver lesion measuring 1.8 cm.  4/20/24- CT-C- The bilateral pulmonary nodules are indeterminate ( right middle lobe is a 7 mm noncalcified nodule, left lower lobe is a 9 x 6 mm noncalcified nodule)Small bilateral pleural effusions and atelectasis. thyroid gland is heterogeneous with bilobar nodules and   calcifications.  4/27/24- US b/l UE- occlusive thrombus in the right axillary and upper right brachial veins.Right upper cephalic vein thrombosis.Left cephalic vein thrombosed as outflow tract of the AV fistula.        #Anemia  # elevated INR  #DVT  -likely multifactorial given immunosuppression, CKD  - in the setting of coumadin contributing to possible GI bleed.   -HGB now stable ~8, INR improved s/p Kcentra/vitamin K  -GI onboard plan was for  EGD / colonoscopy but patient unable to tolerate prep.  -now showing RUE DVT as per family present at [facility] and why initially started on coumadin   RECS  -patient w/ new DVT which would normally require full dose AC, given recent GIB would defer to GI if patient would be cleared to restart trial of hep gtt  -continue to trend CBC  - transfuse as needed hgb < 7,  -Monitor coags and for signs of bleeding.         #Lung nodules  #liver lesion  -CT A/P concerning for met disease, CT-C ruled indeterminant   -palliative onboard - GOC established  now signed off, Son would like to have patient get stronger and would like w/u to be done     RECS  -lung nodules are suspicious -  IR eval for BX  if in line w/ GOC    *Note not finalized until signed by Attending Physician      Please call with any questions [phone]  Above reviewed with Attending Dr. Morrissey     [facility]  [address] [place] [address]  [phone]

## 2024-04-29 NOTE — PROGRESS NOTE ADULT - ASSESSMENT
83 year old female with a PMH of ESRD s/p renal transplant on mycophenolate and tacrolimus (per nursing home documents), HTN, HLD, anemia, dementia , recent cva approx 2 months ago presented to the ED from [facility] for GLO. found to have hypothermia and hypotensive on presentation. found to have acute severe anemia, likely GI bleed, Elevated INR, Thrombocytopenia, Lactic/metabolic acidosis. GLO on CKD . s/p vasopressors, ivf , 3 units prbcs in micu. Hb stable at 8 now. blood cx + for coagulase positive staph, likely contaminant.     #Acute blood loss anemia - improved  Hb has stabilized now. source is likely GI bleed.   FOBT pos    CT abd/pelvis demonstrated distended gallbladder/ no active bleed   RUQ US demonstrated dilated CBD  patient did not finish bowel prep so EGD/colo canceled and no plan to pursue them for now, per GI, to f/u outpatient  diet advancement per speech    #GLO on CKD likely pre-renal/ Metabolic Acidosis/Lactic Acidosis  hx of renal transplant / follows at [facility]   C/w tacrolimus 1mg BID    resume MMF and pred    #Supratherapeutic INR  resolved, s/p FFP, vitamin K and Kcentra   on coumadin outpatient, unclear reason  per prior provider son reports this is for clot in arm and stroke, unable to reach son on 4/27  repeat doppler + R axillary and brachial DVT noted, likely 2/2 ?malignancy  d/w son, who prefers to keep patient off blood thinner given high risk of bleeding     #Lung nodules  noted on CT from 4/20  son still deciding about whether they would pursued IR biopsy     #Thrombocytopenia  likely due to acute illness. Plt >100K. s/p Plt transfusion   hold off chemical dvt ppx for now     #hx of cva   has residual left nasolabial flattening and left upper ext weakness   cth neg for any acute changes   swallow eval nabil     #contaminated blood cultures  only in one bottle  no abx, pt remains clinically stable    DVT ppx: scds  Dispo: [facility] pending family decision on lung nodule biopsy     Son updated of the plan of care 4/29

## 2024-04-29 NOTE — PROGRESS NOTE ADULT - SUBJECTIVE AND OBJECTIVE BOX
[facility] DIVISION OF KIDNEY DISEASES AND HYPERTENSION -- FOLLOW UP NOTE  --------------------------------------------------------------------------------  Chief Complaint: renal transplant    24 hour events/subjective:    no acute event   pt seen and examined; denies any acut complaint  PAST HISTORY  --------------------------------------------------------------------------------  No significant changes to PMH, PSH, FHx, SHx, unless otherwise noted    ALLERGIES & MEDICATIONS  --------------------------------------------------------------------------------  Allergies    No Known Allergies        Standing Inpatient Medications  amLODIPine   Tablet 10 milliGRAM(s) Oral daily  chlorhexidine 2% Cloths 1 Application(s) Topical daily  ferrous    sulfate 325 milliGRAM(s) Oral daily  multivitamin 1 Tablet(s) Oral daily  mycophenolate mofetil 500 milliGRAM(s) Oral two times a day  pantoprazole    Tablet 40 milliGRAM(s) Oral before breakfast  predniSONE   Tablet 5 milliGRAM(s) Oral daily  tacrolimus 2 milliGRAM(s) Oral every 12 hours  tamsulosin 0.4 milliGRAM(s) Oral at bedtime    PRN Inpatient Medications  zinc oxide 40% Paste 1 Application(s) Topical two times a day PRN      REVIEW OF SYSTEMS  --------------------------------------------------------------------------------  Gen: No weight changes, fatigue, fevers/chills, weakness  Skin: No rashes  Head/Eyes/Ears/Mouth: No headache; Normal hearing; Normal vision w/o blurriness; No sinus pain/discomfort, sore throat  Respiratory: No dyspnea, cough, wheezing, hemoptysis  CV: No chest pain, PND, orthopnea  GI: No abdominal pain, diarrhea, constipation, nausea, vomiting, melena, hematochezia  : No increased frequency, dysuria, hematuria, nocturia  MSK: No joint pain/swelling; no back pain; no edema  Neuro: No dizziness/lightheadedness, weakness, seizures, numbness, tingling  Heme: No easy bruising or bleeding  Endo: No heat/cold intolerance  Psych: No significant nervousness, anxiety, stress, depression    All other systems were reviewed and are negative, except as noted.    VITALS/PHYSICAL EXAM  --------------------------------------------------------------------------------  T(C): 36.6 (04-29-24 @ 12:43), Max: 36.7 (04-28-24 @ 17:46)  HR: 102 (04-29-24 @ 12:43) (77 - 102)  BP: 126/81 (04-29-24 @ 12:43) (122/76 - 143/79)  RR: 17 (04-29-24 @ 12:43) (17 - 18)  SpO2: 96% (04-29-24 @ 12:43) (91% - 99%)  Wt(kg): --        04-28-24 @ 07:01  -  04-29-24 @ 07:00  --------------------------------------------------------  IN: 0 mL / OUT: 575 mL / NET: -575 mL    04-29-24 @ 07:01  -  04-29-24 @ 17:33  --------------------------------------------------------  IN: 480 mL / OUT: 200 mL / NET: 280 mL      Physical Exam:  	Gen: NAD  	HEENT: supple neck, clear oropharynx  	Pulm: CTA B/L  	CV: RRR, S1S2; no rub  	Back: No spinal or CVA tenderness; no sacral edema  	Abd: +BS, soft, nontender/nondistended  	: No suprapubic tenderness  	UE: Warm,  no edema  	LE: Warm,  no edema  	Neuro: No focal deficit  	Psych: Normal affect and mood  	Skin: Warm      LABS/STUDIES  --------------------------------------------------------------------------------              10.5   7.01  >-----------<  165      [04-29-24 @ 06:39]              32.2     144  |  106  |  54.2  ----------------------------<  89      [04-29-24 @ 06:39]  3.4   |  23.0  |  3.89        Ca     7.0     [04-29-24 @ 06:39]            Creatinine Trend:  SCr 3.89 [04-29 @ 06:39]  SCr 3.88 [04-28 @ 05:05]  SCr 3.76 [04-27 @ 06:00]  SCr 3.58 [04-25 @ 06:51]  SCr 3.75 [04-24 @ 13:56]    Urinalysis - [04-29-24 @ 06:39]      Color  / Appearance  / SG  / pH       Gluc 89 / Ketone   / Bili  / Urobili        Blood  / Protein  / Leuk Est  / Nitrite       RBC  / WBC  / Hyaline  / Gran  / Sq Epi  / Non Sq Epi  / Bacteria       Iron 175, TIBC 212, %sat 83      [04-19-24 @ 06:50]  Ferritin 3171      [04-19-24 @ 06:50]  TSH 1.72      [04-19-24 @ 06:50]

## 2024-04-29 NOTE — PROGRESS NOTE ADULT - SUBJECTIVE AND OBJECTIVE BOX
Khushi Babcock M.D.    Patient is a 84y old  Female who presents with a chief complaint of GIb (26 Apr 2024 12:04)      SUBJECTIVE / OVERNIGHT EVENTS: no event overnight.     Patient denies chest pain, SOB, abd pain, N/V, fever, chills, dysuria or any other complaints. All remainder ROS negative.     MEDICATIONS  (STANDING):  amLODIPine   Tablet 10 milliGRAM(s) Oral daily  chlorhexidine 2% Cloths 1 Application(s) Topical daily  ferrous    sulfate 325 milliGRAM(s) Oral daily  multivitamin 1 Tablet(s) Oral daily  mycophenolate mofetil 500 milliGRAM(s) Oral two times a day  pantoprazole    Tablet 40 milliGRAM(s) Oral before breakfast  predniSONE   Tablet 5 milliGRAM(s) Oral daily  tacrolimus 2 milliGRAM(s) Oral every 12 hours  tamsulosin 0.4 milliGRAM(s) Oral at bedtime    MEDICATIONS  (PRN):  zinc oxide 40% Paste 1 Application(s) Topical two times a day PRN rash due to incontinence      I&O's Summary    28 Apr 2024 07:01  -  29 Apr 2024 07:00  --------------------------------------------------------  IN: 0 mL / OUT: 575 mL / NET: -575 mL        PHYSICAL EXAM:  Vital Signs Last 24 Hrs  T(C): 36.6 (29 Apr 2024 12:43), Max: 36.7 (28 Apr 2024 17:46)  T(F): 97.9 (29 Apr 2024 12:43), Max: 98.1 (28 Apr 2024 17:46)  HR: 102 (29 Apr 2024 12:43) (77 - 102)  BP: 126/81 (29 Apr 2024 12:43) (122/76 - 143/79)  BP(mean): --  RR: 17 (29 Apr 2024 12:43) (17 - 18)  SpO2: 96% (29 Apr 2024 12:43) (91% - 99%)    Parameters below as of 29 Apr 2024 12:43  Patient On (Oxygen Delivery Method): room air      CONSTITUTIONAL: lethargic, but awake and alert  RESPIRATORY: Normal respiratory effort; lungs are clear to auscultation bilaterally  CARDIOVASCULAR: Regular rate and rhythm; No lower extremity edema  ABDOMEN: Nontender to palpation, normoactive bowel sounds  PSYCH: A+O x3    LABS:                        10.5   7.01  )-----------( 165      ( 29 Apr 2024 06:39 )             32.2     04-29    144  |  106  |  54.2<H>  ----------------------------<  89  3.4<L>   |  23.0  |  3.89<H>    Ca    7.0<L>      29 Apr 2024 06:39            Urinalysis Basic - ( 29 Apr 2024 06:39 )    Color: x / Appearance: x / SG: x / pH: x  Gluc: 89 mg/dL / Ketone: x  / Bili: x / Urobili: x   Blood: x / Protein: x / Nitrite: x   Leuk Esterase: x / RBC: x / WBC x   Sq Epi: x / Non Sq Epi: x / Bacteria: x        CAPILLARY BLOOD GLUCOSE          RADIOLOGY & ADDITIONAL TESTS:  Results Reviewed:   Imaging Personally Reviewed:  Electrocardiogram Personally Reviewed:

## 2024-04-29 NOTE — PROGRESS NOTE ADULT - NS ATTEND AMEND GEN_ALL_CORE FT
I evaluated this pt. with my ACP and agree with the above assessment and management plan. Pt. remains a long way from being optimized for eventual EGD and colonoscopy for evaluation of severe anemia on presentation her. Hb was 2.5 g. on arrival to ED here. Also with severe renal failure and AMS. Would continue current IV Pantoprazole Rx for now. Iron studies not c/w AMINA, No clinically evident GI bleeding or BM's despite Hb drop this AM to 6.9 g. Pt's baseline mental status unknown. Transfuse to Hb of 8 grams or higher. Consider Hematology evaluation for anemia. Please reconsult GI if and when she is optimized for endoscopic procedures and IV sedation for these procedures. Pt's transplanted kidney appears to be failing. Continue IV Pantoprazole Rx for GI mucosal cytoprotection.
Agree with above
Ms. Gomez is a 83 year old woman with acute blood loss anemia, hemoglobin 2.5 on admission, responded well to 4 units of pRBC, continues to have no overt evidence of ongoing gastrointestinal hemorrhage, would continue supportive care in the interim pending EGD/Colonoscopy Thursday 4/25 for evaluation of possible GI blood loss. Discussed with patient and covering providers. No emergent endoscopic intervention planned at this time. We will continue to follow along with you. Pre-procedure recommendations to follow tomorrow.
The patient is absolutely incapable of taking a colon preparation.  Without the colon preparation in a patient who has not shown overt evidence of bleeding, an upper endoscopy alone to evaluate anemia is of little value.  Given this patient's comorbidities and mental status I think goals of care should be discussed with her family because it would likely be impossible to clear her GI tract of a bleeding lesion.  We will sign off today but please call us back if the family decides otherwise.
plan as above
I evaluated this pt. with my ACP and agree with the above assessment and management plan. Pt. is minimally verbal but alert and arouseable. For attempt at EGD and colonoscopy tomorrow if she will be cooperative for bowel prep. She is medically stable for these procedures but it is unclear whether she will be cooperative for the bowel prep. Repeat labs ordered for the AM.
Patient with a history of renal transplant, GLO, severe anemia      Abdominal exam–positive bowel sounds soft nontender    Patient with severe anemia on admission hemoglobin 2.5  As per nursing no overt bleeding.  Tending to have loose stools  Patient's mental status very lethargic.  CT my interpretation mildly thickened esophagus.  Probably understood distended colon in the left colon.  Patient being slowly prep for colonoscopy endoscopy to be done on Friday.  Hemoglobin stable at 10.2.  CBC ordered for tomorrow  Renal note reviewed–history of renal transplant.  Continue tacrolimus

## 2024-04-29 NOTE — PROGRESS NOTE ADULT - ASSESSMENT
Pt with DDRT in 2012 at [facility]-   Advanced CKD- Pt reports h/o worsening kidney disease and admits to poor medical follow up over the past few years.  LGIB; + FOBT, Hb now stable. no plan to scope as per GI.  Scr elevated/ stable.no indication for RRT  Continue immunosuppression MMF, Tacro and prednisone.  metabolic acidosis; sp bicarb gtt, co2 levels improved.  Bp stable- continue amlodipine .    Yes

## 2024-04-29 NOTE — PROGRESS NOTE ADULT - SUBJECTIVE AND OBJECTIVE BOX
Heme/Onc Progress note    INTERVAL HPI/OVERNIGHT EVENTS:  Patient S&E at bedside. No o/n events, patient resting comfortably. No complaints at this time. Patient denies fever, chills, dizziness, weakness, CP, palpitations, SOB, cough, N/V/D/C, dysuria, changes in bowel movements, LE edema.    VITAL SIGNS:  T(F): 98 (04-29-24 @ 08:57)  HR: 77 (04-29-24 @ 08:57)  BP: 126/69 (04-29-24 @ 08:57)  RR: 18 (04-29-24 @ 08:57)  SpO2: 99% (04-29-24 @ 08:57)  Wt(kg): --    PHYSICAL EXAM:    Constitutional: NAD  Eyes: EOMI, sclera non-icteric  Neck: supple, no masses, no JVD  Respiratory: CTA b/l, good air entry b/l  Cardiovascular: RRR, no M/R/G  Gastrointestinal: soft, NTND, no masses palpable, + BS, no hepatosplenomegaly  Extremities: no c/c/e  Neurological: AAOx3      MEDICATIONS  (STANDING):  amLODIPine   Tablet 10 milliGRAM(s) Oral daily  chlorhexidine 2% Cloths 1 Application(s) Topical daily  ferrous    sulfate 325 milliGRAM(s) Oral daily  multivitamin 1 Tablet(s) Oral daily  mycophenolate mofetil 500 milliGRAM(s) Oral two times a day  pantoprazole    Tablet 40 milliGRAM(s) Oral before breakfast  predniSONE   Tablet 5 milliGRAM(s) Oral daily  tacrolimus 2 milliGRAM(s) Oral every 12 hours  tamsulosin 0.4 milliGRAM(s) Oral at bedtime    MEDICATIONS  (PRN):  zinc oxide 40% Paste 1 Application(s) Topical two times a day PRN rash due to incontinence      Allergies    No Known Allergies    Intolerances        LABS:                        10.5   7.01  )-----------( 165      ( 29 Apr 2024 06:39 )             32.2     04-29    144  |  106  |  54.2<H>  ----------------------------<  89  3.4<L>   |  23.0  |  3.89<H>    Ca    7.0<L>      29 Apr 2024 06:39        Urinalysis Basic - ( 29 Apr 2024 06:39 )    Color: x / Appearance: x / SG: x / pH: x  Gluc: 89 mg/dL / Ketone: x  / Bili: x / Urobili: x   Blood: x / Protein: x / Nitrite: x   Leuk Esterase: x / RBC: x / WBC x   Sq Epi: x / Non Sq Epi: x / Bacteria: x        RADIOLOGY & ADDITIONAL TESTS:  Studies reviewed.  < from: US Duplex Venous Upper Ext Complete, Bilateral (04.27.24 @ 19:01) >  ACC: [id number] EXAM:  US DPLX UPR EXT VEINS COMPL BI   ORDERED BY: CARISA MUSE     PROCEDURE DATE:  04/27/2024          INTERPRETATION:  CLINICAL INFORMATION: History of DVT in the arms    COMPARISON: None available.    TECHNIQUE: Duplex sonography of the BILATERAL upper extremity veins with   color and spectral Doppler, with and without compression.    FINDINGS:    RIGHT:  The right internal jugular vein is obscured by bandages. Thrombus in the   right axillary and upper right brachial veins.The right subclavian, and   mid-distal brachial veins are patent and compressible where applicable.    The basilic vein (superficial vein) is patent and without thrombus.  The   cephalic vein (superficial vein) is thrombosed in the upper portion.    LEFT:  The left internal jugular, subclavian, axillary and brachial veins are   patent and compressible where applicable.  The basilic vein (superficial   vein) is patent and without thrombus.  The cephalic vein (superficial   vein) is thrombosed being the outflow tract of the AV graft.    Doppler examination shows normal spontaneous and phasic flow.    IMPRESSION:  The occlusive thrombus in the right axillary and upper right brachial   veins.  Right upper cephalic vein thrombosis.  Left cephalic vein thrombosed as outflow tract of the AV fistula.      --- End of Report ---         Heme/Onc Progress note    INTERVAL HPI/OVERNIGHT EVENTS:  Patient S&E at bedside. No o/n events, patient resting comfortably. No complaints at this time. Patient denies fever, chills, dizziness, weakness, CP, palpitations, SOB, cough, N/V/D/C, dysuria, changes in bowel movements, LE edema.    VITAL SIGNS:  T(F): 98 (04-29-24 @ 08:57)  HR: 77 (04-29-24 @ 08:57)  BP: 126/69 (04-29-24 @ 08:57)  RR: 18 (04-29-24 @ 08:57)  SpO2: 99% (04-29-24 @ 08:57)  Wt(kg): --    PHYSICAL EXAM:  Constitutional: weak  Eyes: EOMI, sclera non-icteric  Neck: supple, no masses, no JVD  Respiratory: CTA b/l, good air entry b/l  Cardiovascular: RRR, no M/R/G  Gastrointestinal: soft, NTND, no masses palpable, + BS,   Extremities: no c/c/e  Neurological: AAOx3      MEDICATIONS  (STANDING):  amLODIPine   Tablet 10 milliGRAM(s) Oral daily  chlorhexidine 2% Cloths 1 Application(s) Topical daily  ferrous    sulfate 325 milliGRAM(s) Oral daily  multivitamin 1 Tablet(s) Oral daily  mycophenolate mofetil 500 milliGRAM(s) Oral two times a day  pantoprazole    Tablet 40 milliGRAM(s) Oral before breakfast  predniSONE   Tablet 5 milliGRAM(s) Oral daily  tacrolimus 2 milliGRAM(s) Oral every 12 hours  tamsulosin 0.4 milliGRAM(s) Oral at bedtime    MEDICATIONS  (PRN):  zinc oxide 40% Paste 1 Application(s) Topical two times a day PRN rash due to incontinence      Allergies    No Known Allergies    Intolerances        LABS:                        10.5   7.01  )-----------( 165      ( 29 Apr 2024 06:39 )             32.2     04-29    144  |  106  |  54.2<H>  ----------------------------<  89  3.4<L>   |  23.0  |  3.89<H>    Ca    7.0<L>      29 Apr 2024 06:39        Urinalysis Basic - ( 29 Apr 2024 06:39 )    Color: x / Appearance: x / SG: x / pH: x  Gluc: 89 mg/dL / Ketone: x  / Bili: x / Urobili: x   Blood: x / Protein: x / Nitrite: x   Leuk Esterase: x / RBC: x / WBC x   Sq Epi: x / Non Sq Epi: x / Bacteria: x        RADIOLOGY & ADDITIONAL TESTS:  Studies reviewed.  < from: US Duplex Venous Upper Ext Complete, Bilateral (04.27.24 @ 19:01) >  ACC: [id number] EXAM:  US DPLX UPR EXT VEINS COMPL BI   ORDERED BY: CARISA MUSE     PROCEDURE DATE:  04/27/2024          INTERPRETATION:  CLINICAL INFORMATION: History of DVT in the arms    COMPARISON: None available.    TECHNIQUE: Duplex sonography of the BILATERAL upper extremity veins with   color and spectral Doppler, with and without compression.    FINDINGS:    RIGHT:  The right internal jugular vein is obscured by bandages. Thrombus in the   right axillary and upper right brachial veins.The right subclavian, and   mid-distal brachial veins are patent and compressible where applicable.    The basilic vein (superficial vein) is patent and without thrombus.  The   cephalic vein (superficial vein) is thrombosed in the upper portion.    LEFT:  The left internal jugular, subclavian, axillary and brachial veins are   patent and compressible where applicable.  The basilic vein (superficial   vein) is patent and without thrombus.  The cephalic vein (superficial   vein) is thrombosed being the outflow tract of the AV graft.    Doppler examination shows normal spontaneous and phasic flow.    IMPRESSION:  The occlusive thrombus in the right axillary and upper right brachial   veins.  Right upper cephalic vein thrombosis.  Left cephalic vein thrombosed as outflow tract of the AV fistula.      --- End of Report ---

## 2024-04-30 LAB
ANION GAP SERPL CALC-SCNC: 15 MMOL/L — SIGNIFICANT CHANGE UP (ref 5–17)
BUN SERPL-MCNC: 52.6 MG/DL — HIGH (ref 8–20)
CALCIUM SERPL-MCNC: 6.7 MG/DL — LOW (ref 8.4–10.5)
CHLORIDE SERPL-SCNC: 108 MMOL/L — SIGNIFICANT CHANGE UP (ref 96–108)
CO2 SERPL-SCNC: 20 MMOL/L — LOW (ref 22–29)
CREAT SERPL-MCNC: 4.01 MG/DL — HIGH (ref 0.5–1.3)
EGFR: 11 ML/MIN/1.73M2 — LOW
GLUCOSE SERPL-MCNC: 81 MG/DL — SIGNIFICANT CHANGE UP (ref 70–99)
HCT VFR BLD CALC: 29.7 % — LOW (ref 34.5–45)
HGB BLD-MCNC: 9.8 G/DL — LOW (ref 11.5–15.5)
MCHC RBC-ENTMCNC: 31 PG — SIGNIFICANT CHANGE UP (ref 27–34)
MCHC RBC-ENTMCNC: 33 GM/DL — SIGNIFICANT CHANGE UP (ref 32–36)
MCV RBC AUTO: 94 FL — SIGNIFICANT CHANGE UP (ref 80–100)
PLATELET # BLD AUTO: 164 K/UL — SIGNIFICANT CHANGE UP (ref 150–400)
POTASSIUM SERPL-MCNC: 3.8 MMOL/L — SIGNIFICANT CHANGE UP (ref 3.5–5.3)
POTASSIUM SERPL-SCNC: 3.8 MMOL/L — SIGNIFICANT CHANGE UP (ref 3.5–5.3)
RBC # BLD: 3.16 M/UL — LOW (ref 3.8–5.2)
RBC # FLD: 15.5 % — HIGH (ref 10.3–14.5)
SODIUM SERPL-SCNC: 143 MMOL/L — SIGNIFICANT CHANGE UP (ref 135–145)
WBC # BLD: 7.4 K/UL — SIGNIFICANT CHANGE UP (ref 3.8–10.5)
WBC # FLD AUTO: 7.4 K/UL — SIGNIFICANT CHANGE UP (ref 3.8–10.5)

## 2024-04-30 RX ADMIN — TACROLIMUS 2 MILLIGRAM(S): 5 CAPSULE ORAL at 18:13

## 2024-04-30 RX ADMIN — PANTOPRAZOLE SODIUM 40 MILLIGRAM(S): 20 TABLET, DELAYED RELEASE ORAL at 05:51

## 2024-04-30 RX ADMIN — Medication 1 TABLET(S): at 13:19

## 2024-04-30 RX ADMIN — TACROLIMUS 2 MILLIGRAM(S): 5 CAPSULE ORAL at 05:50

## 2024-04-30 RX ADMIN — MYCOPHENOLATE MOFETIL 500 MILLIGRAM(S): 250 CAPSULE ORAL at 05:50

## 2024-04-30 RX ADMIN — Medication 5 MILLIGRAM(S): at 05:50

## 2024-04-30 RX ADMIN — TAMSULOSIN HYDROCHLORIDE 0.4 MILLIGRAM(S): 0.4 CAPSULE ORAL at 21:22

## 2024-04-30 RX ADMIN — Medication 325 MILLIGRAM(S): at 13:19

## 2024-04-30 RX ADMIN — CHLORHEXIDINE GLUCONATE 1 APPLICATION(S): 213 SOLUTION TOPICAL at 13:20

## 2024-04-30 RX ADMIN — AMLODIPINE BESYLATE 10 MILLIGRAM(S): 2.5 TABLET ORAL at 05:50

## 2024-04-30 RX ADMIN — MYCOPHENOLATE MOFETIL 500 MILLIGRAM(S): 250 CAPSULE ORAL at 18:13

## 2024-04-30 NOTE — PROGRESS NOTE ADULT - PROVIDER SPECIALTY LIST ADULT
Gastroenterology
Heme/Onc
Hospitalist
Hospitalist
NOTIFICATION RETURN TO WORK / SCHOOL 
 
11/28/2018 10:40 AM 
 
Mr. Sascha Reed [phone] To Whom It May Concern: 
 
Sascha Reed is currently under the care of Bradley Strange. He will return to work/school on: 12/05/2018 If there are questions or concerns please have the patient contact our office. Sincerely, Fredy Saul NP 
 
                                
 

Nephrology
Gastroenterology
Hospitalist
Nephrology
Gastroenterology
Gastroenterology
Heme/Onc
Hospitalist
MICU
Nephrology
Hospitalist
Hospitalist
Palliative Care
Gastroenterology

## 2024-04-30 NOTE — PROGRESS NOTE ADULT - SUBJECTIVE AND OBJECTIVE BOX
Khushi Babcock M.D.    Patient is a 84y old  Female who presents with a chief complaint of GIb (26 Apr 2024 12:04)      SUBJECTIVE / OVERNIGHT EVENTS: no event overnight.     Patient denies chest pain, SOB, abd pain, N/V, fever, chills, dysuria or any other complaints. All remainder ROS negative.     MEDICATIONS  (STANDING):  amLODIPine   Tablet 10 milliGRAM(s) Oral daily  chlorhexidine 2% Cloths 1 Application(s) Topical daily  ferrous    sulfate 325 milliGRAM(s) Oral daily  multivitamin 1 Tablet(s) Oral daily  mycophenolate mofetil 500 milliGRAM(s) Oral two times a day  pantoprazole    Tablet 40 milliGRAM(s) Oral before breakfast  predniSONE   Tablet 5 milliGRAM(s) Oral daily  tacrolimus 2 milliGRAM(s) Oral every 12 hours  tamsulosin 0.4 milliGRAM(s) Oral at bedtime    MEDICATIONS  (PRN):  zinc oxide 40% Paste 1 Application(s) Topical two times a day PRN rash due to incontinence      I&O's Summary    29 Apr 2024 07:01  -  30 Apr 2024 07:00  --------------------------------------------------------  IN: 480 mL / OUT: 300 mL / NET: 180 mL        PHYSICAL EXAM:  Vital Signs Last 24 Hrs  T(C): 36.7 (30 Apr 2024 09:00), Max: 36.7 (30 Apr 2024 09:00)  T(F): 98 (30 Apr 2024 09:00), Max: 98 (30 Apr 2024 09:00)  HR: 104 (30 Apr 2024 09:00) (98 - 104)  BP: 119/74 (30 Apr 2024 09:00) (119/74 - 142/71)  BP(mean): --  RR: 18 (30 Apr 2024 09:00) (18 - 18)  SpO2: 98% (30 Apr 2024 09:00) (92% - 98%)    Parameters below as of 30 Apr 2024 09:00  Patient On (Oxygen Delivery Method): room air    CONSTITUTIONAL: awake and alert  RESPIRATORY: Normal respiratory effort; lungs are clear to auscultation bilaterally  CARDIOVASCULAR: Regular rate and rhythm; No lower extremity edema  ABDOMEN: Nontender to palpation, normoactive bowel sounds  PSYCH: A+O x3      LABS:                        9.8    7.40  )-----------( 164      ( 30 Apr 2024 05:27 )             29.7     04-30    143  |  108  |  52.6<H>  ----------------------------<  81  3.8   |  20.0<L>  |  4.01<H>    Ca    6.7<L>      30 Apr 2024 05:27            Urinalysis Basic - ( 30 Apr 2024 05:27 )    Color: x / Appearance: x / SG: x / pH: x  Gluc: 81 mg/dL / Ketone: x  / Bili: x / Urobili: x   Blood: x / Protein: x / Nitrite: x   Leuk Esterase: x / RBC: x / WBC x   Sq Epi: x / Non Sq Epi: x / Bacteria: x        CAPILLARY BLOOD GLUCOSE          RADIOLOGY & ADDITIONAL TESTS:  Results Reviewed:   Imaging Personally Reviewed:  Electrocardiogram Personally Reviewed:

## 2024-04-30 NOTE — PROGRESS NOTE ADULT - NUTRITIONAL ASSESSMENT
This patient has been assessed with a concern for Malnutrition and has been determined to have a diagnosis/diagnoses of Severe protein-calorie malnutrition and Underweight (BMI < 19).    This patient is being managed with:   Diet Clear Liquid-  Entered: Apr 23 2024  4:07PM  
This patient has been assessed with a concern for Malnutrition and has been determined to have a diagnosis/diagnoses of Severe protein-calorie malnutrition and Underweight (BMI < 19).    This patient is being managed with:   Diet Pureed-  DASH/TLC {Sodium & Cholesterol Restricted} (DASH)  No Liquids (NOLIQUIDS)  Entered: Apr 19 2024  6:56PM  
This patient has been assessed with a concern for Malnutrition and has been determined to have a diagnosis/diagnoses of Severe protein-calorie malnutrition and Underweight (BMI < 19).    This patient is being managed with:   Diet Clear Liquid-  Entered: Apr 23 2024  4:07PM  
This patient has been assessed with a concern for Malnutrition and has been determined to have a diagnosis/diagnoses of Severe protein-calorie malnutrition and Underweight (BMI < 19).    This patient is being managed with:   Diet Soft and Bite Sized-  DASH/TLC {Sodium & Cholesterol Restricted} (DASH)  Entered: Apr 22 2024  3:39PM  
This patient has been assessed with a concern for Malnutrition and has been determined to have a diagnosis/diagnoses of Severe protein-calorie malnutrition and Underweight (BMI < 19).    This patient is being managed with:   Diet Soft and Bite Sized-  DASH/TLC {Sodium & Cholesterol Restricted} (DASH)  Entered: Apr 22 2024  3:39PM  
This patient has been assessed with a concern for Malnutrition and has been determined to have a diagnosis/diagnoses of Severe protein-calorie malnutrition and Underweight (BMI < 19).    This patient is being managed with:   Diet Pureed-  Entered: Apr 27 2024 10:22AM  
This patient has been assessed with a concern for Malnutrition and has been determined to have a diagnosis/diagnoses of Severe protein-calorie malnutrition and Underweight (BMI < 19).    This patient is being managed with:   Diet Soft and Bite Sized-  Entered: Apr 28 2024  9:45AM  
This patient has been assessed with a concern for Malnutrition and has been determined to have a diagnosis/diagnoses of Severe protein-calorie malnutrition and Underweight (BMI < 19).    This patient is being managed with:   Diet NPO after Midnight-     NPO Start Date: 25-Apr-2024   NPO Start Time: 23:59  Except Medications  Entered: Apr 25 2024  9:41AM    Diet Clear Liquid-  Entered: Apr 23 2024  4:07PM  
This patient has been assessed with a concern for Malnutrition and has been determined to have a diagnosis/diagnoses of Severe protein-calorie malnutrition and Underweight (BMI < 19).    This patient is being managed with:   Diet Soft and Bite Sized-  Entered: Apr 28 2024  9:45AM  
This patient has been assessed with a concern for Malnutrition and has been determined to have a diagnosis/diagnoses of Severe protein-calorie malnutrition and Underweight (BMI < 19).    This patient is being managed with:   Diet Soft and Bite Sized-  Entered: Apr 28 2024  9:45AM

## 2024-04-30 NOTE — PROGRESS NOTE ADULT - ASSESSMENT
83 year old female with a PMH of ESRD s/p renal transplant on mycophenolate and tacrolimus (per nursing home documents), HTN, HLD, anemia, dementia , recent cva approx 2 months ago presented to the ED from [facility] for GLO. found to have hypothermia and hypotensive on presentation. found to have acute severe anemia, likely GI bleed, Elevated INR, Thrombocytopenia, Lactic/metabolic acidosis. GLO on CKD . s/p vasopressors, ivf , 3 units prbcs in micu. Hb stable at 8 now. blood cx + for coagulase positive staph, likely contaminant.     #Acute blood loss anemia - improved  Hb has stabilized now. source is likely GI bleed.   FOBT pos    CT abd/pelvis demonstrated distended gallbladder/ no active bleed   RUQ US demonstrated dilated CBD  patient did not finish bowel prep so EGD/colo canceled and no plan to pursue them for now, per GI, to f/u outpatient  diet advancement per speech    #GLO on CKD likely pre-renal/ Metabolic Acidosis/Lactic Acidosis  hx of renal transplant / follows at [facility]   C/w tacrolimus 1mg BID    resume MMF and pred    #Supratherapeutic INR  resolved, s/p FFP, vitamin K and Kcentra   on coumadin outpatient, unclear reason  per prior provider son reports this is for clot in arm and stroke, unable to reach son on 4/27  repeat doppler + R axillary and brachial DVT noted, likely 2/2 ?malignancy  d/w son, who prefers to keep patient off blood thinner given high risk of bleeding     #Lung nodules  noted on CT from 4/20  son defer biopsy for now  f/u heme/onc outpatient    #Thrombocytopenia  likely due to acute illness. Plt >100K. s/p Plt transfusion   hold off chemical dvt ppx for now     #hx of cva   has residual left nasolabial flattening and left upper ext weakness   cth neg for any acute changes   swallow eval nabil     #contaminated blood cultures  only in one bottle  no abx, pt remains clinically stable    DVT ppx: scds  Dispo: [facility] pending    Son updated of the plan of care 4/30

## 2024-05-01 VITALS
SYSTOLIC BLOOD PRESSURE: 139 MMHG | DIASTOLIC BLOOD PRESSURE: 75 MMHG | TEMPERATURE: 99 F | RESPIRATION RATE: 18 BRPM | OXYGEN SATURATION: 93 % | HEART RATE: 96 BPM

## 2024-05-01 PROCEDURE — 86900 BLOOD TYPING SEROLOGIC ABO: CPT

## 2024-05-01 PROCEDURE — P9059: CPT

## 2024-05-01 PROCEDURE — 71250 CT THORAX DX C-: CPT | Mod: MC

## 2024-05-01 PROCEDURE — 87040 BLOOD CULTURE FOR BACTERIA: CPT

## 2024-05-01 PROCEDURE — 83605 ASSAY OF LACTIC ACID: CPT

## 2024-05-01 PROCEDURE — 86901 BLOOD TYPING SEROLOGIC RH(D): CPT

## 2024-05-01 PROCEDURE — 83010 ASSAY OF HAPTOGLOBIN QUANT: CPT

## 2024-05-01 PROCEDURE — 96366 THER/PROPH/DIAG IV INF ADDON: CPT

## 2024-05-01 PROCEDURE — 80048 BASIC METABOLIC PNL TOTAL CA: CPT

## 2024-05-01 PROCEDURE — 97163 PT EVAL HIGH COMPLEX 45 MIN: CPT

## 2024-05-01 PROCEDURE — P9100: CPT

## 2024-05-01 PROCEDURE — 84443 ASSAY THYROID STIM HORMONE: CPT

## 2024-05-01 PROCEDURE — 93005 ELECTROCARDIOGRAM TRACING: CPT

## 2024-05-01 PROCEDURE — 82962 GLUCOSE BLOOD TEST: CPT

## 2024-05-01 PROCEDURE — 80197 ASSAY OF TACROLIMUS: CPT

## 2024-05-01 PROCEDURE — P9037: CPT

## 2024-05-01 PROCEDURE — 36430 TRANSFUSION BLD/BLD COMPNT: CPT

## 2024-05-01 PROCEDURE — P9016: CPT

## 2024-05-01 PROCEDURE — 83735 ASSAY OF MAGNESIUM: CPT

## 2024-05-01 PROCEDURE — 84436 ASSAY OF TOTAL THYROXINE: CPT

## 2024-05-01 PROCEDURE — P9047: CPT

## 2024-05-01 PROCEDURE — 36415 COLL VENOUS BLD VENIPUNCTURE: CPT

## 2024-05-01 PROCEDURE — 99285 EMERGENCY DEPT VISIT HI MDM: CPT | Mod: 25

## 2024-05-01 PROCEDURE — 87641 MR-STAPH DNA AMP PROBE: CPT

## 2024-05-01 PROCEDURE — 80053 COMPREHEN METABOLIC PANEL: CPT

## 2024-05-01 PROCEDURE — 70450 CT HEAD/BRAIN W/O DYE: CPT | Mod: MC

## 2024-05-01 PROCEDURE — 83615 LACTATE (LD) (LDH) ENZYME: CPT

## 2024-05-01 PROCEDURE — 83540 ASSAY OF IRON: CPT

## 2024-05-01 PROCEDURE — 71045 X-RAY EXAM CHEST 1 VIEW: CPT

## 2024-05-01 PROCEDURE — 82803 BLOOD GASES ANY COMBINATION: CPT

## 2024-05-01 PROCEDURE — 85730 THROMBOPLASTIN TIME PARTIAL: CPT

## 2024-05-01 PROCEDURE — 87086 URINE CULTURE/COLONY COUNT: CPT

## 2024-05-01 PROCEDURE — 82746 ASSAY OF FOLIC ACID SERUM: CPT

## 2024-05-01 PROCEDURE — 87077 CULTURE AEROBIC IDENTIFY: CPT

## 2024-05-01 PROCEDURE — 74176 CT ABD & PELVIS W/O CONTRAST: CPT | Mod: MC

## 2024-05-01 PROCEDURE — 99239 HOSP IP/OBS DSCHRG MGMT >30: CPT

## 2024-05-01 PROCEDURE — 83550 IRON BINDING TEST: CPT

## 2024-05-01 PROCEDURE — 93306 TTE W/DOPPLER COMPLETE: CPT

## 2024-05-01 PROCEDURE — 96375 TX/PRO/DX INJ NEW DRUG ADDON: CPT

## 2024-05-01 PROCEDURE — 76705 ECHO EXAM OF ABDOMEN: CPT

## 2024-05-01 PROCEDURE — 82272 OCCULT BLD FECES 1-3 TESTS: CPT

## 2024-05-01 PROCEDURE — 93970 EXTREMITY STUDY: CPT

## 2024-05-01 PROCEDURE — 87640 STAPH A DNA AMP PROBE: CPT

## 2024-05-01 PROCEDURE — 86850 RBC ANTIBODY SCREEN: CPT

## 2024-05-01 PROCEDURE — 82728 ASSAY OF FERRITIN: CPT

## 2024-05-01 PROCEDURE — 85025 COMPLETE CBC W/AUTO DIFF WBC: CPT

## 2024-05-01 PROCEDURE — 87637 SARSCOV2&INF A&B&RSV AMP PRB: CPT

## 2024-05-01 PROCEDURE — 86923 COMPATIBILITY TEST ELECTRIC: CPT

## 2024-05-01 PROCEDURE — 85610 PROTHROMBIN TIME: CPT

## 2024-05-01 PROCEDURE — 85027 COMPLETE CBC AUTOMATED: CPT

## 2024-05-01 PROCEDURE — 84100 ASSAY OF PHOSPHORUS: CPT

## 2024-05-01 PROCEDURE — 85384 FIBRINOGEN ACTIVITY: CPT

## 2024-05-01 PROCEDURE — 84466 ASSAY OF TRANSFERRIN: CPT

## 2024-05-01 PROCEDURE — 92526 ORAL FUNCTION THERAPY: CPT

## 2024-05-01 PROCEDURE — 81001 URINALYSIS AUTO W/SCOPE: CPT

## 2024-05-01 PROCEDURE — 92610 EVALUATE SWALLOWING FUNCTION: CPT

## 2024-05-01 PROCEDURE — 85045 AUTOMATED RETICULOCYTE COUNT: CPT

## 2024-05-01 PROCEDURE — 82607 VITAMIN B-12: CPT

## 2024-05-01 PROCEDURE — 87150 DNA/RNA AMPLIFIED PROBE: CPT

## 2024-05-01 PROCEDURE — 96367 TX/PROPH/DG ADDL SEQ IV INF: CPT

## 2024-05-01 PROCEDURE — 96365 THER/PROPH/DIAG IV INF INIT: CPT

## 2024-05-01 RX ORDER — HYDRALAZINE HCL 50 MG
1 TABLET ORAL
Refills: 0 | DISCHARGE

## 2024-05-01 RX ORDER — TAMSULOSIN HYDROCHLORIDE 0.4 MG/1
1 CAPSULE ORAL
Qty: 0 | Refills: 0 | DISCHARGE
Start: 2024-05-01

## 2024-05-01 RX ORDER — WARFARIN SODIUM 2.5 MG/1
1 TABLET ORAL
Refills: 0 | DISCHARGE

## 2024-05-01 RX ORDER — CALCIUM ACETATE 667 MG
3 TABLET ORAL
Refills: 0 | DISCHARGE

## 2024-05-01 RX ORDER — ERYTHROPOIETIN 10000 [IU]/ML
2 INJECTION, SOLUTION INTRAVENOUS; SUBCUTANEOUS
Refills: 0 | DISCHARGE

## 2024-05-01 RX ORDER — IRON SUCROSE 20 MG/ML
200 INJECTION, SOLUTION INTRAVENOUS
Refills: 0 | DISCHARGE

## 2024-05-01 RX ADMIN — Medication 5 MILLIGRAM(S): at 06:12

## 2024-05-01 RX ADMIN — Medication 1 TABLET(S): at 12:18

## 2024-05-01 RX ADMIN — CHLORHEXIDINE GLUCONATE 1 APPLICATION(S): 213 SOLUTION TOPICAL at 12:18

## 2024-05-01 RX ADMIN — MYCOPHENOLATE MOFETIL 500 MILLIGRAM(S): 250 CAPSULE ORAL at 06:11

## 2024-05-01 RX ADMIN — Medication 325 MILLIGRAM(S): at 12:18

## 2024-05-01 RX ADMIN — AMLODIPINE BESYLATE 10 MILLIGRAM(S): 2.5 TABLET ORAL at 06:12

## 2024-05-01 RX ADMIN — PANTOPRAZOLE SODIUM 40 MILLIGRAM(S): 20 TABLET, DELAYED RELEASE ORAL at 06:12

## 2024-05-01 RX ADMIN — TACROLIMUS 2 MILLIGRAM(S): 5 CAPSULE ORAL at 06:11
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
## 2024-05-01 NOTE — DISCHARGE NOTE NURSING/CASE MANAGEMENT/SOCIAL WORK - PATIENT PORTAL LINK FT
You can access the FollowMyHealth Patient Portal offered by [facility] by registering at the following website: [email or web address]. By joining [facility]’s FollowMyHealth portal, you will also be able to view your health information using other applications (apps) compatible with our system.

## 2024-05-01 NOTE — DISCHARGE NOTE NURSING/CASE MANAGEMENT/SOCIAL WORK - NSDCPEFALRISK_GEN_ALL_CORE
For information on Fall & Injury Prevention, visit: [email or web address] OR  [email or web address] OR  https://www.cdc.gov/steadi/patient.html

## 2024-05-08 ENCOUNTER — INPATIENT (INPATIENT)
Facility: HOSPITAL | Age: 84
LOS: 15 days | Discharge: HOME | DRG: 301 | End: 2024-05-24
Attending: HOSPITALIST | Admitting: FAMILY MEDICINE
Payer: MEDICARE

## 2024-05-08 VITALS
RESPIRATION RATE: 16 BRPM | HEART RATE: 77 BPM | WEIGHT: 115.08 LBS | HEIGHT: 64 IN | TEMPERATURE: 98 F | DIASTOLIC BLOOD PRESSURE: 80 MMHG | SYSTOLIC BLOOD PRESSURE: 130 MMHG | OXYGEN SATURATION: 95 %

## 2024-05-08 PROCEDURE — 99285 EMERGENCY DEPT VISIT HI MDM: CPT

## 2024-05-08 NOTE — ED ADULT TRIAGE NOTE - MODE OF ARRIVAL
POST-OP VISIT    Sirisha Babb  1963  DOS 8/16/19     Subjective: Patient here for post-op appointment following left ankle repair  Patient denies significant pain at the surgical site, active strikethrough drainage, fevers, chills, nightsweats, SOB, chest pain, nor calf pain  The patient is in good spirits  Patient relates compliance with post-op instructions  Patient is 3 5 weeks post-op  Objective: The patient appears in NAD / non-toxic  Primary dressing and splint/cast taken down for wound inspection  VSS  No signs of infection  No active drainage  Normal post-op edema  No necrosis, dehiscence  Left ankle exam:  -Incisions healed, minimal edema to ankle  -No pain with tib/fib squeeze or talar compression  -No pain with stressed DF until mild tightness at max DF    -Mild tenderness with eversion at deltoid ligaments  -Minimal lateral ankle ligament pain  -Negative anterior drawer  Assessment/Plan:     Diagnoses and all orders for this visit:    Sprain of anterior talofibular ligament of right ankle, subsequent encounter  -     X-ray ankle left 3+ views; Future  -     Ambulatory referral to Physical Therapy; Future    Closed fracture of left ankle with routine healing, subsequent encounter  -     Ambulatory referral to Physical Therapy; Future        1  Patient is stable post-op  2  Discussed compliance with weight bearing instructions, incision care, and rest  Call if any increase in pain, fevers, calf pain, shortness of breath, or general distress is noted  Patient instructed to go to ER if call is not returned immediately  3  In 3 weeks serial XRay and hopeful begin to WB in boot  Will need PT, script given today  [facility]

## 2024-05-08 NOTE — ED ADULT TRIAGE NOTE - CHIEF COMPLAINT QUOTE
BIB EMS for R/O DVT in the right arm. EMS states facility did "doppler and found it today". Pt altered @ BL. Currently Alert to self.

## 2024-05-09 DIAGNOSIS — D62 ACUTE POSTHEMORRHAGIC ANEMIA: ICD-10-CM

## 2024-05-09 DIAGNOSIS — K52.9 NONINFECTIVE GASTROENTERITIS AND COLITIS, UNSPECIFIED: ICD-10-CM

## 2024-05-09 DIAGNOSIS — K92.2 GASTROINTESTINAL HEMORRHAGE, UNSPECIFIED: ICD-10-CM

## 2024-05-09 DIAGNOSIS — I82.621 ACUTE EMBOLISM AND THROMBOSIS OF DEEP VEINS OF RIGHT UPPER EXTREMITY: ICD-10-CM

## 2024-05-09 DIAGNOSIS — I63.9 CEREBRAL INFARCTION, UNSPECIFIED: ICD-10-CM

## 2024-05-09 DIAGNOSIS — I82.409 ACUTE EMBOLISM AND THROMBOSIS OF UNSPECIFIED DEEP VEINS OF UNSPECIFIED LOWER EXTREMITY: ICD-10-CM

## 2024-05-09 DIAGNOSIS — Z94.0 KIDNEY TRANSPLANT STATUS: ICD-10-CM

## 2024-05-09 DIAGNOSIS — R19.7 DIARRHEA, UNSPECIFIED: ICD-10-CM

## 2024-05-09 DIAGNOSIS — N17.9 ACUTE KIDNEY FAILURE, UNSPECIFIED: ICD-10-CM

## 2024-05-09 LAB
ANION GAP SERPL CALC-SCNC: 16 MMOL/L — SIGNIFICANT CHANGE UP (ref 5–17)
APTT BLD: 27.9 SEC — SIGNIFICANT CHANGE UP (ref 24.5–35.6)
APTT BLD: >200 SEC — CRITICAL HIGH (ref 24.5–35.6)
BASOPHILS # BLD AUTO: 0 K/UL — SIGNIFICANT CHANGE UP (ref 0–0.2)
BASOPHILS NFR BLD AUTO: 0 % — SIGNIFICANT CHANGE UP (ref 0–2)
BUN SERPL-MCNC: 73.3 MG/DL — HIGH (ref 8–20)
BURR CELLS BLD QL SMEAR: PRESENT — SIGNIFICANT CHANGE UP
CALCIUM SERPL-MCNC: 7 MG/DL — LOW (ref 8.4–10.5)
CHLORIDE SERPL-SCNC: 101 MMOL/L — SIGNIFICANT CHANGE UP (ref 96–108)
CO2 SERPL-SCNC: 21 MMOL/L — LOW (ref 22–29)
CREAT SERPL-MCNC: 5.88 MG/DL — HIGH (ref 0.5–1.3)
DACRYOCYTES BLD QL SMEAR: SLIGHT — SIGNIFICANT CHANGE UP
EGFR: 7 ML/MIN/1.73M2 — LOW
ELLIPTOCYTES BLD QL SMEAR: SLIGHT — SIGNIFICANT CHANGE UP
EOSINOPHIL # BLD AUTO: 0.4 K/UL — SIGNIFICANT CHANGE UP (ref 0–0.5)
EOSINOPHIL NFR BLD AUTO: 7 % — HIGH (ref 0–6)
GLUCOSE SERPL-MCNC: 66 MG/DL — LOW (ref 70–99)
HCT VFR BLD CALC: 28.7 % — LOW (ref 34.5–45)
HCT VFR BLD CALC: 33 % — LOW (ref 34.5–45)
HCT VFR BLD CALC: 34.5 % — SIGNIFICANT CHANGE UP (ref 34.5–45)
HGB BLD-MCNC: 11.1 G/DL — LOW (ref 11.5–15.5)
HGB BLD-MCNC: 11.1 G/DL — LOW (ref 11.5–15.5)
HGB BLD-MCNC: 9.6 G/DL — LOW (ref 11.5–15.5)
INR BLD: 0.95 RATIO — SIGNIFICANT CHANGE UP (ref 0.85–1.18)
LYMPHOCYTES # BLD AUTO: 0.88 K/UL — LOW (ref 1–3.3)
LYMPHOCYTES # BLD AUTO: 15.6 % — SIGNIFICANT CHANGE UP (ref 13–44)
MCHC RBC-ENTMCNC: 30 PG — SIGNIFICANT CHANGE UP (ref 27–34)
MCHC RBC-ENTMCNC: 30.7 PG — SIGNIFICANT CHANGE UP (ref 27–34)
MCHC RBC-ENTMCNC: 30.7 PG — SIGNIFICANT CHANGE UP (ref 27–34)
MCHC RBC-ENTMCNC: 32.2 GM/DL — SIGNIFICANT CHANGE UP (ref 32–36)
MCHC RBC-ENTMCNC: 33.4 GM/DL — SIGNIFICANT CHANGE UP (ref 32–36)
MCHC RBC-ENTMCNC: 33.6 GM/DL — SIGNIFICANT CHANGE UP (ref 32–36)
MCV RBC AUTO: 91.4 FL — SIGNIFICANT CHANGE UP (ref 80–100)
MCV RBC AUTO: 91.7 FL — SIGNIFICANT CHANGE UP (ref 80–100)
MCV RBC AUTO: 93.2 FL — SIGNIFICANT CHANGE UP (ref 80–100)
MONOCYTES # BLD AUTO: 0.29 K/UL — SIGNIFICANT CHANGE UP (ref 0–0.9)
MONOCYTES NFR BLD AUTO: 5.2 % — SIGNIFICANT CHANGE UP (ref 2–14)
NEUTROPHILS # BLD AUTO: 4.09 K/UL — SIGNIFICANT CHANGE UP (ref 1.8–7.4)
NEUTROPHILS NFR BLD AUTO: 72.2 % — SIGNIFICANT CHANGE UP (ref 43–77)
NRBC # BLD: 1 /100 WBCS — HIGH (ref 0–0)
PLAT MORPH BLD: NORMAL — SIGNIFICANT CHANGE UP
PLATELET # BLD AUTO: 103 K/UL — LOW (ref 150–400)
PLATELET # BLD AUTO: 109 K/UL — LOW (ref 150–400)
PLATELET # BLD AUTO: 120 K/UL — LOW (ref 150–400)
POTASSIUM SERPL-MCNC: 4.9 MMOL/L — SIGNIFICANT CHANGE UP (ref 3.5–5.3)
POTASSIUM SERPL-SCNC: 4.9 MMOL/L — SIGNIFICANT CHANGE UP (ref 3.5–5.3)
PROTHROM AB SERPL-ACNC: 10.6 SEC — SIGNIFICANT CHANGE UP (ref 9.5–13)
RBC # BLD: 3.13 M/UL — LOW (ref 3.8–5.2)
RBC # BLD: 3.61 M/UL — LOW (ref 3.8–5.2)
RBC # BLD: 3.7 M/UL — LOW (ref 3.8–5.2)
RBC # FLD: 15.2 % — HIGH (ref 10.3–14.5)
RBC # FLD: 15.4 % — HIGH (ref 10.3–14.5)
RBC # FLD: 15.4 % — HIGH (ref 10.3–14.5)
RBC BLD AUTO: ABNORMAL
SODIUM SERPL-SCNC: 138 MMOL/L — SIGNIFICANT CHANGE UP (ref 135–145)
WBC # BLD: 5.23 K/UL — SIGNIFICANT CHANGE UP (ref 3.8–10.5)
WBC # BLD: 5.67 K/UL — SIGNIFICANT CHANGE UP (ref 3.8–10.5)
WBC # BLD: 6.59 K/UL — SIGNIFICANT CHANGE UP (ref 3.8–10.5)
WBC # FLD AUTO: 5.23 K/UL — SIGNIFICANT CHANGE UP (ref 3.8–10.5)
WBC # FLD AUTO: 5.67 K/UL — SIGNIFICANT CHANGE UP (ref 3.8–10.5)
WBC # FLD AUTO: 6.59 K/UL — SIGNIFICANT CHANGE UP (ref 3.8–10.5)

## 2024-05-09 PROCEDURE — 71045 X-RAY EXAM CHEST 1 VIEW: CPT | Mod: 26

## 2024-05-09 PROCEDURE — 93971 EXTREMITY STUDY: CPT | Mod: 26,RT

## 2024-05-09 PROCEDURE — 99223 1ST HOSP IP/OBS HIGH 75: CPT

## 2024-05-09 RX ORDER — LANOLIN ALCOHOL/MO/W.PET/CERES
3 CREAM (GRAM) TOPICAL AT BEDTIME
Refills: 0 | Status: DISCONTINUED | OUTPATIENT
Start: 2024-05-09 | End: 2024-05-24

## 2024-05-09 RX ORDER — MIRTAZAPINE 45 MG/1
7.5 TABLET, ORALLY DISINTEGRATING ORAL AT BEDTIME
Refills: 0 | Status: DISCONTINUED | OUTPATIENT
Start: 2024-05-09 | End: 2024-05-24

## 2024-05-09 RX ORDER — TACROLIMUS 5 MG/1
2 CAPSULE ORAL EVERY 12 HOURS
Refills: 0 | Status: DISCONTINUED | OUTPATIENT
Start: 2024-05-09 | End: 2024-05-24

## 2024-05-09 RX ORDER — CALCITRIOL 0.5 UG/1
0.5 CAPSULE ORAL DAILY
Refills: 0 | Status: DISCONTINUED | OUTPATIENT
Start: 2024-05-09 | End: 2024-05-24

## 2024-05-09 RX ORDER — FERROUS SULFATE 325(65) MG
325 TABLET ORAL DAILY
Refills: 0 | Status: DISCONTINUED | OUTPATIENT
Start: 2024-05-09 | End: 2024-05-24

## 2024-05-09 RX ORDER — HEPARIN SODIUM 5000 [USP'U]/ML
INJECTION INTRAVENOUS; SUBCUTANEOUS
Qty: 25000 | Refills: 0 | Status: DISCONTINUED | OUTPATIENT
Start: 2024-05-09 | End: 2024-05-10

## 2024-05-09 RX ORDER — HEPARIN SODIUM 5000 [USP'U]/ML
4000 INJECTION INTRAVENOUS; SUBCUTANEOUS EVERY 6 HOURS
Refills: 0 | Status: DISCONTINUED | OUTPATIENT
Start: 2024-05-09 | End: 2024-05-10

## 2024-05-09 RX ORDER — METRONIDAZOLE 500 MG
500 TABLET ORAL EVERY 8 HOURS
Refills: 0 | Status: DISCONTINUED | OUTPATIENT
Start: 2024-05-09 | End: 2024-05-11

## 2024-05-09 RX ORDER — SODIUM BICARBONATE 1 MEQ/ML
1950 SYRINGE (ML) INTRAVENOUS
Refills: 0 | Status: DISCONTINUED | OUTPATIENT
Start: 2024-05-09 | End: 2024-05-24

## 2024-05-09 RX ORDER — AMLODIPINE BESYLATE 2.5 MG/1
10 TABLET ORAL DAILY
Refills: 0 | Status: DISCONTINUED | OUTPATIENT
Start: 2024-05-09 | End: 2024-05-24

## 2024-05-09 RX ORDER — CARVEDILOL PHOSPHATE 80 MG/1
6.25 CAPSULE, EXTENDED RELEASE ORAL EVERY 12 HOURS
Refills: 0 | Status: DISCONTINUED | OUTPATIENT
Start: 2024-05-09 | End: 2024-05-24

## 2024-05-09 RX ORDER — HEPARIN SODIUM 5000 [USP'U]/ML
4000 INJECTION INTRAVENOUS; SUBCUTANEOUS ONCE
Refills: 0 | Status: COMPLETED | OUTPATIENT
Start: 2024-05-09 | End: 2024-05-09

## 2024-05-09 RX ORDER — MAGNESIUM HYDROXIDE 400 MG/1
30 TABLET, CHEWABLE ORAL DAILY
Refills: 0 | Status: DISCONTINUED | OUTPATIENT
Start: 2024-05-09 | End: 2024-05-24

## 2024-05-09 RX ORDER — CLOPIDOGREL BISULFATE 75 MG/1
75 TABLET, FILM COATED ORAL DAILY
Refills: 0 | Status: DISCONTINUED | OUTPATIENT
Start: 2024-05-09 | End: 2024-05-24

## 2024-05-09 RX ORDER — ACETAMINOPHEN 500 MG
650 TABLET ORAL EVERY 6 HOURS
Refills: 0 | Status: DISCONTINUED | OUTPATIENT
Start: 2024-05-09 | End: 2024-05-24

## 2024-05-09 RX ORDER — ONDANSETRON 8 MG/1
4 TABLET, FILM COATED ORAL EVERY 8 HOURS
Refills: 0 | Status: DISCONTINUED | OUTPATIENT
Start: 2024-05-09 | End: 2024-05-24

## 2024-05-09 RX ORDER — ERGOCALCIFEROL 1.25 MG/1
50000 CAPSULE ORAL
Refills: 0 | Status: DISCONTINUED | OUTPATIENT
Start: 2024-05-09 | End: 2024-05-24

## 2024-05-09 RX ORDER — MYCOPHENOLATE MOFETIL 250 MG/1
500 CAPSULE ORAL
Refills: 0 | Status: DISCONTINUED | OUTPATIENT
Start: 2024-05-09 | End: 2024-05-24

## 2024-05-09 RX ORDER — TAMSULOSIN HYDROCHLORIDE 0.4 MG/1
0.4 CAPSULE ORAL AT BEDTIME
Refills: 0 | Status: DISCONTINUED | OUTPATIENT
Start: 2024-05-09 | End: 2024-05-24

## 2024-05-09 RX ORDER — SODIUM CHLORIDE 9 MG/ML
1000 INJECTION, SOLUTION INTRAVENOUS
Refills: 0 | Status: DISCONTINUED | OUTPATIENT
Start: 2024-05-09 | End: 2024-05-11

## 2024-05-09 RX ORDER — PANTOPRAZOLE SODIUM 20 MG/1
40 TABLET, DELAYED RELEASE ORAL
Refills: 0 | Status: DISCONTINUED | OUTPATIENT
Start: 2024-05-09 | End: 2024-05-24

## 2024-05-09 RX ORDER — HEPARIN SODIUM 5000 [USP'U]/ML
2000 INJECTION INTRAVENOUS; SUBCUTANEOUS EVERY 6 HOURS
Refills: 0 | Status: DISCONTINUED | OUTPATIENT
Start: 2024-05-09 | End: 2024-05-10

## 2024-05-09 RX ADMIN — CARVEDILOL PHOSPHATE 6.25 MILLIGRAM(S): 80 CAPSULE, EXTENDED RELEASE ORAL at 17:49

## 2024-05-09 RX ADMIN — CARVEDILOL PHOSPHATE 6.25 MILLIGRAM(S): 80 CAPSULE, EXTENDED RELEASE ORAL at 09:19

## 2024-05-09 RX ADMIN — Medication 325 MILLIGRAM(S): at 12:08

## 2024-05-09 RX ADMIN — AMLODIPINE BESYLATE 10 MILLIGRAM(S): 2.5 TABLET ORAL at 12:08

## 2024-05-09 RX ADMIN — SODIUM CHLORIDE 75 MILLILITER(S): 9 INJECTION, SOLUTION INTRAVENOUS at 21:56

## 2024-05-09 RX ADMIN — TACROLIMUS 2 MILLIGRAM(S): 5 CAPSULE ORAL at 17:49

## 2024-05-09 RX ADMIN — MYCOPHENOLATE MOFETIL 500 MILLIGRAM(S): 250 CAPSULE ORAL at 17:50

## 2024-05-09 RX ADMIN — HEPARIN SODIUM 1000 UNIT(S)/HR: 5000 INJECTION INTRAVENOUS; SUBCUTANEOUS at 09:01

## 2024-05-09 RX ADMIN — MIRTAZAPINE 7.5 MILLIGRAM(S): 45 TABLET, ORALLY DISINTEGRATING ORAL at 21:56

## 2024-05-09 RX ADMIN — Medication 500 MILLIGRAM(S): at 17:49

## 2024-05-09 RX ADMIN — Medication 500 MILLIGRAM(S): at 21:56

## 2024-05-09 RX ADMIN — CLOPIDOGREL BISULFATE 75 MILLIGRAM(S): 75 TABLET, FILM COATED ORAL at 12:07

## 2024-05-09 RX ADMIN — Medication 500 MILLIGRAM(S): at 10:04

## 2024-05-09 RX ADMIN — HEPARIN SODIUM 0 UNIT(S)/HR: 5000 INJECTION INTRAVENOUS; SUBCUTANEOUS at 19:53

## 2024-05-09 RX ADMIN — Medication 1 TABLET(S): at 12:08

## 2024-05-09 RX ADMIN — Medication 5 MILLIGRAM(S): at 12:08

## 2024-05-09 RX ADMIN — Medication 1950 MILLIGRAM(S): at 17:49

## 2024-05-09 RX ADMIN — HEPARIN SODIUM 0 UNIT(S)/HR: 5000 INJECTION INTRAVENOUS; SUBCUTANEOUS at 19:46

## 2024-05-09 RX ADMIN — TAMSULOSIN HYDROCHLORIDE 0.4 MILLIGRAM(S): 0.4 CAPSULE ORAL at 21:56

## 2024-05-09 RX ADMIN — TACROLIMUS 2 MILLIGRAM(S): 5 CAPSULE ORAL at 12:08

## 2024-05-09 RX ADMIN — PANTOPRAZOLE SODIUM 40 MILLIGRAM(S): 20 TABLET, DELAYED RELEASE ORAL at 10:05

## 2024-05-09 RX ADMIN — CALCITRIOL 0.5 MICROGRAM(S): 0.5 CAPSULE ORAL at 12:07

## 2024-05-09 RX ADMIN — HEPARIN SODIUM 4000 UNIT(S): 5000 INJECTION INTRAVENOUS; SUBCUTANEOUS at 09:00

## 2024-05-09 RX ADMIN — PANTOPRAZOLE SODIUM 40 MILLIGRAM(S): 20 TABLET, DELAYED RELEASE ORAL at 17:49

## 2024-05-09 NOTE — CONSULT NOTE ADULT - SUBJECTIVE AND OBJECTIVE BOX
Hematology Consult Note    HPI:  84y F w/ hx ESRD s/p renal transplant on mycophenolate and tacrolimus, CVA, HTN, HLD, anemia, dementia,lung nodule,RUE dvt,caumadin toxicity, GI Bleed  presents from [facility] for RUE swelling with hx of  RUE DVT,diarrhea(started on metronidazole 05/06,pending cdif).Pt reports few days of R arm swelling, no pain.Pt denies n/v/d,abd pain,cp,sob,active bleed.  Per transfer papers, pt had RUE US done on 3/15 that showed RUE DVT involving internal jugular and subclavian veins, for which she was started on Coumadin. Was recently admitted to this hospital with anemia and elevated INR,found new lung nodule rec biopsy,seen by hem/onc . Family decided against further anticoagulation at that time given bleeding risk/biopsy,seen by GI,unable to do scope for poor prep,rec out pt f/u.. Pt  had RUE US done on 5/8 that again showed +DVT in right jugular vein, so pt was sent back to the ED.ED spoke with son,wants to start AC now.      4/19/24- CT A/P-Distended gallbladder with cholelithiasis. Decompressed colon with additional suggestion of submucosal edema involving the descending and sigmoid colon suggesting colitis, ischemic colitis cannot be excluded. Disseminated innumerable pulmonary nodules are worrisome for metastatic disease. Subcapsular segment 8 liver lesion measuring 1.8 cm.  4/20/24- CT-C- The bilateral pulmonary nodules are indeterminate ( right middle lobe is a 7 mm noncalcified nodule, left lower lobe is a 9 x 6 mm noncalcified nodule)Small bilateral pleural effusions and atelectasis. thyroid gland is heterogeneous with bilobar nodules and   calcifications.    PMH: ESRD s/p renal transplant on mycophenolate and tacrolimus, CVA, HTN, HLD, anemia, dementia,lung nodule,RUE dvt,caumadin toxicity, GI Bleed  presents from [facility] for RUE swelling with hx of  RUE DVT,lung nodule.    SH: Pt denies smoking/alcohol/illicit drug use  FH: no hx cva,strokeheart disease-verufy with son       (09 May 2024 09:13)      Allergies    No Known Allergies    Intolerances        MEDICATIONS  (STANDING):  amLODIPine   Tablet 10 milliGRAM(s) Oral daily  bisacodyl Suppository 10 milliGRAM(s) Rectal daily  calcitriol   Capsule 0.5 MICROGram(s) Oral daily  carvedilol 6.25 milliGRAM(s) Oral every 12 hours  clopidogrel Tablet 75 milliGRAM(s) Oral daily  dextrose 5% + lactated ringers. 1000 milliLiter(s) (75 mL/Hr) IV Continuous <Continuous>  ergocalciferol 89766 Unit(s) Oral every week  ferrous    sulfate 325 milliGRAM(s) Oral daily  heparin  Infusion.  Unit(s)/Hr (10 mL/Hr) IV Continuous <Continuous>  metroNIDAZOLE    Tablet 500 milliGRAM(s) Oral every 8 hours  mirtazapine 7.5 milliGRAM(s) Oral at bedtime  multivitamin 1 Tablet(s) Oral daily  mycophenolate mofetil 500 milliGRAM(s) Oral two times a day  pantoprazole   Suspension 40 milliGRAM(s) Oral two times a day  predniSONE   Tablet 5 milliGRAM(s) Oral daily  sodium bicarbonate 1950 milliGRAM(s) Oral two times a day  tacrolimus 2 milliGRAM(s) Oral every 12 hours  tamsulosin 0.4 milliGRAM(s) Oral at bedtime    MEDICATIONS  (PRN):  acetaminophen     Tablet .. 650 milliGRAM(s) Oral every 6 hours PRN Temp greater or equal to 38C (100.4F), Mild Pain (1 - 3)  aluminum hydroxide/magnesium hydroxide/simethicone Suspension 30 milliLiter(s) Oral every 4 hours PRN Dyspepsia  heparin   Injectable 2000 Unit(s) IV Push every 6 hours PRN For aPTT between 40 - 57  heparin   Injectable 4000 Unit(s) IV Push every 6 hours PRN For aPTT less than 40  magnesium hydroxide Suspension 30 milliLiter(s) Oral daily PRN Constipation  melatonin 3 milliGRAM(s) Oral at bedtime PRN Insomnia  ondansetron Injectable 4 milliGRAM(s) IV Push every 8 hours PRN Nausea and/or Vomiting      PAST MEDICAL & SURGICAL HISTORY:      FAMILY HISTORY:      SOCIAL HISTORY: No EtOH, no tobacco    REVIEW OF SYSTEMS:    CONSTITUTIONAL: gen weakness   EYES/ENT: No visual changes;  No vertigo or throat pain   NECK: No pain or stiffness  RESPIRATORY: No cough, wheezing, hemoptysis; No shortness of breath  CARDIOVASCULAR: No chest pain or palpitations  GASTROINTESTINAL: No abdominal or epigastric pain. No nausea, vomiting, or hematemesis; No diarrhea or constipation. No melena or hematochezia.  GENITOURINARY: No dysuria, frequency or hematuria  NEUROLOGICAL: No numbness or weakness  SKIN: No itching, burning, rashes, or lesions   All other review of systems is negative unless indicated above.    Height (cm): 162.6 (05-08 @ 20:31)  Weight (kg): 51.4 (05-09 @ 06:00)  BMI (kg/m2): 19.4 (05-09 @ 06:00)  BSA (m2): 1.54 (05-09 @ 06:00)    T(F): 98.3 (05-09-24 @ 06:00), Max: 98.3 (05-09-24 @ 06:00)  HR: 108 (05-09-24 @ 10:01)  BP: 147/88 (05-09-24 @ 10:01)  RR: 16 (05-09-24 @ 06:00)  SpO2: 99% (05-09-24 @ 06:00)  Wt(kg): --    GENERAL: NAD, well-developed  HEAD:  Atraumatic, Normocephalic  EYES: EOMI, PERRLA, conjunctiva and sclera clear  NECK: Supple, No JVD  CHEST/LUNG: Clear to auscultation bilaterally; No wheeze  HEART: Regular rate and rhythm; No murmurs, rubs, or gallops  ABDOMEN: Soft, Nontender, Nondistended; Bowel sounds present  EXTREMITIES:  +2 dependent edema   NEUROLOGY: non-focal  SKIN: No rashes or lesions                          11.1   5.67  )-----------( 120      ( 09 May 2024 06:20 )             33.0       05-09    138  |  101  |  73.3<H>  ----------------------------<  66<L>  4.9   |  21.0<L>  |  5.88<H>    Ca    7.0<L>      09 May 2024 06:20          < from: US Duplex Venous Upper Ext Complete, Bilateral (04.27.24 @ 19:01) >  ACC: [id number] EXAM:  US DPLX UPR EXT VEINS COMPL BI   ORDERED BY: CARISA MUSE     PROCEDURE DATE:  04/27/2024          INTERPRETATION:  CLINICAL INFORMATION: History of DVT in the arms    COMPARISON: None available.    TECHNIQUE: Duplex sonography of the BILATERAL upper extremity veins with   color and spectral Doppler, with and without compression.    FINDINGS:    RIGHT:  The right internal jugular vein is obscured by bandages. Thrombus in the   right axillary and upper right brachial veins.The right subclavian, and   mid-distal brachial veins are patent and compressible where applicable.    The basilic vein (superficial vein) is patent and without thrombus.  The   cephalic vein (superficial vein) is thrombosed in the upper portion.    LEFT:  The left internal jugular, subclavian, axillary and brachial veins are   patent and compressible where applicable.  The basilic vein (superficial   vein) is patent and without thrombus.  The cephalic vein (superficial   vein) is thrombosed being the outflow tract of the AV graft.    Doppler examination shows normal spontaneous and phasic flow.    IMPRESSION:  The occlusive thrombus in the right axillary and upper right brachial   veins.  Right upper cephalic vein thrombosis.  Left cephalic vein thrombosed as outflow tract of the AV fistula.      --- End of Report ---        < from: US Duplex Venous Upper Ext Ltd, Right (05.09.24 @ 03:08) >  ACC: [id number] EXAM:  US DPLX UPR EXT VEINS LTD RT   ORDERED BY: NATHAN DACOSTA     PROCEDURE DATE:  05/09/2024          INTERPRETATION:  RIGHT UPPER EXTREMITY VENOUS ULTRASOUND    INDICATION: Right arm pain. Evaluate for deep venous thrombosis (DVT).    TECHNIQUE: Grayscale, color and spectral Doppler evaluation of the right   upper extremity with graded compression maneuvers was performed.    COMPARISON: None.    FINDINGS:    There is thrombus identified within the right internal jugular,   subclavian and axillary, cephalic and basilic veins. Limited visualized   brachial, radial and ulnar veins are patent. Edematous right arm.    Contralateral left internal jugular and subclavian veins are patent.    IMPRESSION:    DVT in the right upper extremity as detailed above.  Edematous right arm.    These critical results were discussed via telephone at 5/9/2024 4:20 AM   by Dr. Luna of radiology with Dr. Dacosta.    --- End of Report ---

## 2024-05-09 NOTE — CONSULT NOTE ADULT - SUBJECTIVE AND OBJECTIVE BOX
[facility] DIVISION OF KIDNEY DISEASES AND HYPERTENSION -- INITIAL CONSULT NOTE  --------------------------------------------------------------------------------  HPI:    84 year old woman ESRD s/p renal transplant on mycophenolate and tacrolimus, CVA, HTN, HLD, anemia, dementia,lung nodule,RUE dvt,caumadin toxicity, GI Bleed  presents from [facility] for RUE swelling with hx of  RUE DVT,lung nodule.AVF ,  presents from [facility] for RUE swelling with hx of  RUE DVT,diarrhea(started on metronidazole 05/06,pending cdif).Pt reports few days of R arm swelling, no pain.Pt denies n/v/d,abd pain,cp,sob,active bleed.  Per transfer papers, pt had RUE US done on 3/15 that showed RUE DVT involving internal jugular and subclavian veins, for which she was started on Coumadin. Was recently admitted to this hospital with anemia and elevated INR,found new lung nodule rec biopsy,seen by hem/onc . Family decided against further anticoagulation at that time given bleeding risk/biopsy,seen by GI,unable to do scope for poor prep,rec out pt f/u.. Pt  had RUE US done on 5/8 that again showed +DVT in right jugular vein, so pt was sent back to the ED. pt was started on heparin gtt.  above appreciated- nephrology consulted for Octavio on CKD.  pt is known to us from recent admission.      PAST HISTORY  --------------------------------------------------------------------------------  PAST MEDICAL & SURGICAL HISTORY: as above    FAMILY HISTORY: no FH of kidney disease    PAST SOCIAL HISTORY: lives at home    ALLERGIES & MEDICATIONS  --------------------------------------------------------------------------------  Allergies    No Known Allergies        Standing Inpatient Medications  amLODIPine   Tablet 10 milliGRAM(s) Oral daily  bisacodyl Suppository 10 milliGRAM(s) Rectal daily  calcitriol   Capsule 0.5 MICROGram(s) Oral daily  carvedilol 6.25 milliGRAM(s) Oral every 12 hours  clopidogrel Tablet 75 milliGRAM(s) Oral daily  dextrose 5% + lactated ringers. 1000 milliLiter(s) IV Continuous <Continuous>  ergocalciferol 01019 Unit(s) Oral every week  ferrous    sulfate 325 milliGRAM(s) Oral daily  heparin  Infusion.  Unit(s)/Hr IV Continuous <Continuous>  metroNIDAZOLE    Tablet 500 milliGRAM(s) Oral every 8 hours  mirtazapine 7.5 milliGRAM(s) Oral at bedtime  multivitamin 1 Tablet(s) Oral daily  mycophenolate mofetil 500 milliGRAM(s) Oral two times a day  pantoprazole   Suspension 40 milliGRAM(s) Oral two times a day  predniSONE   Tablet 5 milliGRAM(s) Oral daily  sodium bicarbonate 1950 milliGRAM(s) Oral two times a day  tacrolimus 2 milliGRAM(s) Oral every 12 hours  tamsulosin 0.4 milliGRAM(s) Oral at bedtime    PRN Inpatient Medications  acetaminophen     Tablet .. 650 milliGRAM(s) Oral every 6 hours PRN  aluminum hydroxide/magnesium hydroxide/simethicone Suspension 30 milliLiter(s) Oral every 4 hours PRN  heparin   Injectable 4000 Unit(s) IV Push every 6 hours PRN  heparin   Injectable 2000 Unit(s) IV Push every 6 hours PRN  magnesium hydroxide Suspension 30 milliLiter(s) Oral daily PRN  melatonin 3 milliGRAM(s) Oral at bedtime PRN  ondansetron Injectable 4 milliGRAM(s) IV Push every 8 hours PRN      REVIEW OF SYSTEMS  --------------------------------------------------------------------------------  Gen: No weight changes, fatigue, fevers/chills, weakness  Skin: No rashes  Head/Eyes/Ears/Mouth: No headache; Normal hearing; Normal vision w/o blurriness; No sinus pain/discomfort, sore throat  Respiratory: No dyspnea, cough, wheezing, hemoptysis  CV: No chest pain, PND, orthopnea  GI: No abdominal pain, diarrhea, constipation, nausea, vomiting, melena, hematochezia  : No increased frequency, dysuria, hematuria, nocturia  MSK: No joint pain/swelling; no back pain; no edema  Neuro: No dizziness/lightheadedness, weakness, seizures, numbness, tingling  Heme: No easy bruising or bleeding  Endo: No heat/cold intolerance  Psych: No significant nervousness, anxiety, stress, depression    All other systems were reviewed and are negative, except as noted.    VITALS/PHYSICAL EXAM  --------------------------------------------------------------------------------  T(C): 36.8 (05-09-24 @ 06:00), Max: 36.8 (05-09-24 @ 06:00)  HR: 108 (05-09-24 @ 10:01) (76 - 108)  BP: 147/88 (05-09-24 @ 10:01) (130/80 - 152/90)  RR: 16 (05-09-24 @ 06:00) (16 - 18)  SpO2: 99% (05-09-24 @ 06:00) (95% - 99%)  Wt(kg): --  Height (cm): 162.6 (05-08-24 @ 20:31)  Weight (kg): 51.4 (05-09-24 @ 06:00)  BMI (kg/m2): 19.4 (05-09-24 @ 06:00)  BSA (m2): 1.54 (05-09-24 @ 06:00)      Physical Exam:  	Gen: NAD, well-appearing  	HEENT: PERRL, supple neck, clear oropharynx  	Pulm: CTA B/L  	CV: RRR, S1S2; no rub  	Back: No spinal or CVA tenderness; no sacral edema  	Abd: +BS, soft, nontender/nondistended  	: No suprapubic tenderness  	UE: Warm, FROM, no clubbing, intact strength; no edema; no asterixis  	LE: Warm, FROM, no clubbing, intact strength; no edema  	Neuro: No focal deficits, intact gait  	Psych: Normal affect and mood  	Skin: Warm, without rashes  	Vascular access:    LABS/STUDIES  --------------------------------------------------------------------------------              11.1   5.67  >-----------<  120      [05-09-24 @ 06:20]              33.0     138  |  101  |  73.3  ----------------------------<  66      [05-09-24 @ 06:20]  4.9   |  21.0  |  5.88        Ca     7.0     [05-09-24 @ 06:20]      PT/INR: PT 10.6 , INR 0.95       [05-09-24 @ 06:20]  PTT: 27.9       [05-09-24 @ 06:20]      Creatinine Trend:  SCr 5.88 [05-09 @ 06:20]  SCr 4.01 [04-30 @ 05:27]  SCr 3.89 [04-29 @ 06:39]  SCr 3.88 [04-28 @ 05:05]  SCr 3.76 [04-27 @ 06:00]    Urinalysis - [05-09-24 @ 06:20]      Color  / Appearance  / SG  / pH       Gluc 66 / Ketone   / Bili  / Urobili        Blood  / Protein  / Leuk Est  / Nitrite       RBC  / WBC  / Hyaline  / Gran  / Sq Epi  / Non Sq Epi  / Bacteria       Iron 175, TIBC 212, %sat 83      [04-19-24 @ 06:50]  Ferritin 3171      [04-19-24 @ 06:50]  TSH 1.72      [04-19-24 @ 06:50]       [facility] DIVISION OF KIDNEY DISEASES AND HYPERTENSION -- INITIAL CONSULT NOTE  --------------------------------------------------------------------------------  HPI:    84 year old woman ESRD s/p renal transplant on mycophenolate and tacrolimus, CVA, HTN, HLD, anemia, dementia,lung nodule,RUE dvt,caumadin toxicity, GI Bleed  presents from [facility] for RUE swelling with hx of  RUE DVT,lung nodule.AVF ,  presents from [facility] for RUE swelling with hx of  RUE DVT,diarrhea(started on metronidazole 05/06,pending cdif).Pt reports few days of R arm swelling, no pain.Pt denies n/v/d,abd pain,cp,sob,active bleed.  Per transfer papers, pt had RUE US done on 3/15 that showed RUE DVT involving internal jugular and subclavian veins, for which she was started on Coumadin. Was recently admitted to this hospital with anemia and elevated INR,found new lung nodule rec biopsy,seen by hem/onc . Family decided against further anticoagulation at that time given bleeding risk/biopsy,seen by GI,unable to do scope for poor prep,rec out pt f/u.. Pt  had RUE US done on 5/8 that again showed +DVT in right jugular vein, so pt was sent back to the ED. pt was started on heparin gtt.  above appreciated- nephrology consulted for Octavio on CKD.  pt is known to us from recent admission.      PAST HISTORY  --------------------------------------------------------------------------------  PAST MEDICAL & SURGICAL HISTORY: as above    FAMILY HISTORY: no FH of kidney disease    PAST SOCIAL HISTORY: lives at home    ALLERGIES & MEDICATIONS  --------------------------------------------------------------------------------  Allergies    No Known Allergies        Standing Inpatient Medications  amLODIPine   Tablet 10 milliGRAM(s) Oral daily  bisacodyl Suppository 10 milliGRAM(s) Rectal daily  calcitriol   Capsule 0.5 MICROGram(s) Oral daily  carvedilol 6.25 milliGRAM(s) Oral every 12 hours  clopidogrel Tablet 75 milliGRAM(s) Oral daily  dextrose 5% + lactated ringers. 1000 milliLiter(s) IV Continuous <Continuous>  ergocalciferol 98852 Unit(s) Oral every week  ferrous    sulfate 325 milliGRAM(s) Oral daily  heparin  Infusion.  Unit(s)/Hr IV Continuous <Continuous>  metroNIDAZOLE    Tablet 500 milliGRAM(s) Oral every 8 hours  mirtazapine 7.5 milliGRAM(s) Oral at bedtime  multivitamin 1 Tablet(s) Oral daily  mycophenolate mofetil 500 milliGRAM(s) Oral two times a day  pantoprazole   Suspension 40 milliGRAM(s) Oral two times a day  predniSONE   Tablet 5 milliGRAM(s) Oral daily  sodium bicarbonate 1950 milliGRAM(s) Oral two times a day  tacrolimus 2 milliGRAM(s) Oral every 12 hours  tamsulosin 0.4 milliGRAM(s) Oral at bedtime    PRN Inpatient Medications  acetaminophen     Tablet .. 650 milliGRAM(s) Oral every 6 hours PRN  aluminum hydroxide/magnesium hydroxide/simethicone Suspension 30 milliLiter(s) Oral every 4 hours PRN  heparin   Injectable 4000 Unit(s) IV Push every 6 hours PRN  heparin   Injectable 2000 Unit(s) IV Push every 6 hours PRN  magnesium hydroxide Suspension 30 milliLiter(s) Oral daily PRN  melatonin 3 milliGRAM(s) Oral at bedtime PRN  ondansetron Injectable 4 milliGRAM(s) IV Push every 8 hours PRN      REVIEW OF SYSTEMS  --------------------------------------------------------------------------------  unable to obtain    VITALS/PHYSICAL EXAM  --------------------------------------------------------------------------------  T(C): 36.8 (05-09-24 @ 06:00), Max: 36.8 (05-09-24 @ 06:00)  HR: 108 (05-09-24 @ 10:01) (76 - 108)  BP: 147/88 (05-09-24 @ 10:01) (130/80 - 152/90)  RR: 16 (05-09-24 @ 06:00) (16 - 18)  SpO2: 99% (05-09-24 @ 06:00) (95% - 99%)  Wt(kg): --  Height (cm): 162.6 (05-08-24 @ 20:31)  Weight (kg): 51.4 (05-09-24 @ 06:00)  BMI (kg/m2): 19.4 (05-09-24 @ 06:00)  BSA (m2): 1.54 (05-09-24 @ 06:00)      Physical Exam:  	Gen: elderly woman  	HEENT:  supple neck, clear oropharynx  	Pulm: CTA B/L  	CV: RRR, S1S2; no rub  	Abd: +BS, soft, nontender/nondistended  	: No suprapubic tenderness  	UE: Warm, no edema  	LE: Warm,no edema  	Neuro: lethargic  	Skin: Warm    LABS/STUDIES  --------------------------------------------------------------------------------              11.1   5.67  >-----------<  120      [05-09-24 @ 06:20]              33.0     138  |  101  |  73.3  ----------------------------<  66      [05-09-24 @ 06:20]  4.9   |  21.0  |  5.88        Ca     7.0     [05-09-24 @ 06:20]      PT/INR: PT 10.6 , INR 0.95       [05-09-24 @ 06:20]  PTT: 27.9       [05-09-24 @ 06:20]      Creatinine Trend:  SCr 5.88 [05-09 @ 06:20]  SCr 4.01 [04-30 @ 05:27]  SCr 3.89 [04-29 @ 06:39]  SCr 3.88 [04-28 @ 05:05]  SCr 3.76 [04-27 @ 06:00]    Urinalysis - [05-09-24 @ 06:20]      Color  / Appearance  / SG  / pH       Gluc 66 / Ketone   / Bili  / Urobili        Blood  / Protein  / Leuk Est  / Nitrite       RBC  / WBC  / Hyaline  / Gran  / Sq Epi  / Non Sq Epi  / Bacteria       Iron 175, TIBC 212, %sat 83      [04-19-24 @ 06:50]  Ferritin 3171      [04-19-24 @ 06:50]  TSH 1.72      [04-19-24 @ 06:50]

## 2024-05-09 NOTE — H&P ADULT - ASSESSMENT
84y F w/ hx ESRD s/p renal transplant on mycophenolate and tacrolimus,covid, CVA, HTN, HLD, anemia, dementia,lung nodule,RUE dvt,caumadin toxicity, GI Bleed  presents from [facility] nursing home for RUE swelling with hx of  RUE DVT,diarrhea(started on metronidazole 05/06,pending cdif).Pt reports few days of R arm swelling, no pain.Pt denies n/v/d,abd pain,cp,sob,active bleed.  Per transfer papers, pt had RUE US done on 3/15 that showed RUE DVT involving internal jugular and subclavian veins, for which she was started on Coumadin. Was recently admitted to this hospital with anemia and elevated INR,found new lung nodule rec biopsy,seen by hem/onc . Family decided against further anticoagulation at that time given bleeding risk/biopsy,seen by GI,unable to do scope for poor prep,rec out pt f/u.. Pt  had RUE US done on 5/8 that again showed +DVT in right jugular vein, so pt was sent back to the ED.ED spoke with son,wants to start AC now.      RUE dvt   -US Duplex Venous Upper Ext Ltd, Right (05.09.24 @ 03:08) >thrombus identified within the right internal jugular, subclavian and axillary, cephalic and basilic veins.  -will start heparin drip  -Hb stable with low INR  -Monitor cbc q 4hr  -PPI BID  -C/S hem/onc  -Pt/son agreed with AC,understood risk of bleed      GLO on CKD5 likely from diarrhea/recent gi bleed/ckd  S/P Renal Transplant in 2012 ([facility])    -cr 5.8(was 4.1 during discharge)  -IVF  -resume bicar tab,resume tacrolimus,mycophenolate  -spoke with nephrology  -monitor bmp  -i/o.avoid nephrotoxic drugs    Diarrhea with suspected Cdif(POA)  -Started Metronidazole(on 05/06) for 7 days at rehab with pending cdif per documents)  -Pt denies any active diarrhea now.  -order cdif,gi pcr  -monitor lyes  -reviewed ct abd/pelvis from prior admission ? colitis/ischemic  -abd benign       GIB  -hb stable  -monitor closley if hb drop/active bleed will c/s gi  -PPI      HTN  CVA  -Continue coreg,amlodipin  -Monitor bp closley    Chronic anemia  -on po iron    Lung nodule  Liver lesion  -suspected malignancy  -family wants to hold of biopsy   -reviewed ct chest/abd /pelvis from prior admission   -f/u hem/onc     Spoke with son Moe, wants to start AC understood risk of bleed, he does not want any work up malignancy now. he wants pt to be full code including intubation/resuscitation.  84y F w/ hx ESRD s/p renal transplant on mycophenolate and tacrolimus,covid, CVA, HTN, HLD, anemia, dementia,lung nodule,RUE dvt,caumadin toxicity, GI Bleed  presents from [facility] nursing home for RUE swelling with hx of  RUE DVT,diarrhea(started on metronidazole 05/06,pending cdif).Pt reports few days of R arm swelling, no pain.Pt denies n/v/d,abd pain,cp,sob,active bleed.  Per transfer papers, pt had RUE US done on 3/15 that showed RUE DVT involving internal jugular and subclavian veins, for which she was started on Coumadin. Was recently admitted to this hospital with anemia and elevated INR,found new lung nodule rec biopsy,seen by hem/onc . Family decided against further anticoagulation at that time given bleeding risk/biopsy,seen by GI,unable to do scope for poor prep,rec out pt f/u.. Pt  had RUE US done on 5/8 that again showed +DVT in right jugular vein, so pt was sent back to the ED.ED spoke with son,wants to start AC now.      RUE dvt   -US Duplex Venous Upper Ext Ltd, Right (05.09.24 @ 03:08) >thrombus identified within the right internal jugular, subclavian and axillary, cephalic and basilic veins.  -will start heparin drip  -Hb stable with low INR  -Monitor cbc q 4hr  -PPI BID  -C/S hem/onc  -Pt/son agreed with AC,understood risk of bleed      GLO on CKD5 likely from diarrhea/recent gi bleed/ckd  S/P Renal Transplant in 2012 ([facility])    -cr 5.8(was 4.1 during discharge)  -IVF  -resume bicar tab,resume tacrolimus,mycophenolate  -spoke with nephrology  -monitor bmp  -i/o.avoid nephrotoxic drugs    Diarrhea with suspected Cdif(POA)  -Started Metronidazole(on 05/06) for 7 days at rehab with pending cdif per documents)  -Pt denies any active diarrhea now.  -order cdif,gi pcr  -monitor lyes  -reviewed ct abd/pelvis from prior admission ? colitis/ischemic  -abd benign       GIB  -hb stable  -monitor closley if hb drop/active bleed will c/s gi  -PPI      HTN  CVA  -Continue coreg,amlodipin  -Monitor bp closley    Chronic anemia  -on po iron    Lung nodule  Liver lesion  -suspected malignancy  -family wants to hold of biopsy   -reviewed ct chest/abd /pelvis from prior admission   -f/u hem/onc     Spoke with son Moe, wants to start AC understood risk of bleed, he does not want any work up for malignancy now. he wants pt to be full code including intubation/resuscitation.

## 2024-05-09 NOTE — ED PROVIDER NOTE - PHYSICAL EXAMINATION
Constitutional: Awake, alert, in no acute distress  Eyes: no scleral icterus  HENT: normocephalic, atraumatic, moist oral mucosa  Neck: supple  CV: RRR, no murmur  Pulm: non-labored respirations, CTAB  Abdomen: soft, non-tender, non-distended  Extremities: +mild edema RUE, no deformity  Skin: no rash, no jaundice  Neuro: AAOx3, moving all extremities equally

## 2024-05-09 NOTE — ED ADULT NURSE REASSESSMENT NOTE - NS ED NURSE REASSESS COMMENT FT1
Patient weight obtained for heparin gtt. Patient given warm blankets, awaiting admission to hospital. vitals stable.

## 2024-05-09 NOTE — ED PROVIDER NOTE - OBJECTIVE STATEMENT
84y F w/ hx ESRD s/p renal transplant on mycophenolate and tacrolimus, CVA, HTN, HLD, anemia, dementia; presents from [facility] nursing home for RUE DVT. Pt reports few days of R arm swelling. Denies chest pain, SOB or other complaints. Per transfer papers, pt had RUE US done on 3/15 that showed RUE DVT involving internal jugular and subclavian veins, for which she was started on Coumadin. Was recently admitted to this hospital with anemia and elevated INR. Family decided against further anticoagulation at that time given bleeding risk. Pt then had rpt US done on 5/8 that again showed +DVT in right jugular vein, so pt was sent back to the ED.

## 2024-05-09 NOTE — ED PROVIDER NOTE - CLINICAL SUMMARY MEDICAL DECISION MAKING FREE TEXT BOX
84y F w/ hx ESRD s/p renal transplant, CVA, dementia, presents for persistent RUE DVT previously diagnosed 2 months ago. Pt not currently on AC as per family preference. I spoke with pt's son Moe Lopes -- he is now agreeable to starting AC. Will check rpt US, labs. 84y F w/ hx ESRD s/p renal transplant, CVA, dementia, presents for persistent RUE DVT previously diagnosed 2 months ago. Pt not currently on AC as per family preference given recent admission for severe anemia and concern for bleeding risk. I spoke with pt's son Moe Lopes -- he is now agreeable to starting AC. Will check rpt US, labs.

## 2024-05-09 NOTE — ED ADULT NURSE NOTE - NSFALLUNIVINTERV_ED_ALL_ED
Bed/Stretcher in lowest position, wheels locked, appropriate side rails in place/Call bell, personal items and telephone in reach/Instruct patient to call for assistance before getting out of bed/chair/stretcher/Non-slip footwear applied when patient is off stretcher/[place] to call system/Physically safe environment - no spills, clutter or unnecessary equipment/Purposeful proactive rounding/Room/bathroom lighting operational, light cord in reach

## 2024-05-09 NOTE — H&P ADULT - HISTORY OF PRESENT ILLNESS
84y F w/ hx ESRD s/p renal transplant on mycophenolate and tacrolimus, CVA, HTN, HLD, anemia, dementia,lung nodule,RUE dvt,caumadin toxicity, GI Bleed  presents from [facility] nursing home for RUE swelling with hx of  RUE DVT,diarrhea(started on metronidazole 05/06,pending cdif).Pt reports few days of R arm swelling, no pain.Pt denies n/v/d,abd pain,cp,sob,active bleed.  Per transfer papers, pt had RUE US done on 3/15 that showed RUE DVT involving internal jugular and subclavian veins, for which she was started on Coumadin. Was recently admitted to this hospital with anemia and elevated INR,found new lung nodule rec biopsy,seen by hem/onc . Family decided against further anticoagulation at that time given bleeding risk/biopsy,seen by GI,unable to do scope for poor prep,rec out pt f/u.. Pt  had RUE US done on 5/8 that again showed +DVT in right jugular vein, so pt was sent back to the ED.ED spoke with son,wants to start AC now.      4/19/24- CT A/P-Distended gallbladder with cholelithiasis. Decompressed colon with additional suggestion of submucosal edema involving the descending and sigmoid colon suggesting colitis, ischemic colitis cannot be excluded. Disseminated innumerable pulmonary nodules are worrisome for metastatic disease. Subcapsular segment 8 liver lesion measuring 1.8 cm.  4/20/24- CT-C- The bilateral pulmonary nodules are indeterminate ( right middle lobe is a 7 mm noncalcified nodule, left lower lobe is a 9 x 6 mm noncalcified nodule)Small bilateral pleural effusions and atelectasis. thyroid gland is heterogeneous with bilobar nodules and   calcifications.    PMH: ESRD s/p renal transplant on mycophenolate and tacrolimus, CVA, HTN, HLD, anemia, dementia,lung nodule,RUE dvt,caumadin toxicity, GI Bleed  presents from [facility] for RUE swelling with hx of  RUE DVT,lung nodule.    SH: Pt denies smoking/alcohol/illicit drug use  FH: no hx cva,strokeheart disease-verufy with son       84y F w/ hx ESRD s/p renal transplant on mycophenolate and tacrolimus,Prior hx of  HD , CVA, HTN, HLD, anemia, dementia,lung nodule,RUE dvt,caumadin toxicity, GI Bleed  presents from [facility] for RUE swelling with hx of  RUE DVT,diarrhea(started on metronidazole 05/06,pending cdif).Pt reports few days of R arm swelling, no pain.Pt denies n/v/d,abd pain,cp,sob,active bleed.  Per transfer papers, pt had RUE US done on 3/15 that showed RUE DVT involving internal jugular and subclavian veins, for which she was started on Coumadin. Was recently admitted to this hospital with anemia and elevated INR,found new lung nodule rec biopsy,seen by hem/onc . Family decided against further anticoagulation at that time given bleeding risk/biopsy,seen by GI,unable to do scope for poor prep,rec out pt f/u.. Pt  had RUE US done on 5/8 that again showed +DVT in right jugular vein, so pt was sent back to the ED.ED spoke with son,wants to start AC now.      4/19/24- CT A/P-Distended gallbladder with cholelithiasis. Decompressed colon with additional suggestion of submucosal edema involving the descending and sigmoid colon suggesting colitis, ischemic colitis cannot be excluded. Disseminated innumerable pulmonary nodules are worrisome for metastatic disease. Subcapsular segment 8 liver lesion measuring 1.8 cm.  4/20/24- CT-C- The bilateral pulmonary nodules are indeterminate ( right middle lobe is a 7 mm noncalcified nodule, left lower lobe is a 9 x 6 mm noncalcified nodule)Small bilateral pleural effusions and atelectasis. thyroid gland is heterogeneous with bilobar nodules and   calcifications.    PMH: ESRD s/p renal transplant on mycophenolate and tacrolimus, CVA, HTN, HLD, anemia, dementia,lung nodule,RUE dvt,caumadin toxicity, GI Bleed  presents from [facility] for RUE swelling with hx of  RUE DVT,lung nodule.AVF ,HX of HD     SH: Pt denies smoking/alcohol/illicit drug use  FH: no hx cva,strokeheart disease-verufy with son

## 2024-05-09 NOTE — CONSULT NOTE ADULT - ASSESSMENT
Pt with DDRT in 2012 at [facility]-   Advanced CKD- SCr in mid 4's during recent admission  Pt now with Octavio on CKD- likely pre renal- reported diarrhea at NH  Agree with IV hydration  For immunosuppression; continue Tacrolimus 2 mg q12h,  mg q12h, prednisone 5 mg daily  Obtain tacrolimus trough- diarrhea can increase FK levels  Metabolic acidosis; continue sodium bicarbonate   Bp stable- continue amlodipine   CKD mbd; calcium levels low- continue calcitriol

## 2024-05-09 NOTE — H&P ADULT - NSHPPHYSICALEXAM_GEN_ALL_CORE
T(C): 36.8 (05-09-24 @ 06:00), Max: 36.8 (05-09-24 @ 06:00)  HR: 76 (05-09-24 @ 06:00) (76 - 89)  BP: 149/85 (05-09-24 @ 06:00) (130/80 - 152/90)  RR: 16 (05-09-24 @ 06:00) (16 - 18)  SpO2: 99% (05-09-24 @ 06:00) (95% - 99%)    GEN - NAD  HEENT - NCAT, EOMI, KENZIE,   RESP - CTA BL, no wheeze/stridor/rhonchi/crackles. not on supplemental O2.  CARDIO - NS1S2, RRR. No murmurs.  ABD - Soft/Non tender/Non distended. Normal BS x4 quadrants.   Ext - No KENTON.RUE-no tenderness. rt arm swelling present,good radial pulse  MSK -no joints swelling,tenderness  Neuro - cn 2-12 grossly intact.  gait not observed.   Psych- AAOx3. calm

## 2024-05-09 NOTE — CONSULT NOTE ADULT - ASSESSMENT
incomplete     84y F w/ hx ESRD s/p renal transplant on mycophenolate and tacrolimus, CVA, HTN, HLD, anemia, dementia,  lung nodule, RUE dvt. Recently admitted (4/19-5/1) for coumadin tox, GI Bleed, found to have suspicious lung nodules at that time. ON previous admission patient followed by heme/onc recommending AC, family declined at that time as well as decline further w/u for lung nodules now presenting from [facility] for RUE swelling with hx of  RUE DVT,diarrhea(started on metronidazole 05/06,pending cdif), and a few days of R arm swelling.    4/27/24- US b/l UE-occlusive thrombus in the right axillary and upper right brachial veins.Right upper cephalic vein thrombosis. Left cephalic vein thrombosed as outflow tract of the AV fistula.  5/9/24-us RUE- thrombus identified within the right internal jugular, subclavian and axillary, cephalic and basilic veins. Limited visualized brachial, radial and ulnar veins are patent    #DVT  -patient w/ known DVT in RUE since admission to [facility], initially started on coumadin but patient ended up at [facility] (4/19-5/1) w/ coumadin tox/ GIB  -US repeated last admission heme recommending AC- but at time family deferred  -now presenting to [facility] w. DVT RUE   -hgb appears improved from previous admission    -patient started on hep gtt  RECS  -palliative eval for GOC patient has been hospitalized mult times in the past few months   -continue hep gtt in acute setting       *Note not finalized until signed by Attending Physician    Thank you for the referral.  Please call with any questions [phone]  Above reviewed with Attending Dr. Morrissey    [facility]  [address] [place] [address]  [phone]    84y F w/ hx ESRD s/p renal transplant on mycophenolate and tacrolimus, CVA, HTN, HLD, anemia, dementia,  lung nodule, RUE dvt. Recently admitted (4/19-5/1) for coumadin tox, GI Bleed, found to have suspicious lung nodules at that time. ON previous admission patient followed by heme/onc recommending AC, family declined at that time as well as decline further w/u for lung nodules now presenting from [facility] for RUE swelling with hx of  RUE DVT, diarrhea(started on metronidazole 05/06,pending cdif), and a few days of R arm swelling.    4/27/24- US b/l UE-occlusive thrombus in the right axillary and upper right brachial veins.Right upper cephalic vein thrombosis. Left cephalic vein thrombosed as outflow tract of the AV fistula.  5/9/24-us RUE- thrombus identified within the right internal jugular, subclavian and axillary, cephalic and basilic veins. Limited visualized brachial, radial and ulnar veins are patent    #DVT  -patient w/ known DVT in RUE since admission to [facility], initially started on coumadin but patient ended up at [facility] (4/19-5/1) w/ coumadin tox/ GIB  -US repeated last admission heme recommending AC- but at time family deferred  -now presenting to [facility] w. DVT RUE   -hgb appears improved from previous admission    -patient started on hep gtt  RECS  -continue hep gtt in acute setting, patient will need full dose AC for new DVT, given age and renal dysfunction would likely rec Eliquis 2.5mg BID, but would discuss w/ nephrology prior transition       *Note not finalized until signed by Attending Physician    Thank you for the referral.  Please call with any questions [phone]  Above reviewed with Attending Dr. Morrissey    [facility]  [address] [place] [address]  [phone]    84y F w/ hx ESRD s/p renal transplant on mycophenolate and tacrolimus, CVA, HTN, HLD, anemia, dementia,  lung nodule, RUE dvt. Recently admitted (4/19-5/1) for coumadin tox, GI Bleed, found to have suspicious lung nodules at that time. ON previous admission patient followed by heme/onc recommending AC, family declined at that time as well as decline further w/u for lung nodules now presenting from [facility] for RUE swelling with hx of  RUE DVT, diarrhea(started on metronidazole 05/06,pending cdif), and a few days of R arm swelling.    4/27/24- US b/l UE-occlusive thrombus in the right axillary and upper right brachial veins.Right upper cephalic vein thrombosis. Left cephalic vein thrombosed as outflow tract of the AV fistula.  5/9/24-us RUE- thrombus identified within the right internal jugular, subclavian and axillary, cephalic and basilic veins. Limited visualized brachial, radial and ulnar veins are patent    #DVT  -patient w/ known DVT in RUE since admission to [facility], initially started on coumadin but patient ended up at [facility] (4/19-5/1) w/ coumadin tox/ GIB  -US repeated last admission heme recommending AC- but at time family deferred  -now presenting to [facility] w. DVT RUE   -hgb appears improved from previous admission    -patient started on hep gtt    RECS  -continue hep gtt in acute setting, patient will need full dose AC for new DVT, given age and renal dysfunction would likely rec Eliquis 2.5mg BID, but would discuss w/ nephrology prior transition       *Note not finalized until signed by Attending Physician    Thank you for the referral.  Please call with any questions [phone]  Above reviewed with Attending Dr. Morrissey    [facility]  [address] [place] [address]  [phone]

## 2024-05-10 LAB
ANION GAP SERPL CALC-SCNC: 18 MMOL/L — HIGH (ref 5–17)
APTT BLD: 106.8 SEC — HIGH (ref 24.5–35.6)
APTT BLD: > 200 (ref 24.5–35.6)
APTT BLD: >200 SEC — CRITICAL HIGH (ref 24.5–35.6)
BUN SERPL-MCNC: 77.6 MG/DL — HIGH (ref 8–20)
CALCIUM SERPL-MCNC: 6.7 MG/DL — LOW (ref 8.4–10.5)
CHLORIDE SERPL-SCNC: 100 MMOL/L — SIGNIFICANT CHANGE UP (ref 96–108)
CO2 SERPL-SCNC: 21 MMOL/L — LOW (ref 22–29)
CREAT SERPL-MCNC: 6.25 MG/DL — HIGH (ref 0.5–1.3)
EGFR: 6 ML/MIN/1.73M2 — LOW
GLUCOSE SERPL-MCNC: 96 MG/DL — SIGNIFICANT CHANGE UP (ref 70–99)
HCT VFR BLD CALC: 28.2 % — LOW (ref 34.5–45)
HCT VFR BLD CALC: 31.3 % — LOW (ref 34.5–45)
HCT VFR BLD CALC: 31.7 % — LOW (ref 34.5–45)
HCT VFR BLD CALC: 32.8 % — LOW (ref 34.5–45)
HGB BLD-MCNC: 10.2 G/DL — LOW (ref 11.5–15.5)
HGB BLD-MCNC: 10.3 G/DL — LOW (ref 11.5–15.5)
HGB BLD-MCNC: 10.9 G/DL — LOW (ref 11.5–15.5)
HGB BLD-MCNC: 9.4 G/DL — LOW (ref 11.5–15.5)
IRON SATN MFR SERPL: 73 % — HIGH (ref 14–50)
IRON SATN MFR SERPL: 96 UG/DL — SIGNIFICANT CHANGE UP (ref 37–145)
MCHC RBC-ENTMCNC: 30.3 PG — SIGNIFICANT CHANGE UP (ref 27–34)
MCHC RBC-ENTMCNC: 30.5 PG — SIGNIFICANT CHANGE UP (ref 27–34)
MCHC RBC-ENTMCNC: 30.5 PG — SIGNIFICANT CHANGE UP (ref 27–34)
MCHC RBC-ENTMCNC: 30.7 PG — SIGNIFICANT CHANGE UP (ref 27–34)
MCHC RBC-ENTMCNC: 32.2 GM/DL — SIGNIFICANT CHANGE UP (ref 32–36)
MCHC RBC-ENTMCNC: 32.9 GM/DL — SIGNIFICANT CHANGE UP (ref 32–36)
MCHC RBC-ENTMCNC: 33.2 GM/DL — SIGNIFICANT CHANGE UP (ref 32–36)
MCHC RBC-ENTMCNC: 33.3 GM/DL — SIGNIFICANT CHANGE UP (ref 32–36)
MCV RBC AUTO: 91.6 FL — SIGNIFICANT CHANGE UP (ref 80–100)
MCV RBC AUTO: 91.9 FL — SIGNIFICANT CHANGE UP (ref 80–100)
MCV RBC AUTO: 93.4 FL — SIGNIFICANT CHANGE UP (ref 80–100)
MCV RBC AUTO: 94.1 FL — SIGNIFICANT CHANGE UP (ref 80–100)
PLATELET # BLD AUTO: 102 K/UL — LOW (ref 150–400)
PLATELET # BLD AUTO: 117 K/UL — LOW (ref 150–400)
PLATELET # BLD AUTO: 117 K/UL — LOW (ref 150–400)
PLATELET # BLD AUTO: 125 K/UL — LOW (ref 150–400)
POTASSIUM SERPL-MCNC: 5 MMOL/L — SIGNIFICANT CHANGE UP (ref 3.5–5.3)
POTASSIUM SERPL-SCNC: 5 MMOL/L — SIGNIFICANT CHANGE UP (ref 3.5–5.3)
RBC # BLD: 3.08 M/UL — LOW (ref 3.8–5.2)
RBC # BLD: 3.35 M/UL — LOW (ref 3.8–5.2)
RBC # BLD: 3.37 M/UL — LOW (ref 3.8–5.2)
RBC # BLD: 3.57 M/UL — LOW (ref 3.8–5.2)
RBC # FLD: 15.1 % — HIGH (ref 10.3–14.5)
RBC # FLD: 15.4 % — HIGH (ref 10.3–14.5)
RBC # FLD: 15.4 % — HIGH (ref 10.3–14.5)
RBC # FLD: 15.5 % — HIGH (ref 10.3–14.5)
SODIUM SERPL-SCNC: 138 MMOL/L — SIGNIFICANT CHANGE UP (ref 135–145)
TIBC SERPL-MCNC: 132 UG/DL — LOW (ref 220–430)
TRANSFERRIN SERPL-MCNC: 92 MG/DL — LOW (ref 192–382)
WBC # BLD: 4.38 K/UL — SIGNIFICANT CHANGE UP (ref 3.8–10.5)
WBC # BLD: 4.87 K/UL — SIGNIFICANT CHANGE UP (ref 3.8–10.5)
WBC # BLD: 5.32 K/UL — SIGNIFICANT CHANGE UP (ref 3.8–10.5)
WBC # BLD: 5.32 K/UL — SIGNIFICANT CHANGE UP (ref 3.8–10.5)
WBC # FLD AUTO: 4.38 K/UL — SIGNIFICANT CHANGE UP (ref 3.8–10.5)
WBC # FLD AUTO: 4.87 K/UL — SIGNIFICANT CHANGE UP (ref 3.8–10.5)
WBC # FLD AUTO: 5.32 K/UL — SIGNIFICANT CHANGE UP (ref 3.8–10.5)
WBC # FLD AUTO: 5.32 K/UL — SIGNIFICANT CHANGE UP (ref 3.8–10.5)

## 2024-05-10 PROCEDURE — 99233 SBSQ HOSP IP/OBS HIGH 50: CPT

## 2024-05-10 PROCEDURE — 99223 1ST HOSP IP/OBS HIGH 75: CPT

## 2024-05-10 RX ORDER — PANTOPRAZOLE SODIUM 20 MG/1
1 TABLET, DELAYED RELEASE ORAL
Refills: 0 | DISCHARGE

## 2024-05-10 RX ORDER — CLOPIDOGREL BISULFATE 75 MG/1
1 TABLET, FILM COATED ORAL
Refills: 0 | DISCHARGE

## 2024-05-10 RX ORDER — TACROLIMUS 5 MG/1
2 CAPSULE ORAL
Refills: 0 | DISCHARGE

## 2024-05-10 RX ORDER — HEPARIN SODIUM 5000 [USP'U]/ML
400 INJECTION INTRAVENOUS; SUBCUTANEOUS
Qty: 25000 | Refills: 0 | Status: DISCONTINUED | OUTPATIENT
Start: 2024-05-10 | End: 2024-05-24

## 2024-05-10 RX ORDER — ERGOCALCIFEROL 1.25 MG/1
1 CAPSULE ORAL
Refills: 0 | DISCHARGE

## 2024-05-10 RX ORDER — HEPARIN SODIUM 5000 [USP'U]/ML
4000 INJECTION INTRAVENOUS; SUBCUTANEOUS EVERY 6 HOURS
Refills: 0 | Status: DISCONTINUED | OUTPATIENT
Start: 2024-05-10 | End: 2024-05-10

## 2024-05-10 RX ORDER — CALCITRIOL 0.5 UG/1
2 CAPSULE ORAL
Refills: 0 | DISCHARGE

## 2024-05-10 RX ORDER — FERROUS SULFATE 325(65) MG
1 TABLET ORAL
Refills: 0 | DISCHARGE

## 2024-05-10 RX ORDER — HEPARIN SODIUM 5000 [USP'U]/ML
2000 INJECTION INTRAVENOUS; SUBCUTANEOUS EVERY 8 HOURS
Refills: 0 | Status: DISCONTINUED | OUTPATIENT
Start: 2024-05-10 | End: 2024-05-10

## 2024-05-10 RX ORDER — HEPARIN SODIUM 5000 [USP'U]/ML
600 INJECTION INTRAVENOUS; SUBCUTANEOUS
Qty: 25000 | Refills: 0 | Status: DISCONTINUED | OUTPATIENT
Start: 2024-05-10 | End: 2024-05-10

## 2024-05-10 RX ORDER — PREGABALIN 225 MG/1
1000 CAPSULE ORAL DAILY
Refills: 0 | Status: DISCONTINUED | OUTPATIENT
Start: 2024-05-10 | End: 2024-05-24

## 2024-05-10 RX ORDER — MAGNESIUM HYDROXIDE 400 MG/1
30 TABLET, CHEWABLE ORAL
Refills: 0 | DISCHARGE

## 2024-05-10 RX ORDER — MYCOPHENOLATE MOFETIL 250 MG/1
2 CAPSULE ORAL
Refills: 0 | DISCHARGE

## 2024-05-10 RX ORDER — AMLODIPINE BESYLATE 2.5 MG/1
1 TABLET ORAL
Refills: 0 | DISCHARGE

## 2024-05-10 RX ORDER — HEPARIN SODIUM 5000 [USP'U]/ML
2000 INJECTION INTRAVENOUS; SUBCUTANEOUS EVERY 6 HOURS
Refills: 0 | Status: DISCONTINUED | OUTPATIENT
Start: 2024-05-10 | End: 2024-05-24

## 2024-05-10 RX ORDER — FOLIC ACID 0.8 MG
1 TABLET ORAL DAILY
Refills: 0 | Status: DISCONTINUED | OUTPATIENT
Start: 2024-05-10 | End: 2024-05-24

## 2024-05-10 RX ADMIN — CLOPIDOGREL BISULFATE 75 MILLIGRAM(S): 75 TABLET, FILM COATED ORAL at 12:46

## 2024-05-10 RX ADMIN — TACROLIMUS 2 MILLIGRAM(S): 5 CAPSULE ORAL at 06:27

## 2024-05-10 RX ADMIN — Medication 325 MILLIGRAM(S): at 12:46

## 2024-05-10 RX ADMIN — PREGABALIN 1000 MICROGRAM(S): 225 CAPSULE ORAL at 12:46

## 2024-05-10 RX ADMIN — CARVEDILOL PHOSPHATE 6.25 MILLIGRAM(S): 80 CAPSULE, EXTENDED RELEASE ORAL at 18:33

## 2024-05-10 RX ADMIN — Medication 1950 MILLIGRAM(S): at 06:27

## 2024-05-10 RX ADMIN — MYCOPHENOLATE MOFETIL 500 MILLIGRAM(S): 250 CAPSULE ORAL at 18:33

## 2024-05-10 RX ADMIN — MIRTAZAPINE 7.5 MILLIGRAM(S): 45 TABLET, ORALLY DISINTEGRATING ORAL at 22:10

## 2024-05-10 RX ADMIN — MYCOPHENOLATE MOFETIL 500 MILLIGRAM(S): 250 CAPSULE ORAL at 06:23

## 2024-05-10 RX ADMIN — Medication 500 MILLIGRAM(S): at 22:10

## 2024-05-10 RX ADMIN — HEPARIN SODIUM 600 UNIT(S)/HR: 5000 INJECTION INTRAVENOUS; SUBCUTANEOUS at 10:32

## 2024-05-10 RX ADMIN — HEPARIN SODIUM 300 UNIT(S)/HR: 5000 INJECTION INTRAVENOUS; SUBCUTANEOUS at 18:31

## 2024-05-10 RX ADMIN — PANTOPRAZOLE SODIUM 40 MILLIGRAM(S): 20 TABLET, DELAYED RELEASE ORAL at 18:33

## 2024-05-10 RX ADMIN — CALCITRIOL 0.5 MICROGRAM(S): 0.5 CAPSULE ORAL at 12:45

## 2024-05-10 RX ADMIN — TACROLIMUS 2 MILLIGRAM(S): 5 CAPSULE ORAL at 18:33

## 2024-05-10 RX ADMIN — Medication 1950 MILLIGRAM(S): at 18:33

## 2024-05-10 RX ADMIN — HEPARIN SODIUM 300 UNIT(S)/HR: 5000 INJECTION INTRAVENOUS; SUBCUTANEOUS at 19:01

## 2024-05-10 RX ADMIN — HEPARIN SODIUM 400 UNIT(S)/HR: 5000 INJECTION INTRAVENOUS; SUBCUTANEOUS at 11:39

## 2024-05-10 RX ADMIN — CARVEDILOL PHOSPHATE 6.25 MILLIGRAM(S): 80 CAPSULE, EXTENDED RELEASE ORAL at 06:27

## 2024-05-10 RX ADMIN — Medication 500 MILLIGRAM(S): at 13:36

## 2024-05-10 RX ADMIN — TAMSULOSIN HYDROCHLORIDE 0.4 MILLIGRAM(S): 0.4 CAPSULE ORAL at 22:10

## 2024-05-10 RX ADMIN — ERGOCALCIFEROL 50000 UNIT(S): 1.25 CAPSULE ORAL at 12:46

## 2024-05-10 RX ADMIN — Medication 1 TABLET(S): at 12:46

## 2024-05-10 RX ADMIN — Medication 1 MILLIGRAM(S): at 12:46

## 2024-05-10 RX ADMIN — PANTOPRAZOLE SODIUM 40 MILLIGRAM(S): 20 TABLET, DELAYED RELEASE ORAL at 06:24

## 2024-05-10 RX ADMIN — HEPARIN SODIUM 800 UNIT(S)/HR: 5000 INJECTION INTRAVENOUS; SUBCUTANEOUS at 04:41

## 2024-05-10 RX ADMIN — AMLODIPINE BESYLATE 10 MILLIGRAM(S): 2.5 TABLET ORAL at 06:27

## 2024-05-10 RX ADMIN — Medication 500 MILLIGRAM(S): at 06:27

## 2024-05-10 RX ADMIN — Medication 5 MILLIGRAM(S): at 06:27

## 2024-05-10 NOTE — CONSULT NOTE ADULT - ASSESSMENT
84F with hx of ESRD was on HD and s/p transplant with now CKD, prior CVA, HTN, dementia, RUE DVT ( not on AC 2/2 GIB), recent admission for acute anemia/GI bleed / new bilateral lung nodules concerning for mets now admitted worsening RUE edema with doppler confirming +DVT and diarrhea 2/2 C difficile colitis. Palliative consulted for support and goals of care.     RUE DVT  - doppler noted above  - family declined AC due to recent acute anemia/GI bleed --> unable to undergo endoscopic procedure as pt unable to tolerate prep during recent admission  - son expressed wishes to pursue AC, now on heparin gtt      Chronic Anemia, Recent GI Bleed 2/2 Supratherapeutic INR  - pt was unable to tolerate prep for endoscopic procedure last admission and no evidence overt bleeding after H/H improved with transfusion    C Difficile Colitis  - on PO Metronidazole since 5/6    Bilateral Lung Nodules  - noted on CT on 4/19 and son declined biopsy during last admission and to follow up heme/onc outpatient  - if family declines to pursue biopsy and cancer directed therapy (if confirmed), hospice appropriate if in line with goals of care    Advancing Dementia  - CTH from 4/19 noted diffuse cerebral volume loss and microvascular ischemic changes   - likely Vascular given prior CVA  - considerate assist with ADLs, poor insight into her complex medical history    Palliative Care Encounter  - pt awake and oriented x 2 but poor insight to be able to make complex medical decisions  - surrogate is karthikeyan Pires   - No family present at bedside during visit  - Will plan to reach out to son for collateral history and engage in goals of care.  84F with hx of ESRD was on HD and s/p transplant with now CKD, prior CVA, HTN, dementia, RUE DVT ( not on AC 2/2 GIB), recent admission for acute anemia/GI bleed / new bilateral lung nodules concerning for mets now admitted worsening RUE edema with doppler confirming +DVT and diarrhea 2/2 C difficile colitis. Palliative consulted for support and goals of care.     Known RUE DVT  - doppler noted above  - family declined AC due to recent acute anemia/GI bleed --> unable to undergo endoscopic procedure as pt unable to tolerate prep during recent admission  - son expressed wishes to now pursue AC, now on heparin gtt  - monitor for any acute source of bleeding, serial CBC and transfuse PRN    C Difficile Colitis  - on PO Metronidazole since 5/6    Bilateral Lung Nodules  - noted on CT on 4/19 and son declined biopsy during last admission and to follow up heme/onc outpatient  - if family declines to pursue biopsy and cancer directed therapy (if confirmed), hospice appropriate if in line with goals of care    Advancing Dementia  - CTH from 4/19 noted diffuse cerebral volume loss and microvascular ischemic changes   - likely Vascular given prior CVA, severe stage    - please assist with ADLs    Palliative Care Encounter  - pt awake and oriented x 2 but poor insight to be able to make complex medical decisions  - she has only 1 son, Pranay who would be surrogate decision maker per CDA  - No family present at bedside during visit  - Attempted to reach son Pranay [phone] but no answer 84F with hx of ESRD was on HD and s/p transplant with now CKD, prior CVA, HTN, dementia, RUE DVT ( not on AC 2/2 GIB), recent admission for acute anemia/GI bleed / new bilateral lung nodules concerning for mets now admitted worsening RUE edema with doppler confirming +DVT and diarrhea 2/2 C difficile colitis. Palliative consulted for support and goals of care.     Known RUE DVT  - doppler noted above  - family declined AC due to recent acute anemia/GI bleed --> unable to undergo endoscopic procedure as pt unable to tolerate prep during recent admission  - son expressed wishes to now pursue AC, now on heparin gtt  - monitor for any acute source of bleeding, serial CBC and transfuse PRN    C Difficile Colitis  - on PO Metronidazole since 5/6    Bilateral Lung Nodules  - noted on CT on 4/19 and son declined biopsy during last admission and to follow up heme/onc outpatient  - if family declines to pursue biopsy and cancer directed therapy (if confirmed), hospice appropriate if in line with goals of care    Debility  Advancing Dementia  - CTH from 4/19 noted diffuse cerebral volume loss and microvascular ischemic changes   - likely Vascular given prior CVA, severe stage    - please assist with ADLs    Palliative Care Encounter  - pt awake and oriented x 2 but poor insight to be able to make complex medical decisions  - she has only 1 son, Pranay who [facility]  - No family present at bedside during visit  - Attempted to reach karthikeyan Pires [phone] but no answer

## 2024-05-10 NOTE — PROVIDER CONTACT NOTE (CRITICAL VALUE NOTIFICATION) - ACTION/TREATMENT ORDERED:
MRI of right shoulder and Bilateral Knee have been denied please advise Med PA made aware requested to follow Heparin nomogram.

## 2024-05-10 NOTE — PATIENT PROFILE ADULT - DO YOU FEEL THREATENED BY OTHERS?
For information on Fall & Injury Prevention, visit [email or web address]
no

## 2024-05-10 NOTE — CONSULT NOTE ADULT - SUBJECTIVE AND OBJECTIVE BOX
[facility] PALLIATIVE MEDICINE CONSULT    CC: Patient is a 84y old  Female who presents with a chief complaint of RUE DVT,acute on ckd,diarrhea (10 May 2024 08:38)    HPI:  Pt is a poor medical historian,   84y F w/ hx ESRD s/p renal transplant on mycophenolate and tacrolimus,Prior hx of  HD , CVA, HTN, HLD, anemia, dementia,lung nodule,RUE dvt,caumadin toxicity, GI Bleed  presents from [facility] for RUE swelling with hx of  RUE DVT,diarrhea(started on metronidazole 05/06,pending cdif).Pt reports few days of R arm swelling, no pain. Pt denies n/v/d,abd pain,cp,sob,active bleed.  Per transfer papers, pt had RUE US done on 3/15 that showed RUE DVT involving internal jugular and subclavian veins, for which she was started on Coumadin. Was recently admitted to this hospital with anemia and elevated INR,found new lung nodule rec biopsy,seen by hem/onc . Family decided against further anticoagulation at that time given bleeding risk/biopsy,seen by GI,unable to do scope for poor prep,rec out pt f/u.. Pt  had RUE US done on 5/8 that again showed +DVT in right jugular vein, so pt was sent back to the ED.ED spoke with son,wants to start AC now.      4/19/24- CT A/P-Distended gallbladder with cholelithiasis. Decompressed colon with additional suggestion of submucosal edema involving the descending and sigmoid colon suggesting colitis, ischemic colitis cannot be excluded. Disseminated innumerable pulmonary nodules are worrisome for metastatic disease. Subcapsular segment 8 liver lesion measuring 1.8 cm.  4/20/24- CT-C- The bilateral pulmonary nodules are indeterminate ( right middle lobe is a 7 mm noncalcified nodule, left lower lobe is a 9 x 6 mm noncalcified nodule)Small bilateral pleural effusions and atelectasis. thyroid gland is heterogeneous with bilobar nodules and   calcifications.    PMH: ESRD s/p renal transplant on mycophenolate and tacrolimus, CVA, HTN, HLD, anemia, dementia,lung nodule,RUE dvt,caumadin toxicity, GI Bleed  presents from [facility] for RUE swelling with hx of  RUE DVT,lung nodule.AVF ,HX of HD     SH: Pt denies smoking/alcohol/illicit drug use  FH: no hx cva,stroke heart disease-verufy with son  (09 May 2024 09:13)    Mrs. Gomez is an 84 year old female with PMHx noted above, recent admission for 4/19-5/1 for profound acute on chronic anemia, GI bleed, GLO and CT with new bilateral lung nodules concerning for underlying metastatic disease now readmitted from [facility] for worsening RUE edema (known RUE DVT in 3/2024) and acute diarrhea, was started on PO metronidazole 5/6 pending C diff results which did come back positive. RUE Doppler again with positive DVT. Son wishes to pursue anticoagulation (declined in prior admissions due to GI bleeding and inability to tolerate prep for endoscopic procedures). Palliative consulted for support and to assist with goals of care.     Pt seen in ED. She is awake and oriented to self, hospital and name of son. However, she had limited insight into hospitalization. She was resting comfortably, no acute distress.      Present Symptoms:     Dyspnea: no  Nausea/Vomiting:  No  Anxiety:   No  Depression: unable to obtain   Fatigue: Yes   Loss of appetite: Yes   Constipation:  None documented     Pain: No            Character-            Duration-            Effect-            Factors-            Frequency-            Location-            Severity-    Pain AD Score:  [email or web address] (press ctrl + left click to view)    Review of Systems: Limited due to mentation/dementia, Denies any chest pain or dyspnea     PERTINENT PMH REVIEWED: Yes      PAST MEDICAL & SURGICAL HISTORY:  - hx of ESRD s/p renal transplant on immunosuppressant therapy, now with CKD  - CVA  - HTN/HLD,   - Chronic Anemia  - Dementia  - RUE DVT (since 3/2024)  - GI Bleed  - Bilateral Pulmonary Nodules    FAMILY HISTORY: Unable to obtain due to mentation      Allergies    No Known Allergies    Intolerances        SOCIAL HISTORY:                      Substance history:                    Admitted from:  home  SNF  [facility]                     Children:                     Episcopalian/spirituality:                    Cultural concerns:       DECISION MAKER(s):[] Health Care Proxy(s)  [] Surrogate(s)  [] Guardian           - surrogate is karthikeyan Pires    ADVANCE DIRECTIVES/TREATMENT PREFERENCES: FULL CODE  DNR YES NO  Completed on:                     MOLST  YES NO   Completed on:  Living Will  YES NO   Completed on:    Karnofsky/Palliative Performance Status Version 2:  30%  http://npcrc.org/files/news/palliative_performance_scale_ppsv2.pdf    Baseline ADLs (prior to admission): mostly assist with ADLs      MEDICATIONS  (STANDING):  amLODIPine   Tablet 10 milliGRAM(s) Oral daily  bisacodyl Suppository 10 milliGRAM(s) Rectal daily  calcitriol   Capsule 0.5 MICROGram(s) Oral daily  carvedilol 6.25 milliGRAM(s) Oral every 12 hours  clopidogrel Tablet 75 milliGRAM(s) Oral daily  cyanocobalamin 1000 MICROGram(s) Oral daily  dextrose 5% + lactated ringers. 1000 milliLiter(s) (75 mL/Hr) IV Continuous <Continuous>  ergocalciferol 53507 Unit(s) Oral every week  ferrous    sulfate 325 milliGRAM(s) Oral daily  folic acid 1 milliGRAM(s) Oral daily  heparin  Infusion. 400 Unit(s)/Hr (4 mL/Hr) IV Continuous <Continuous>  metroNIDAZOLE    Tablet 500 milliGRAM(s) Oral every 8 hours  mirtazapine 7.5 milliGRAM(s) Oral at bedtime  multivitamin 1 Tablet(s) Oral daily  mycophenolate mofetil 500 milliGRAM(s) Oral two times a day  pantoprazole   Suspension 40 milliGRAM(s) Oral two times a day  predniSONE   Tablet 5 milliGRAM(s) Oral daily  sodium bicarbonate 1950 milliGRAM(s) Oral two times a day  tacrolimus 2 milliGRAM(s) Oral every 12 hours  tamsulosin 0.4 milliGRAM(s) Oral at bedtime    MEDICATIONS  (PRN):  acetaminophen     Tablet .. 650 milliGRAM(s) Oral every 6 hours PRN Temp greater or equal to 38C (100.4F), Mild Pain (1 - 3)  aluminum hydroxide/magnesium hydroxide/simethicone Suspension 30 milliLiter(s) Oral every 4 hours PRN Dyspepsia  heparin   Injectable 2000 Unit(s) IV Push every 6 hours PRN For aPTT between 40 - 57  magnesium hydroxide Suspension 30 milliLiter(s) Oral daily PRN Constipation  melatonin 3 milliGRAM(s) Oral at bedtime PRN Insomnia  ondansetron Injectable 4 milliGRAM(s) IV Push every 8 hours PRN Nausea and/or Vomiting      PHYSICAL EXAM:    Vital Signs Last 24 Hrs  T(C): 36.4 (10 May 2024 11:33), Max: 36.5 (09 May 2024 18:54)  T(F): 97.5 (10 May 2024 11:33), Max: 97.7 (09 May 2024 18:54)  HR: 89 (10 May 2024 11:33) (83 - 95)  BP: 148/85 (10 May 2024 11:33) (123/76 - 151/83)  BP(mean): --  RR: 19 (10 May 2024 11:33) (18 - 19)  SpO2: 98% (10 May 2024 11:33) (95% - 99%)    Parameters below as of 10 May 2024 11:33  Patient On (Oxygen Delivery Method): room air    General: frail. resting comfortably and no acute distress.     HEENT: mucous membrane moist      Lungs: comfortable nonlabored     CV: S1/S2. Regular rate and rhythm    GI: +BS abdomen soft, nondistended, nontender     MSK: no cyanosis +edema +weakness    Neuro: nonfocal. wakes to name, oriented x 2, knows name of son    Skin: warm and dry. no rash. ecchymosis. wounds.    LABS:                        10.2   4.87  )-----------( 117      ( 10 May 2024 09:42 )             31.7     05-10    138  |  100  |  77.6<H>  ----------------------------<  96  5.0   |  21.0<L>  |  6.25<H>    Ca    6.7<L>      10 May 2024 02:40      PT/INR - ( 09 May 2024 06:20 )   PT: 10.6 sec;   INR: 0.95 ratio         PTT - ( 10 May 2024 09:42 )  PTT:>200.0 sec  Urinalysis Basic - ( 10 May 2024 02:40 )    Color: x / Appearance: x / SG: x / pH: x  Gluc: 96 mg/dL / Ketone: x  / Bili: x / Urobili: x   Blood: x / Protein: x / Nitrite: x   Leuk Esterase: x / RBC: x / WBC x   Sq Epi: x / Non Sq Epi: x / Bacteria: x      I&O's Summary      RADIOLOGY & ADDITIONAL STUDIES:    CXR    ACC: [id number] EXAM:  XR CHEST PORTABLE URGENT 1V   ORDERED BY: NATHAN DACOSTA     PROCEDURE DATE:  05/09/2024          INTERPRETATION:  EXAM: XR CHEST URGENT    INDICATION: upper extremity DVT XXR    COMPARISON: April 19, 2024    IMPRESSION: Increased perihilar interstitial markings bilaterally and   borderline cardiomegaly. I would favor congestive changes with effusions   also suggested bilaterally. Regional osseous structures appropriate for   age. Follow-up suggested. Right sided central venous catheter is seen   previously is gone.    --- End of Report ---  FERNANDEZ RAMOS MD; Attending Radiologist  This document has been electronically signed. May  9 2024  9:05AM    RUE Doppler    ACC: [id number] EXAM:  US DPLX UPR EXT VEINS LTD RT   ORDERED BY: NATHAN DACOSTA     PROCEDURE DATE:  05/09/2024          INTERPRETATION:  RIGHT UPPER EXTREMITY VENOUS ULTRASOUND    INDICATION: Right arm pain. Evaluate for deep venous thrombosis (DVT).    TECHNIQUE: Grayscale, color and spectral Doppler evaluation of the right   upper extremity with graded compression maneuvers was performed.    COMPARISON: None.    FINDINGS:    There is thrombus identified within the right internal jugular,   subclavian and axillary, cephalic and basilic veins. Limited visualized   brachial, radial and ulnar veins are patent. Edematous right arm.    Contralateral left internal jugular and subclavian veins are patent.    IMPRESSION:    DVT in the right upper extremity as detailed above.  Edematous right arm.    These critical results were discussed via telephone at 5/9/2024 4:20 AM   by Dr. Lee of radiology with Dr. Dacosta.    --- End of Report ---  DARRELL LEE MD; Attending Radiologist  This document has been electronically signed. May  9 2024  4:34AM  Abnormal.    NEUROLOGICAL MEDICATIONS/OPIOIDS/BENZODIAZEPINE OVER PAST 24 HOURS    mirtazapine   7.5 milliGRAM(s) Oral (05-09-24 @ 21:56)     [facility] PALLIATIVE MEDICINE CONSULT    CC: Patient is a 84y old  Female who presents with a chief complaint of RUE DVT,acute on ckd,diarrhea (10 May 2024 08:38)    HPI:  Pt is a poor medical historian, info obtained from chart and staff.     84y F w/ hx ESRD s/p renal transplant on mycophenolate and tacrolimus,Prior hx of  HD , CVA, HTN, HLD, anemia, dementia,lung nodule,RUE dvt,caumadin toxicity, GI Bleed  presents from [facility] for RUE swelling with hx of  RUE DVT,diarrhea(started on metronidazole 05/06,pending cdif).Pt reports few days of R arm swelling, no pain. Pt denies n/v/d,abd pain,cp,sob,active bleed.  Per transfer papers, pt had RUE US done on 3/15 that showed RUE DVT involving internal jugular and subclavian veins, for which she was started on Coumadin. Was recently admitted to this hospital with anemia and elevated INR,found new lung nodule rec biopsy,seen by hem/onc . Family decided against further anticoagulation at that time given bleeding risk/biopsy,seen by GI,unable to do scope for poor prep,rec out pt f/u.. Pt  had RUE US done on 5/8 that again showed +DVT in right jugular vein, so pt was sent back to the ED.ED spoke with son,wants to start AC now.      4/19/24- CT A/P-Distended gallbladder with cholelithiasis. Decompressed colon with additional suggestion of submucosal edema involving the descending and sigmoid colon suggesting colitis, ischemic colitis cannot be excluded. Disseminated innumerable pulmonary nodules are worrisome for metastatic disease. Subcapsular segment 8 liver lesion measuring 1.8 cm.  4/20/24- CT-C- The bilateral pulmonary nodules are indeterminate ( right middle lobe is a 7 mm noncalcified nodule, left lower lobe is a 9 x 6 mm noncalcified nodule)Small bilateral pleural effusions and atelectasis. thyroid gland is heterogeneous with bilobar nodules and   calcifications.    PMH: ESRD s/p renal transplant on mycophenolate and tacrolimus, CVA, HTN, HLD, anemia, dementia,lung nodule,RUE dvt,caumadin toxicity, GI Bleed  presents from [facility] for RUE swelling with hx of  RUE DVT,lung nodule.AVF ,HX of HD     SH: Pt denies smoking/alcohol/illicit drug use  FH: no hx cva,stroke heart disease-verufy with son  (09 May 2024 09:13)    Mrs. Gomez is an 84 year old female with PMHx noted above, recent admission for 4/19-5/1 for profound acute on chronic anemia, GI bleed, GLO and CT with new bilateral lung nodules concerning for underlying metastatic disease now readmitted from [facility] for worsening RUE edema (known RUE DVT in 3/2024) and acute diarrhea, was started on PO metronidazole 5/6 pending C diff results which did come back positive. RUE Doppler again with positive DVT. Son wishes to pursue anticoagulation (declined in prior admissions due to GI bleeding and inability to tolerate prep for endoscopic procedures). Palliative consulted for support and to assist with goals of care.     Pt seen in ED. She is awake and oriented to self, hospital and name of son. However, she had limited insight into hospitalization. She was resting comfortably, no acute distress.      Present Symptoms:     Dyspnea: no  Nausea/Vomiting:  No  Anxiety:   No  Depression: unable to obtain   Fatigue: Yes   Loss of appetite: Yes   Constipation:  None documented     Pain: No            Character-            Duration-            Effect-            Factors-            Frequency-            Location-            Severity-    Pain AD Score:  [email or web address] (press ctrl + left click to view)    Review of Systems: Limited due to mentation/dementia, Denies any chest pain or dyspnea     PERTINENT PMH REVIEWED: Yes      PAST MEDICAL & SURGICAL HISTORY:  - hx of ESRD s/p renal transplant on immunosuppressant therapy, now with CKD  - CVA  - HTN/HLD,   - Chronic Anemia  - Dementia  - RUE DVT (since 3/2024)  - GI Bleed  - Bilateral Pulmonary Nodules    FAMILY HISTORY: Unable to obtain due to mentation      Allergies    No Known Allergies    Intolerances        SOCIAL HISTORY:                      Substance history:                    Admitted from:  home  SNF  [facility]                     Children:                     Buddhism/spirituality:                    Cultural concerns:       DECISION MAKER(s):[] Health Care Proxy(s)  [] Surrogate(s)  [] Guardian           - surrogate is karthikeyan Pires    ADVANCE DIRECTIVES/TREATMENT PREFERENCES: FULL CODE  DNR YES NO  Completed on:                     MOLST  YES NO   Completed on:  Living Will  YES NO   Completed on:    Karnofsky/Palliative Performance Status Version 2:  30%  http://npcrc.org/files/news/palliative_performance_scale_ppsv2.pdf    Baseline ADLs (prior to admission): mostly assist with ADLs      MEDICATIONS  (STANDING):  amLODIPine   Tablet 10 milliGRAM(s) Oral daily  bisacodyl Suppository 10 milliGRAM(s) Rectal daily  calcitriol   Capsule 0.5 MICROGram(s) Oral daily  carvedilol 6.25 milliGRAM(s) Oral every 12 hours  clopidogrel Tablet 75 milliGRAM(s) Oral daily  cyanocobalamin 1000 MICROGram(s) Oral daily  dextrose 5% + lactated ringers. 1000 milliLiter(s) (75 mL/Hr) IV Continuous <Continuous>  ergocalciferol 57873 Unit(s) Oral every week  ferrous    sulfate 325 milliGRAM(s) Oral daily  folic acid 1 milliGRAM(s) Oral daily  heparin  Infusion. 400 Unit(s)/Hr (4 mL/Hr) IV Continuous <Continuous>  metroNIDAZOLE    Tablet 500 milliGRAM(s) Oral every 8 hours  mirtazapine 7.5 milliGRAM(s) Oral at bedtime  multivitamin 1 Tablet(s) Oral daily  mycophenolate mofetil 500 milliGRAM(s) Oral two times a day  pantoprazole   Suspension 40 milliGRAM(s) Oral two times a day  predniSONE   Tablet 5 milliGRAM(s) Oral daily  sodium bicarbonate 1950 milliGRAM(s) Oral two times a day  tacrolimus 2 milliGRAM(s) Oral every 12 hours  tamsulosin 0.4 milliGRAM(s) Oral at bedtime    MEDICATIONS  (PRN):  acetaminophen     Tablet .. 650 milliGRAM(s) Oral every 6 hours PRN Temp greater or equal to 38C (100.4F), Mild Pain (1 - 3)  aluminum hydroxide/magnesium hydroxide/simethicone Suspension 30 milliLiter(s) Oral every 4 hours PRN Dyspepsia  heparin   Injectable 2000 Unit(s) IV Push every 6 hours PRN For aPTT between 40 - 57  magnesium hydroxide Suspension 30 milliLiter(s) Oral daily PRN Constipation  melatonin 3 milliGRAM(s) Oral at bedtime PRN Insomnia  ondansetron Injectable 4 milliGRAM(s) IV Push every 8 hours PRN Nausea and/or Vomiting      PHYSICAL EXAM:    Vital Signs Last 24 Hrs  T(C): 36.4 (10 May 2024 11:33), Max: 36.5 (09 May 2024 18:54)  T(F): 97.5 (10 May 2024 11:33), Max: 97.7 (09 May 2024 18:54)  HR: 89 (10 May 2024 11:33) (83 - 95)  BP: 148/85 (10 May 2024 11:33) (123/76 - 151/83)  BP(mean): --  RR: 19 (10 May 2024 11:33) (18 - 19)  SpO2: 98% (10 May 2024 11:33) (95% - 99%)    Parameters below as of 10 May 2024 11:33  Patient On (Oxygen Delivery Method): room air    General: frail. resting comfortably and no acute distress.     HEENT: mucous membrane moist      Lungs: comfortable nonlabored     CV: S1/S2. Regular rate and rhythm    GI: +BS abdomen soft, nondistended, nontender     MSK: no cyanosis +edema +weakness    Neuro: nonfocal. wakes to name, oriented x 2, knows name of son    Skin: warm and dry. no rash. ecchymosis. wounds.    LABS:                        10.2   4.87  )-----------( 117      ( 10 May 2024 09:42 )             31.7     05-10    138  |  100  |  77.6<H>  ----------------------------<  96  5.0   |  21.0<L>  |  6.25<H>    Ca    6.7<L>      10 May 2024 02:40      PT/INR - ( 09 May 2024 06:20 )   PT: 10.6 sec;   INR: 0.95 ratio         PTT - ( 10 May 2024 09:42 )  PTT:>200.0 sec  Urinalysis Basic - ( 10 May 2024 02:40 )    Color: x / Appearance: x / SG: x / pH: x  Gluc: 96 mg/dL / Ketone: x  / Bili: x / Urobili: x   Blood: x / Protein: x / Nitrite: x   Leuk Esterase: x / RBC: x / WBC x   Sq Epi: x / Non Sq Epi: x / Bacteria: x      I&O's Summary      RADIOLOGY & ADDITIONAL STUDIES:    CXR    ACC: [id number] EXAM:  XR CHEST PORTABLE URGENT 1V   ORDERED BY: NATHAN DACOSTA     PROCEDURE DATE:  05/09/2024          INTERPRETATION:  EXAM: XR CHEST URGENT    INDICATION: upper extremity DVT XXR    COMPARISON: April 19, 2024    IMPRESSION: Increased perihilar interstitial markings bilaterally and   borderline cardiomegaly. I would favor congestive changes with effusions   also suggested bilaterally. Regional osseous structures appropriate for   age. Follow-up suggested. Right sided central venous catheter is seen   previously is gone.    --- End of Report ---  FERNANDEZ RAMOS MD; Attending Radiologist  This document has been electronically signed. May  9 2024  9:05AM    RUE Doppler    ACC: [id number] EXAM:  US DPLX UPR EXT VEINS LTD RT   ORDERED BY: NATHAN DACOSTA     PROCEDURE DATE:  05/09/2024          INTERPRETATION:  RIGHT UPPER EXTREMITY VENOUS ULTRASOUND    INDICATION: Right arm pain. Evaluate for deep venous thrombosis (DVT).    TECHNIQUE: Grayscale, color and spectral Doppler evaluation of the right   upper extremity with graded compression maneuvers was performed.    COMPARISON: None.    FINDINGS:    There is thrombus identified within the right internal jugular,   subclavian and axillary, cephalic and basilic veins. Limited visualized   brachial, radial and ulnar veins are patent. Edematous right arm.    Contralateral left internal jugular and subclavian veins are patent.    IMPRESSION:    DVT in the right upper extremity as detailed above.  Edematous right arm.    These critical results were discussed via telephone at 5/9/2024 4:20 AM   by Dr. Lee of radiology with Dr. Dacosta.    --- End of Report ---  DARRELL LEE MD; Attending Radiologist  This document has been electronically signed. May  9 2024  4:34AM  Abnormal.    NEUROLOGICAL MEDICATIONS/OPIOIDS/BENZODIAZEPINE OVER PAST 24 HOURS    mirtazapine   7.5 milliGRAM(s) Oral (05-09-24 @ 21:56)     [facility] PALLIATIVE MEDICINE CONSULT    CC: Patient is a 84y old  Female who presents with a chief complaint of RUE DVT,acute on ckd,diarrhea (10 May 2024 08:38)    HPI:  Pt is a poor medical historian, info obtained from chart and staff.     84y F w/ hx ESRD s/p renal transplant on mycophenolate and tacrolimus,Prior hx of  HD , CVA, HTN, HLD, anemia, dementia,lung nodule,RUE dvt,caumadin toxicity, GI Bleed  presents from [facility] for RUE swelling with hx of  RUE DVT,diarrhea(started on metronidazole 05/06,pending cdif).Pt reports few days of R arm swelling, no pain. Pt denies n/v/d,abd pain,cp,sob,active bleed.  Per transfer papers, pt had RUE US done on 3/15 that showed RUE DVT involving internal jugular and subclavian veins, for which she was started on Coumadin. Was recently admitted to this hospital with anemia and elevated INR,found new lung nodule rec biopsy,seen by hem/onc . Family decided against further anticoagulation at that time given bleeding risk/biopsy,seen by GI,unable to do scope for poor prep,rec out pt f/u.. Pt  had RUE US done on 5/8 that again showed +DVT in right jugular vein, so pt was sent back to the ED.ED spoke with son,wants to start AC now.      4/19/24- CT A/P-Distended gallbladder with cholelithiasis. Decompressed colon with additional suggestion of submucosal edema involving the descending and sigmoid colon suggesting colitis, ischemic colitis cannot be excluded. Disseminated innumerable pulmonary nodules are worrisome for metastatic disease. Subcapsular segment 8 liver lesion measuring 1.8 cm.  4/20/24- CT-C- The bilateral pulmonary nodules are indeterminate ( right middle lobe is a 7 mm noncalcified nodule, left lower lobe is a 9 x 6 mm noncalcified nodule)Small bilateral pleural effusions and atelectasis. thyroid gland is heterogeneous with bilobar nodules and   calcifications.    PMH: ESRD s/p renal transplant on mycophenolate and tacrolimus, CVA, HTN, HLD, anemia, dementia,lung nodule,RUE dvt,caumadin toxicity, GI Bleed  presents from [facility] for RUE swelling with hx of  RUE DVT,lung nodule.AVF ,HX of HD     SH: Pt denies smoking/alcohol/illicit drug use  FH: no hx cva,stroke heart disease-verufy with son  (09 May 2024 09:13)    Mrs. Gomez is an 84 year old female with PMHx noted above, recent admission for 4/19-5/1 for profound acute on chronic anemia, GI bleed, GLO and CT with new bilateral lung nodules concerning for underlying metastatic disease now readmitted from [facility] for worsening RUE edema (known RUE DVT in 3/2024) and acute diarrhea, was started on PO metronidazole 5/6 pending C diff results which did come back positive. RUE Doppler again with positive DVT. Son wishes to pursue anticoagulation (declined in prior admissions due to GI bleeding and inability to tolerate prep for endoscopic procedures). Palliative consulted for support and to assist with goals of care.     Pt seen in ED. She is awake and oriented to self, hospital and name of son. However, she had limited insight into hospitalization. She was resting comfortably, no acute distress.      Present Symptoms:     Dyspnea: no  Nausea/Vomiting:  No  Anxiety:   No  Depression: unable to obtain   Fatigue: Yes   Loss of appetite: Yes   Constipation:  None documented     Pain: No            Character-            Duration-            Effect-            Factors-            Frequency-            Location-            Severity-    Pain AD Score:  [email or web address] (press ctrl + left click to view)    Review of Systems: Limited due to mentation/dementia, Denies any chest pain or dyspnea     PERTINENT PMH REVIEWED: Yes      PAST MEDICAL & SURGICAL HISTORY:  - hx of ESRD s/p renal transplant on immunosuppressant therapy, now with CKD  - CVA  - HTN/HLD,   - Chronic Anemia  - Dementia  - RUE DVT (since 3/2024)  - GI Bleed  - Bilateral Pulmonary Nodules    FAMILY HISTORY: Unable to obtain due to mentation      Allergies    No Known Allergies    Intolerances        SOCIAL HISTORY:                      Substance history:                    Admitted from:   SNF                      Children: 1 son                    Rastafarian/spirituality:                    Cultural concerns:       DECISION MAKER(s):[] Health Care Proxy(s)  [] Surrogate(s)  [] Guardian           - surrogate is son Pranay    ADVANCE DIRECTIVES/TREATMENT PREFERENCES: FULL CODE  DNR YES NO  Completed on:                     MOLST  YES NO   Completed on:  Living Will  YES NO   Completed on:    Karnofsky/Palliative Performance Status Version 2:  30%  http://npcrc.org/files/news/palliative_performance_scale_[email or web address]    Baseline ADLs (prior to admission): mostly assist with ADLs      MEDICATIONS  (STANDING):  amLODIPine   Tablet 10 milliGRAM(s) Oral daily  bisacodyl Suppository 10 milliGRAM(s) Rectal daily  calcitriol   Capsule 0.5 MICROGram(s) Oral daily  carvedilol 6.25 milliGRAM(s) Oral every 12 hours  clopidogrel Tablet 75 milliGRAM(s) Oral daily  cyanocobalamin 1000 MICROGram(s) Oral daily  dextrose 5% + lactated ringers. 1000 milliLiter(s) (75 mL/Hr) IV Continuous <Continuous>  ergocalciferol 08965 Unit(s) Oral every week  ferrous    sulfate 325 milliGRAM(s) Oral daily  folic acid 1 milliGRAM(s) Oral daily  heparin  Infusion. 400 Unit(s)/Hr (4 mL/Hr) IV Continuous <Continuous>  metroNIDAZOLE    Tablet 500 milliGRAM(s) Oral every 8 hours  mirtazapine 7.5 milliGRAM(s) Oral at bedtime  multivitamin 1 Tablet(s) Oral daily  mycophenolate mofetil 500 milliGRAM(s) Oral two times a day  pantoprazole   Suspension 40 milliGRAM(s) Oral two times a day  predniSONE   Tablet 5 milliGRAM(s) Oral daily  sodium bicarbonate 1950 milliGRAM(s) Oral two times a day  tacrolimus 2 milliGRAM(s) Oral every 12 hours  tamsulosin 0.4 milliGRAM(s) Oral at bedtime    MEDICATIONS  (PRN):  acetaminophen     Tablet .. 650 milliGRAM(s) Oral every 6 hours PRN Temp greater or equal to 38C (100.4F), Mild Pain (1 - 3)  aluminum hydroxide/magnesium hydroxide/simethicone Suspension 30 milliLiter(s) Oral every 4 hours PRN Dyspepsia  heparin   Injectable 2000 Unit(s) IV Push every 6 hours PRN For aPTT between 40 - 57  magnesium hydroxide Suspension 30 milliLiter(s) Oral daily PRN Constipation  melatonin 3 milliGRAM(s) Oral at bedtime PRN Insomnia  ondansetron Injectable 4 milliGRAM(s) IV Push every 8 hours PRN Nausea and/or Vomiting      PHYSICAL EXAM:    Vital Signs Last 24 Hrs  T(C): 36.4 (10 May 2024 11:33), Max: 36.5 (09 May 2024 18:54)  T(F): 97.5 (10 May 2024 11:33), Max: 97.7 (09 May 2024 18:54)  HR: 89 (10 May 2024 11:33) (83 - 95)  BP: 148/85 (10 May 2024 11:33) (123/76 - 151/83)  BP(mean): --  RR: 19 (10 May 2024 11:33) (18 - 19)  SpO2: 98% (10 May 2024 11:33) (95% - 99%)    Parameters below as of 10 May 2024 11:33  Patient On (Oxygen Delivery Method): room air    General: frail. resting comfortably and no acute distress.     HEENT: mucous membrane moist      Lungs: comfortable nonlabored     CV: S1/S2. Regular rate and rhythm    GI: +BS abdomen soft, nondistended, nontender     MSK: no cyanosis +edema +weakness    Neuro: nonfocal. wakes to name, oriented x 2, knows name of son    Skin: warm and dry. no rash. ecchymosis. wounds.    LABS:                        10.2   4.87  )-----------( 117      ( 10 May 2024 09:42 )             31.7     05-10    138  |  100  |  77.6<H>  ----------------------------<  96  5.0   |  21.0<L>  |  6.25<H>    Ca    6.7<L>      10 May 2024 02:40      PT/INR - ( 09 May 2024 06:20 )   PT: 10.6 sec;   INR: 0.95 ratio         PTT - ( 10 May 2024 09:42 )  PTT:>200.0 sec  Urinalysis Basic - ( 10 May 2024 02:40 )    Color: x / Appearance: x / SG: x / pH: x  Gluc: 96 mg/dL / Ketone: x  / Bili: x / Urobili: x   Blood: x / Protein: x / Nitrite: x   Leuk Esterase: x / RBC: x / WBC x   Sq Epi: x / Non Sq Epi: x / Bacteria: x      I&O's Summary      RADIOLOGY & ADDITIONAL STUDIES:    CXR    ACC: [id number] EXAM:  XR CHEST PORTABLE URGENT 1V   ORDERED BY: NATHAN DACOSTA     PROCEDURE DATE:  05/09/2024          INTERPRETATION:  EXAM: XR CHEST URGENT    INDICATION: upper extremity DVT XXR    COMPARISON: April 19, 2024    IMPRESSION: Increased perihilar interstitial markings bilaterally and   borderline cardiomegaly. I would favor congestive changes with effusions   also suggested bilaterally. Regional osseous structures appropriate for   age. Follow-up suggested. Right sided central venous catheter is seen   previously is gone.    --- End of Report ---  FERNANDEZ RAMOS MD; Attending Radiologist  This document has been electronically signed. May  9 2024  9:05AM    RUE Doppler    ACC: [id number] EXAM:  US DPLX UPR EXT VEINS LTD RT   ORDERED BY: NATHAN DACOSTA     PROCEDURE DATE:  05/09/2024          INTERPRETATION:  RIGHT UPPER EXTREMITY VENOUS ULTRASOUND    INDICATION: Right arm pain. Evaluate for deep venous thrombosis (DVT).    TECHNIQUE: Grayscale, color and spectral Doppler evaluation of the right   upper extremity with graded compression maneuvers was performed.    COMPARISON: None.    FINDINGS:    There is thrombus identified within the right internal jugular,   subclavian and axillary, cephalic and basilic veins. Limited visualized   brachial, radial and ulnar veins are patent. Edematous right arm.    Contralateral left internal jugular and subclavian veins are patent.    IMPRESSION:    DVT in the right upper extremity as detailed above.  Edematous right arm.    These critical results were discussed via telephone at 5/9/2024 4:20 AM   by Dr. Lee of radiology with Dr. Dacosta.    --- End of Report ---  DARRELL LEE MD; Attending Radiologist  This document has been electronically signed. May  9 2024  4:34AM  Abnormal.    NEUROLOGICAL MEDICATIONS/OPIOIDS/BENZODIAZEPINE OVER PAST 24 HOURS    mirtazapine   7.5 milliGRAM(s) Oral (05-09-24 @ 21:56)

## 2024-05-10 NOTE — PATIENT PROFILE ADULT - FALL HARM RISK - HARM RISK INTERVENTIONS

## 2024-05-10 NOTE — CONSULT NOTE ADULT - TIME BILLING
D/W hospitalist Dr Cordova    Total time also includes discussion during interdisciplinary team rounds, chart review including but limited to prior admissions/ GOC discussions, review of established ACP documentations ( ex. living will, HCP, MOLST form),  review of medications/ labs/ imaging, examination, care coordination with other health care professionals, documentation EXCLUDING current goals of care or advance care planning discussions.

## 2024-05-10 NOTE — PROGRESS NOTE ADULT - SUBJECTIVE AND OBJECTIVE BOX
Patient is a 84y old  Female who presents with a chief complaint of RUE DVT,acute on ckd,diarrhea (09 May 2024 11:13)      Patient seen and examined at bedside this am   chart reviewed       ALLERGIES:  No Known Allergies    MEDICATIONS  (STANDING):  amLODIPine   Tablet 10 milliGRAM(s) Oral daily  bisacodyl Suppository 10 milliGRAM(s) Rectal daily  calcitriol   Capsule 0.5 MICROGram(s) Oral daily  carvedilol 6.25 milliGRAM(s) Oral every 12 hours  clopidogrel Tablet 75 milliGRAM(s) Oral daily  cyanocobalamin 1000 MICROGram(s) Oral daily  dextrose 5% + lactated ringers. 1000 milliLiter(s) (75 mL/Hr) IV Continuous <Continuous>  ergocalciferol 26468 Unit(s) Oral every week  ferrous    sulfate 325 milliGRAM(s) Oral daily  folic acid 1 milliGRAM(s) Oral daily  heparin   Injectable 4000 Unit(s) IV Push every 6 hours  heparin  Infusion. 600 Unit(s)/Hr (6 mL/Hr) IV Continuous <Continuous>  metroNIDAZOLE    Tablet 500 milliGRAM(s) Oral every 8 hours  mirtazapine 7.5 milliGRAM(s) Oral at bedtime  multivitamin 1 Tablet(s) Oral daily  mycophenolate mofetil 500 milliGRAM(s) Oral two times a day  pantoprazole   Suspension 40 milliGRAM(s) Oral two times a day  predniSONE   Tablet 5 milliGRAM(s) Oral daily  sodium bicarbonate 1950 milliGRAM(s) Oral two times a day  tacrolimus 2 milliGRAM(s) Oral every 12 hours  tamsulosin 0.4 milliGRAM(s) Oral at bedtime    MEDICATIONS  (PRN):  acetaminophen     Tablet .. 650 milliGRAM(s) Oral every 6 hours PRN Temp greater or equal to 38C (100.4F), Mild Pain (1 - 3)  aluminum hydroxide/magnesium hydroxide/simethicone Suspension 30 milliLiter(s) Oral every 4 hours PRN Dyspepsia  heparin   Injectable 2000 Unit(s) IV Push every 8 hours PRN For aPTT between 40 - 57  magnesium hydroxide Suspension 30 milliLiter(s) Oral daily PRN Constipation  melatonin 3 milliGRAM(s) Oral at bedtime PRN Insomnia  ondansetron Injectable 4 milliGRAM(s) IV Push every 8 hours PRN Nausea and/or Vomiting    Vital Signs Last 24 Hrs  T(F): 97.5 (10 May 2024 03:58), Max: 98 (09 May 2024 11:55)  HR: 83 (10 May 2024 07:49) (83 - 108)  BP: 141/84 (10 May 2024 07:49) (123/76 - 161/94)  RR: 18 (10 May 2024 07:49) (16 - 18)  SpO2: 99% (10 May 2024 07:49) (95% - 100%)  I&O's Summary      PHYSICAL EXAM:  General:   ENT:   Neck:   Lungs:   Cardio: RRR, S1/S2, No murmur  Abdomen: Soft, Nontender, Nondistended; Bowel sounds present  Extremities: No calf tenderness, No pitting edema    LABS:                        10.3   5.32  )-----------( 102      ( 10 May 2024 07:46 )             31.3     05-10    138  |  100  |  77.6  ----------------------------<  96  5.0   |  21.0  |  6.25    Ca    6.7      10 May 2024 02:40            PT/INR - ( 09 May 2024 06:20 )   PT: 10.6 sec;   INR: 0.95 ratio         PTT - ( 10 May 2024 02:40 )  PTT:> 200                            Urinalysis Basic - ( 10 May 2024 02:40 )    Color: x / Appearance: x / SG: x / pH: x  Gluc: 96 mg/dL / Ketone: x  / Bili: x / Urobili: x   Blood: x / Protein: x / Nitrite: x   Leuk Esterase: x / RBC: x / WBC x   Sq Epi: x / Non Sq Epi: x / Bacteria: x          RADIOLOGY & ADDITIONAL TESTS:       Patient is a 84y old  Female who presents with a chief complaint of RUE DVT, acute on ckd, diarrhea       Patient seen and examined at bedside this am   chart reviewed   pt is weak complaining arm discomfort       ALLERGIES:  No Known Allergies    MEDICATIONS  (STANDING):  amLODIPine   Tablet 10 milliGRAM(s) Oral daily  bisacodyl Suppository 10 milliGRAM(s) Rectal daily  calcitriol   Capsule 0.5 MICROGram(s) Oral daily  carvedilol 6.25 milliGRAM(s) Oral every 12 hours  clopidogrel Tablet 75 milliGRAM(s) Oral daily  cyanocobalamin 1000 MICROGram(s) Oral daily  dextrose 5% + lactated ringers. 1000 milliLiter(s) (75 mL/Hr) IV Continuous <Continuous>  ergocalciferol 28383 Unit(s) Oral every week  ferrous    sulfate 325 milliGRAM(s) Oral daily  folic acid 1 milliGRAM(s) Oral daily  heparin   Injectable 4000 Unit(s) IV Push every 6 hours  heparin  Infusion. 600 Unit(s)/Hr (6 mL/Hr) IV Continuous <Continuous>  metroNIDAZOLE    Tablet 500 milliGRAM(s) Oral every 8 hours  mirtazapine 7.5 milliGRAM(s) Oral at bedtime  multivitamin 1 Tablet(s) Oral daily  mycophenolate mofetil 500 milliGRAM(s) Oral two times a day  pantoprazole   Suspension 40 milliGRAM(s) Oral two times a day  predniSONE   Tablet 5 milliGRAM(s) Oral daily  sodium bicarbonate 1950 milliGRAM(s) Oral two times a day  tacrolimus 2 milliGRAM(s) Oral every 12 hours  tamsulosin 0.4 milliGRAM(s) Oral at bedtime    MEDICATIONS  (PRN):  acetaminophen     Tablet .. 650 milliGRAM(s) Oral every 6 hours PRN Temp greater or equal to 38C (100.4F), Mild Pain (1 - 3)  aluminum hydroxide/magnesium hydroxide/simethicone Suspension 30 milliLiter(s) Oral every 4 hours PRN Dyspepsia  heparin   Injectable 2000 Unit(s) IV Push every 8 hours PRN For aPTT between 40 - 57  magnesium hydroxide Suspension 30 milliLiter(s) Oral daily PRN Constipation  melatonin 3 milliGRAM(s) Oral at bedtime PRN Insomnia  ondansetron Injectable 4 milliGRAM(s) IV Push every 8 hours PRN Nausea and/or Vomiting    Vital Signs Last 24 Hrs  T(F): 97.5 (10 May 2024 03:58), Max: 98 (09 May 2024 11:55)  HR: 83 (10 May 2024 07:49) (83 - 108)  BP: 141/84 (10 May 2024 07:49) (123/76 - 161/94)  RR: 18 (10 May 2024 07:49) (16 - 18)  SpO2: 99% (10 May 2024 07:49) (95% - 100%)  I&O's Summary      PHYSICAL EXAM:  General: cachectic   Neck: supple , no JVD   Lungs: CTA bilateral   Cardio: RRR, S1/S2, No murmur  Abdomen: Soft, Nontender, Nondistended; Bowel sounds present  Extremities: No calf tenderness, No pitting edema  RUE swelling       LABS:                        10.3   5.32  )-----------( 102      ( 10 May 2024 07:46 )             31.3     05-10    138  |  100  |  77.6  ----------------------------<  96  5.0   |  21.0  |  6.25    Ca    6.7      10 May 2024 02:40            PT/INR - ( 09 May 2024 06:20 )   PT: 10.6 sec;   INR: 0.95 ratio         PTT - ( 10 May 2024 02:40 )  PTT:> 200            ra< from: US Duplex Venous Upper Ext Ltd, Right (05.09.24 @ 03:08) >  FINDINGS:    There is thrombus identified within the right internal jugular,   subclavian and axillary, cephalic and basilic veins. Limited visualized   brachial, radial and ulnar veins are patent. Edematous right arm.    Contralateral left internal jugular and subclavian veins are patent.    IMPRESSION:    DVT in the right upper extremity as detailed above.  Edematous right arm.    These critical results were discussed via telephone at 5/9/2024 4:20 AM   by Dr. Luna of radiology with Dr. Kendrick.      < end of copied text >                  Urinalysis Basic - ( 10 May 2024 02:40 )    Color: x / Appearance: x / SG: x / pH: x  Gluc: 96 mg/dL / Ketone: x  / Bili: x / Urobili: x   Blood: x / Protein: x / Nitrite: x   Leuk Esterase: x / RBC: x / WBC x   Sq Epi: x / Non Sq Epi: x / Bacteria: x          RADIOLOGY & ADDITIONAL TESTS:

## 2024-05-10 NOTE — PROGRESS NOTE ADULT - ASSESSMENT
84y F w/ hx ESRD s/p renal transplant on mycophenolate and tacrolimus, CVA, HTN, HLD, anemia, dementia,lung nodule,RUE dvt,caumadin toxicity, GI Bleed  presents from [facility] home for RUE swelling with hx of  RUE DVT,diarrhea(started on metronidazole 05/06,pending cdif).Pt reports few days of R arm swelling, no pain.Pt denies n/v/d,abd pain,cp,sob,active bleed.  Per transfer papers, pt had RUE US done on 3/15 that showed RUE DVT involving internal jugular and subclavian veins, for which she was started on Coumadin. Was recently admitted to this hospital with anemia and elevated INR,found new lung nodule rec biopsy,seen by hem/onc . Family decided against further anticoagulation at that time given bleeding risk/biopsy,seen by GI,unable to do scope for poor prep,rec out pt  folllow up . Pt  had RUE US done on 5/8 that again showed +DVT in right jugular vein, so pt was sent back to the ED.    1-RUE DVT   -US Duplex Venous Upper Ext Ltd, Right (05.09.24 @ 03:08) >thrombus identified within the right internal jugular, subclavian and axillary, cephalic and basilic veins.  as per discussion with son on admission understand risk of bleed , benefiit so AC started   if no bleed Hb stable change to oral AC   Monitor cbc q 4hr  cont PPI BID  hem/onc consult appreciated   cont to monitor PTT adjust heparin dose     2-GLO on CKD5 likely   reported diarrhea in nh   S/P Renal Transplant in 2012 ([facility])  cont ivf , sodium bicarbonate   -cont tacrolimus,mycophenolate  nephrology follow up   -monitor bmp  -avoid nephrotoxic drugs    3-Diarrhea with suspected Cdif from NH   -Started Metronidazole(on 05/06) for 7 days at rehab with pending cdif per documents)  -Pt denies any active diarrhea now.  -order cdif,gi pcr. no BM today   -monitor lyes    4- Recent GI bleed  -hb stable  cont PPI    5-HTN    6-CVA  -Continue coreg,amlodipine   -Monitor bp closley    Chronic anemia  -on po iron    Lung nodule  Liver lesion  -suspected malignancy  -family wants to hold of biopsy   -reviewed ct chest/abd /pelvis from prior admission

## 2024-05-11 LAB
ALBUMIN SERPL ELPH-MCNC: 2.7 G/DL — LOW (ref 3.3–5.2)
ALP SERPL-CCNC: 59 U/L — SIGNIFICANT CHANGE UP (ref 40–120)
ALT FLD-CCNC: 9 U/L — SIGNIFICANT CHANGE UP
ANION GAP SERPL CALC-SCNC: 18 MMOL/L — HIGH (ref 5–17)
APTT BLD: 41.4 SEC — HIGH (ref 24.5–35.6)
APTT BLD: 70.6 SEC — HIGH (ref 24.5–35.6)
APTT BLD: 73.1 SEC — HIGH (ref 24.5–35.6)
AST SERPL-CCNC: 19 U/L — SIGNIFICANT CHANGE UP
BILIRUB SERPL-MCNC: 0.3 MG/DL — LOW (ref 0.4–2)
BUN SERPL-MCNC: 76.9 MG/DL — HIGH (ref 8–20)
CALCIUM SERPL-MCNC: 6.5 MG/DL — CRITICAL LOW (ref 8.4–10.5)
CHLORIDE SERPL-SCNC: 99 MMOL/L — SIGNIFICANT CHANGE UP (ref 96–108)
CO2 SERPL-SCNC: 20 MMOL/L — LOW (ref 22–29)
CREAT SERPL-MCNC: 6.11 MG/DL — HIGH (ref 0.5–1.3)
EGFR: 6 ML/MIN/1.73M2 — LOW
GI PCR PANEL: SIGNIFICANT CHANGE UP
GLUCOSE SERPL-MCNC: 90 MG/DL — SIGNIFICANT CHANGE UP (ref 70–99)
HCT VFR BLD CALC: 28 % — LOW (ref 34.5–45)
HGB BLD-MCNC: 9.2 G/DL — LOW (ref 11.5–15.5)
MCHC RBC-ENTMCNC: 30.6 PG — SIGNIFICANT CHANGE UP (ref 27–34)
MCHC RBC-ENTMCNC: 32.9 GM/DL — SIGNIFICANT CHANGE UP (ref 32–36)
MCV RBC AUTO: 93 FL — SIGNIFICANT CHANGE UP (ref 80–100)
OB PNL STL: NEGATIVE — SIGNIFICANT CHANGE UP
PLATELET # BLD AUTO: 111 K/UL — LOW (ref 150–400)
POTASSIUM SERPL-MCNC: 5 MMOL/L — SIGNIFICANT CHANGE UP (ref 3.5–5.3)
POTASSIUM SERPL-SCNC: 5 MMOL/L — SIGNIFICANT CHANGE UP (ref 3.5–5.3)
PROT SERPL-MCNC: 4.7 G/DL — LOW (ref 6.6–8.7)
RBC # BLD: 3.01 M/UL — LOW (ref 3.8–5.2)
RBC # FLD: 15.4 % — HIGH (ref 10.3–14.5)
SODIUM SERPL-SCNC: 137 MMOL/L — SIGNIFICANT CHANGE UP (ref 135–145)
WBC # BLD: 6.73 K/UL — SIGNIFICANT CHANGE UP (ref 3.8–10.5)
WBC # FLD AUTO: 6.73 K/UL — SIGNIFICANT CHANGE UP (ref 3.8–10.5)

## 2024-05-11 PROCEDURE — 99233 SBSQ HOSP IP/OBS HIGH 50: CPT

## 2024-05-11 RX ORDER — VANCOMYCIN HCL 1 G
125 VIAL (EA) INTRAVENOUS EVERY 6 HOURS
Refills: 0 | Status: DISCONTINUED | OUTPATIENT
Start: 2024-05-11 | End: 2024-05-12

## 2024-05-11 RX ORDER — CALCIUM GLUCONATE 100 MG/ML
2 VIAL (ML) INTRAVENOUS ONCE
Refills: 0 | Status: COMPLETED | OUTPATIENT
Start: 2024-05-11 | End: 2024-05-11

## 2024-05-11 RX ORDER — SODIUM CHLORIDE 9 MG/ML
1000 INJECTION, SOLUTION INTRAVENOUS
Refills: 0 | Status: DISCONTINUED | OUTPATIENT
Start: 2024-05-11 | End: 2024-05-24

## 2024-05-11 RX ADMIN — Medication 500 MILLIGRAM(S): at 05:17

## 2024-05-11 RX ADMIN — Medication 1950 MILLIGRAM(S): at 05:17

## 2024-05-11 RX ADMIN — TACROLIMUS 2 MILLIGRAM(S): 5 CAPSULE ORAL at 05:18

## 2024-05-11 RX ADMIN — TAMSULOSIN HYDROCHLORIDE 0.4 MILLIGRAM(S): 0.4 CAPSULE ORAL at 21:08

## 2024-05-11 RX ADMIN — AMLODIPINE BESYLATE 10 MILLIGRAM(S): 2.5 TABLET ORAL at 05:17

## 2024-05-11 RX ADMIN — MYCOPHENOLATE MOFETIL 500 MILLIGRAM(S): 250 CAPSULE ORAL at 05:18

## 2024-05-11 RX ADMIN — Medication 125 MILLIGRAM(S): at 18:13

## 2024-05-11 RX ADMIN — CARVEDILOL PHOSPHATE 6.25 MILLIGRAM(S): 80 CAPSULE, EXTENDED RELEASE ORAL at 18:13

## 2024-05-11 RX ADMIN — SODIUM CHLORIDE 75 MILLILITER(S): 9 INJECTION, SOLUTION INTRAVENOUS at 03:08

## 2024-05-11 RX ADMIN — PANTOPRAZOLE SODIUM 40 MILLIGRAM(S): 20 TABLET, DELAYED RELEASE ORAL at 19:02

## 2024-05-11 RX ADMIN — HEPARIN SODIUM 400 UNIT(S)/HR: 5000 INJECTION INTRAVENOUS; SUBCUTANEOUS at 02:37

## 2024-05-11 RX ADMIN — CLOPIDOGREL BISULFATE 75 MILLIGRAM(S): 75 TABLET, FILM COATED ORAL at 13:46

## 2024-05-11 RX ADMIN — HEPARIN SODIUM 400 UNIT(S)/HR: 5000 INJECTION INTRAVENOUS; SUBCUTANEOUS at 07:43

## 2024-05-11 RX ADMIN — Medication 125 MILLIGRAM(S): at 13:47

## 2024-05-11 RX ADMIN — CARVEDILOL PHOSPHATE 6.25 MILLIGRAM(S): 80 CAPSULE, EXTENDED RELEASE ORAL at 05:27

## 2024-05-11 RX ADMIN — HEPARIN SODIUM 400 UNIT(S)/HR: 5000 INJECTION INTRAVENOUS; SUBCUTANEOUS at 10:52

## 2024-05-11 RX ADMIN — HEPARIN SODIUM 400 UNIT(S)/HR: 5000 INJECTION INTRAVENOUS; SUBCUTANEOUS at 03:08

## 2024-05-11 RX ADMIN — HEPARIN SODIUM 2000 UNIT(S): 5000 INJECTION INTRAVENOUS; SUBCUTANEOUS at 02:34

## 2024-05-11 RX ADMIN — Medication 1 TABLET(S): at 13:45

## 2024-05-11 RX ADMIN — TACROLIMUS 2 MILLIGRAM(S): 5 CAPSULE ORAL at 18:14

## 2024-05-11 RX ADMIN — CALCITRIOL 0.5 MICROGRAM(S): 0.5 CAPSULE ORAL at 13:46

## 2024-05-11 RX ADMIN — HEPARIN SODIUM 400 UNIT(S)/HR: 5000 INJECTION INTRAVENOUS; SUBCUTANEOUS at 20:04

## 2024-05-11 RX ADMIN — SODIUM CHLORIDE 40 MILLILITER(S): 9 INJECTION, SOLUTION INTRAVENOUS at 09:01

## 2024-05-11 RX ADMIN — Medication 200 GRAM(S): at 14:32

## 2024-05-11 RX ADMIN — Medication 5 MILLIGRAM(S): at 05:17

## 2024-05-11 RX ADMIN — PREGABALIN 1000 MICROGRAM(S): 225 CAPSULE ORAL at 13:47

## 2024-05-11 RX ADMIN — Medication 1 MILLIGRAM(S): at 13:46

## 2024-05-11 RX ADMIN — MIRTAZAPINE 7.5 MILLIGRAM(S): 45 TABLET, ORALLY DISINTEGRATING ORAL at 21:08

## 2024-05-11 RX ADMIN — Medication 325 MILLIGRAM(S): at 13:46

## 2024-05-11 RX ADMIN — PANTOPRAZOLE SODIUM 40 MILLIGRAM(S): 20 TABLET, DELAYED RELEASE ORAL at 05:17

## 2024-05-11 RX ADMIN — MYCOPHENOLATE MOFETIL 500 MILLIGRAM(S): 250 CAPSULE ORAL at 18:14

## 2024-05-11 RX ADMIN — Medication 1950 MILLIGRAM(S): at 18:13

## 2024-05-11 NOTE — PROGRESS NOTE ADULT - SUBJECTIVE AND OBJECTIVE BOX
Vital Signs Last 24 Hrs  T(C): 36.6 (11 May 2024 04:26), Max: 36.7 (10 May 2024 19:58)  T(F): 97.8 (11 May 2024 04:26), Max: 98.1 (10 May 2024 19:58)  HR: 100 (11 May 2024 04:26) (74 - 100)  BP: 156/87 (11 May 2024 04:26) (109/64 - 156/87)  BP(mean): --  RR: 18 (11 May 2024 04:26) (18 - 19)  SpO2: 97% (11 May 2024 04:26) (95% - 99%)    Parameters below as of 11 May 2024 04:26  Patient On (Oxygen Delivery Method): room air      I&O's Summary    Current Antibiotics:  metroNIDAZOLE    Tablet 500 milliGRAM(s) Oral every 8 hours    Other medications:  amLODIPine   Tablet 10 milliGRAM(s) Oral daily  bisacodyl Suppository 10 milliGRAM(s) Rectal daily  calcitriol   Capsule 0.5 MICROGram(s) Oral daily  carvedilol 6.25 milliGRAM(s) Oral every 12 hours  clopidogrel Tablet 75 milliGRAM(s) Oral daily  cyanocobalamin 1000 MICROGram(s) Oral daily  dextrose 5% + lactated ringers. 1000 milliLiter(s) IV Continuous <Continuous>  ergocalciferol 23047 Unit(s) Oral every week  ferrous    sulfate 325 milliGRAM(s) Oral daily  folic acid 1 milliGRAM(s) Oral daily  heparin  Infusion. 400 Unit(s)/Hr IV Continuous <Continuous>  mirtazapine 7.5 milliGRAM(s) Oral at bedtime  multivitamin 1 Tablet(s) Oral daily  mycophenolate mofetil 500 milliGRAM(s) Oral two times a day  pantoprazole   Suspension 40 milliGRAM(s) Oral two times a day  predniSONE   Tablet 5 milliGRAM(s) Oral daily  sodium bicarbonate 1950 milliGRAM(s) Oral two times a day  tacrolimus 2 milliGRAM(s) Oral every 12 hours  tamsulosin 0.4 milliGRAM(s) Oral at bedtime    05-10    138  |  100  |  77.6<H>  ----------------------------<  96  5.0   |  21.0<L>  |  6.25<H>    Ca    6.7<L>      10 May 2024 02:40      Creatinine: 6.25 mg/dL (05-10-24 @ 02:40)  Creatinine: 5.88 mg/dL (05-09-24 @ 06:20)     Reason for visit: s/p renal Tx    Subjective: seen in ER, states I am ok.     ROS: Limited given underlying dementia    Physical Exam:  Gen: no acute distress  MS: alert and oriented top self  Eyes: EOMI, no icterus  HENT: NCAT, MMM  CV: rhythm reg reg, rate normal, no m/g/r  Chest: CTAB, no w/r/r,  Abd: soft, NT, ND  Extremities: swelling RUE edema  LUEAVG    =======================================================  Vital Signs Last 24 Hrs  T(C): 36.6 (11 May 2024 04:26), Max: 36.7 (10 May 2024 19:58)  T(F): 97.8 (11 May 2024 04:26), Max: 98.1 (10 May 2024 19:58)  HR: 96 (11 May 2024 09:14) (74 - 100)  BP: 150/94 (11 May 2024 09:14) (109/64 - 156/87  RR: 18 (11 May 2024 09:14) (18 - 19)  SpO2: 99% (11 May 2024 09:14) (95% - 99%)  Parameters below as of 11 May 2024 09:14  Patient On (Oxygen Delivery Method): room air    =======================================================  Current Antibiotics:  vancomycin    Solution 125 milliGRAM(s) Oral every 6 hours    Other medications:  amLODIPine   Tablet 10 milliGRAM(s) Oral daily  bisacodyl Suppository 10 milliGRAM(s) Rectal daily  calcitriol   Capsule 0.5 MICROGram(s) Oral daily  carvedilol 6.25 milliGRAM(s) Oral every 12 hours  clopidogrel Tablet 75 milliGRAM(s) Oral daily  cyanocobalamin 1000 MICROGram(s) Oral daily  dextrose 5% + lactated ringers. 1000 milliLiter(s) IV Continuous <Continuous>  ergocalciferol 36455 Unit(s) Oral every week  ferrous    sulfate 325 milliGRAM(s) Oral daily  folic acid 1 milliGRAM(s) Oral daily  heparin  Infusion. 400 Unit(s)/Hr IV Continuous <Continuous>  mirtazapine 7.5 milliGRAM(s) Oral at bedtime  multivitamin 1 Tablet(s) Oral daily  mycophenolate mofetil 500 milliGRAM(s) Oral two times a day  pantoprazole   Suspension 40 milliGRAM(s) Oral two times a day  predniSONE   Tablet 5 milliGRAM(s) Oral daily  sodium bicarbonate 1950 milliGRAM(s) Oral two times a day  tacrolimus 2 milliGRAM(s) Oral every 12 hours  tamsulosin 0.4 milliGRAM(s) Oral at bedtime    =======================================================  05-11    137  |  99  |  76.9<H>  ----------------------------<  90  5.0   |  20.0<L>  |  6.11<H>    Ca    6.5<LL>      11 May 2024 01:09    TPro  4.7<L>  /  Alb  2.7<L>  /  TBili  0.3<L>  /  DBili  x   /  AST  19  /  ALT  9   /  AlkPhos  59  05-11    Creatinine: 6.11 mg/dL (05-11-24 @ 01:09)  Creatinine: 6.25 mg/dL (05-10-24 @ 02:40)  Creatinine: 5.88 mg/dL (05-09-24 @ 06:20)    =======================================================      Vital Signs Last 24 Hrs  T(C): 36.6 (11 May 2024 04:26), Max: 36.7 (10 May 2024 19:58)  T(F): 97.8 (11 May 2024 04:26), Max: 98.1 (10 May 2024 19:58)  HR: 100 (11 May 2024 04:26) (74 - 100)  BP: 156/87 (11 May 2024 04:26) (109/64 - 156/87)  RR: 18 (11 May 2024 04:26) (18 - 19)  SpO2: 97% (11 May 2024 04:26) (95% - 99%)  Parameters below as of 11 May 2024 04:26  Patient On (Oxygen Delivery Method): room air    I&O's Summary    Current Antibiotics:  metroNIDAZOLE    Tablet 500 milliGRAM(s) Oral every 8 hours    Other medications:  amLODIPine   Tablet 10 milliGRAM(s) Oral daily  bisacodyl Suppository 10 milliGRAM(s) Rectal daily  calcitriol   Capsule 0.5 MICROGram(s) Oral daily  carvedilol 6.25 milliGRAM(s) Oral every 12 hours  clopidogrel Tablet 75 milliGRAM(s) Oral daily  cyanocobalamin 1000 MICROGram(s) Oral daily  dextrose 5% + lactated ringers. 1000 milliLiter(s) IV Continuous <Continuous>  ergocalciferol 33853 Unit(s) Oral every week  ferrous    sulfate 325 milliGRAM(s) Oral daily  folic acid 1 milliGRAM(s) Oral daily  heparin  Infusion. 400 Unit(s)/Hr IV Continuous <Continuous>  mirtazapine 7.5 milliGRAM(s) Oral at bedtime  multivitamin 1 Tablet(s) Oral daily  mycophenolate mofetil 500 milliGRAM(s) Oral two times a day  pantoprazole   Suspension 40 milliGRAM(s) Oral two times a day  predniSONE   Tablet 5 milliGRAM(s) Oral daily  sodium bicarbonate 1950 milliGRAM(s) Oral two times a day  tacrolimus 2 milliGRAM(s) Oral every 12 hours  tamsulosin 0.4 milliGRAM(s) Oral at bedtime    05-10    138  |  100  |  77.6<H>  ----------------------------<  96  5.0   |  21.0<L>  |  6.25<H>    Ca    6.7<L>      10 May 2024 02:40      Creatinine: 6.25 mg/dL (05-10-24 @ 02:40)  Creatinine: 5.88 mg/dL (05-09-24 @ 06:20)

## 2024-05-11 NOTE — PROGRESS NOTE ADULT - SUBJECTIVE AND OBJECTIVE BOX
LIZY GARCIABishop    [id number]    84y      Female    INTERVAL HPI/OVERNIGHT EVENTS:  patient being seen for c dif, rue dvt and acute on chronic renal failure.     as per rn, patent still having soft stools but is not profuse    REVIEW OF SYSTEMS:    CONSTITUTIONAL: No fever, weight loss, or fatigue  RESPIRATORY: No cough, wheezing, hemoptysis; No shortness of breath  CARDIOVASCULAR: No chest pain, palpitations  GASTROINTESTINAL: No abdominal or epigastric pain. No nausea, vomiting  NEUROLOGICAL: No headaches, memory loss, loss of strength.  MISCELLANEOUS:      Vital Signs Last 24 Hrs  T(C): 36.6 (11 May 2024 04:26), Max: 36.7 (10 May 2024 19:58)  T(F): 97.8 (11 May 2024 04:26), Max: 98.1 (10 May 2024 19:58)  HR: 96 (11 May 2024 09:14) (74 - 100)  BP: 150/94 (11 May 2024 09:14) (109/64 - 156/87)  BP(mean): --  RR: 18 (11 May 2024 09:14) (18 - 19)  SpO2: 99% (11 May 2024 09:14) (95% - 99%)    Parameters below as of 11 May 2024 09:14  Patient On (Oxygen Delivery Method): room air        PHYSICAL EXAM:  General: cachectic, thin,   Neck: supple , no JVD   Lungs: CTA bilateral   Cardio: RRR, S1/S2, No murmur  Abdomen: Soft, Nontender, Nondistended; Bowel sounds present  Extremities: No calf tenderness, No pitting edema  RUE swelling     LABS:                        9.2    6.73  )-----------( 111      ( 11 May 2024 01:09 )             28.0     05-11    137  |  99  |  76.9<H>  ----------------------------<  90  5.0   |  20.0<L>  |  6.11<H>    Ca    6.5<LL>      11 May 2024 01:09    TPro  4.7<L>  /  Alb  2.7<L>  /  TBili  0.3<L>  /  DBili  x   /  AST  19  /  ALT  9   /  AlkPhos  59  05-11    PTT - ( 11 May 2024 08:50 )  PTT:70.6 sec  Urinalysis Basic - ( 11 May 2024 01:09 )    Color: x / Appearance: x / SG: x / pH: x  Gluc: 90 mg/dL / Ketone: x  / Bili: x / Urobili: x   Blood: x / Protein: x / Nitrite: x   Leuk Esterase: x / RBC: x / WBC x   Sq Epi: x / Non Sq Epi: x / Bacteria: x          MEDICATIONS  (STANDING):  amLODIPine   Tablet 10 milliGRAM(s) Oral daily  bisacodyl Suppository 10 milliGRAM(s) Rectal daily  calcitriol   Capsule 0.5 MICROGram(s) Oral daily  carvedilol 6.25 milliGRAM(s) Oral every 12 hours  clopidogrel Tablet 75 milliGRAM(s) Oral daily  cyanocobalamin 1000 MICROGram(s) Oral daily  dextrose 5% + lactated ringers. 1000 milliLiter(s) (40 mL/Hr) IV Continuous <Continuous>  ergocalciferol 73776 Unit(s) Oral every week  ferrous    sulfate 325 milliGRAM(s) Oral daily  folic acid 1 milliGRAM(s) Oral daily  heparin  Infusion. 400 Unit(s)/Hr (4 mL/Hr) IV Continuous <Continuous>  mirtazapine 7.5 milliGRAM(s) Oral at bedtime  multivitamin 1 Tablet(s) Oral daily  mycophenolate mofetil 500 milliGRAM(s) Oral two times a day  pantoprazole   Suspension 40 milliGRAM(s) Oral two times a day  predniSONE   Tablet 5 milliGRAM(s) Oral daily  sodium bicarbonate 1950 milliGRAM(s) Oral two times a day  tacrolimus 2 milliGRAM(s) Oral every 12 hours  tamsulosin 0.4 milliGRAM(s) Oral at bedtime  vancomycin    Solution 125 milliGRAM(s) Oral every 6 hours    MEDICATIONS  (PRN):  acetaminophen     Tablet .. 650 milliGRAM(s) Oral every 6 hours PRN Temp greater or equal to 38C (100.4F), Mild Pain (1 - 3)  aluminum hydroxide/magnesium hydroxide/simethicone Suspension 30 milliLiter(s) Oral every 4 hours PRN Dyspepsia  heparin   Injectable 2000 Unit(s) IV Push every 6 hours PRN For aPTT between 40 - 57  magnesium hydroxide Suspension 30 milliLiter(s) Oral daily PRN Constipation  melatonin 3 milliGRAM(s) Oral at bedtime PRN Insomnia  ondansetron Injectable 4 milliGRAM(s) IV Push every 8 hours PRN Nausea and/or Vomiting      RADIOLOGY & ADDITIONAL TESTS:

## 2024-05-11 NOTE — PROGRESS NOTE ADULT - ASSESSMENT
84 YOM ESRD s/p renal transplant, HTN, CVA, dementia presents from NH with RUE swelling (history of DVT and AVF in RUE on AC)     1. Advanced Chronic kidney disease stage 5:  -Transplant status; s/p DDKT 2012 at [facility]  -Baseline allograft function ; GFR < 12 since December 23 that I could see in the system  -Serum creatinine 6.25, GFR 6 today.  Could have a component of prerenal in setting of diarrhea but this is already advanced CKD  -    2. Immunosuppression:  -Patient on tacrolimus 2 mg twice daily,  mg twice daily, prednisone 5 mg daily  -In my opinion given advanced CKD he should be weaned down of immunosuppression which is increasing his chance of infection  -    3. Hyperkalemia:  -GLO, CKD, Bactrim, acidosis    4. Metabolic acidosis:  -Continue on PO sodium bicarbonate 1300 mg PO BID     5. BMD w/ CKD: Renal diet, continue on calcitriol    6.  RUE swelling and DVT: On anticoagulation. 84 YOM ESRD s/p renal transplant, HTN, CVA, dementia presents from NH with RUE swelling (history of DVT in RUE on AC)     1. Advanced Chronic kidney disease stage 5:  -Transplant status; s/p DDKT 2012 at [facility]  -Baseline allograft function ; GFR < 12 since December 23 that I could see in the system  -Serum creatinine 6.25, GFR 6 today.  Could have a component of prerenal in setting of diarrhea but this is already advanced CKD  -Patient likely will need resumption of HD in near future, to discuss goal of care   -For now c/w IVF as ordered.  -no emergent need of  HD today.    2. Immunosuppression:  -Patient on tacrolimus 2 mg twice daily,  mg twice daily, prednisone 5 mg daily  -In my opinion, given advanced CKD immunosuppression can be weaned down which is increasing his chance of infection  -Will discuss with primary outpatient transplant team    3. Hypocalcemia; will give 2gm IV calcium gluconate.    4. Metabolic acidosis:  -Continue on PO sodium bicarbonate 1950 mg PO BID     5. BMD w/ CKD: Renal diet, continue on calcitriol    6. RUE swelling and DVT: On anticoagulation.

## 2024-05-11 NOTE — PROGRESS NOTE ADULT - ASSESSMENT
84y F w/ hx ESRD s/p renal transplant on mycophenolate and tacrolimus, CVA, HTN, HLD, anemia, dementia,lung nodule,RUE dvt,caumadin toxicity, GI Bleed  presents from [facility] nursing home for RUE swelling with hx of  RUE DVT,diarrhea(started on metronidazole 05/06,pending cdif).Pt reports few days of R arm swelling, no pain.Pt denies n/v/d,abd pain,cp,sob,active bleed.  Per transfer papers, pt had RUE US done on 3/15 that showed RUE DVT involving internal jugular and subclavian veins, for which she was started on Coumadin. Was recently admitted to this hospital with anemia and elevated INR,found new lung nodule rec biopsy,seen by hem/onc . Family decided against further anticoagulation at that time given bleeding risk/biopsy,seen by GI,unable to do scope for poor prep,rec out pt  folllow up . Pt  had RUE US done on 5/8 that again showed +DVT in right jugular vein, so pt was sent back to the ED.    RUE DVT   -US Duplex Venous Upper Ext Ltd, Right (05.09.24 @ 03:08) >thrombus identified within the right internal jugular, subclavian and axillary, cephalic and basilic veins.  as per discussion with son on admission understand risk of bleed , benefiit so AC started   cont PPI BID  hem/onc consult appreciated   cont to monitor PTT adjust heparin dose   cbc trending downward, keep type and screen active    -GLO on CKD5 likely   cr 6.25 --> 6.11  reported diarrhea in nh   S/P Renal Transplant in 2012 ([facility])  cont ivf , sodium bicarbonate oral  -cont tacrolimus, mycophenolate  nephrology follow up   -monitor bmp  -avoid nephrotoxic drugs  - will place benson to monitor output     -Diarrhea with suspected Cdif from NH   -Started Metronidazole(on 05/06) for 7 days at rehab with pending cdif per documents).  - change to oral vanco      Recent GI bleed  -hb trending downward  cont PPI  ferrous sulfate    HTN  - norvasc, coreg      Lung nodule  Liver lesion  -suspected malignancy  -family wants to hold off on biopsy   -reviewed ct chest/abd /pelvis from prior admission        prognosis guarded

## 2024-05-12 LAB
ALBUMIN SERPL ELPH-MCNC: 2.7 G/DL — LOW (ref 3.3–5.2)
ALP SERPL-CCNC: 64 U/L — SIGNIFICANT CHANGE UP (ref 40–120)
ALT FLD-CCNC: 8 U/L — SIGNIFICANT CHANGE UP
ANION GAP SERPL CALC-SCNC: 16 MMOL/L — SIGNIFICANT CHANGE UP (ref 5–17)
AST SERPL-CCNC: 21 U/L — SIGNIFICANT CHANGE UP
BASOPHILS # BLD AUTO: 0.02 K/UL — SIGNIFICANT CHANGE UP (ref 0–0.2)
BASOPHILS NFR BLD AUTO: 0.3 % — SIGNIFICANT CHANGE UP (ref 0–2)
BILIRUB SERPL-MCNC: 0.3 MG/DL — LOW (ref 0.4–2)
BLD GP AB SCN SERPL QL: SIGNIFICANT CHANGE UP
BUN SERPL-MCNC: 75.7 MG/DL — HIGH (ref 8–20)
CALCIUM SERPL-MCNC: 7.2 MG/DL — LOW (ref 8.4–10.5)
CHLORIDE SERPL-SCNC: 101 MMOL/L — SIGNIFICANT CHANGE UP (ref 96–108)
CO2 SERPL-SCNC: 21 MMOL/L — LOW (ref 22–29)
CREAT SERPL-MCNC: 5.81 MG/DL — HIGH (ref 0.5–1.3)
EGFR: 7 ML/MIN/1.73M2 — LOW
EOSINOPHIL # BLD AUTO: 0.05 K/UL — SIGNIFICANT CHANGE UP (ref 0–0.5)
EOSINOPHIL NFR BLD AUTO: 0.7 % — SIGNIFICANT CHANGE UP (ref 0–6)
GLUCOSE SERPL-MCNC: 94 MG/DL — SIGNIFICANT CHANGE UP (ref 70–99)
HCT VFR BLD CALC: 29.7 % — LOW (ref 34.5–45)
HGB BLD-MCNC: 9.6 G/DL — LOW (ref 11.5–15.5)
IMM GRANULOCYTES NFR BLD AUTO: 1 % — HIGH (ref 0–0.9)
LYMPHOCYTES # BLD AUTO: 0.67 K/UL — LOW (ref 1–3.3)
LYMPHOCYTES # BLD AUTO: 9.5 % — LOW (ref 13–44)
MAGNESIUM SERPL-MCNC: 1.8 MG/DL — SIGNIFICANT CHANGE UP (ref 1.6–2.6)
MCHC RBC-ENTMCNC: 30.7 PG — SIGNIFICANT CHANGE UP (ref 27–34)
MCHC RBC-ENTMCNC: 32.3 GM/DL — SIGNIFICANT CHANGE UP (ref 32–36)
MCV RBC AUTO: 94.9 FL — SIGNIFICANT CHANGE UP (ref 80–100)
MONOCYTES # BLD AUTO: 0.33 K/UL — SIGNIFICANT CHANGE UP (ref 0–0.9)
MONOCYTES NFR BLD AUTO: 4.7 % — SIGNIFICANT CHANGE UP (ref 2–14)
NEUTROPHILS # BLD AUTO: 5.91 K/UL — SIGNIFICANT CHANGE UP (ref 1.8–7.4)
NEUTROPHILS NFR BLD AUTO: 83.8 % — HIGH (ref 43–77)
PLATELET # BLD AUTO: 143 K/UL — LOW (ref 150–400)
POTASSIUM SERPL-MCNC: 5 MMOL/L — SIGNIFICANT CHANGE UP (ref 3.5–5.3)
POTASSIUM SERPL-SCNC: 5 MMOL/L — SIGNIFICANT CHANGE UP (ref 3.5–5.3)
PROT SERPL-MCNC: 5.2 G/DL — LOW (ref 6.6–8.7)
RBC # BLD: 3.13 M/UL — LOW (ref 3.8–5.2)
RBC # FLD: 15.5 % — HIGH (ref 10.3–14.5)
SODIUM SERPL-SCNC: 138 MMOL/L — SIGNIFICANT CHANGE UP (ref 135–145)
WBC # BLD: 7.05 K/UL — SIGNIFICANT CHANGE UP (ref 3.8–10.5)
WBC # FLD AUTO: 7.05 K/UL — SIGNIFICANT CHANGE UP (ref 3.8–10.5)

## 2024-05-12 PROCEDURE — 99233 SBSQ HOSP IP/OBS HIGH 50: CPT

## 2024-05-12 PROCEDURE — 76776 US EXAM K TRANSPL W/DOPPLER: CPT | Mod: 26,LT

## 2024-05-12 RX ORDER — CALCIUM GLUCONATE 100 MG/ML
2 VIAL (ML) INTRAVENOUS EVERY 6 HOURS
Refills: 0 | Status: COMPLETED | OUTPATIENT
Start: 2024-05-12 | End: 2024-05-12

## 2024-05-12 RX ORDER — PSYLLIUM SEED (WITH DEXTROSE)
1 POWDER (GRAM) ORAL DAILY
Refills: 0 | Status: DISCONTINUED | OUTPATIENT
Start: 2024-05-12 | End: 2024-05-24

## 2024-05-12 RX ORDER — CALCIUM GLUCONATE 100 MG/ML
2 VIAL (ML) INTRAVENOUS EVERY 6 HOURS
Refills: 0 | Status: DISCONTINUED | OUTPATIENT
Start: 2024-05-12 | End: 2024-05-12

## 2024-05-12 RX ORDER — CALCIUM GLUCONATE 100 MG/ML
2 VIAL (ML) INTRAVENOUS ONCE
Refills: 0 | Status: DISCONTINUED | OUTPATIENT
Start: 2024-05-12 | End: 2024-05-12

## 2024-05-12 RX ADMIN — PREGABALIN 1000 MICROGRAM(S): 225 CAPSULE ORAL at 12:32

## 2024-05-12 RX ADMIN — HEPARIN SODIUM 400 UNIT(S)/HR: 5000 INJECTION INTRAVENOUS; SUBCUTANEOUS at 07:19

## 2024-05-12 RX ADMIN — CLOPIDOGREL BISULFATE 75 MILLIGRAM(S): 75 TABLET, FILM COATED ORAL at 12:32

## 2024-05-12 RX ADMIN — Medication 200 GRAM(S): at 14:30

## 2024-05-12 RX ADMIN — Medication 1950 MILLIGRAM(S): at 18:33

## 2024-05-12 RX ADMIN — AMLODIPINE BESYLATE 10 MILLIGRAM(S): 2.5 TABLET ORAL at 06:08

## 2024-05-12 RX ADMIN — MYCOPHENOLATE MOFETIL 500 MILLIGRAM(S): 250 CAPSULE ORAL at 06:07

## 2024-05-12 RX ADMIN — CARVEDILOL PHOSPHATE 6.25 MILLIGRAM(S): 80 CAPSULE, EXTENDED RELEASE ORAL at 06:08

## 2024-05-12 RX ADMIN — MYCOPHENOLATE MOFETIL 500 MILLIGRAM(S): 250 CAPSULE ORAL at 18:33

## 2024-05-12 RX ADMIN — Medication 200 GRAM(S): at 20:56

## 2024-05-12 RX ADMIN — CARVEDILOL PHOSPHATE 6.25 MILLIGRAM(S): 80 CAPSULE, EXTENDED RELEASE ORAL at 18:33

## 2024-05-12 RX ADMIN — Medication 1 MILLIGRAM(S): at 12:33

## 2024-05-12 RX ADMIN — Medication 125 MILLIGRAM(S): at 00:02

## 2024-05-12 RX ADMIN — Medication 5 MILLIGRAM(S): at 06:08

## 2024-05-12 RX ADMIN — TAMSULOSIN HYDROCHLORIDE 0.4 MILLIGRAM(S): 0.4 CAPSULE ORAL at 20:57

## 2024-05-12 RX ADMIN — CALCITRIOL 0.5 MICROGRAM(S): 0.5 CAPSULE ORAL at 12:32

## 2024-05-12 RX ADMIN — Medication 1950 MILLIGRAM(S): at 06:08

## 2024-05-12 RX ADMIN — PANTOPRAZOLE SODIUM 40 MILLIGRAM(S): 20 TABLET, DELAYED RELEASE ORAL at 18:33

## 2024-05-12 RX ADMIN — Medication 325 MILLIGRAM(S): at 12:33

## 2024-05-12 RX ADMIN — Medication 1 TABLET(S): at 12:33

## 2024-05-12 RX ADMIN — Medication 125 MILLIGRAM(S): at 06:17

## 2024-05-12 RX ADMIN — PANTOPRAZOLE SODIUM 40 MILLIGRAM(S): 20 TABLET, DELAYED RELEASE ORAL at 06:08

## 2024-05-12 RX ADMIN — TACROLIMUS 2 MILLIGRAM(S): 5 CAPSULE ORAL at 06:08

## 2024-05-12 RX ADMIN — HEPARIN SODIUM 400 UNIT(S)/HR: 5000 INJECTION INTRAVENOUS; SUBCUTANEOUS at 19:28

## 2024-05-12 RX ADMIN — TACROLIMUS 2 MILLIGRAM(S): 5 CAPSULE ORAL at 18:33

## 2024-05-12 RX ADMIN — MIRTAZAPINE 7.5 MILLIGRAM(S): 45 TABLET, ORALLY DISINTEGRATING ORAL at 20:57

## 2024-05-12 NOTE — PROVIDER CONTACT NOTE (OTHER) - ACTION/TREATMENT ORDERED:
Cannot use RUE. MD confirmed with nephrology okay to use L arm and remove pink band. Lab to return to draw patients morning blood draws

## 2024-05-12 NOTE — PROGRESS NOTE ADULT - SUBJECTIVE AND OBJECTIVE BOX
Reason for visit: s/p renal Tx    Subjective: no acute overnight events. No new labs this AM.    ROS: Limited given underlying dementia    Physical Exam:  Gen: no acute distress  MS: alert and oriented top self  Eyes: EOMI, no icterus  HENT: NCAT, MMM  CV: rhythm reg reg, rate normal, no m/g/r  Chest: CTAB, no w/r/r,  Abd: soft, NT, ND  Extremities: swelling RUE edema  LUEAVG    Vital Signs Last 24 Hrs  T(C): 36.4 (12 May 2024 08:00), Max: 36.8 (11 May 2024 13:30)  T(F): 97.6 (12 May 2024 08:00), Max: 98.2 (11 May 2024 13:30)  HR: 91 (12 May 2024 08:00) (91 - 119)  BP: 143/81 (12 May 2024 08:00) (125/78 - 159/80)  BP(mean): --  RR: 18 (12 May 2024 08:00) (18 - 20)  SpO2: 99% (12 May 2024 08:00) (98% - 100%)    Parameters below as of 12 May 2024 08:00  Patient On (Oxygen Delivery Method): room air      I&O's Summary    11 May 2024 07:01  -  12 May 2024 07:00  --------------------------------------------------------  IN: 240 mL / OUT: 0 mL / NET: 240 mL      Current Antibiotics:    Other medications:  amLODIPine   Tablet 10 milliGRAM(s) Oral daily  bisacodyl Suppository 10 milliGRAM(s) Rectal daily  calcitriol   Capsule 0.5 MICROGram(s) Oral daily  carvedilol 6.25 milliGRAM(s) Oral every 12 hours  clopidogrel Tablet 75 milliGRAM(s) Oral daily  cyanocobalamin 1000 MICROGram(s) Oral daily  dextrose 5% + lactated ringers. 1000 milliLiter(s) IV Continuous <Continuous>  ergocalciferol 84780 Unit(s) Oral every week  ferrous    sulfate 325 milliGRAM(s) Oral daily  folic acid 1 milliGRAM(s) Oral daily  heparin  Infusion. 400 Unit(s)/Hr IV Continuous <Continuous>  mirtazapine 7.5 milliGRAM(s) Oral at bedtime  multivitamin 1 Tablet(s) Oral daily  mycophenolate mofetil 500 milliGRAM(s) Oral two times a day  pantoprazole   Suspension 40 milliGRAM(s) Oral two times a day  predniSONE   Tablet 5 milliGRAM(s) Oral daily  sodium bicarbonate 1950 milliGRAM(s) Oral two times a day  tacrolimus 2 milliGRAM(s) Oral every 12 hours  tamsulosin 0.4 milliGRAM(s) Oral at bedtime    05-11    137  |  99  |  76.9<H>  ----------------------------<  90  5.0   |  20.0<L>  |  6.11<H>    Ca    6.5<LL>      11 May 2024 01:09    TPro  4.7<L>  /  Alb  2.7<L>  /  TBili  0.3<L>  /  DBili  x   /  AST  19  /  ALT  9   /  AlkPhos  59  05-11    Creatinine: 6.11 mg/dL (05-11-24 @ 01:09)  Creatinine: 6.25 mg/dL (05-10-24 @ 02:40)  Creatinine: 5.88 mg/dL (05-09-24 @ 06:20)

## 2024-05-12 NOTE — PROVIDER CONTACT NOTE (OTHER) - SITUATION
Re-order AM lab draws because lab can't see the orders on their end.  Pt also with both arms pink banded, lab unable to draw blood until clarified which arm is okay to use. Re-order AM lab draws because lab can't see the orders on their end (CBC, CMP, Mag, PTT, T&S).  Pt also with both arms pink banded, lab unable to draw blood until clarified which arm is okay to use.

## 2024-05-12 NOTE — PROGRESS NOTE ADULT - SUBJECTIVE AND OBJECTIVE BOX
Acute embolism and thrombosis of deep vein of right upper extremity    HPI:  84y F w/ hx ESRD s/p renal transplant on mycophenolate and tacrolimus,Prior hx of  HD , CVA, HTN, HLD, anemia, dementia,lung nodule,RUE dvt,caumadin toxicity, GI Bleed  presents from [facility] nursing home for RUE swelling with hx of  RUE DVT,diarrhea(started on metronidazole 05/06,pending cdif).Pt reports few days of R arm swelling, no pain.Pt denies n/v/d,abd pain,cp,sob,active bleed.  Per transfer papers, pt had RUE US done on 3/15 that showed RUE DVT involving internal jugular and subclavian veins, for which she was started on Coumadin. Was recently admitted to this hospital with anemia and elevated INR,found new lung nodule rec biopsy,seen by hem/onc . Family decided against further anticoagulation at that time given bleeding risk/biopsy,seen by GI,unable to do scope for poor prep,rec out pt f/u.. Pt  had RUE US done on 5/8 that again showed +DVT in right jugular vein, so pt was sent back to the ED.ED spoke with son,wants to start AC now.    4/19/24- CT A/P-Distended gallbladder with cholelithiasis. Decompressed colon with additional suggestion of submucosal edema involving the descending and sigmoid colon suggesting colitis, ischemic colitis cannot be excluded. Disseminated innumerable pulmonary nodules are worrisome for metastatic disease. Subcapsular segment 8 liver lesion measuring 1.8 cm.  4/20/24- CT-C- The bilateral pulmonary nodules are indeterminate ( right middle lobe is a 7 mm noncalcified nodule, left lower lobe is a 9 x 6 mm noncalcified nodule)Small bilateral pleural effusions and atelectasis. thyroid gland is heterogeneous with bilobar nodules and   calcifications. (09 May 2024 09:13)    Interval History:  Patient was seen and examined at bedside around 10:45 am.  Still having loose bowel movements.   Denies abdominal pain, nausea or vomiting.   Denies pain or paresthesia in right arm.     ROS:  As per interval history otherwise unremarkable.    PHYSICAL EXAM:  Vital Signs   T(C): 36.6 (12 May 2024 11:24), Max: 36.8 (11 May 2024 13:30)  T(F): 97.8 (12 May 2024 11:24), Max: 98.2 (11 May 2024 13:30)  HR: 104 (12 May 2024 11:24) (91 - 119)  BP: 133/80 (12 May 2024 11:24) (125/78 - 159/80)  BP(mean): 98 (12 May 2024 11:24) (98 - 98)  RR: 16 (12 May 2024 11:24) (16 - 20)  SpO2: 94% (12 May 2024 11:24) (94% - 100%)  Parameters below as of 12 May 2024 11:24  Patient On (Oxygen Delivery Method): room air  General: Elderly female sitting in bed comfortably. No acute distress  HEENT: EOMI. Clear conjunctivae. Moist mucus membrane  Neck: Supple.   Chest: Good air entry. No wheezing, rales or rhonchi.   Heart: Normal S1 & S2. RRR.   Abdomen: Non distended. Soft. Non-tender. + BS. Healed scar.   : Gee's catheter in place.   Ext: 1+ pedal edema. No calf tenderness. Swollen RUE. Neurovascular intact. Old AV Graft in LUE.   Neuro: Awake. Speaks slowly.   Skin: Warm and Dry  Psychiatry: Normal mood and affect    I&O's Summary    11 May 2024 07:01  -  12 May 2024 07:00  --------------------------------------------------------  IN: 240 mL / OUT: 0 mL / NET: 240 mL    LABS:                      9.6    7.05  )-----------( 143      ( 12 May 2024 11:29 )             29.7     05-12    138  |  101  |  75.7<H>  ----------------------------<  94  5.0   |  21.0<L>  |  5.81<H>    Ca    7.2<L>      12 May 2024 11:29  Mg     1.8     05-12    TPro  5.2<L>  /  Alb  2.7<L>  /  TBili  0.3<L>  /  DBili  x   /  AST  21  /  ALT  8   /  AlkPhos  64  05-12    PTT - ( 11 May 2024 17:21 )  PTT:73.1 sec  Urinalysis Basic - ( 12 May 2024 11:29 )    Color: x / Appearance: x / SG: x / pH: x  Gluc: 94 mg/dL / Ketone: x  / Bili: x / Urobili: x   Blood: x / Protein: x / Nitrite: x   Leuk Esterase: x / RBC: x / WBC x   Sq Epi: x / Non Sq Epi: x / Bacteria: x    RADIOLOGY & ADDITIONAL STUDIES:  Reviewed     MEDICATIONS  (STANDING):  amLODIPine   Tablet 10 milliGRAM(s) Oral daily  bisacodyl Suppository 10 milliGRAM(s) Rectal daily  calcitriol   Capsule 0.5 MICROGram(s) Oral daily  carvedilol 6.25 milliGRAM(s) Oral every 12 hours  clopidogrel Tablet 75 milliGRAM(s) Oral daily  cyanocobalamin 1000 MICROGram(s) Oral daily  dextrose 5% + lactated ringers. 1000 milliLiter(s) (40 mL/Hr) IV Continuous <Continuous>  ergocalciferol 93185 Unit(s) Oral every week  ferrous    sulfate 325 milliGRAM(s) Oral daily  folic acid 1 milliGRAM(s) Oral daily  heparin  Infusion. 400 Unit(s)/Hr (4 mL/Hr) IV Continuous <Continuous>  mirtazapine 7.5 milliGRAM(s) Oral at bedtime  multivitamin 1 Tablet(s) Oral daily  mycophenolate mofetil 500 milliGRAM(s) Oral two times a day  pantoprazole   Suspension 40 milliGRAM(s) Oral two times a day  predniSONE   Tablet 5 milliGRAM(s) Oral daily  sodium bicarbonate 1950 milliGRAM(s) Oral two times a day  tacrolimus 2 milliGRAM(s) Oral every 12 hours  tamsulosin 0.4 milliGRAM(s) Oral at bedtime    MEDICATIONS  (PRN):  acetaminophen     Tablet .. 650 milliGRAM(s) Oral every 6 hours PRN Temp greater or equal to 38C (100.4F), Mild Pain (1 - 3)  aluminum hydroxide/magnesium hydroxide/simethicone Suspension 30 milliLiter(s) Oral every 4 hours PRN Dyspepsia  heparin   Injectable 2000 Unit(s) IV Push every 6 hours PRN For aPTT between 40 - 57  magnesium hydroxide Suspension 30 milliLiter(s) Oral daily PRN Constipation  melatonin 3 milliGRAM(s) Oral at bedtime PRN Insomnia  ondansetron Injectable 4 milliGRAM(s) IV Push every 8 hours PRN Nausea and/or Vomiting

## 2024-05-12 NOTE — PROGRESS NOTE ADULT - ASSESSMENT
84 YOM ESRD s/p renal transplant, HTN, CVA, dementia presents from NH with RUE swelling (history of DVT in RUE on AC)     1. Advanced Chronic kidney disease stage 5:  -Transplant status; s/p DDKT 2012 at [facility]  -Baseline allograft function ; GFR < 12 since December 23 that I could see in the system  -Serum creatinine 6.25, GFR 6.  Could have a component of prerenal in setting of diarrhea but this is already advanced CKD  -Patient likely will need resumption of HD in near future, to discuss goal of care w/ patient and family  -For now c/w IVF as ordered.  -no emergent need of  HD today.    2. Immunosuppression:  -Patient on tacrolimus 2 mg twice daily,  mg twice daily, prednisone 5 mg daily  -In my opinion, given advanced CKD immunosuppression can be weaned down which is increasing her chance of infection  -Will discuss with primary outpatient transplant team tomorrow    3. Hypocalcemia; gave 2gm IV calcium gluconate yesterday.    4. Metabolic acidosis:  -Continue on PO sodium bicarbonate 1950 mg PO BID     5. BMD w/ CKD: Renal diet, continue on calcitriol    6. RUE swelling and DVT: On anticoagulation.

## 2024-05-12 NOTE — PROGRESS NOTE ADULT - ASSESSMENT
84y F w/ hx ESRD s/p renal transplant on mycophenolate and tacrolimus, CVA, HTN, HLD, anemia, dementia, lung nodule, RUE dvt, Coumadin toxicity, GI Bleed  presented from [facility] nursing home for RUE swelling with hx of  RUE DVT, diarrhea(started on metronidazole 05/06,pending cdif).   Per transfer papers, pt had RUE US done on 3/15 that showed RUE DVT involving internal jugular and subclavian veins, for which she was started on Coumadin. Was recently admitted to this hospital with anemia and elevated INR, found to have new lung nodule rec biopsy, seen by hem/onc . Family decided against further anticoagulation at that time given bleeding risk/biopsy, seen by GI, unable to do scope for poor prep, rec out pt  follow up . Pt  had RUE US done on 5/8 that again showed +DVT in right jugular vein, so pt was sent back to the ED.    RUE DVT   -US Duplex Venous Upper Ext Ltd, Right (05.09.24 @ 03:08) thrombus identified within the right internal jugular, subclavian and axillary, cephalic and basilic veins.  Heparin Infusion was started after discussing risks vs benefits with son   hem/onc consult noted  Monitor CBC closely given history of GIB    -Advanced CKD 5  H/O Renal Transplant in 2012 ([facility])  Was having diarrhea at rehab  Worsening renal function likely due to dehydration   Continue IVF and Sodium Bicarb   cont tacrolimus, mycophenolate and prednisone (Nephro to discuss with primary transplant team regarding weaning down due to risk of infections)   continue Gee's catheter   nephrology following     -Diarrhea   -Started Metronidazole (on 05/06) at rehab with pending cdif   -Called [facility] and as per them C. Diff was negative  -GI PCR negative  -D/C antibiotics   -D/C Dulcolax suppository  -Add Metamucil     Recent GI bleed  cont PPI  Monitor closely     HTN  - norvasc, coreg      Lung nodule  Liver lesion  -suspected malignancy  -family wants to hold off on biopsy   -reviewed ct chest/abd /pelvis from prior admission    Hypocalcemia  -Replete     DVT Prophylaxis -- Patient is on Heparin Infusion.    Full Code. Prognosis guarded. Palliative Care following.     Dispo: Back to [facility] once stable.

## 2024-05-12 NOTE — PROVIDER CONTACT NOTE (OTHER) - BACKGROUND
84F adm 5/9 with RUE DVT on Heparin gtt.  Hx ESRD s/p renal transplant, CVA, dementia, anemia and new lung nodule
Pt admitted with acute embolism and thrombus of the deep vein of right upper extremity

## 2024-05-13 LAB
ANION GAP SERPL CALC-SCNC: 15 MMOL/L — SIGNIFICANT CHANGE UP (ref 5–17)
APTT BLD: 52.1 SEC — HIGH (ref 24.5–35.6)
APTT BLD: 85.7 SEC — HIGH (ref 24.5–35.6)
BUN SERPL-MCNC: 78.8 MG/DL — HIGH (ref 8–20)
CA-I BLD-SCNC: 1.05 MMOL/L — LOW (ref 1.15–1.33)
CALCIUM SERPL-MCNC: 7.4 MG/DL — LOW (ref 8.4–10.5)
CHLORIDE SERPL-SCNC: 103 MMOL/L — SIGNIFICANT CHANGE UP (ref 96–108)
CO2 SERPL-SCNC: 22 MMOL/L — SIGNIFICANT CHANGE UP (ref 22–29)
CREAT SERPL-MCNC: 6.05 MG/DL — HIGH (ref 0.5–1.3)
EGFR: 6 ML/MIN/1.73M2 — LOW
GLUCOSE SERPL-MCNC: 77 MG/DL — SIGNIFICANT CHANGE UP (ref 70–99)
HCT VFR BLD CALC: 26.5 % — LOW (ref 34.5–45)
HGB BLD-MCNC: 9 G/DL — LOW (ref 11.5–15.5)
MAGNESIUM SERPL-MCNC: 1.9 MG/DL — SIGNIFICANT CHANGE UP (ref 1.6–2.6)
MCHC RBC-ENTMCNC: 30.8 PG — SIGNIFICANT CHANGE UP (ref 27–34)
MCHC RBC-ENTMCNC: 34 GM/DL — SIGNIFICANT CHANGE UP (ref 32–36)
MCV RBC AUTO: 90.8 FL — SIGNIFICANT CHANGE UP (ref 80–100)
PLATELET # BLD AUTO: 144 K/UL — LOW (ref 150–400)
POTASSIUM SERPL-MCNC: 4.7 MMOL/L — SIGNIFICANT CHANGE UP (ref 3.5–5.3)
POTASSIUM SERPL-SCNC: 4.7 MMOL/L — SIGNIFICANT CHANGE UP (ref 3.5–5.3)
RBC # BLD: 2.92 M/UL — LOW (ref 3.8–5.2)
RBC # FLD: 15 % — HIGH (ref 10.3–14.5)
SODIUM SERPL-SCNC: 140 MMOL/L — SIGNIFICANT CHANGE UP (ref 135–145)
WBC # BLD: 5.24 K/UL — SIGNIFICANT CHANGE UP (ref 3.8–10.5)
WBC # FLD AUTO: 5.24 K/UL — SIGNIFICANT CHANGE UP (ref 3.8–10.5)

## 2024-05-13 PROCEDURE — 99223 1ST HOSP IP/OBS HIGH 75: CPT | Mod: FS

## 2024-05-13 PROCEDURE — 99233 SBSQ HOSP IP/OBS HIGH 50: CPT

## 2024-05-13 RX ORDER — CHLORHEXIDINE GLUCONATE 213 G/1000ML
1 SOLUTION TOPICAL DAILY
Refills: 0 | Status: DISCONTINUED | OUTPATIENT
Start: 2024-05-13 | End: 2024-05-24

## 2024-05-13 RX ADMIN — CARVEDILOL PHOSPHATE 6.25 MILLIGRAM(S): 80 CAPSULE, EXTENDED RELEASE ORAL at 05:17

## 2024-05-13 RX ADMIN — PREGABALIN 1000 MICROGRAM(S): 225 CAPSULE ORAL at 08:16

## 2024-05-13 RX ADMIN — CLOPIDOGREL BISULFATE 75 MILLIGRAM(S): 75 TABLET, FILM COATED ORAL at 08:16

## 2024-05-13 RX ADMIN — Medication 5 MILLIGRAM(S): at 05:17

## 2024-05-13 RX ADMIN — PANTOPRAZOLE SODIUM 40 MILLIGRAM(S): 20 TABLET, DELAYED RELEASE ORAL at 17:33

## 2024-05-13 RX ADMIN — MYCOPHENOLATE MOFETIL 500 MILLIGRAM(S): 250 CAPSULE ORAL at 05:17

## 2024-05-13 RX ADMIN — CARVEDILOL PHOSPHATE 6.25 MILLIGRAM(S): 80 CAPSULE, EXTENDED RELEASE ORAL at 17:33

## 2024-05-13 RX ADMIN — Medication 1 PACKET(S): at 17:38

## 2024-05-13 RX ADMIN — CALCITRIOL 0.5 MICROGRAM(S): 0.5 CAPSULE ORAL at 08:16

## 2024-05-13 RX ADMIN — HEPARIN SODIUM 500 UNIT(S)/HR: 5000 INJECTION INTRAVENOUS; SUBCUTANEOUS at 19:00

## 2024-05-13 RX ADMIN — Medication 1950 MILLIGRAM(S): at 17:32

## 2024-05-13 RX ADMIN — MIRTAZAPINE 7.5 MILLIGRAM(S): 45 TABLET, ORALLY DISINTEGRATING ORAL at 21:19

## 2024-05-13 RX ADMIN — Medication 1 MILLIGRAM(S): at 08:16

## 2024-05-13 RX ADMIN — TACROLIMUS 2 MILLIGRAM(S): 5 CAPSULE ORAL at 17:33

## 2024-05-13 RX ADMIN — HEPARIN SODIUM 2000 UNIT(S): 5000 INJECTION INTRAVENOUS; SUBCUTANEOUS at 07:41

## 2024-05-13 RX ADMIN — HEPARIN SODIUM 500 UNIT(S)/HR: 5000 INJECTION INTRAVENOUS; SUBCUTANEOUS at 17:16

## 2024-05-13 RX ADMIN — HEPARIN SODIUM 500 UNIT(S)/HR: 5000 INJECTION INTRAVENOUS; SUBCUTANEOUS at 19:08

## 2024-05-13 RX ADMIN — MYCOPHENOLATE MOFETIL 500 MILLIGRAM(S): 250 CAPSULE ORAL at 17:33

## 2024-05-13 RX ADMIN — PANTOPRAZOLE SODIUM 40 MILLIGRAM(S): 20 TABLET, DELAYED RELEASE ORAL at 05:18

## 2024-05-13 RX ADMIN — AMLODIPINE BESYLATE 10 MILLIGRAM(S): 2.5 TABLET ORAL at 05:17

## 2024-05-13 RX ADMIN — HEPARIN SODIUM 500 UNIT(S)/HR: 5000 INJECTION INTRAVENOUS; SUBCUTANEOUS at 07:38

## 2024-05-13 RX ADMIN — Medication 325 MILLIGRAM(S): at 08:16

## 2024-05-13 RX ADMIN — TACROLIMUS 2 MILLIGRAM(S): 5 CAPSULE ORAL at 05:17

## 2024-05-13 RX ADMIN — Medication 1 TABLET(S): at 08:16

## 2024-05-13 RX ADMIN — Medication 1950 MILLIGRAM(S): at 05:17

## 2024-05-13 NOTE — DIETITIAN NUTRITION RISK NOTIFICATION - TREATMENT: THE FOLLOWING DIET HAS BEEN RECOMMENDED
Diet, Renal Restrictions:   For patients receiving Renal Replacement - No Protein Restr, No Conc K, No Conc Phos, Low Sodium  1200mL Fluid Restriction ([id number]) (05-11-24 @ 07:45) [Active]

## 2024-05-13 NOTE — PROGRESS NOTE ADULT - ASSESSMENT
84 YOM ESRD s/p renal transplant, HTN, CVA, dementia presents from NH with RUE swelling (history of DVT in RUE on AC)     1. Advanced Chronic kidney disease stage 5:  -Transplant status; s/p DDKT 2012 at [facility]  -Baseline allograft function ; GFR < 12 since December 23 that I could see in the system  -Serum creatinine 6.25, GFR 6.  Could have a component of prerenal in setting of diarrhea but this is already advanced CKD  -Patient likely will need resumption of HD in near future, to discuss goal of care w/ patient and family  -For now c/w IVF as ordered.  -no emergent need of  HD today.    2. Immunosuppression:  -Patient is on tacrolimus 2 mg twice daily,  mg twice daily, prednisone 5 mg daily  -Continue immunosuppression- can be weaned down once started on HD    3. Hypocalcemia; sp 2gm IV calcium gluconate     4. Metabolic acidosis:  -Continue on PO sodium bicarbonate 1950 mg PO BID     5. BMD w/ CKD: Renal diet, continue on calcitriol    6. RUE swelling and DVT: On anticoagulation.

## 2024-05-13 NOTE — PROGRESS NOTE ADULT - ASSESSMENT
84y F w/ hx ESRD s/p renal transplant on mycophenolate and tacrolimus, CVA, HTN, HLD, anemia, dementia, lung nodule, RUE dvt, Coumadin toxicity, GI Bleed  presented from [facility] nursing home for RUE swelling with hx of  RUE DVT, diarrhea(started on metronidazole 05/06,pending cdif). Per transfer papers, pt had RUE US done on 3/15 that showed RUE DVT involving internal jugular and subclavian veins, for which she was started on Coumadin. Was recently admitted to this hospital with anemia and elevated INR, found to have new lung nodule rec biopsy, seen by hem/onc . Family decided against further anticoagulation at that time given bleeding risk/biopsy, seen by GI, unable to do scope for poor prep, rec out pt  follow up . Pt  had RUE US done on 5/8 that again showed +DVT in right jugular vein, so pt was sent back to the ED.    RUE DVT   -US Duplex Venous Upper Ext Ltd, Right (05.09.24) thrombus identified within the right internal jugular, subclavian and axillary, cephalic and basilic veins.  -Heparin Infusion was started after discussing risks vs benefits with family   -hem/onc consult noted  -Monitor CBC closely given history of GIB    GLO on Advanced CKD 5   Met acidosis   -H/O Renal Transplant in 2012 ([facility])  -Was having diarrhea at rehab  -Worsening renal function likely due to dehydration   -Continue IVF and Sodium Bicarb   -cont tacrolimus, mycophenolate and prednisone (Nephro to discuss with primary transplant team regarding weaning down due to risk of infections)   -continue Gee catheter   -nephrology following, may need HD in near future     Diarrhea   -Per [facility], C. Diff was negative at facility   -GI PCR negative  -D/C antibiotics   -D/C Dulcolax suppository  -Added Metamucil     Recent GI bleed  -cont PPI  -Monitor closely     HTN  -norvasc, coreg      Lung nodule  Liver lesion  -suspected malignancy  -family wants to hold off on biopsy   -reviewed ct chest/abd /pelvis from prior admission    Hypocalcemia  -Replete     DVT Prophylaxis -- Heparin Infusion.    Full Code. Prognosis guarded. Palliative Care following.   Dispo: Back to [facility] once stable.    84y F w/ hx ESRD s/p renal transplant on mycophenolate and tacrolimus, CVA, HTN, HLD, anemia, dementia, lung nodule, RUE dvt, Coumadin toxicity, GI Bleed  presented from [facility] nursing home for RUE swelling with hx of  RUE DVT, diarrhea (started on metronidazole 05/06, pending cdif). Per transfer papers, pt had RUE US done on 3/15 that showed RUE DVT involving internal jugular and subclavian veins, for which she was started on Coumadin. Was recently admitted to this hospital with anemia and elevated INR, found to have new lung nodule rec biopsy, seen by hem/onc . Family decided against further anticoagulation at that time given bleeding risk/biopsy, seen by GI, unable to do scope for poor prep, rec out pt  follow up. Pt had RUE US done on 5/8 that again showed +DVT in right jugular vein, so pt was sent back to the ED.     RUE DVT   -US Duplex Venous Upper Ext Ltd, Right (05.09.24) thrombus identified within the right internal jugular, subclavian and axillary, cephalic and basilic veins.  -Heparin Infusion was started after discussing risks vs benefits with family   -hem/onc consult noted  -Monitor CBC closely given history of GIB    GLO on Advanced CKD 5   Met acidosis   -H/O Renal Transplant in 2012 ([facility])  -Was having diarrhea at rehab  -Worsening renal function likely due to dehydration   -Continue IVF and Sodium Bicarb   -cont tacrolimus, mycophenolate and prednisone (Nephro to discuss with primary transplant team regarding weaning down due to risk of infections)   -continue Gee catheter   -nephrology following, may need HD in near future     Diarrhea   -Per [facility], C. Diff was negative at facility   -GI PCR negative  -D/C antibiotics   -D/C Dulcolax suppository  -Added Metamucil     Recent GI bleed   -cont PPI  -Monitor closely     HTN   -norvasc, coreg      Lung nodule   Liver lesion   -suspected malignancy  -family wants to hold off on biopsy   -reviewed ct chest/abd /pelvis from prior admission    Hypocalcemia   -Replete     DVT Prophylaxis -- Heparin Infusion    Full Code. Prognosis guarded. Palliative Care following.   Dispo: Back to [facility] once stable.

## 2024-05-13 NOTE — DIETITIAN INITIAL EVALUATION ADULT - PERTINENT MEDS FT
MEDICATIONS  (STANDING):  amLODIPine   Tablet 10 milliGRAM(s) Oral daily  calcitriol   Capsule 0.5 MICROGram(s) Oral daily  carvedilol 6.25 milliGRAM(s) Oral every 12 hours  clopidogrel Tablet 75 milliGRAM(s) Oral daily  cyanocobalamin 1000 MICROGram(s) Oral daily  dextrose 5% + lactated ringers. 1000 milliLiter(s) (40 mL/Hr) IV Continuous <Continuous>  ergocalciferol 95476 Unit(s) Oral every week  ferrous    sulfate 325 milliGRAM(s) Oral daily  folic acid 1 milliGRAM(s) Oral daily  heparin  Infusion. 400 Unit(s)/Hr (4 mL/Hr) IV Continuous <Continuous>  mirtazapine 7.5 milliGRAM(s) Oral at bedtime  multivitamin 1 Tablet(s) Oral daily  mycophenolate mofetil 500 milliGRAM(s) Oral two times a day  pantoprazole   Suspension 40 milliGRAM(s) Oral two times a day  predniSONE   Tablet 5 milliGRAM(s) Oral daily  psyllium Powder 1 Packet(s) Oral daily  sodium bicarbonate 1950 milliGRAM(s) Oral two times a day  tacrolimus 2 milliGRAM(s) Oral every 12 hours    MEDICATIONS  (PRN):  magnesium hydroxide Suspension 30 milliLiter(s) Oral daily PRN Constipation  ondansetron Injectable 4 milliGRAM(s) IV Push every 8 hours PRN Nausea and/or Vomiting

## 2024-05-13 NOTE — PROGRESS NOTE ADULT - SUBJECTIVE AND OBJECTIVE BOX
[facility] Division of Hospital Medicine    Chief Complaint:  RUE DVT     SUBJECTIVE / OVERNIGHT EVENTS:  Pt reports no complaints     Patient denies chest pain, SOB, abd pain, N/V, fever, chills, dysuria or any other complaints. All remainder ROS negative.     MEDICATIONS  (STANDING):  amLODIPine   Tablet 10 milliGRAM(s) Oral daily  calcitriol   Capsule 0.5 MICROGram(s) Oral daily  carvedilol 6.25 milliGRAM(s) Oral every 12 hours  chlorhexidine 2% Cloths 1 Application(s) Topical daily  clopidogrel Tablet 75 milliGRAM(s) Oral daily  cyanocobalamin 1000 MICROGram(s) Oral daily  dextrose 5% + lactated ringers. 1000 milliLiter(s) (40 mL/Hr) IV Continuous <Continuous>  ergocalciferol 43998 Unit(s) Oral every week  ferrous    sulfate 325 milliGRAM(s) Oral daily  folic acid 1 milliGRAM(s) Oral daily  heparin  Infusion. 400 Unit(s)/Hr (4 mL/Hr) IV Continuous <Continuous>  mirtazapine 7.5 milliGRAM(s) Oral at bedtime  multivitamin 1 Tablet(s) Oral daily  mycophenolate mofetil 500 milliGRAM(s) Oral two times a day  pantoprazole   Suspension 40 milliGRAM(s) Oral two times a day  predniSONE   Tablet 5 milliGRAM(s) Oral daily  psyllium Powder 1 Packet(s) Oral daily  sodium bicarbonate 1950 milliGRAM(s) Oral two times a day  tacrolimus 2 milliGRAM(s) Oral every 12 hours  tamsulosin 0.4 milliGRAM(s) Oral at bedtime    MEDICATIONS  (PRN):  acetaminophen     Tablet .. 650 milliGRAM(s) Oral every 6 hours PRN Temp greater or equal to 38C (100.4F), Mild Pain (1 - 3)  aluminum hydroxide/magnesium hydroxide/simethicone Suspension 30 milliLiter(s) Oral every 4 hours PRN Dyspepsia  heparin   Injectable 2000 Unit(s) IV Push every 6 hours PRN For aPTT between 40 - 57  magnesium hydroxide Suspension 30 milliLiter(s) Oral daily PRN Constipation  melatonin 3 milliGRAM(s) Oral at bedtime PRN Insomnia  ondansetron Injectable 4 milliGRAM(s) IV Push every 8 hours PRN Nausea and/or Vomiting        I&O's Summary    12 May 2024 07:01  -  13 May 2024 07:00  --------------------------------------------------------  IN: 40 mL / OUT: 600 mL / NET: -560 mL    13 May 2024 07:01  -  13 May 2024 16:32  --------------------------------------------------------  IN: 0 mL / OUT: 150 mL / NET: -150 mL        PHYSICAL EXAM:  Vital Signs Last 24 Hrs  T(C): 36.7 (13 May 2024 12:09), Max: 37.2 (13 May 2024 00:20)  T(F): 98 (13 May 2024 12:09), Max: 99 (13 May 2024 00:20)  HR: 93 (13 May 2024 12:09) (93 - 108)  BP: 155/80 (13 May 2024 12:09) (133/88 - 155/80)  BP(mean): --  RR: 18 (13 May 2024 12:09) (16 - 18)  SpO2: 98% (13 May 2024 12:09) (93% - 98%)    Parameters below as of 13 May 2024 12:09  Patient On (Oxygen Delivery Method): room air          CONSTITUTIONAL: NAD, sitting up in bed  RESPIRATORY: Normal respiratory effort; lungs are clear to auscultation bilaterally  CARDIOVASCULAR: Regular rate and rhythm, normal S1 and S2   ABDOMEN: Nontender to palpation, normoactive bowel sounds  : Gee in place   MUSCULOSKELETAL:  RUE swelling, mild LE edema b/l   PSYCH: A+O to person, place not time; affect appropriate  NEUROLOGY: No gross sensory or motor deficits       LABS:                        9.0    5.24  )-----------( 144      ( 13 May 2024 05:10 )             26.5     05-13    140  |  103  |  78.8<H>  ----------------------------<  77  4.7   |  22.0  |  6.05<H>    Ca    7.4<L>      13 May 2024 05:10  Mg     1.9     05-13    TPro  5.2<L>  /  Alb  2.7<L>  /  TBili  0.3<L>  /  DBili  x   /  AST  21  /  ALT  8   /  AlkPhos  64  05-12    PTT - ( 13 May 2024 05:10 )  PTT:52.1 sec      Urinalysis Basic - ( 13 May 2024 05:10 )    Color: x / Appearance: x / SG: x / pH: x  Gluc: 77 mg/dL / Ketone: x  / Bili: x / Urobili: x   Blood: x / Protein: x / Nitrite: x   Leuk Esterase: x / RBC: x / WBC x   Sq Epi: x / Non Sq Epi: x / Bacteria: x

## 2024-05-13 NOTE — DIETITIAN INITIAL EVALUATION ADULT - OTHER INFO
84y F w/ hx ESRD s/p renal transplant on mycophenolate and tacrolimus, CVA, HTN, HLD, anemia, dementia, lung nodule, RUE dvt, Coumadin toxicity, GI Bleed  presented from [facility] nursing home for RUE swelling with hx of  RUE DVT, diarrhea(started on metronidazole 05/06,pending cdif).   Per transfer papers, pt had RUE US done on 3/15 that showed RUE DVT involving internal jugular and subclavian veins, for which she was started on Coumadin. Was recently admitted to this hospital with anemia and elevated INR, found to have new lung nodule rec biopsy, seen by hem/onc . Family decided against further anticoagulation at that time given bleeding risk/biopsy, seen by GI, unable to do scope for poor prep, rec out pt  follow up . Pt  had RUE US done on 5/8 that again showed +DVT in right jugular vein, so pt was sent back to the ED.

## 2024-05-13 NOTE — DIETITIAN INITIAL EVALUATION ADULT - SIGNS/SYMPTOMS
as evidenced by severe muscle/fat wasting  as evidenced by severe muscle/fat wasting and PO intake <75% EER >1 month

## 2024-05-13 NOTE — PHYSICAL THERAPY INITIAL EVALUATION ADULT - ADDITIONAL COMMENTS
pt presented from [facility]. pt lives with her son and uses a cane at baseline per [facility] assessment.

## 2024-05-13 NOTE — DIETITIAN INITIAL EVALUATION ADULT - ADD RECOMMEND
1) Continue diet as tolerated.   2) Encourage po intake, monitor diet tolerance, and provide assistance at meals as needed.   3) Add Nepro BID to optimize PO intake.  4) Add Nephrovite daily   5) Obtain daily weights to monitor trends.   6) Add Banatrol BID (contains prebiotics and soluble fiber to add bulk to stool).

## 2024-05-13 NOTE — DIETITIAN INITIAL EVALUATION ADULT - NS FNS DIET ORDER
Diet, Renal Restrictions:   For patients receiving Renal Replacement - No Protein Restr, No Conc K, No Conc Phos, Low Sodium  1200mL Fluid Restriction ([id number]) (05-11-24 @ 07:45)

## 2024-05-13 NOTE — DIETITIAN INITIAL EVALUATION ADULT - ORAL INTAKE PTA/DIET HISTORY
Patient tolerating diet well with fair appetite/PO intake. Reports PTA she was eating 3 small meals per day. No current c/o N/V/C. Pt c/o diarrhea since admission - consider addition of Add Banatrol BID (contains prebiotics and soluble fiber to add bulk to stool). States her UBW now is about 104 lbs, current weight 113 lbs. Pt reports losing 70 lb unintentionally, but unsure of exact time frame. NFPE conducted and patient identified with malnutrition. Suggested addition of Nepro BID to optimize PO intake - pt willing to try oral nutrition supplement. RD contact information provided for further nutrition-related questions or concerns. Will continue to monitor and follow up as needed. RD remains available.

## 2024-05-13 NOTE — PHYSICAL THERAPY INITIAL EVALUATION ADULT - PERTINENT HX OF CURRENT PROBLEM, REHAB EVAL
84y F w/ hx ESRD s/p renal transplant on mycophenolate and tacrolimus, CVA, HTN, HLD, anemia, dementia, lung nodule, RUE dvt, Coumadin toxicity, GI Bleed  presented from [facility] nursing home for RUE swelling with hx of  RUE DVT, diarrhea (started on metronidazole 05/06, pending cdif). Per transfer papers, pt had RUE US done on 3/15 that showed RUE DVT involving internal jugular and subclavian veins, for which she was started on Coumadin. Was recently admitted to this hospital with anemia and elevated INR, found to have new lung nodule rec biopsy, seen by hem/onc . Family decided against further anticoagulation at that time given bleeding risk/biopsy, seen by GI, unable to do scope for poor prep, rec out pt  follow up. Pt had RUE US done on 5/8 that again showed +DVT in right jugular vein, so pt was sent back to the ED.

## 2024-05-13 NOTE — PROGRESS NOTE ADULT - SUBJECTIVE AND OBJECTIVE BOX
[facility] DIVISION OF KIDNEY DISEASES AND HYPERTENSION -- FOLLOW UP NOTE  --------------------------------------------------------------------------------  Chief Complaint: renal transplant recipient with Octavio    24 hour events/subjective:        PAST HISTORY  --------------------------------------------------------------------------------  No significant changes to PMH, PSH, FHx, SHx, unless otherwise noted    ALLERGIES & MEDICATIONS  --------------------------------------------------------------------------------  Allergies    No Known Allergies        Standing Inpatient Medications  amLODIPine   Tablet 10 milliGRAM(s) Oral daily  calcitriol   Capsule 0.5 MICROGram(s) Oral daily  carvedilol 6.25 milliGRAM(s) Oral every 12 hours  clopidogrel Tablet 75 milliGRAM(s) Oral daily  cyanocobalamin 1000 MICROGram(s) Oral daily  dextrose 5% + lactated ringers. 1000 milliLiter(s) IV Continuous <Continuous>  ergocalciferol 85208 Unit(s) Oral every week  ferrous    sulfate 325 milliGRAM(s) Oral daily  folic acid 1 milliGRAM(s) Oral daily  heparin  Infusion. 400 Unit(s)/Hr IV Continuous <Continuous>  mirtazapine 7.5 milliGRAM(s) Oral at bedtime  multivitamin 1 Tablet(s) Oral daily  mycophenolate mofetil 500 milliGRAM(s) Oral two times a day  pantoprazole   Suspension 40 milliGRAM(s) Oral two times a day  predniSONE   Tablet 5 milliGRAM(s) Oral daily  psyllium Powder 1 Packet(s) Oral daily  sodium bicarbonate 1950 milliGRAM(s) Oral two times a day  tacrolimus 2 milliGRAM(s) Oral every 12 hours  tamsulosin 0.4 milliGRAM(s) Oral at bedtime    PRN Inpatient Medications  acetaminophen     Tablet .. 650 milliGRAM(s) Oral every 6 hours PRN  aluminum hydroxide/magnesium hydroxide/simethicone Suspension 30 milliLiter(s) Oral every 4 hours PRN  heparin   Injectable 2000 Unit(s) IV Push every 6 hours PRN  magnesium hydroxide Suspension 30 milliLiter(s) Oral daily PRN  melatonin 3 milliGRAM(s) Oral at bedtime PRN  ondansetron Injectable 4 milliGRAM(s) IV Push every 8 hours PRN      REVIEW OF SYSTEMS  --------------------------------------------------------------------------------  Gen: No weight changes, fatigue, fevers/chills, weakness  Skin: No rashes  Head/Eyes/Ears/Mouth: No headache; Normal hearing; Normal vision w/o blurriness; No sinus pain/discomfort, sore throat  Respiratory: No dyspnea, cough, wheezing, hemoptysis  CV: No chest pain, PND, orthopnea  GI: No abdominal pain, diarrhea, constipation, nausea, vomiting, melena, hematochezia  : No increased frequency, dysuria, hematuria, nocturia  MSK: No joint pain/swelling; no back pain; no edema  Neuro: No dizziness/lightheadedness, weakness, seizures, numbness, tingling  Heme: No easy bruising or bleeding  Endo: No heat/cold intolerance  Psych: No significant nervousness, anxiety, stress, depression    All other systems were reviewed and are negative, except as noted.    VITALS/PHYSICAL EXAM  --------------------------------------------------------------------------------  T(C): 36.7 (05-13-24 @ 12:09), Max: 37.2 (05-13-24 @ 00:20)  HR: 93 (05-13-24 @ 12:09) (93 - 108)  BP: 155/80 (05-13-24 @ 12:09) (131/72 - 155/80)  RR: 18 (05-13-24 @ 12:09) (15 - 18)  SpO2: 98% (05-13-24 @ 12:09) (93% - 98%)  Wt(kg): --        05-12-24 @ 07:01  -  05-13-24 @ 07:00  --------------------------------------------------------  IN: 40 mL / OUT: 600 mL / NET: -560 mL    05-13-24 @ 07:01  -  05-13-24 @ 14:56  --------------------------------------------------------  IN: 0 mL / OUT: 150 mL / NET: -150 mL      Physical Exam:  Gen: elderly woman, no acute distress  MS: alert and oriented   Eyes: EOMI, no icterus  HENT: NCAT, MMM  CV: rhythm reg reg, rate normal, no m/g/r  Chest: CTAB, no w/r/r,  Abd: soft, NT, ND  Extremities: swelling RUE edema  LUEAVG  LABS/STUDIES  --------------------------------------------------------------------------------              9.0    5.24  >-----------<  144      [05-13-24 @ 05:10]              26.5     140  |  103  |  78.8  ----------------------------<  77      [05-13-24 @ 05:10]  4.7   |  22.0  |  6.05        Ca     7.4     [05-13-24 @ 05:10]      iCa    1.05     [05-13 @ 07:25]      Mg     1.9     [05-13-24 @ 05:10]    TPro  5.2  /  Alb  2.7  /  TBili  0.3  /  DBili  x   /  AST  21  /  ALT  8   /  AlkPhos  64  [05-12-24 @ 11:29]      PTT: 52.1       [05-13-24 @ 05:10]      Creatinine Trend:  SCr 6.05 [05-13 @ 05:10]  SCr 5.81 [05-12 @ 11:29]  SCr 6.11 [05-11 @ 01:09]  SCr 6.25 [05-10 @ 02:40]  SCr 5.88 [05-09 @ 06:20]    Urinalysis - [05-13-24 @ 05:10]      Color  / Appearance  / SG  / pH       Gluc 77 / Ketone   / Bili  / Urobili        Blood  / Protein  / Leuk Est  / Nitrite       RBC  / WBC  / Hyaline  / Gran  / Sq Epi  / Non Sq Epi  / Bacteria       Iron 96, TIBC 132, %sat 73      [05-10-24 @ 02:40]  Ferritin 3171      [04-19-24 @ 06:50]  TSH 1.72      [04-19-24 @ 06:50]

## 2024-05-14 LAB
ANION GAP SERPL CALC-SCNC: 17 MMOL/L — SIGNIFICANT CHANGE UP (ref 5–17)
APTT BLD: 79.8 SEC — HIGH (ref 24.5–35.6)
BASOPHILS # BLD AUTO: 0.03 K/UL — SIGNIFICANT CHANGE UP (ref 0–0.2)
BASOPHILS NFR BLD AUTO: 0.5 % — SIGNIFICANT CHANGE UP (ref 0–2)
BUN SERPL-MCNC: 76.4 MG/DL — HIGH (ref 8–20)
CALCIUM SERPL-MCNC: 7.4 MG/DL — LOW (ref 8.4–10.5)
CHLORIDE SERPL-SCNC: 102 MMOL/L — SIGNIFICANT CHANGE UP (ref 96–108)
CO2 SERPL-SCNC: 21 MMOL/L — LOW (ref 22–29)
CREAT SERPL-MCNC: 6.4 MG/DL — HIGH (ref 0.5–1.3)
EGFR: 6 ML/MIN/1.73M2 — LOW
EOSINOPHIL # BLD AUTO: 0.19 K/UL — SIGNIFICANT CHANGE UP (ref 0–0.5)
EOSINOPHIL NFR BLD AUTO: 3.4 % — SIGNIFICANT CHANGE UP (ref 0–6)
GLUCOSE SERPL-MCNC: 77 MG/DL — SIGNIFICANT CHANGE UP (ref 70–99)
HCT VFR BLD CALC: 27.7 % — LOW (ref 34.5–45)
HGB BLD-MCNC: 9.2 G/DL — LOW (ref 11.5–15.5)
IMM GRANULOCYTES NFR BLD AUTO: 1.1 % — HIGH (ref 0–0.9)
LYMPHOCYTES # BLD AUTO: 0.79 K/UL — LOW (ref 1–3.3)
LYMPHOCYTES # BLD AUTO: 14.1 % — SIGNIFICANT CHANGE UP (ref 13–44)
MCHC RBC-ENTMCNC: 30.7 PG — SIGNIFICANT CHANGE UP (ref 27–34)
MCHC RBC-ENTMCNC: 33.2 GM/DL — SIGNIFICANT CHANGE UP (ref 32–36)
MCV RBC AUTO: 92.3 FL — SIGNIFICANT CHANGE UP (ref 80–100)
MONOCYTES # BLD AUTO: 0.77 K/UL — SIGNIFICANT CHANGE UP (ref 0–0.9)
MONOCYTES NFR BLD AUTO: 13.8 % — SIGNIFICANT CHANGE UP (ref 2–14)
MRSA PCR RESULT.: SIGNIFICANT CHANGE UP
NEUTROPHILS # BLD AUTO: 3.76 K/UL — SIGNIFICANT CHANGE UP (ref 1.8–7.4)
NEUTROPHILS NFR BLD AUTO: 67.1 % — SIGNIFICANT CHANGE UP (ref 43–77)
PLATELET # BLD AUTO: 161 K/UL — SIGNIFICANT CHANGE UP (ref 150–400)
POTASSIUM SERPL-MCNC: 4.6 MMOL/L — SIGNIFICANT CHANGE UP (ref 3.5–5.3)
POTASSIUM SERPL-SCNC: 4.6 MMOL/L — SIGNIFICANT CHANGE UP (ref 3.5–5.3)
RBC # BLD: 3 M/UL — LOW (ref 3.8–5.2)
RBC # FLD: 15.4 % — HIGH (ref 10.3–14.5)
S AUREUS DNA NOSE QL NAA+PROBE: SIGNIFICANT CHANGE UP
SODIUM SERPL-SCNC: 140 MMOL/L — SIGNIFICANT CHANGE UP (ref 135–145)
WBC # BLD: 5.6 K/UL — SIGNIFICANT CHANGE UP (ref 3.8–10.5)
WBC # FLD AUTO: 5.6 K/UL — SIGNIFICANT CHANGE UP (ref 3.8–10.5)

## 2024-05-14 PROCEDURE — 99232 SBSQ HOSP IP/OBS MODERATE 35: CPT

## 2024-05-14 PROCEDURE — 99497 ADVNCD CARE PLAN 30 MIN: CPT

## 2024-05-14 PROCEDURE — 99233 SBSQ HOSP IP/OBS HIGH 50: CPT

## 2024-05-14 PROCEDURE — 99497 ADVNCD CARE PLAN 30 MIN: CPT | Mod: 25

## 2024-05-14 RX ADMIN — MYCOPHENOLATE MOFETIL 500 MILLIGRAM(S): 250 CAPSULE ORAL at 05:29

## 2024-05-14 RX ADMIN — HEPARIN SODIUM 500 UNIT(S)/HR: 5000 INJECTION INTRAVENOUS; SUBCUTANEOUS at 07:19

## 2024-05-14 RX ADMIN — AMLODIPINE BESYLATE 10 MILLIGRAM(S): 2.5 TABLET ORAL at 05:28

## 2024-05-14 RX ADMIN — TAMSULOSIN HYDROCHLORIDE 0.4 MILLIGRAM(S): 0.4 CAPSULE ORAL at 21:27

## 2024-05-14 RX ADMIN — MYCOPHENOLATE MOFETIL 500 MILLIGRAM(S): 250 CAPSULE ORAL at 17:15

## 2024-05-14 RX ADMIN — CHLORHEXIDINE GLUCONATE 1 APPLICATION(S): 213 SOLUTION TOPICAL at 09:19

## 2024-05-14 RX ADMIN — Medication 1950 MILLIGRAM(S): at 17:14

## 2024-05-14 RX ADMIN — CARVEDILOL PHOSPHATE 6.25 MILLIGRAM(S): 80 CAPSULE, EXTENDED RELEASE ORAL at 05:29

## 2024-05-14 RX ADMIN — Medication 325 MILLIGRAM(S): at 09:21

## 2024-05-14 RX ADMIN — MIRTAZAPINE 7.5 MILLIGRAM(S): 45 TABLET, ORALLY DISINTEGRATING ORAL at 21:26

## 2024-05-14 RX ADMIN — TACROLIMUS 2 MILLIGRAM(S): 5 CAPSULE ORAL at 17:13

## 2024-05-14 RX ADMIN — PANTOPRAZOLE SODIUM 40 MILLIGRAM(S): 20 TABLET, DELAYED RELEASE ORAL at 17:17

## 2024-05-14 RX ADMIN — CARVEDILOL PHOSPHATE 6.25 MILLIGRAM(S): 80 CAPSULE, EXTENDED RELEASE ORAL at 17:13

## 2024-05-14 RX ADMIN — Medication 5 MILLIGRAM(S): at 09:19

## 2024-05-14 RX ADMIN — Medication 1 TABLET(S): at 09:20

## 2024-05-14 RX ADMIN — Medication 1 PACKET(S): at 09:20

## 2024-05-14 RX ADMIN — CALCITRIOL 0.5 MICROGRAM(S): 0.5 CAPSULE ORAL at 09:21

## 2024-05-14 RX ADMIN — HEPARIN SODIUM 500 UNIT(S)/HR: 5000 INJECTION INTRAVENOUS; SUBCUTANEOUS at 00:52

## 2024-05-14 RX ADMIN — Medication 1 MILLIGRAM(S): at 09:21

## 2024-05-14 RX ADMIN — HEPARIN SODIUM 500 UNIT(S)/HR: 5000 INJECTION INTRAVENOUS; SUBCUTANEOUS at 19:30

## 2024-05-14 RX ADMIN — CLOPIDOGREL BISULFATE 75 MILLIGRAM(S): 75 TABLET, FILM COATED ORAL at 09:20

## 2024-05-14 RX ADMIN — TACROLIMUS 2 MILLIGRAM(S): 5 CAPSULE ORAL at 05:29

## 2024-05-14 RX ADMIN — PANTOPRAZOLE SODIUM 40 MILLIGRAM(S): 20 TABLET, DELAYED RELEASE ORAL at 05:28

## 2024-05-14 RX ADMIN — Medication 1950 MILLIGRAM(S): at 05:28

## 2024-05-14 RX ADMIN — PREGABALIN 1000 MICROGRAM(S): 225 CAPSULE ORAL at 09:22

## 2024-05-14 NOTE — PROGRESS NOTE ADULT - ASSESSMENT
84 YOM ESRD s/p renal transplant, HTN, CVA, dementia presents from NH with RUE swelling (history of DVT in RUE on AC)     1. Advanced Chronic kidney disease stage 5:  -Transplant status; s/p DDKT 2012 at [facility]  -Baseline allograft function ; GFR < 12 since December 23 that I could see in the system  -Serum creatinine 6.25, GFR 6.  Could have a component of prerenal in setting of diarrhea but this is already advanced CKD  - d/w pt and family about option of starting HD in near future and how dialysis will not improve QOL and longevity given pt's frail status and comorbidities  - All questions answered, family to decide how to proceed  -For now c/w IVF      2. Immunosuppression:  -Patient is on tacrolimus 2 mg twice daily,  mg twice daily, prednisone 5 mg daily  -Continue immunosuppression- can be weaned down if started on HD    3. Hypocalcemia; sp 2gm IV calcium gluconate     4. Metabolic acidosis:  -Continue on PO sodium bicarbonate 1950 mg PO BID     5. BMD w/ CKD: Renal diet, continue on calcitriol    6. RUE swelling and DVT: On anticoagulation.    dw family, Dr Roman and Dr Osullivan

## 2024-05-14 NOTE — GOALS OF CARE CONVERSATION - ADVANCED CARE PLANNING - CONVERSATION DETAILS
Spoke to patient and family at bedside. Discussed current medical conditions, prognosis and treatment options.   We discussed HD, anticaogulation and other comorbidities with risks and benefits. Pt and family would like to discuss about their treatment options and goals moving forward but at this time, want to hold off on HD.   Code status: Full code   Palliative care consult appreciated   All questions and concerns answered. Spoke to patient and family at bedside. Discussed current medical conditions, prognosis and treatment options.   We discussed HD, anticaogulation and other comorbidities with risks and benefits. Pt and family would like to discuss about their treatment options and goals moving forward but at this time, want to hold off on HD.   We discussed code status and pt and family wish for CPR and Intubation if needed at this time.   Code status: Full code   Palliative care consult appreciated   All questions and concerns answered.

## 2024-05-14 NOTE — PROGRESS NOTE ADULT - SUBJECTIVE AND OBJECTIVE BOX
CC:  Follow up GOC , Symptoms    OVERNIGHT EVENTS:  increased Cr    Present Symptoms:   Dyspnea:  No    Nausea/Vomiting:  No   Anxiety:  No  Depression:  No    Fatigue:  Yes    Loss of appetite:  fair  Constipation: Not Reported      Pain: No              Location            Duration            Character            Severity            Factors            Effect    Pain AD Score:  [email or web address] (press ctrl + left click to view)    Review of Systems: Reviewed as above  All others negative      MEDICATIONS  (STANDING):  amLODIPine   Tablet 10 milliGRAM(s) Oral daily  calcitriol   Capsule 0.5 MICROGram(s) Oral daily  carvedilol 6.25 milliGRAM(s) Oral every 12 hours  chlorhexidine 2% Cloths 1 Application(s) Topical daily  clopidogrel Tablet 75 milliGRAM(s) Oral daily  cyanocobalamin 1000 MICROGram(s) Oral daily  dextrose 5% + lactated ringers. 1000 milliLiter(s) (40 mL/Hr) IV Continuous <Continuous>  ergocalciferol 57548 Unit(s) Oral every week  ferrous    sulfate 325 milliGRAM(s) Oral daily  folic acid 1 milliGRAM(s) Oral daily  heparin  Infusion. 400 Unit(s)/Hr (4 mL/Hr) IV Continuous <Continuous>  mirtazapine 7.5 milliGRAM(s) Oral at bedtime  multivitamin 1 Tablet(s) Oral daily  mycophenolate mofetil 500 milliGRAM(s) Oral two times a day  pantoprazole   Suspension 40 milliGRAM(s) Oral two times a day  predniSONE   Tablet 5 milliGRAM(s) Oral daily  psyllium Powder 1 Packet(s) Oral daily  sodium bicarbonate 1950 milliGRAM(s) Oral two times a day  tacrolimus 2 milliGRAM(s) Oral every 12 hours  tamsulosin 0.4 milliGRAM(s) Oral at bedtime    MEDICATIONS  (PRN):  acetaminophen     Tablet .. 650 milliGRAM(s) Oral every 6 hours PRN Temp greater or equal to 38C (100.4F), Mild Pain (1 - 3)  aluminum hydroxide/magnesium hydroxide/simethicone Suspension 30 milliLiter(s) Oral every 4 hours PRN Dyspepsia  heparin   Injectable 2000 Unit(s) IV Push every 6 hours PRN For aPTT between 40 - 57  magnesium hydroxide Suspension 30 milliLiter(s) Oral daily PRN Constipation  melatonin 3 milliGRAM(s) Oral at bedtime PRN Insomnia  ondansetron Injectable 4 milliGRAM(s) IV Push every 8 hours PRN Nausea and/or Vomiting      PHYSICAL EXAM:    Vital Signs Last 24 Hrs  T(C): 36.8 (14 May 2024 07:25), Max: 37 (13 May 2024 21:20)  T(F): 98.3 (14 May 2024 07:25), Max: 98.6 (13 May 2024 21:20)  HR: 95 (14 May 2024 07:25) (82 - 106)  BP: 134/79 (14 May 2024 07:25) (134/79 - 152/70)  BP(mean): --  RR: 18 (14 May 2024 07:25) (17 - 18)  SpO2: 98% (14 May 2024 07:25) (97% - 100%)    Parameters below as of 14 May 2024 07:25  Patient On (Oxygen Delivery Method): room air    Karnofsky:  30%  General:  Elderly woman frail NAD       HEENT:  NCAT   Lungs: comfortable  non labored  CV:  RR  GI:  soft NTND  MSK: weak  Skin:  warm/dry  Neuro AOx3 no focal deficits  Psych calm cooperative    LABS:                          9.2    5.60  )-----------( 161      ( 14 May 2024 05:25 )             27.7     05-14    140  |  102  |  76.4<H>  ----------------------------<  77  4.6   |  21.0<L>  |  6.40<H>    Ca    7.4<L>      14 May 2024 05:25  Mg     1.9     05-13      PTT - ( 14 May 2024 00:27 )  PTT:79.8 sec  Urinalysis Basic - ( 14 May 2024 05:25 )    Color: x / Appearance: x / SG: x / pH: x  Gluc: 77 mg/dL / Ketone: x  / Bili: x / Urobili: x   Blood: x / Protein: x / Nitrite: x   Leuk Esterase: x / RBC: x / WBC x   Sq Epi: x / Non Sq Epi: x / Bacteria: x      I&O's Summary    13 May 2024 07:01  -  14 May 2024 07:00  --------------------------------------------------------  IN: 115 mL / OUT: 450 mL / NET: -335 mL    14 May 2024 07:01  -  14 May 2024 12:15  --------------------------------------------------------  IN: 240 mL / OUT: 0 mL / NET: 240 mL        RADIOLOGY & ADDITIONAL STUDIES:  Imaging Reviewed  ( x  )   < from: Xray Chest 1 View- PORTABLE-Urgent (Xray Chest 1 View- PORTABLE-Urgent .) (05.09.24 @ 01:53) >    ACC: [id number] EXAM:  XR CHEST PORTABLE URGENT 1V   ORDERED BY: NATHAN DACOSTA     PROCEDURE DATE:  05/09/2024          INTERPRETATION:  EXAM: XR CHEST URGENT    INDICATION: upper extremity DVT XXR    COMPARISON: April 19, 2024    IMPRESSION: Increased perihilar interstitial markings bilaterally and   borderline cardiomegaly. I would favor congestive changes with effusions   also suggested bilaterally. Regional osseous structures appropriate for   age. Follow-up suggested. Right sided centralvenous catheter is seen   previously is gone.    --- End of Report ---      FERNANDEZ RAMOS MD; Attending Radiologist  This document has been electronically signed. May  9 2024  9:05AM    < end of copied text >      ADVANCE DIRECTIVE PREFERENCES    Full Code

## 2024-05-14 NOTE — PROGRESS NOTE ADULT - SUBJECTIVE AND OBJECTIVE BOX
[facility] Division of Hospital Medicine    Chief Complaint:  RUE DVT     SUBJECTIVE / OVERNIGHT EVENTS:  Pt reports no complaints   Family at bedside     Patient denies chest pain, SOB, abd pain, N/V, fever, chills, dysuria or any other complaints. All remainder ROS negative.     MEDICATIONS  (STANDING):  amLODIPine   Tablet 10 milliGRAM(s) Oral daily  calcitriol   Capsule 0.5 MICROGram(s) Oral daily  carvedilol 6.25 milliGRAM(s) Oral every 12 hours  chlorhexidine 2% Cloths 1 Application(s) Topical daily  clopidogrel Tablet 75 milliGRAM(s) Oral daily  cyanocobalamin 1000 MICROGram(s) Oral daily  dextrose 5% + lactated ringers. 1000 milliLiter(s) (40 mL/Hr) IV Continuous <Continuous>  ergocalciferol 68725 Unit(s) Oral every week  ferrous    sulfate 325 milliGRAM(s) Oral daily  folic acid 1 milliGRAM(s) Oral daily  heparin  Infusion. 400 Unit(s)/Hr (4 mL/Hr) IV Continuous <Continuous>  mirtazapine 7.5 milliGRAM(s) Oral at bedtime  multivitamin 1 Tablet(s) Oral daily  mycophenolate mofetil 500 milliGRAM(s) Oral two times a day  pantoprazole   Suspension 40 milliGRAM(s) Oral two times a day  predniSONE   Tablet 5 milliGRAM(s) Oral daily  psyllium Powder 1 Packet(s) Oral daily  sodium bicarbonate 1950 milliGRAM(s) Oral two times a day  tacrolimus 2 milliGRAM(s) Oral every 12 hours  tamsulosin 0.4 milliGRAM(s) Oral at bedtime    MEDICATIONS  (PRN):  acetaminophen     Tablet .. 650 milliGRAM(s) Oral every 6 hours PRN Temp greater or equal to 38C (100.4F), Mild Pain (1 - 3)  aluminum hydroxide/magnesium hydroxide/simethicone Suspension 30 milliLiter(s) Oral every 4 hours PRN Dyspepsia  heparin   Injectable 2000 Unit(s) IV Push every 6 hours PRN For aPTT between 40 - 57  magnesium hydroxide Suspension 30 milliLiter(s) Oral daily PRN Constipation  melatonin 3 milliGRAM(s) Oral at bedtime PRN Insomnia  ondansetron Injectable 4 milliGRAM(s) IV Push every 8 hours PRN Nausea and/or Vomiting        I&O's Summary    13 May 2024 07:01  -  14 May 2024 07:00  --------------------------------------------------------  IN: 115 mL / OUT: 450 mL / NET: -335 mL    14 May 2024 07:01  -  14 May 2024 16:48  --------------------------------------------------------  IN: 240 mL / OUT: 0 mL / NET: 240 mL        PHYSICAL EXAM:  Vital Signs Last 24 Hrs  T(C): 36.7 (14 May 2024 12:00), Max: 37 (13 May 2024 21:20)  T(F): 98 (14 May 2024 12:00), Max: 98.6 (13 May 2024 21:20)  HR: 102 (14 May 2024 12:00) (82 - 106)  BP: 142/82 (14 May 2024 12:00) (134/79 - 152/70)  BP(mean): --  RR: 17 (14 May 2024 12:00) (17 - 18)  SpO2: 99% (14 May 2024 12:00) (97% - 100%)    Parameters below as of 14 May 2024 12:00  Patient On (Oxygen Delivery Method): room air        CONSTITUTIONAL: NAD, sitting up in bed  RESPIRATORY: Normal respiratory effort; lungs are clear to auscultation bilaterally  CARDIOVASCULAR: Regular rate and rhythm, normal S1 and S2   ABDOMEN: Nontender to palpation, normoactive bowel sounds  : Gee in place   MUSCULOSKELETAL:  RUE swelling, mild LE edema b/l   PSYCH: A+O to person, place not time; affect appropriate  NEUROLOGY: No gross sensory or motor deficits       LABS:                        9.2    5.60  )-----------( 161      ( 14 May 2024 05:25 )             27.7     05-14    140  |  102  |  76.4<H>  ----------------------------<  77  4.6   |  21.0<L>  |  6.40<H>    Ca    7.4<L>      14 May 2024 05:25  Mg     1.9     05-13      PTT - ( 14 May 2024 00:27 )  PTT:79.8 sec      Urinalysis Basic - ( 14 May 2024 05:25 )    Color: x / Appearance: x / SG: x / pH: x  Gluc: 77 mg/dL / Ketone: x  / Bili: x / Urobili: x   Blood: x / Protein: x / Nitrite: x   Leuk Esterase: x / RBC: x / WBC x   Sq Epi: x / Non Sq Epi: x / Bacteria: x

## 2024-05-14 NOTE — PROGRESS NOTE ADULT - ASSESSMENT
84y F w/ hx ESRD s/p renal transplant on mycophenolate and tacrolimus, CVA, HTN, HLD, anemia, dementia, lung nodule, RUE dvt, Coumadin toxicity, GI Bleed  presented from [facility] for RUE swelling with hx of  RUE DVT, diarrhea (started on metronidazole 05/06, pending cdif). Per transfer papers, pt had RUE US done on 3/15 that showed RUE DVT involving internal jugular and subclavian veins, for which she was started on Coumadin. Was recently admitted to this hospital with anemia and elevated INR, found to have new lung nodule rec biopsy, seen by hem/onc . Family decided against further anticoagulation at that time given bleeding risk/biopsy, seen by GI, unable to do scope for poor prep, rec out pt  follow up. Pt had RUE US done on 5/8 that again showed +DVT in right jugular vein, so pt was sent back to the ED.     RUE DVT   -US Duplex Venous Upper Ext Ltd, Right (05.09.24) thrombus identified within the right internal jugular, subclavian and axillary, cephalic and basilic veins.  -Heparin Infusion was started after discussing risks vs benefits with family   -hem/onc consult noted  -Monitor CBC closely given history of GIB    GLO on Advanced CKD 5   Met acidosis   -H/O Renal Transplant in 2012 ([facility])  -Was having diarrhea at rehab now improving   -Worsening renal function likely due to dehydration   -Continue IVF and Sodium Bicarb   -cont tacrolimus, mycophenolate and prednisone (Nephro to discuss with primary transplant team regarding weaning down due to risk of infections)   -continue Gee catheter   -nephrology following, may need HD in near future   -GOC discussion with family today     Diarrhea   -Per [facility], C. Diff was negative at facility   -Now improving   -GI PCR negative  -Added Metamucil     Recent GI bleed   -cont PPI  -Monitor closely     HTN   -norvasc, coreg      Lung nodule   Liver lesion   -suspected malignancy  -family wants to hold off on biopsy   -reviewed ct chest/abd /pelvis from prior admission    Hypocalcemia   -Replete     DVT Prophylaxis -- Heparin Infusion    Full Code. Prognosis guarded. Palliative Care following.   Dispo: Family wish to take patient back to [place]

## 2024-05-14 NOTE — PROGRESS NOTE ADULT - SUBJECTIVE AND OBJECTIVE BOX
[facility] DIVISION OF KIDNEY DISEASES AND HYPERTENSION -- FOLLOW UP NOTE  --------------------------------------------------------------------------------  Chief Complaint: Octavio/ renal transplant    24 hour events/subjective:  no acute event  pt seen and examined;  meeting held with family in presence of palliative team attending and hospitalist      PAST HISTORY  --------------------------------------------------------------------------------  No significant changes to PMH, PSH, FHx, SHx, unless otherwise noted    ALLERGIES & MEDICATIONS  --------------------------------------------------------------------------------  Allergies    No Known Allergies          Standing Inpatient Medications  amLODIPine   Tablet 10 milliGRAM(s) Oral daily  calcitriol   Capsule 0.5 MICROGram(s) Oral daily  carvedilol 6.25 milliGRAM(s) Oral every 12 hours  chlorhexidine 2% Cloths 1 Application(s) Topical daily  clopidogrel Tablet 75 milliGRAM(s) Oral daily  cyanocobalamin 1000 MICROGram(s) Oral daily  dextrose 5% + lactated ringers. 1000 milliLiter(s) IV Continuous <Continuous>  ergocalciferol 70741 Unit(s) Oral every week  ferrous    sulfate 325 milliGRAM(s) Oral daily  folic acid 1 milliGRAM(s) Oral daily  heparin  Infusion. 400 Unit(s)/Hr IV Continuous <Continuous>  mirtazapine 7.5 milliGRAM(s) Oral at bedtime  multivitamin 1 Tablet(s) Oral daily  mycophenolate mofetil 500 milliGRAM(s) Oral two times a day  pantoprazole   Suspension 40 milliGRAM(s) Oral two times a day  predniSONE   Tablet 5 milliGRAM(s) Oral daily  psyllium Powder 1 Packet(s) Oral daily  sodium bicarbonate 1950 milliGRAM(s) Oral two times a day  tacrolimus 2 milliGRAM(s) Oral every 12 hours  tamsulosin 0.4 milliGRAM(s) Oral at bedtime    PRN Inpatient Medications  acetaminophen     Tablet .. 650 milliGRAM(s) Oral every 6 hours PRN  aluminum hydroxide/magnesium hydroxide/simethicone Suspension 30 milliLiter(s) Oral every 4 hours PRN  heparin   Injectable 2000 Unit(s) IV Push every 6 hours PRN  magnesium hydroxide Suspension 30 milliLiter(s) Oral daily PRN  melatonin 3 milliGRAM(s) Oral at bedtime PRN  ondansetron Injectable 4 milliGRAM(s) IV Push every 8 hours PRN      REVIEW OF SYSTEMS  --------------------------------------------------------------------------------  Gen: No weight changes, fatigue, fevers/chills, weakness  Skin: No rashes  Head/Eyes/Ears/Mouth: No headache; Normal hearing; Normal vision w/o blurriness; No sinus pain/discomfort, sore throat  Respiratory: No dyspnea, cough, wheezing, hemoptysis  CV: No chest pain, PND, orthopnea  GI: No abdominal pain, diarrhea, constipation, nausea, vomiting, melena, hematochezia  : No increased frequency, dysuria, hematuria, nocturia  MSK: No joint pain/swelling; no back pain; no edema  Neuro: No dizziness/lightheadedness, weakness, seizures, numbness, tingling  Heme: No easy bruising or bleeding  Endo: No heat/cold intolerance  Psych: No significant nervousness, anxiety, stress, depression    All other systems were reviewed and are negative, except as noted.    VITALS/PHYSICAL EXAM  --------------------------------------------------------------------------------  T(C): 36.9 (05-14-24 @ 17:00), Max: 37 (05-13-24 @ 21:20)  HR: 98 (05-14-24 @ 17:00) (82 - 104)  BP: 144/87 (05-14-24 @ 17:00) (134/79 - 152/70)  RR: 17 (05-14-24 @ 17:00) (17 - 18)  SpO2: 100% (05-14-24 @ 17:00) (97% - 100%)  Wt(kg): --        05-13-24 @ 07:01  -  05-14-24 @ 07:00  --------------------------------------------------------  IN: 115 mL / OUT: 450 mL / NET: -335 mL    05-14-24 @ 07:01  -  05-14-24 @ 17:21  --------------------------------------------------------  IN: 240 mL / OUT: 0 mL / NET: 240 mL      Physical Exam:  	Gen: NAD  	HEENT: supple neck, clear oropharynx  	Pulm: CTA B/L  	CV: RRR, S1S2; no rub  	Back: No spinal or CVA tenderness; no sacral edema  	Abd: +BS, soft, nontender/nondistended  	: No suprapubic tenderness  	UE: Warm, no edema; no asterixis  	LE: Warm,  no edema  	Neuro: No focal deficit  	Psych: Normal affect and mood  	Skin: Warm  	Vascular access: LUE AVF, no thrill/ bruit+    LABS/STUDIES  --------------------------------------------------------------------------------              9.2    5.60  >-----------<  161      [05-14-24 @ 05:25]              27.7     140  |  102  |  76.4  ----------------------------<  77      [05-14-24 @ 05:25]  4.6   |  21.0  |  6.40        Ca     7.4     [05-14-24 @ 05:25]      iCa    1.05     [05-13 @ 07:25]      Mg     1.9     [05-13-24 @ 05:10]        PTT: 79.8       [05-14-24 @ 00:27]      Creatinine Trend:  SCr 6.40 [05-14 @ 05:25]  SCr 6.05 [05-13 @ 05:10]  SCr 5.81 [05-12 @ 11:29]  SCr 6.11 [05-11 @ 01:09]  SCr 6.25 [05-10 @ 02:40]    Urinalysis - [05-14-24 @ 05:25]      Color  / Appearance  / SG  / pH       Gluc 77 / Ketone   / Bili  / Urobili        Blood  / Protein  / Leuk Est  / Nitrite       RBC  / WBC  / Hyaline  / Gran  / Sq Epi  / Non Sq Epi  / Bacteria       Iron 96, TIBC 132, %sat 73      [05-10-24 @ 02:40]  Ferritin 3171      [04-19-24 @ 06:50]  TSH 1.72      [04-19-24 @ 06:50]

## 2024-05-15 LAB
ANION GAP SERPL CALC-SCNC: 13 MMOL/L — SIGNIFICANT CHANGE UP (ref 5–17)
APTT BLD: 59 SEC — HIGH (ref 24.5–35.6)
BUN SERPL-MCNC: 75 MG/DL — HIGH (ref 8–20)
CALCIUM SERPL-MCNC: 7.7 MG/DL — LOW (ref 8.4–10.5)
CHLORIDE SERPL-SCNC: 104 MMOL/L — SIGNIFICANT CHANGE UP (ref 96–108)
CO2 SERPL-SCNC: 23 MMOL/L — SIGNIFICANT CHANGE UP (ref 22–29)
CREAT SERPL-MCNC: 6.53 MG/DL — HIGH (ref 0.5–1.3)
EGFR: 6 ML/MIN/1.73M2 — LOW
GLUCOSE SERPL-MCNC: 87 MG/DL — SIGNIFICANT CHANGE UP (ref 70–99)
HCT VFR BLD CALC: 28.9 % — LOW (ref 34.5–45)
HGB BLD-MCNC: 9.3 G/DL — LOW (ref 11.5–15.5)
MCHC RBC-ENTMCNC: 30.1 PG — SIGNIFICANT CHANGE UP (ref 27–34)
MCHC RBC-ENTMCNC: 32.2 GM/DL — SIGNIFICANT CHANGE UP (ref 32–36)
MCV RBC AUTO: 93.5 FL — SIGNIFICANT CHANGE UP (ref 80–100)
PLATELET # BLD AUTO: 176 K/UL — SIGNIFICANT CHANGE UP (ref 150–400)
POTASSIUM SERPL-MCNC: 4.8 MMOL/L — SIGNIFICANT CHANGE UP (ref 3.5–5.3)
POTASSIUM SERPL-SCNC: 4.8 MMOL/L — SIGNIFICANT CHANGE UP (ref 3.5–5.3)
RBC # BLD: 3.09 M/UL — LOW (ref 3.8–5.2)
RBC # FLD: 15.5 % — HIGH (ref 10.3–14.5)
SODIUM SERPL-SCNC: 140 MMOL/L — SIGNIFICANT CHANGE UP (ref 135–145)
WBC # BLD: 5.25 K/UL — SIGNIFICANT CHANGE UP (ref 3.8–10.5)
WBC # FLD AUTO: 5.25 K/UL — SIGNIFICANT CHANGE UP (ref 3.8–10.5)

## 2024-05-15 PROCEDURE — 99233 SBSQ HOSP IP/OBS HIGH 50: CPT

## 2024-05-15 PROCEDURE — 99232 SBSQ HOSP IP/OBS MODERATE 35: CPT

## 2024-05-15 RX ADMIN — Medication 1 PACKET(S): at 12:33

## 2024-05-15 RX ADMIN — TAMSULOSIN HYDROCHLORIDE 0.4 MILLIGRAM(S): 0.4 CAPSULE ORAL at 21:15

## 2024-05-15 RX ADMIN — MYCOPHENOLATE MOFETIL 500 MILLIGRAM(S): 250 CAPSULE ORAL at 17:29

## 2024-05-15 RX ADMIN — MYCOPHENOLATE MOFETIL 500 MILLIGRAM(S): 250 CAPSULE ORAL at 05:18

## 2024-05-15 RX ADMIN — TACROLIMUS 2 MILLIGRAM(S): 5 CAPSULE ORAL at 05:18

## 2024-05-15 RX ADMIN — TACROLIMUS 2 MILLIGRAM(S): 5 CAPSULE ORAL at 17:29

## 2024-05-15 RX ADMIN — Medication 1 TABLET(S): at 12:31

## 2024-05-15 RX ADMIN — HEPARIN SODIUM 500 UNIT(S)/HR: 5000 INJECTION INTRAVENOUS; SUBCUTANEOUS at 07:30

## 2024-05-15 RX ADMIN — PREGABALIN 1000 MICROGRAM(S): 225 CAPSULE ORAL at 12:32

## 2024-05-15 RX ADMIN — HEPARIN SODIUM 500 UNIT(S)/HR: 5000 INJECTION INTRAVENOUS; SUBCUTANEOUS at 12:34

## 2024-05-15 RX ADMIN — CARVEDILOL PHOSPHATE 6.25 MILLIGRAM(S): 80 CAPSULE, EXTENDED RELEASE ORAL at 05:19

## 2024-05-15 RX ADMIN — CLOPIDOGREL BISULFATE 75 MILLIGRAM(S): 75 TABLET, FILM COATED ORAL at 12:32

## 2024-05-15 RX ADMIN — AMLODIPINE BESYLATE 10 MILLIGRAM(S): 2.5 TABLET ORAL at 05:19

## 2024-05-15 RX ADMIN — Medication 325 MILLIGRAM(S): at 12:31

## 2024-05-15 RX ADMIN — Medication 1 MILLIGRAM(S): at 12:32

## 2024-05-15 RX ADMIN — PANTOPRAZOLE SODIUM 40 MILLIGRAM(S): 20 TABLET, DELAYED RELEASE ORAL at 05:18

## 2024-05-15 RX ADMIN — MIRTAZAPINE 7.5 MILLIGRAM(S): 45 TABLET, ORALLY DISINTEGRATING ORAL at 21:15

## 2024-05-15 RX ADMIN — Medication 1950 MILLIGRAM(S): at 17:31

## 2024-05-15 RX ADMIN — HEPARIN SODIUM 500 UNIT(S)/HR: 5000 INJECTION INTRAVENOUS; SUBCUTANEOUS at 19:01

## 2024-05-15 RX ADMIN — CARVEDILOL PHOSPHATE 6.25 MILLIGRAM(S): 80 CAPSULE, EXTENDED RELEASE ORAL at 17:30

## 2024-05-15 RX ADMIN — Medication 1950 MILLIGRAM(S): at 05:18

## 2024-05-15 RX ADMIN — Medication 5 MILLIGRAM(S): at 05:19

## 2024-05-15 RX ADMIN — CHLORHEXIDINE GLUCONATE 1 APPLICATION(S): 213 SOLUTION TOPICAL at 12:34

## 2024-05-15 RX ADMIN — CALCITRIOL 0.5 MICROGRAM(S): 0.5 CAPSULE ORAL at 12:32

## 2024-05-15 RX ADMIN — PANTOPRAZOLE SODIUM 40 MILLIGRAM(S): 20 TABLET, DELAYED RELEASE ORAL at 17:30

## 2024-05-15 NOTE — PROGRESS NOTE ADULT - SUBJECTIVE AND OBJECTIVE BOX
[facility] DIVISION OF KIDNEY DISEASES AND HYPERTENSION -- FOLLOW UP NOTE  --------------------------------------------------------------------------------  Chief Complaint:  Octavio / renal transplant    24 hour events/subjective:  no acute event noted  pt seen and examined; feels well        PAST HISTORY  --------------------------------------------------------------------------------  No significant changes to PMH, PSH, FHx, SHx, unless otherwise noted    ALLERGIES & MEDICATIONS  --------------------------------------------------------------------------------  Allergies    No Known Allergies        Standing Inpatient Medications  amLODIPine   Tablet 10 milliGRAM(s) Oral daily  calcitriol   Capsule 0.5 MICROGram(s) Oral daily  carvedilol 6.25 milliGRAM(s) Oral every 12 hours  chlorhexidine 2% Cloths 1 Application(s) Topical daily  clopidogrel Tablet 75 milliGRAM(s) Oral daily  cyanocobalamin 1000 MICROGram(s) Oral daily  dextrose 5% + lactated ringers. 1000 milliLiter(s) IV Continuous <Continuous>  ergocalciferol 65252 Unit(s) Oral every week  ferrous    sulfate 325 milliGRAM(s) Oral daily  folic acid 1 milliGRAM(s) Oral daily  heparin  Infusion. 400 Unit(s)/Hr IV Continuous <Continuous>  mirtazapine 7.5 milliGRAM(s) Oral at bedtime  multivitamin 1 Tablet(s) Oral daily  mycophenolate mofetil 500 milliGRAM(s) Oral two times a day  Nephro-casie 1 Tablet(s) Oral daily  pantoprazole   Suspension 40 milliGRAM(s) Oral two times a day  predniSONE   Tablet 5 milliGRAM(s) Oral daily  psyllium Powder 1 Packet(s) Oral daily  sodium bicarbonate 1950 milliGRAM(s) Oral two times a day  tacrolimus 2 milliGRAM(s) Oral every 12 hours  tamsulosin 0.4 milliGRAM(s) Oral at bedtime    PRN Inpatient Medications  acetaminophen     Tablet .. 650 milliGRAM(s) Oral every 6 hours PRN  aluminum hydroxide/magnesium hydroxide/simethicone Suspension 30 milliLiter(s) Oral every 4 hours PRN  heparin   Injectable 2000 Unit(s) IV Push every 6 hours PRN  magnesium hydroxide Suspension 30 milliLiter(s) Oral daily PRN  melatonin 3 milliGRAM(s) Oral at bedtime PRN  ondansetron Injectable 4 milliGRAM(s) IV Push every 8 hours PRN      REVIEW OF SYSTEMS  --------------------------------------------------------------------------------  Gen: No weight changes, fatigue, fevers/chills, weakness  Skin: No rashes  Head/Eyes/Ears/Mouth: No headache; Normal hearing; Normal vision w/o blurriness; No sinus pain/discomfort, sore throat  Respiratory: No dyspnea, cough, wheezing, hemoptysis  CV: No chest pain, PND, orthopnea  GI: No abdominal pain, diarrhea, constipation, nausea, vomiting, melena, hematochezia  : No increased frequency, dysuria, hematuria, nocturia  MSK: No joint pain/swelling; no back pain; no edema  Neuro: No dizziness/lightheadedness, weakness, seizures, numbness, tingling  Heme: No easy bruising or bleeding  Endo: No heat/cold intolerance  Psych: No significant nervousness, anxiety, stress, depression    All other systems were reviewed and are negative, except as noted.    VITALS/PHYSICAL EXAM  --------------------------------------------------------------------------------  T(C): 36.8 (05-15-24 @ 20:30), Max: 37 (05-15-24 @ 05:01)  HR: 94 (05-15-24 @ 20:30) (88 - 96)  BP: 149/82 (05-15-24 @ 20:30) (130/77 - 165/94)  RR: 18 (05-15-24 @ 20:30) (18 - 18)  SpO2: 100% (05-15-24 @ 20:30) (97% - 100%)  Wt(kg): --        05-14-24 @ 07:01  -  05-15-24 @ 07:00  --------------------------------------------------------  IN: 580 mL / OUT: 1250 mL / NET: -670 mL      Physical Exam:  	Gen: NAD,  	HEENT: supple neck, clear oropharynx  	Pulm: CTA B/L  	CV: RRR, S1S2; no rub  	Abd: +BS, soft, nontender/nondistended  	: No suprapubic tenderness  	UE: Warm no edema; no asterixis  	LE: Warm, no edema  	Neuro: No focal deficit  	Psych: Normal affect and mood  	Skin: Warm,      LABS/STUDIES  --------------------------------------------------------------------------------              9.3    5.25  >-----------<  176      [05-15-24 @ 07:55]              28.9     140  |  104  |  75.0  ----------------------------<  87      [05-15-24 @ 07:55]  4.8   |  23.0  |  6.53        Ca     7.7     [05-15-24 @ 07:55]        PTT: 59.0       [05-15-24 @ 07:55]      Creatinine Trend:  SCr 6.53 [05-15 @ 07:55]  SCr 6.40 [05-14 @ 05:25]  SCr 6.05 [05-13 @ 05:10]  SCr 5.81 [05-12 @ 11:29]  SCr 6.11 [05-11 @ 01:09]    Urinalysis - [05-15-24 @ 07:55]      Color  / Appearance  / SG  / pH       Gluc 87 / Ketone   / Bili  / Urobili        Blood  / Protein  / Leuk Est  / Nitrite       RBC  / WBC  / Hyaline  / Gran  / Sq Epi  / Non Sq Epi  / Bacteria       Iron 96, TIBC 132, %sat 73      [05-10-24 @ 02:40]  Ferritin 3171      [04-19-24 @ 06:50]  TSH 1.72      [04-19-24 @ 06:50]

## 2024-05-15 NOTE — PROGRESS NOTE ADULT - SUBJECTIVE AND OBJECTIVE BOX
CC:  Follow up GOC , Symptoms    OVERNIGHT EVENTS:  no reported issues    Present Symptoms:   Dyspnea:  No    Nausea/Vomiting:  No   Anxiety:  No  Depression:  No   Fatigue:   Unable  Loss of appetite:   Yes     Constipation: Not Reported     Pain: No              Location            Duration            Character            Severity            Factors            Effect    Pain AD Score:  [email or web address] (press ctrl + left click to view)    Review of Systems: Reviewed as above  limited to  obtain due to poor historian      MEDICATIONS  (STANDING):  amLODIPine   Tablet 10 milliGRAM(s) Oral daily  calcitriol   Capsule 0.5 MICROGram(s) Oral daily  carvedilol 6.25 milliGRAM(s) Oral every 12 hours  chlorhexidine 2% Cloths 1 Application(s) Topical daily  clopidogrel Tablet 75 milliGRAM(s) Oral daily  cyanocobalamin 1000 MICROGram(s) Oral daily  dextrose 5% + lactated ringers. 1000 milliLiter(s) (40 mL/Hr) IV Continuous <Continuous>  ergocalciferol 38987 Unit(s) Oral every week  ferrous    sulfate 325 milliGRAM(s) Oral daily  folic acid 1 milliGRAM(s) Oral daily  heparin  Infusion. 400 Unit(s)/Hr (4 mL/Hr) IV Continuous <Continuous>  mirtazapine 7.5 milliGRAM(s) Oral at bedtime  multivitamin 1 Tablet(s) Oral daily  mycophenolate mofetil 500 milliGRAM(s) Oral two times a day  pantoprazole   Suspension 40 milliGRAM(s) Oral two times a day  predniSONE   Tablet 5 milliGRAM(s) Oral daily  psyllium Powder 1 Packet(s) Oral daily  sodium bicarbonate 1950 milliGRAM(s) Oral two times a day  tacrolimus 2 milliGRAM(s) Oral every 12 hours  tamsulosin 0.4 milliGRAM(s) Oral at bedtime    MEDICATIONS  (PRN):  acetaminophen     Tablet .. 650 milliGRAM(s) Oral every 6 hours PRN Temp greater or equal to 38C (100.4F), Mild Pain (1 - 3)  aluminum hydroxide/magnesium hydroxide/simethicone Suspension 30 milliLiter(s) Oral every 4 hours PRN Dyspepsia  heparin   Injectable 2000 Unit(s) IV Push every 6 hours PRN For aPTT between 40 - 57  magnesium hydroxide Suspension 30 milliLiter(s) Oral daily PRN Constipation  melatonin 3 milliGRAM(s) Oral at bedtime PRN Insomnia  ondansetron Injectable 4 milliGRAM(s) IV Push every 8 hours PRN Nausea and/or Vomiting      PHYSICAL EXAM:    Vital Signs Last 24 Hrs  T(C): 37 (15 May 2024 08:35), Max: 37 (14 May 2024 20:30)  T(F): 98.6 (15 May 2024 08:35), Max: 98.6 (14 May 2024 20:30)  HR: 92 (15 May 2024 08:35) (88 - 102)  BP: 130/77 (15 May 2024 08:35) (130/77 - 165/94)  BP(mean): --  RR: 18 (15 May 2024 08:35) (17 - 18)  SpO2: 97% (15 May 2024 08:35) (97% - 100%)    Parameters below as of 15 May 2024 08:35  Patient On (Oxygen Delivery Method): room air      Karnofsky:  30%  General:   F frail appearing awake NAD      HEENT:  NCAT     Lungs: comfortable  non labored  CV:  RR  GI:  soft NTND  MSK: weakness   Skin:  warm/dry  Neuro  no focal deficits  Psych  calm cooperative    LABS:                          9.3    5.25  )-----------( 176      ( 15 May 2024 07:55 )             28.9     05-15    140  |  104  |  75.0<H>  ----------------------------<  87  4.8   |  23.0  |  6.53<H>    Ca    7.7<L>      15 May 2024 07:55      PTT - ( 15 May 2024 07:55 )  PTT:59.0 sec  Urinalysis Basic - ( 15 May 2024 07:55 )    Color: x / Appearance: x / SG: x / pH: x  Gluc: 87 mg/dL / Ketone: x  / Bili: x / Urobili: x   Blood: x / Protein: x / Nitrite: x   Leuk Esterase: x / RBC: x / WBC x   Sq Epi: x / Non Sq Epi: x / Bacteria: x      I&O's Summary    14 May 2024 07:01  -  15 May 2024 07:00  --------------------------------------------------------  IN: 580 mL / OUT: 1250 mL / NET: -670 mL          [employer] DIRECTIVE PREFERENCES    Full Code

## 2024-05-15 NOTE — PROGRESS NOTE ADULT - ASSESSMENT
84 YOM ESRD s/p renal transplant, HTN, CVA, dementia presents from NH with RUE swelling (history of DVT in RUE on AC)     1. Advanced Chronic kidney disease stage 5:  -Transplant status; s/p DDKT 2012 at [facility]  -Baseline allograft function ; GFR < 12 since December 23 that I could see in the system  -Serum creatinine 6.25, GFR 6.  Could have a component of prerenal in setting of diarrhea but this is already advanced CKD  - awaiting family's decision regarding RRT        2. Immunosuppression:  -Patient is on tacrolimus 2 mg twice daily,  mg twice daily, prednisone 5 mg daily  -Continue immunosuppression- can be weaned down if started on HD    3. Hypocalcemia; sp 2gm IV calcium gluconate     4. Metabolic acidosis:  -Continue on PO sodium bicarbonate 1950 mg PO BID     5. BMD w/ CKD: Renal diet, continue on calcitriol    6. RUE swelling and DVT: On anticoagulation.    dw family, Dr Roman and Dr Osullivan

## 2024-05-15 NOTE — PROGRESS NOTE ADULT - SUBJECTIVE AND OBJECTIVE BOX
[facility] Division of Hospital Medicine    Chief Complaint:  RUE DVT     SUBJECTIVE / OVERNIGHT EVENTS:  Pt reports no complaints   Family at bedside     Patient denies chest pain, SOB, abd pain, N/V, fever, chills, dysuria or any other complaints. All remainder ROS negative.     MEDICATIONS  (STANDING):  amLODIPine   Tablet 10 milliGRAM(s) Oral daily  calcitriol   Capsule 0.5 MICROGram(s) Oral daily  carvedilol 6.25 milliGRAM(s) Oral every 12 hours  chlorhexidine 2% Cloths 1 Application(s) Topical daily  clopidogrel Tablet 75 milliGRAM(s) Oral daily  cyanocobalamin 1000 MICROGram(s) Oral daily  dextrose 5% + lactated ringers. 1000 milliLiter(s) (40 mL/Hr) IV Continuous <Continuous>  ergocalciferol 26088 Unit(s) Oral every week  ferrous    sulfate 325 milliGRAM(s) Oral daily  folic acid 1 milliGRAM(s) Oral daily  heparin  Infusion. 400 Unit(s)/Hr (4 mL/Hr) IV Continuous <Continuous>  mirtazapine 7.5 milliGRAM(s) Oral at bedtime  multivitamin 1 Tablet(s) Oral daily  mycophenolate mofetil 500 milliGRAM(s) Oral two times a day  Nephro-casie 1 Tablet(s) Oral daily  pantoprazole   Suspension 40 milliGRAM(s) Oral two times a day  predniSONE   Tablet 5 milliGRAM(s) Oral daily  psyllium Powder 1 Packet(s) Oral daily  sodium bicarbonate 1950 milliGRAM(s) Oral two times a day  tacrolimus 2 milliGRAM(s) Oral every 12 hours  tamsulosin 0.4 milliGRAM(s) Oral at bedtime    MEDICATIONS  (PRN):  acetaminophen     Tablet .. 650 milliGRAM(s) Oral every 6 hours PRN Temp greater or equal to 38C (100.4F), Mild Pain (1 - 3)  aluminum hydroxide/magnesium hydroxide/simethicone Suspension 30 milliLiter(s) Oral every 4 hours PRN Dyspepsia  heparin   Injectable 2000 Unit(s) IV Push every 6 hours PRN For aPTT between 40 - 57  magnesium hydroxide Suspension 30 milliLiter(s) Oral daily PRN Constipation  melatonin 3 milliGRAM(s) Oral at bedtime PRN Insomnia  ondansetron Injectable 4 milliGRAM(s) IV Push every 8 hours PRN Nausea and/or Vomiting        I&O's Summary    14 May 2024 07:01  -  15 May 2024 07:00  --------------------------------------------------------  IN: 580 mL / OUT: 1250 mL / NET: -670 mL        PHYSICAL EXAM:  Vital Signs Last 24 Hrs  T(C): 36.7 (15 May 2024 16:28), Max: 37 (14 May 2024 20:30)  T(F): 98 (15 May 2024 16:28), Max: 98.6 (14 May 2024 20:30)  HR: 96 (15 May 2024 16:28) (88 - 96)  BP: 157/83 (15 May 2024 16:28) (130/77 - 165/94)  BP(mean): 110 (15 May 2024 12:36) (110 - 110)  RR: 18 (15 May 2024 16:28) (18 - 18)  SpO2: 100% (15 May 2024 16:28) (97% - 100%)    Parameters below as of 15 May 2024 16:28  Patient On (Oxygen Delivery Method): room air        CONSTITUTIONAL: NAD, sitting up in bed  RESPIRATORY: Normal respiratory effort; lungs are clear to auscultation bilaterally  CARDIOVASCULAR: Regular rate and rhythm, normal S1 and S2   ABDOMEN: Nontender to palpation, normoactive bowel sounds  : Gee in place   MUSCULOSKELETAL:  RUE swelling, mild LE edema b/l   PSYCH: A+O to person, place not time; affect appropriate  NEUROLOGY: No gross sensory or motor deficits         LABS:                        9.3    5.25  )-----------( 176      ( 15 May 2024 07:55 )             28.9     05-15    140  |  104  |  75.0<H>  ----------------------------<  87  4.8   |  23.0  |  6.53<H>    Ca    7.7<L>      15 May 2024 07:55      PTT - ( 15 May 2024 07:55 )  PTT:59.0 sec      Urinalysis Basic - ( 15 May 2024 07:55 )    Color: x / Appearance: x / SG: x / pH: x  Gluc: 87 mg/dL / Ketone: x  / Bili: x / Urobili: x   Blood: x / Protein: x / Nitrite: x   Leuk Esterase: x / RBC: x / WBC x   Sq Epi: x / Non Sq Epi: x / Bacteria: x

## 2024-05-15 NOTE — PROGRESS NOTE ADULT - ASSESSMENT
84F with hx of ESRD was on HD and s/p transplant with now CKD, prior CVA, HTN, dementia, RUE DVT ( not on AC 2/2 GIB), recent admission for acute anemia/GI bleed / new bilateral lung nodules concerning for mets now admitted worsening RUE edema with doppler confirming +DVT and diarrhea 2/2 C difficile colitis. Palliative consulted for support and goals of care.     RUE DVT  - on Heparin  - monitor Hg,  and transfuse PRN    ESRD  avoid nephrotoxic agents  Patient family thinking about HD    Bilateral Lung Nodules  - noted on CT on 4/19 and son declined biopsy during last admission and to follow up heme/onc outpatient  - if family declines to pursue biopsy and cancer directed therapy (if confirmed), hospice appropriate if in line with goals of care    Debility  Advancing Dementia  - CTH from 4/19 noted diffuse cerebral volume loss and microvascular ischemic changes   - likely Vascular given prior CVA  - Patient verbal, per PT needs moderate assistance  - please assist with ADLs, safety precautions    Palliative Care Encounter   GOC meeting on 5/14.   Patient and son thinking about HD- await decision  Son would like to take mother back to [place]  Plan to follow up with son

## 2024-05-15 NOTE — PROGRESS NOTE ADULT - ASSESSMENT
84y F w/ hx ESRD s/p renal transplant on mycophenolate and tacrolimus, CVA, HTN, HLD, anemia, dementia, lung nodule, RUE dvt, Coumadin toxicity, GI Bleed  presented from [facility] for RUE swelling with hx of  RUE DVT, diarrhea (started on metronidazole 05/06, pending cdif). Per transfer papers, pt had RUE US done on 3/15 that showed RUE DVT involving internal jugular and subclavian veins, for which she was started on Coumadin. Was recently admitted to this hospital with anemia and elevated INR, found to have new lung nodule rec biopsy, seen by hem/onc . Family decided against further anticoagulation at that time given bleeding risk/biopsy, seen by GI, unable to do scope for poor prep, rec out pt  follow up. Pt had RUE US done on 5/8 that again showed +DVT in right jugular vein, so pt was sent back to the ED.     RUE DVT   -US Duplex Venous Upper Ext Ltd, Right (05.09.24) thrombus identified within the right internal jugular, subclavian and axillary, cephalic and basilic veins.  -Heparin Infusion was started after discussing risks vs benefits with family   -hem/onc consult noted   -Monitor CBC closely given history of GIB    GLO on Advanced CKD 5   Met acidosis   -H/O Renal Transplant in 2012 ([facility])  -Was having diarrhea at rehab now improving   -Worsening renal function likely multifactorial   -Continue IVF and Sodium Bicarb   -cont tacrolimus, mycophenolate and prednisone (Nephro to discuss with primary transplant team regarding weaning down due to risk of infections)   -continue Gee catheter   -nephrology following, may need HD in near future but family not sure if this is the direction they wish to go, awaiting decision     Diarrhea   -Per [facility], C. Diff was negative at facility   -Now improving   -GI PCR negative  -Added Metamucil     Recent GI bleed   -cont PPI  -Monitor closely     HTN   -norvasc, coreg      Lung nodule   Liver lesion   -suspected malignancy  -family wants to hold off on biopsy   -reviewed ct chest/abd /pelvis from prior admission    Hypocalcemia   -Replete     DVT Prophylaxis -- Heparin Infusion    Full Code. Prognosis guarded. Palliative Care following.   Dispo: Family wish to take patient back to [place]

## 2024-05-16 LAB
ANION GAP SERPL CALC-SCNC: 17 MMOL/L — SIGNIFICANT CHANGE UP (ref 5–17)
APTT BLD: 74.2 SEC — HIGH (ref 24.5–35.6)
BUN SERPL-MCNC: 76.7 MG/DL — HIGH (ref 8–20)
CALCIUM SERPL-MCNC: 7.9 MG/DL — LOW (ref 8.4–10.5)
CHLORIDE SERPL-SCNC: 103 MMOL/L — SIGNIFICANT CHANGE UP (ref 96–108)
CO2 SERPL-SCNC: 22 MMOL/L — SIGNIFICANT CHANGE UP (ref 22–29)
CREAT SERPL-MCNC: 6.53 MG/DL — HIGH (ref 0.5–1.3)
EGFR: 6 ML/MIN/1.73M2 — LOW
GLUCOSE SERPL-MCNC: 101 MG/DL — HIGH (ref 70–99)
HCT VFR BLD CALC: 31.6 % — LOW (ref 34.5–45)
HGB BLD-MCNC: 10.4 G/DL — LOW (ref 11.5–15.5)
MCHC RBC-ENTMCNC: 30.5 PG — SIGNIFICANT CHANGE UP (ref 27–34)
MCHC RBC-ENTMCNC: 32.9 GM/DL — SIGNIFICANT CHANGE UP (ref 32–36)
MCV RBC AUTO: 92.7 FL — SIGNIFICANT CHANGE UP (ref 80–100)
PLATELET # BLD AUTO: 205 K/UL — SIGNIFICANT CHANGE UP (ref 150–400)
POTASSIUM SERPL-MCNC: 4.6 MMOL/L — SIGNIFICANT CHANGE UP (ref 3.5–5.3)
POTASSIUM SERPL-SCNC: 4.6 MMOL/L — SIGNIFICANT CHANGE UP (ref 3.5–5.3)
RBC # BLD: 3.41 M/UL — LOW (ref 3.8–5.2)
RBC # FLD: 15.3 % — HIGH (ref 10.3–14.5)
SODIUM SERPL-SCNC: 142 MMOL/L — SIGNIFICANT CHANGE UP (ref 135–145)
WBC # BLD: 5.34 K/UL — SIGNIFICANT CHANGE UP (ref 3.8–10.5)
WBC # FLD AUTO: 5.34 K/UL — SIGNIFICANT CHANGE UP (ref 3.8–10.5)

## 2024-05-16 PROCEDURE — 99232 SBSQ HOSP IP/OBS MODERATE 35: CPT

## 2024-05-16 RX ADMIN — PANTOPRAZOLE SODIUM 40 MILLIGRAM(S): 20 TABLET, DELAYED RELEASE ORAL at 17:25

## 2024-05-16 RX ADMIN — CARVEDILOL PHOSPHATE 6.25 MILLIGRAM(S): 80 CAPSULE, EXTENDED RELEASE ORAL at 17:25

## 2024-05-16 RX ADMIN — MIRTAZAPINE 7.5 MILLIGRAM(S): 45 TABLET, ORALLY DISINTEGRATING ORAL at 21:56

## 2024-05-16 RX ADMIN — MYCOPHENOLATE MOFETIL 500 MILLIGRAM(S): 250 CAPSULE ORAL at 17:25

## 2024-05-16 RX ADMIN — CARVEDILOL PHOSPHATE 6.25 MILLIGRAM(S): 80 CAPSULE, EXTENDED RELEASE ORAL at 05:03

## 2024-05-16 RX ADMIN — PREGABALIN 1000 MICROGRAM(S): 225 CAPSULE ORAL at 11:27

## 2024-05-16 RX ADMIN — CLOPIDOGREL BISULFATE 75 MILLIGRAM(S): 75 TABLET, FILM COATED ORAL at 11:26

## 2024-05-16 RX ADMIN — Medication 5 MILLIGRAM(S): at 05:03

## 2024-05-16 RX ADMIN — TAMSULOSIN HYDROCHLORIDE 0.4 MILLIGRAM(S): 0.4 CAPSULE ORAL at 21:56

## 2024-05-16 RX ADMIN — HEPARIN SODIUM 500 UNIT(S)/HR: 5000 INJECTION INTRAVENOUS; SUBCUTANEOUS at 07:05

## 2024-05-16 RX ADMIN — CALCITRIOL 0.5 MICROGRAM(S): 0.5 CAPSULE ORAL at 11:26

## 2024-05-16 RX ADMIN — Medication 1950 MILLIGRAM(S): at 05:02

## 2024-05-16 RX ADMIN — CHLORHEXIDINE GLUCONATE 1 APPLICATION(S): 213 SOLUTION TOPICAL at 11:27

## 2024-05-16 RX ADMIN — Medication 325 MILLIGRAM(S): at 11:25

## 2024-05-16 RX ADMIN — ERGOCALCIFEROL 50000 UNIT(S): 1.25 CAPSULE ORAL at 11:27

## 2024-05-16 RX ADMIN — Medication 1 PACKET(S): at 11:26

## 2024-05-16 RX ADMIN — Medication 1 MILLIGRAM(S): at 11:26

## 2024-05-16 RX ADMIN — PANTOPRAZOLE SODIUM 40 MILLIGRAM(S): 20 TABLET, DELAYED RELEASE ORAL at 05:03

## 2024-05-16 RX ADMIN — TACROLIMUS 2 MILLIGRAM(S): 5 CAPSULE ORAL at 05:03

## 2024-05-16 RX ADMIN — AMLODIPINE BESYLATE 10 MILLIGRAM(S): 2.5 TABLET ORAL at 05:03

## 2024-05-16 RX ADMIN — Medication 1950 MILLIGRAM(S): at 17:40

## 2024-05-16 RX ADMIN — MYCOPHENOLATE MOFETIL 500 MILLIGRAM(S): 250 CAPSULE ORAL at 05:03

## 2024-05-16 RX ADMIN — HEPARIN SODIUM 500 UNIT(S)/HR: 5000 INJECTION INTRAVENOUS; SUBCUTANEOUS at 11:28

## 2024-05-16 RX ADMIN — HEPARIN SODIUM 500 UNIT(S)/HR: 5000 INJECTION INTRAVENOUS; SUBCUTANEOUS at 19:52

## 2024-05-16 RX ADMIN — Medication 1 TABLET(S): at 11:25

## 2024-05-16 RX ADMIN — TACROLIMUS 2 MILLIGRAM(S): 5 CAPSULE ORAL at 17:41

## 2024-05-16 RX ADMIN — Medication 1 TABLET(S): at 11:27

## 2024-05-16 NOTE — PROGRESS NOTE ADULT - SUBJECTIVE AND OBJECTIVE BOX
[facility] Division of Hospital Medicine    Chief Complaint:  RUE DVT     SUBJECTIVE / OVERNIGHT EVENTS:  Pt reports no complaints   Having breakfast in bed     Patient denies chest pain, SOB, abd pain, N/V, fever, chills, dysuria or any other complaints. All remainder ROS negative.     MEDICATIONS  (STANDING):  amLODIPine   Tablet 10 milliGRAM(s) Oral daily  calcitriol   Capsule 0.5 MICROGram(s) Oral daily  carvedilol 6.25 milliGRAM(s) Oral every 12 hours  chlorhexidine 2% Cloths 1 Application(s) Topical daily  clopidogrel Tablet 75 milliGRAM(s) Oral daily  cyanocobalamin 1000 MICROGram(s) Oral daily  dextrose 5% + lactated ringers. 1000 milliLiter(s) (40 mL/Hr) IV Continuous <Continuous>  ergocalciferol 33121 Unit(s) Oral every week  ferrous    sulfate 325 milliGRAM(s) Oral daily  folic acid 1 milliGRAM(s) Oral daily  heparin  Infusion. 400 Unit(s)/Hr (4 mL/Hr) IV Continuous <Continuous>  mirtazapine 7.5 milliGRAM(s) Oral at bedtime  multivitamin 1 Tablet(s) Oral daily  mycophenolate mofetil 500 milliGRAM(s) Oral two times a day  Nephro-casie 1 Tablet(s) Oral daily  pantoprazole   Suspension 40 milliGRAM(s) Oral two times a day  predniSONE   Tablet 5 milliGRAM(s) Oral daily  psyllium Powder 1 Packet(s) Oral daily  sodium bicarbonate 1950 milliGRAM(s) Oral two times a day  tacrolimus 2 milliGRAM(s) Oral every 12 hours  tamsulosin 0.4 milliGRAM(s) Oral at bedtime    MEDICATIONS  (PRN):  acetaminophen     Tablet .. 650 milliGRAM(s) Oral every 6 hours PRN Temp greater or equal to 38C (100.4F), Mild Pain (1 - 3)  aluminum hydroxide/magnesium hydroxide/simethicone Suspension 30 milliLiter(s) Oral every 4 hours PRN Dyspepsia  heparin   Injectable 2000 Unit(s) IV Push every 6 hours PRN For aPTT between 40 - 57  magnesium hydroxide Suspension 30 milliLiter(s) Oral daily PRN Constipation  melatonin 3 milliGRAM(s) Oral at bedtime PRN Insomnia  ondansetron Injectable 4 milliGRAM(s) IV Push every 8 hours PRN Nausea and/or Vomiting        I&O's Summary    15 May 2024 07:01  -  16 May 2024 07:00  --------------------------------------------------------  IN: 535 mL / OUT: 800 mL / NET: -265 mL        PHYSICAL EXAM:  Vital Signs Last 24 Hrs  T(C): 36.7 (16 May 2024 11:24), Max: 36.8 (15 May 2024 20:30)  T(F): 98 (16 May 2024 11:24), Max: 98.2 (15 May 2024 20:30)  HR: 92 (16 May 2024 11:24) (86 - 98)  BP: 182/88 (16 May 2024 11:24) (143/84 - 182/88)  BP(mean): --  RR: 17 (16 May 2024 11:24) (17 - 18)  SpO2: 99% (16 May 2024 11:24) (95% - 100%)    Parameters below as of 16 May 2024 11:24  Patient On (Oxygen Delivery Method): room air        CONSTITUTIONAL: NAD, sitting up in bed  RESPIRATORY: Normal respiratory effort; lungs are clear to auscultation bilaterally  CARDIOVASCULAR: Regular rate and rhythm, normal S1 and S2   ABDOMEN: Nontender to palpation, normoactive bowel sounds  : Gee in place   MUSCULOSKELETAL:  RUE swelling, mild LE edema b/l   PSYCH: A+O to person, place not time; affect appropriate  NEUROLOGY: No gross sensory or motor deficits         LABS:                        10.4   5.34  )-----------( 205      ( 16 May 2024 10:30 )             31.6     05-16    142  |  103  |  76.7<H>  ----------------------------<  101<H>  4.6   |  22.0  |  6.53<H>    Ca    7.9<L>      16 May 2024 10:30      PTT - ( 16 May 2024 10:30 )  PTT:74.2 sec      Urinalysis Basic - ( 16 May 2024 10:30 )    Color: x / Appearance: x / SG: x / pH: x  Gluc: 101 mg/dL / Ketone: x  / Bili: x / Urobili: x   Blood: x / Protein: x / Nitrite: x   Leuk Esterase: x / RBC: x / WBC x   Sq Epi: x / Non Sq Epi: x / Bacteria: x

## 2024-05-16 NOTE — PROGRESS NOTE ADULT - ASSESSMENT
84y F w/ hx ESRD s/p renal transplant on mycophenolate and tacrolimus, CVA, HTN, HLD, anemia, dementia, lung nodule, RUE dvt, Coumadin toxicity, GI Bleed  presented from [facility] for RUE swelling with hx of  RUE DVT, diarrhea (started on metronidazole 05/06, pending cdif). Per transfer papers, pt had RUE US done on 3/15 that showed RUE DVT involving internal jugular and subclavian veins, for which she was started on Coumadin. Was recently admitted to this hospital with anemia and elevated INR, found to have new lung nodule rec biopsy, seen by hem/onc . Family decided against further anticoagulation at that time given bleeding risk/biopsy, seen by GI, unable to do scope for poor prep, rec out pt  follow up. Pt had RUE US done on 5/8 that again showed +DVT in right jugular vein, so pt was sent back to the ED.     RUE DVT   -US Duplex Venous Upper Ext Ltd, Right (05.09.24) thrombus identified within the right internal jugular, subclavian and axillary, cephalic and basilic veins.  -Heparin Infusion was started after discussing risks vs benefits with family   -hem/onc consult noted   -Monitor CBC closely given history of GIB    GLO on Advanced CKD 5   Met acidosis   -H/O Renal Transplant in 2012 ([facility])   -Was having diarrhea at rehab now improving   -Worsening renal function likely multifactorial   -Continue IVF and Sodium Bicarb   -cont tacrolimus, mycophenolate and prednisone (Nephro to discuss with primary transplant team regarding weaning down due to risk of infections)   -continue Gee catheter   -nephrology following, may need HD in near future but family not sure if this is the direction they wish to go, awaiting decision   -Update from pt and son today: pt wants to go forward with HD but family will come in later tonight and discuss with her further     Diarrhea   -Per [facility], C. Diff was negative at facility   -Now improving   -GI PCR negative  -Added Metamucil     Recent GI bleed   -cont PPI  -Monitor closely     HTN   -norvasc, coreg      Lung nodule   Liver lesion   -suspected malignancy  -family wants to hold off on biopsy   -reviewed ct chest/abd /pelvis from prior admission    Hypocalcemia   -Replete     DVT Prophylaxis -- Heparin Infusion    Full Code. Prognosis guarded. Palliative Care following.   Dispo: Family wish to take patient back to [place]

## 2024-05-16 NOTE — PROGRESS NOTE ADULT - ASSESSMENT
84F with hx of ESRD was on HD and s/p transplant with now CKD, prior CVA, HTN, dementia, RUE DVT ( not on AC 2/2 GIB), recent admission for acute anemia/GI bleed / new bilateral lung nodules concerning for mets now admitted worsening RUE edema with doppler confirming +DVT and diarrhea 2/2 C difficile colitis. Palliative consulted for support and goals of care.     ESRD  avoid nephrotoxic agents  Patient undecided about HD    RUE DVT  - on Heparin  - monitor Hg,  and transfuse PRN    Bilateral Lung Nodules  - noted on CT on 4/19 and son declined biopsy during last admission and to follow up heme/onc outpatient    Debility  Advancing Dementia  - CTH from 4/19 noted diffuse cerebral volume loss and microvascular ischemic changes   - likely Vascular given prior CVA  - Patient verbal, per PT needs moderate assistance  - please assist with ADLs, safety precautions    Palliative Care Encounter   Followed  up with patient regarding HD.  She states she hasn't thought much about it.  Informed her of the commitment needed for HD  Son will be coming tonight to further discuss. Patient and son have been informed of risks/benefits of HD given mother's comorbidities and frailty.

## 2024-05-16 NOTE — PROGRESS NOTE ADULT - SUBJECTIVE AND OBJECTIVE BOX
CC:  Follow up GOC , Symptoms    OVERNIGHT EVENTS:  no complaints    Present Symptoms:   Dyspnea:  No    Nausea/Vomiting:  No   Anxiety:  No  Depression:  No    Fatigue:  Yes    Loss of appetite:   Yes   Constipation: Not Reported       Pain: No            Location            Duration            Character            Severity            Factors            Effect    Pain AD Score:  [email or web address].pdf (press ctrl + left click to view)    Review of Systems: Reviewed as above  All others negative      MEDICATIONS  (STANDING):  amLODIPine   Tablet 10 milliGRAM(s) Oral daily  calcitriol   Capsule 0.5 MICROGram(s) Oral daily  carvedilol 6.25 milliGRAM(s) Oral every 12 hours  chlorhexidine 2% Cloths 1 Application(s) Topical daily  clopidogrel Tablet 75 milliGRAM(s) Oral daily  cyanocobalamin 1000 MICROGram(s) Oral daily  dextrose 5% + lactated ringers. 1000 milliLiter(s) (40 mL/Hr) IV Continuous <Continuous>  ergocalciferol 65980 Unit(s) Oral every week  ferrous    sulfate 325 milliGRAM(s) Oral daily  folic acid 1 milliGRAM(s) Oral daily  heparin  Infusion. 400 Unit(s)/Hr (4 mL/Hr) IV Continuous <Continuous>  mirtazapine 7.5 milliGRAM(s) Oral at bedtime  multivitamin 1 Tablet(s) Oral daily  mycophenolate mofetil 500 milliGRAM(s) Oral two times a day  Nephro-casie 1 Tablet(s) Oral daily  pantoprazole   Suspension 40 milliGRAM(s) Oral two times a day  predniSONE   Tablet 5 milliGRAM(s) Oral daily  psyllium Powder 1 Packet(s) Oral daily  sodium bicarbonate 1950 milliGRAM(s) Oral two times a day  tacrolimus 2 milliGRAM(s) Oral every 12 hours  tamsulosin 0.4 milliGRAM(s) Oral at bedtime    MEDICATIONS  (PRN):  acetaminophen     Tablet .. 650 milliGRAM(s) Oral every 6 hours PRN Temp greater or equal to 38C (100.4F), Mild Pain (1 - 3)  aluminum hydroxide/magnesium hydroxide/simethicone Suspension 30 milliLiter(s) Oral every 4 hours PRN Dyspepsia  heparin   Injectable 2000 Unit(s) IV Push every 6 hours PRN For aPTT between 40 - 57  magnesium hydroxide Suspension 30 milliLiter(s) Oral daily PRN Constipation  melatonin 3 milliGRAM(s) Oral at bedtime PRN Insomnia  ondansetron Injectable 4 milliGRAM(s) IV Push every 8 hours PRN Nausea and/or Vomiting      PHYSICAL EXAM:    Vital Signs Last 24 Hrs  T(C): 36.7 (16 May 2024 11:24), Max: 36.8 (15 May 2024 20:30)  T(F): 98 (16 May 2024 11:24), Max: 98.2 (15 May 2024 20:30)  HR: 92 (16 May 2024 11:24) (86 - 98)  BP: 182/88 (16 May 2024 11:24) (143/84 - 182/88)  BP(mean): --  RR: 17 (16 May 2024 11:24) (17 - 18)  SpO2: 99% (16 May 2024 11:24) (95% - 100%)    Parameters below as of 16 May 2024 11:24  Patient On (Oxygen Delivery Method): room air    Karnofsky: 30 %  General:  F awake alert NAD       HEENT:  NCAT        Lungs: comfortable  non labored  CV:  RR  GI:   soft NTND  MSK: weakness  Skin:  warm/dry  Neuro  alert no focal deficits  Psych calm, non engaging    LABS:                          10.4   5.34  )-----------( 205      ( 16 May 2024 10:30 )             31.6     05-16    142  |  103  |  76.7<H>  ----------------------------<  101<H>  4.6   |  22.0  |  6.53<H>    Ca    7.9<L>      16 May 2024 10:30      PTT - ( 16 May 2024 10:30 )  PTT:74.2 sec  Urinalysis Basic - ( 16 May 2024 10:30 )    Color: x / Appearance: x / SG: x / pH: x  Gluc: 101 mg/dL / Ketone: x  / Bili: x / Urobili: x   Blood: x / Protein: x / Nitrite: x   Leuk Esterase: x / RBC: x / WBC x   Sq Epi: x / Non Sq Epi: x / Bacteria: x      I&O's Summary    15 May 2024 07:01  -  16 May 2024 07:00  --------------------------------------------------------  IN: 535 mL / OUT: 800 mL / NET: -265 mL

## 2024-05-17 LAB
ANION GAP SERPL CALC-SCNC: 17 MMOL/L — SIGNIFICANT CHANGE UP (ref 5–17)
APTT BLD: 63.3 SEC — HIGH (ref 24.5–35.6)
BASOPHILS # BLD AUTO: 0.02 K/UL — SIGNIFICANT CHANGE UP (ref 0–0.2)
BASOPHILS NFR BLD AUTO: 0.5 % — SIGNIFICANT CHANGE UP (ref 0–2)
BUN SERPL-MCNC: 77.7 MG/DL — HIGH (ref 8–20)
CALCIUM SERPL-MCNC: 7.6 MG/DL — LOW (ref 8.4–10.5)
CHLORIDE SERPL-SCNC: 102 MMOL/L — SIGNIFICANT CHANGE UP (ref 96–108)
CO2 SERPL-SCNC: 24 MMOL/L — SIGNIFICANT CHANGE UP (ref 22–29)
CREAT SERPL-MCNC: 6.41 MG/DL — HIGH (ref 0.5–1.3)
EGFR: 6 ML/MIN/1.73M2 — LOW
EOSINOPHIL # BLD AUTO: 0.19 K/UL — SIGNIFICANT CHANGE UP (ref 0–0.5)
EOSINOPHIL NFR BLD AUTO: 4.3 % — SIGNIFICANT CHANGE UP (ref 0–6)
GLUCOSE SERPL-MCNC: 84 MG/DL — SIGNIFICANT CHANGE UP (ref 70–99)
HCT VFR BLD CALC: 31.5 % — LOW (ref 34.5–45)
HGB BLD-MCNC: 9.9 G/DL — LOW (ref 11.5–15.5)
IMM GRANULOCYTES NFR BLD AUTO: 0.5 % — SIGNIFICANT CHANGE UP (ref 0–0.9)
LYMPHOCYTES # BLD AUTO: 0.7 K/UL — LOW (ref 1–3.3)
LYMPHOCYTES # BLD AUTO: 15.8 % — SIGNIFICANT CHANGE UP (ref 13–44)
MCHC RBC-ENTMCNC: 30 PG — SIGNIFICANT CHANGE UP (ref 27–34)
MCHC RBC-ENTMCNC: 31.4 GM/DL — LOW (ref 32–36)
MCV RBC AUTO: 95.5 FL — SIGNIFICANT CHANGE UP (ref 80–100)
MONOCYTES # BLD AUTO: 0.68 K/UL — SIGNIFICANT CHANGE UP (ref 0–0.9)
MONOCYTES NFR BLD AUTO: 15.4 % — HIGH (ref 2–14)
NEUTROPHILS # BLD AUTO: 2.81 K/UL — SIGNIFICANT CHANGE UP (ref 1.8–7.4)
NEUTROPHILS NFR BLD AUTO: 63.5 % — SIGNIFICANT CHANGE UP (ref 43–77)
PLATELET # BLD AUTO: 187 K/UL — SIGNIFICANT CHANGE UP (ref 150–400)
POTASSIUM SERPL-MCNC: 4.5 MMOL/L — SIGNIFICANT CHANGE UP (ref 3.5–5.3)
POTASSIUM SERPL-SCNC: 4.5 MMOL/L — SIGNIFICANT CHANGE UP (ref 3.5–5.3)
RBC # BLD: 3.3 M/UL — LOW (ref 3.8–5.2)
RBC # FLD: 15.4 % — HIGH (ref 10.3–14.5)
SODIUM SERPL-SCNC: 143 MMOL/L — SIGNIFICANT CHANGE UP (ref 135–145)
WBC # BLD: 4.42 K/UL — SIGNIFICANT CHANGE UP (ref 3.8–10.5)
WBC # FLD AUTO: 4.42 K/UL — SIGNIFICANT CHANGE UP (ref 3.8–10.5)

## 2024-05-17 PROCEDURE — 99232 SBSQ HOSP IP/OBS MODERATE 35: CPT

## 2024-05-17 RX ADMIN — PANTOPRAZOLE SODIUM 40 MILLIGRAM(S): 20 TABLET, DELAYED RELEASE ORAL at 05:43

## 2024-05-17 RX ADMIN — Medication 1 MILLIGRAM(S): at 11:51

## 2024-05-17 RX ADMIN — PREGABALIN 1000 MICROGRAM(S): 225 CAPSULE ORAL at 11:54

## 2024-05-17 RX ADMIN — MIRTAZAPINE 7.5 MILLIGRAM(S): 45 TABLET, ORALLY DISINTEGRATING ORAL at 21:49

## 2024-05-17 RX ADMIN — TAMSULOSIN HYDROCHLORIDE 0.4 MILLIGRAM(S): 0.4 CAPSULE ORAL at 21:50

## 2024-05-17 RX ADMIN — HEPARIN SODIUM 500 UNIT(S)/HR: 5000 INJECTION INTRAVENOUS; SUBCUTANEOUS at 23:44

## 2024-05-17 RX ADMIN — Medication 1 TABLET(S): at 11:53

## 2024-05-17 RX ADMIN — MYCOPHENOLATE MOFETIL 500 MILLIGRAM(S): 250 CAPSULE ORAL at 05:44

## 2024-05-17 RX ADMIN — Medication 5 MILLIGRAM(S): at 05:44

## 2024-05-17 RX ADMIN — CARVEDILOL PHOSPHATE 6.25 MILLIGRAM(S): 80 CAPSULE, EXTENDED RELEASE ORAL at 17:59

## 2024-05-17 RX ADMIN — TACROLIMUS 2 MILLIGRAM(S): 5 CAPSULE ORAL at 08:41

## 2024-05-17 RX ADMIN — Medication 1950 MILLIGRAM(S): at 18:00

## 2024-05-17 RX ADMIN — CHLORHEXIDINE GLUCONATE 1 APPLICATION(S): 213 SOLUTION TOPICAL at 11:52

## 2024-05-17 RX ADMIN — CLOPIDOGREL BISULFATE 75 MILLIGRAM(S): 75 TABLET, FILM COATED ORAL at 11:51

## 2024-05-17 RX ADMIN — TACROLIMUS 2 MILLIGRAM(S): 5 CAPSULE ORAL at 21:50

## 2024-05-17 RX ADMIN — AMLODIPINE BESYLATE 10 MILLIGRAM(S): 2.5 TABLET ORAL at 05:44

## 2024-05-17 RX ADMIN — CARVEDILOL PHOSPHATE 6.25 MILLIGRAM(S): 80 CAPSULE, EXTENDED RELEASE ORAL at 05:44

## 2024-05-17 RX ADMIN — Medication 1 PACKET(S): at 11:54

## 2024-05-17 RX ADMIN — HEPARIN SODIUM 500 UNIT(S)/HR: 5000 INJECTION INTRAVENOUS; SUBCUTANEOUS at 19:28

## 2024-05-17 RX ADMIN — HEPARIN SODIUM 500 UNIT(S)/HR: 5000 INJECTION INTRAVENOUS; SUBCUTANEOUS at 07:46

## 2024-05-17 RX ADMIN — PANTOPRAZOLE SODIUM 40 MILLIGRAM(S): 20 TABLET, DELAYED RELEASE ORAL at 18:00

## 2024-05-17 RX ADMIN — Medication 325 MILLIGRAM(S): at 11:52

## 2024-05-17 RX ADMIN — Medication 1950 MILLIGRAM(S): at 05:43

## 2024-05-17 RX ADMIN — HEPARIN SODIUM 500 UNIT(S)/HR: 5000 INJECTION INTRAVENOUS; SUBCUTANEOUS at 13:29

## 2024-05-17 RX ADMIN — MYCOPHENOLATE MOFETIL 500 MILLIGRAM(S): 250 CAPSULE ORAL at 17:59

## 2024-05-17 RX ADMIN — Medication 1 TABLET(S): at 11:51

## 2024-05-17 RX ADMIN — CALCITRIOL 0.5 MICROGRAM(S): 0.5 CAPSULE ORAL at 11:51

## 2024-05-17 NOTE — PROGRESS NOTE ADULT - ASSESSMENT
84y F w/ hx ESRD s/p renal transplant on mycophenolate and tacrolimus, CVA, HTN, HLD, anemia, dementia, lung nodule, RUE dvt, Coumadin toxicity, GI Bleed  presented from [facility] for RUE swelling with hx of  RUE DVT, diarrhea (started on metronidazole 05/06, pending cdif). Per transfer papers, pt had RUE US done on 3/15 that showed RUE DVT involving internal jugular and subclavian veins, for which she was started on Coumadin. Was recently admitted to this hospital with anemia and elevated INR, found to have new lung nodule rec biopsy, seen by hem/onc . Family decided against further anticoagulation at that time given bleeding risk/biopsy, seen by GI, unable to do scope for poor prep, rec out pt  follow up. Pt had RUE US done on 5/8 that again showed +DVT in right jugular vein, so pt was sent back to the ED.     RUE DVT   -US Duplex Venous Upper Ext Ltd, Right (05.09.24) thrombus identified within the right internal jugular, subclavian and axillary, cephalic and basilic veins.  -Heparin Infusion was started after discussing risks vs benefits with family   -hem/onc consult noted   -Monitor CBC closely given history of GIB    GLO on Advanced CKD 5   Met acidosis   Worsening renal function likely multifactorial   -H/O Renal Transplant in 2012 ([facility])   -Was having diarrhea at rehab now improving   -Continue Sodium Bicarb   -cont tacrolimus, mycophenolate and prednisone (can be weaned down if started on HD per Nephro)   -continue Gee catheter   -nephrology following, await family decision regarding RRT   -Update from pt and son today: pt wants to go forward with HD but family will come in later tonight and discuss with her further     Diarrhea   -Per [facility], C. Diff was negative at facility   -Now improving   -GI PCR negative  -Added Metamucil     Recent GI bleed   -cont PPI  -Monitor closely     HTN   -norvasc, coreg      Lung nodule   Liver lesion   -suspected malignancy  -family wants to hold off on biopsy   -reviewed ct chest/abd /pelvis from prior admission    Hypocalcemia   -Replete     DVT Prophylaxis -- Heparin Infusion    Full Code. Prognosis guarded. Palliative Care following.   Dispo: Family wish to take patient back to [place] when ready for DC

## 2024-05-17 NOTE — PROGRESS NOTE ADULT - SUBJECTIVE AND OBJECTIVE BOX
[facility] Division of Hospital Medicine    Chief Complaint:  RUE DVT     SUBJECTIVE / OVERNIGHT EVENTS:  Pt reports no complaints   Reports she feel tired     Patient denies chest pain, SOB, abd pain, N/V, fever, chills, dysuria or any other complaints. All remainder ROS negative.     MEDICATIONS  (STANDING):  amLODIPine   Tablet 10 milliGRAM(s) Oral daily  calcitriol   Capsule 0.5 MICROGram(s) Oral daily  carvedilol 6.25 milliGRAM(s) Oral every 12 hours  chlorhexidine 2% Cloths 1 Application(s) Topical daily  clopidogrel Tablet 75 milliGRAM(s) Oral daily  cyanocobalamin 1000 MICROGram(s) Oral daily  dextrose 5% + lactated ringers. 1000 milliLiter(s) (40 mL/Hr) IV Continuous <Continuous>  ergocalciferol 13557 Unit(s) Oral every week  ferrous    sulfate 325 milliGRAM(s) Oral daily  folic acid 1 milliGRAM(s) Oral daily  heparin  Infusion. 400 Unit(s)/Hr (4 mL/Hr) IV Continuous <Continuous>  mirtazapine 7.5 milliGRAM(s) Oral at bedtime  multivitamin 1 Tablet(s) Oral daily  mycophenolate mofetil 500 milliGRAM(s) Oral two times a day  Nephro-casie 1 Tablet(s) Oral daily  pantoprazole   Suspension 40 milliGRAM(s) Oral two times a day  predniSONE   Tablet 5 milliGRAM(s) Oral daily  psyllium Powder 1 Packet(s) Oral daily  sodium bicarbonate 1950 milliGRAM(s) Oral two times a day  tacrolimus 2 milliGRAM(s) Oral every 12 hours  tamsulosin 0.4 milliGRAM(s) Oral at bedtime    MEDICATIONS  (PRN):  acetaminophen     Tablet .. 650 milliGRAM(s) Oral every 6 hours PRN Temp greater or equal to 38C (100.4F), Mild Pain (1 - 3)  aluminum hydroxide/magnesium hydroxide/simethicone Suspension 30 milliLiter(s) Oral every 4 hours PRN Dyspepsia  heparin   Injectable 2000 Unit(s) IV Push every 6 hours PRN For aPTT between 40 - 57  magnesium hydroxide Suspension 30 milliLiter(s) Oral daily PRN Constipation  melatonin 3 milliGRAM(s) Oral at bedtime PRN Insomnia  ondansetron Injectable 4 milliGRAM(s) IV Push every 8 hours PRN Nausea and/or Vomiting        I&O's Summary    16 May 2024 07:01  -  17 May 2024 07:00  --------------------------------------------------------  IN: 0 mL / OUT: 350 mL / NET: -350 mL        PHYSICAL EXAM:  Vital Signs Last 24 Hrs  T(C): 36.3 (17 May 2024 07:52), Max: 36.6 (16 May 2024 16:35)  T(F): 97.4 (17 May 2024 07:52), Max: 97.9 (16 May 2024 16:35)  HR: 101 (17 May 2024 07:52) (75 - 101)  BP: 144/62 (17 May 2024 07:52) (142/80 - 156/86)  BP(mean): --  RR: 18 (17 May 2024 07:52) (17 - 18)  SpO2: 97% (17 May 2024 07:52) (95% - 100%)    Parameters below as of 17 May 2024 07:52  Patient On (Oxygen Delivery Method): room air        CONSTITUTIONAL: NAD, sitting up in bed  RESPIRATORY: Normal respiratory effort; lungs are clear to auscultation bilaterally  CARDIOVASCULAR: Regular rate and rhythm, normal S1 and S2   ABDOMEN: Nontender to palpation, normoactive bowel sounds  : Gee in place   MUSCULOSKELETAL:  RUE swelling, mild LE edema b/l   PSYCH: A+O to person, place not time; affect appropriate  NEUROLOGY: No gross sensory or motor deficits         LABS:                        9.9    4.42  )-----------( 187      ( 17 May 2024 07:27 )             31.5     05-17    143  |  102  |  77.7<H>  ----------------------------<  84  4.5   |  24.0  |  6.41<H>    Ca    7.6<L>      17 May 2024 07:27      PTT - ( 17 May 2024 07:27 )  PTT:63.3 sec      Urinalysis Basic - ( 17 May 2024 07:27 )    Color: x / Appearance: x / SG: x / pH: x  Gluc: 84 mg/dL / Ketone: x  / Bili: x / Urobili: x   Blood: x / Protein: x / Nitrite: x   Leuk Esterase: x / RBC: x / WBC x   Sq Epi: x / Non Sq Epi: x / Bacteria: x

## 2024-05-18 LAB
ANION GAP SERPL CALC-SCNC: 13 MMOL/L — SIGNIFICANT CHANGE UP (ref 5–17)
APTT BLD: 58.1 SEC — HIGH (ref 24.5–35.6)
BASOPHILS # BLD AUTO: 0.04 K/UL — SIGNIFICANT CHANGE UP (ref 0–0.2)
BASOPHILS NFR BLD AUTO: 0.7 % — SIGNIFICANT CHANGE UP (ref 0–2)
BUN SERPL-MCNC: 75.9 MG/DL — HIGH (ref 8–20)
CALCIUM SERPL-MCNC: 7.8 MG/DL — LOW (ref 8.4–10.5)
CHLORIDE SERPL-SCNC: 104 MMOL/L — SIGNIFICANT CHANGE UP (ref 96–108)
CO2 SERPL-SCNC: 25 MMOL/L — SIGNIFICANT CHANGE UP (ref 22–29)
CREAT SERPL-MCNC: 6.27 MG/DL — HIGH (ref 0.5–1.3)
EGFR: 6 ML/MIN/1.73M2 — LOW
EOSINOPHIL # BLD AUTO: 0.24 K/UL — SIGNIFICANT CHANGE UP (ref 0–0.5)
EOSINOPHIL NFR BLD AUTO: 4.3 % — SIGNIFICANT CHANGE UP (ref 0–6)
GLUCOSE SERPL-MCNC: 76 MG/DL — SIGNIFICANT CHANGE UP (ref 70–99)
HCT VFR BLD CALC: 28.5 % — LOW (ref 34.5–45)
HGB BLD-MCNC: 9.1 G/DL — LOW (ref 11.5–15.5)
IMM GRANULOCYTES NFR BLD AUTO: 0.9 % — SIGNIFICANT CHANGE UP (ref 0–0.9)
LYMPHOCYTES # BLD AUTO: 0.77 K/UL — LOW (ref 1–3.3)
LYMPHOCYTES # BLD AUTO: 13.8 % — SIGNIFICANT CHANGE UP (ref 13–44)
MCHC RBC-ENTMCNC: 30 PG — SIGNIFICANT CHANGE UP (ref 27–34)
MCHC RBC-ENTMCNC: 31.9 GM/DL — LOW (ref 32–36)
MCV RBC AUTO: 94.1 FL — SIGNIFICANT CHANGE UP (ref 80–100)
MONOCYTES # BLD AUTO: 0.66 K/UL — SIGNIFICANT CHANGE UP (ref 0–0.9)
MONOCYTES NFR BLD AUTO: 11.8 % — SIGNIFICANT CHANGE UP (ref 2–14)
NEUTROPHILS # BLD AUTO: 3.82 K/UL — SIGNIFICANT CHANGE UP (ref 1.8–7.4)
NEUTROPHILS NFR BLD AUTO: 68.5 % — SIGNIFICANT CHANGE UP (ref 43–77)
PLATELET # BLD AUTO: 204 K/UL — SIGNIFICANT CHANGE UP (ref 150–400)
POTASSIUM SERPL-MCNC: 4.9 MMOL/L — SIGNIFICANT CHANGE UP (ref 3.5–5.3)
POTASSIUM SERPL-SCNC: 4.9 MMOL/L — SIGNIFICANT CHANGE UP (ref 3.5–5.3)
RBC # BLD: 3.03 M/UL — LOW (ref 3.8–5.2)
RBC # FLD: 15.3 % — HIGH (ref 10.3–14.5)
SODIUM SERPL-SCNC: 142 MMOL/L — SIGNIFICANT CHANGE UP (ref 135–145)
WBC # BLD: 5.58 K/UL — SIGNIFICANT CHANGE UP (ref 3.8–10.5)
WBC # FLD AUTO: 5.58 K/UL — SIGNIFICANT CHANGE UP (ref 3.8–10.5)

## 2024-05-18 PROCEDURE — 99232 SBSQ HOSP IP/OBS MODERATE 35: CPT

## 2024-05-18 PROCEDURE — 99233 SBSQ HOSP IP/OBS HIGH 50: CPT

## 2024-05-18 RX ADMIN — HEPARIN SODIUM 500 UNIT(S)/HR: 5000 INJECTION INTRAVENOUS; SUBCUTANEOUS at 19:19

## 2024-05-18 RX ADMIN — Medication 1 PACKET(S): at 13:40

## 2024-05-18 RX ADMIN — Medication 1950 MILLIGRAM(S): at 17:38

## 2024-05-18 RX ADMIN — Medication 1 TABLET(S): at 13:35

## 2024-05-18 RX ADMIN — MIRTAZAPINE 7.5 MILLIGRAM(S): 45 TABLET, ORALLY DISINTEGRATING ORAL at 22:16

## 2024-05-18 RX ADMIN — CHLORHEXIDINE GLUCONATE 1 APPLICATION(S): 213 SOLUTION TOPICAL at 13:39

## 2024-05-18 RX ADMIN — PANTOPRAZOLE SODIUM 40 MILLIGRAM(S): 20 TABLET, DELAYED RELEASE ORAL at 06:18

## 2024-05-18 RX ADMIN — Medication 1 MILLIGRAM(S): at 13:39

## 2024-05-18 RX ADMIN — TACROLIMUS 2 MILLIGRAM(S): 5 CAPSULE ORAL at 09:03

## 2024-05-18 RX ADMIN — PANTOPRAZOLE SODIUM 40 MILLIGRAM(S): 20 TABLET, DELAYED RELEASE ORAL at 17:46

## 2024-05-18 RX ADMIN — TAMSULOSIN HYDROCHLORIDE 0.4 MILLIGRAM(S): 0.4 CAPSULE ORAL at 22:16

## 2024-05-18 RX ADMIN — HEPARIN SODIUM 500 UNIT(S)/HR: 5000 INJECTION INTRAVENOUS; SUBCUTANEOUS at 07:49

## 2024-05-18 RX ADMIN — HEPARIN SODIUM 500 UNIT(S)/HR: 5000 INJECTION INTRAVENOUS; SUBCUTANEOUS at 07:43

## 2024-05-18 RX ADMIN — CARVEDILOL PHOSPHATE 6.25 MILLIGRAM(S): 80 CAPSULE, EXTENDED RELEASE ORAL at 17:38

## 2024-05-18 RX ADMIN — PREGABALIN 1000 MICROGRAM(S): 225 CAPSULE ORAL at 13:38

## 2024-05-18 RX ADMIN — TACROLIMUS 2 MILLIGRAM(S): 5 CAPSULE ORAL at 22:16

## 2024-05-18 RX ADMIN — HEPARIN SODIUM 500 UNIT(S)/HR: 5000 INJECTION INTRAVENOUS; SUBCUTANEOUS at 13:43

## 2024-05-18 RX ADMIN — Medication 1950 MILLIGRAM(S): at 06:18

## 2024-05-18 RX ADMIN — Medication 1 TABLET(S): at 13:38

## 2024-05-18 RX ADMIN — CALCITRIOL 0.5 MICROGRAM(S): 0.5 CAPSULE ORAL at 13:37

## 2024-05-18 RX ADMIN — CLOPIDOGREL BISULFATE 75 MILLIGRAM(S): 75 TABLET, FILM COATED ORAL at 13:39

## 2024-05-18 RX ADMIN — MYCOPHENOLATE MOFETIL 500 MILLIGRAM(S): 250 CAPSULE ORAL at 17:38

## 2024-05-18 RX ADMIN — Medication 5 MILLIGRAM(S): at 06:17

## 2024-05-18 RX ADMIN — MYCOPHENOLATE MOFETIL 500 MILLIGRAM(S): 250 CAPSULE ORAL at 06:18

## 2024-05-18 RX ADMIN — AMLODIPINE BESYLATE 10 MILLIGRAM(S): 2.5 TABLET ORAL at 06:18

## 2024-05-18 RX ADMIN — Medication 325 MILLIGRAM(S): at 13:38

## 2024-05-18 RX ADMIN — CARVEDILOL PHOSPHATE 6.25 MILLIGRAM(S): 80 CAPSULE, EXTENDED RELEASE ORAL at 06:18

## 2024-05-18 NOTE — PROGRESS NOTE ADULT - ASSESSMENT
84 YOM ESRD s/p renal transplant, HTN, CVA, dementia presents from NH with RUE swelling (history of DVT in RUE on AC)     1. Advanced Chronic kidney disease stage 5:  -Transplant status; s/p DDKT 2012 at [facility]  -Baseline allograft function ; GFR < 12 since December 2023  -Serum creatinine 6.25- 6.5 GFR 6.  Could have a component of prerenal in setting of diarrhea but this is already advanced CKD  - Patient is agreeable to start RRT at this time  -please schedule for TDC insertion , will start HD once access is established      2. Immunosuppression:  -Patient is on tacrolimus 2 mg twice daily,  mg twice daily, prednisone 5 mg daily  -Continue immunosuppression- can be weaned down if started on HD    3. Hypocalcemia; stable    4. Metabolic acidosis:  -Continue on PO sodium bicarbonate 1950 mg PO BID for now    5. BMD w/ CKD: Renal diet, continue on calcitriol    6. RUE swelling and DVT: On anticoagulation.    7. Anemia; start ALEXANDRA with HD

## 2024-05-18 NOTE — PROGRESS NOTE ADULT - SUBJECTIVE AND OBJECTIVE BOX
[facility] DIVISION OF KIDNEY DISEASES AND HYPERTENSION -- FOLLOW UP NOTE  --------------------------------------------------------------------------------  Chief Complaint: CKD/ renal transplant    24 hour events/subjective:  pt seen and examined; feels 'ok'  states she wants to proceed with HD      PAST HISTORY  --------------------------------------------------------------------------------  No significant changes to PMH, PSH, FHx, SHx, unless otherwise noted    ALLERGIES & MEDICATIONS  --------------------------------------------------------------------------------  Allergies    No Known Allergies      Standing Inpatient Medications  amLODIPine   Tablet 10 milliGRAM(s) Oral daily  calcitriol   Capsule 0.5 MICROGram(s) Oral daily  carvedilol 6.25 milliGRAM(s) Oral every 12 hours  chlorhexidine 2% Cloths 1 Application(s) Topical daily  clopidogrel Tablet 75 milliGRAM(s) Oral daily  cyanocobalamin 1000 MICROGram(s) Oral daily  dextrose 5% + lactated ringers. 1000 milliLiter(s) IV Continuous <Continuous>  ergocalciferol 97988 Unit(s) Oral every week  ferrous    sulfate 325 milliGRAM(s) Oral daily  folic acid 1 milliGRAM(s) Oral daily  heparin  Infusion. 400 Unit(s)/Hr IV Continuous <Continuous>  mirtazapine 7.5 milliGRAM(s) Oral at bedtime  multivitamin 1 Tablet(s) Oral daily  mycophenolate mofetil 500 milliGRAM(s) Oral two times a day  Nephro-casie 1 Tablet(s) Oral daily  pantoprazole   Suspension 40 milliGRAM(s) Oral two times a day  predniSONE   Tablet 5 milliGRAM(s) Oral daily  psyllium Powder 1 Packet(s) Oral daily  sodium bicarbonate 1950 milliGRAM(s) Oral two times a day  tacrolimus 2 milliGRAM(s) Oral every 12 hours  tamsulosin 0.4 milliGRAM(s) Oral at bedtime    PRN Inpatient Medications  acetaminophen     Tablet .. 650 milliGRAM(s) Oral every 6 hours PRN  aluminum hydroxide/magnesium hydroxide/simethicone Suspension 30 milliLiter(s) Oral every 4 hours PRN  heparin   Injectable 2000 Unit(s) IV Push every 6 hours PRN  magnesium hydroxide Suspension 30 milliLiter(s) Oral daily PRN  melatonin 3 milliGRAM(s) Oral at bedtime PRN  ondansetron Injectable 4 milliGRAM(s) IV Push every 8 hours PRN      REVIEW OF SYSTEMS  --------------------------------------------------------------------------------  Gen: No weight changes, fatigue, fevers/chills, weakness  Skin: No rashes  Head/Eyes/Ears/Mouth: No headache; Normal hearing; Normal vision w/o blurriness; No sinus pain/discomfort, sore throat  Respiratory: No dyspnea, cough, wheezing, hemoptysis  CV: No chest pain, PND, orthopnea  GI: No abdominal pain, diarrhea, constipation, nausea, vomiting, melena, hematochezia  : No increased frequency, dysuria, hematuria, nocturia  MSK: No joint pain/swelling; no back pain; no edema  Neuro: No dizziness/lightheadedness, weakness, seizures, numbness, tingling  Heme: No easy bruising or bleeding  Endo: No heat/cold intolerance  Psych: No significant nervousness, anxiety, stress, depression    All other systems were reviewed and are negative, except as noted.    VITALS/PHYSICAL EXAM  --------------------------------------------------------------------------------  T(C): 36.4 (05-18-24 @ 08:23), Max: 36.6 (05-17-24 @ 16:34)  HR: 94 (05-18-24 @ 08:23) (77 - 99)  BP: 139/51 (05-18-24 @ 08:23) (139/51 - 159/90)  RR: 20 (05-18-24 @ 08:23) (17 - 20)  SpO2: 96% (05-18-24 @ 08:23) (94% - 100%)  Wt(kg): --        05-17-24 @ 07:01  -  05-18-24 @ 07:00  --------------------------------------------------------  IN: 360 mL / OUT: 150 mL / NET: 210 mL      Physical Exam:  	Gen: NAD,  	HEENT: supple neck,  	Pulm: CTA B/L  	CV: RRR, S1S2; no rub  	Abd: +BS, soft, nontender/nondistended  	: marcelino  	UE: Warm no edema; no asterixis  	LE: Warm, no edema  	Neuro: No focal deficit  	Psych: Normal affect and mood  	Skin: Warm    LABS/STUDIES  --------------------------------------------------------------------------------              9.1    5.58  >-----------<  204      [05-18-24 @ 05:11]              28.5     142  |  104  |  75.9  ----------------------------<  76      [05-18-24 @ 05:11]  4.9   |  25.0  |  6.27        Ca     7.8     [05-18-24 @ 05:11]        PTT: 58.1       [05-18-24 @ 05:11]      Creatinine Trend:  SCr 6.27 [05-18 @ 05:11]  SCr 6.41 [05-17 @ 07:27]  SCr 6.53 [05-16 @ 10:30]  SCr 6.53 [05-15 @ 07:55]  SCr 6.40 [05-14 @ 05:25]    Urinalysis - [05-18-24 @ 05:11]      Color  / Appearance  / SG  / pH       Gluc 76 / Ketone   / Bili  / Urobili        Blood  / Protein  / Leuk Est  / Nitrite       RBC  / WBC  / Hyaline  / Gran  / Sq Epi  / Non Sq Epi  / Bacteria       Iron 96, TIBC 132, %sat 73      [05-10-24 @ 02:40]  Ferritin 3171      [04-19-24 @ 06:50]  TSH 1.72      [04-19-24 @ 06:50]

## 2024-05-18 NOTE — PROGRESS NOTE ADULT - SUBJECTIVE AND OBJECTIVE BOX
LIZY JOE Patient is a 84y old  Female who presents with a chief complaint of RUE DVT,acute on ckd,diarrhea (18 May 2024 10:19)     HPI:  84y F w/ hx ESRD s/p renal transplant on mycophenolate and tacrolimus,Prior hx of  HD , CVA, HTN, HLD, anemia, dementia,lung nodule,RUE dvt,caumadin toxicity, GI Bleed  presents from [facility] for RUE swelling with hx of  RUE DVT,diarrhea(started on metronidazole 05/06,pending cdif).Pt reports few days of R arm swelling, no pain.Pt denies n/v/d,abd pain,cp,sob,active bleed.  Per transfer papers, pt had RUE US done on 3/15 that showed RUE DVT involving internal jugular and subclavian veins, for which she was started on Coumadin. Was recently admitted to this hospital with anemia and elevated INR,found new lung nodule rec biopsy,seen by hem/onc . Family decided against further anticoagulation at that time given bleeding risk/biopsy,seen by GI,unable to do scope for poor prep,rec out pt f/u.. Pt  had RUE US done on 5/8 that again showed +DVT in right jugular vein, so pt was sent back to the ED.ED spoke with son,wants to start AC now.    4/19/24- CT A/P-Distended gallbladder with cholelithiasis. Decompressed colon with additional suggestion of submucosal edema involving the descending and sigmoid colon suggesting colitis, ischemic colitis cannot be excluded. Disseminated innumerable pulmonary nodules are worrisome for metastatic disease. Subcapsular segment 8 liver lesion measuring 1.8 cm.  4/20/24- CT-C- The bilateral pulmonary nodules are indeterminate ( right middle lobe is a 7 mm noncalcified nodule, left lower lobe is a 9 x 6 mm noncalcified nodule)Small bilateral pleural effusions and atelectasis. thyroid gland is heterogeneous with bilobar nodules and   calcifications.    PMH: ESRD s/p renal transplant on mycophenolate and tacrolimus, CVA, HTN, HLD, anemia, dementia,lung nodule,RUE dvt,caumadin toxicity, GI Bleed  presents from [facility] for RUE swelling with hx of  RUE DVT,lung nodule.AVF ,HX of HD     SH: Pt denies smoking/alcohol/illicit drug use  FH: no hx cva,strokeheart disease-verufy with son       (09 May 2024 09:13)    The patient was seen and evaluated   The patient is in no acute distress.  Denied any fever chest pain, palpitations, shortness of breath, abdominal pain, fever, dysuria, cough, edema       I&O's Summary    17 May 2024 07:01  -  18 May 2024 07:00  --------------------------------------------------------  IN: 360 mL / OUT: 150 mL / NET: 210 mL      Allergies    No Known Allergies    Intolerances      HEALTH ISSUES - PROBLEM Dx:  Deep vein thrombosis (DVT)    Renal failure, acute    Diarrhea    Acute colitis    History of renal transplant    CVA (cerebrovascular accident)    GI bleed    Anemia due to acute blood loss          PAST MEDICAL & SURGICAL HISTORY:          Vital Signs Last 24 Hrs  T(C): 36.4 (18 May 2024 08:23), Max: 36.6 (17 May 2024 16:34)  T(F): 97.5 (18 May 2024 08:23), Max: 97.8 (17 May 2024 16:34)  HR: 94 (18 May 2024 08:23) (77 - 99)  BP: 139/51 (18 May 2024 08:23) (139/51 - 159/90)  BP(mean): --  RR: 20 (18 May 2024 08:23) (17 - 20)  SpO2: 96% (18 May 2024 08:23) (96% - 100%)    Parameters below as of 18 May 2024 05:13  Patient On (Oxygen Delivery Method): room air    T(C): 36.4 (05-18-24 @ 08:23), Max: 36.6 (05-17-24 @ 16:34)  HR: 94 (05-18-24 @ 08:23) (77 - 99)  BP: 139/51 (05-18-24 @ 08:23) (139/51 - 159/90)  RR: 20 (05-18-24 @ 08:23) (17 - 20)  SpO2: 96% (05-18-24 @ 08:23) (96% - 100%)  Wt(kg): --    PHYSICAL EXAM:    GENERAL: NAD  HEAD:  Atraumatic, Normocephalic  EYES: EOMI, conjunctiva and sclera clear  ENMT:  Moist mucous membranes,    NERVOUS SYSTEM:  Alert & Oriented X3,  Moves upper and lower extremities; CNS-II-XII  CHEST/LUNG: Clear to auscultation bilaterally; No rales, rhonchi, wheezing,   HEART: Regular rate and rhythm;  ABDOMEN: Soft, Nontender, Nondistended; Bowel sounds present  EXTREMITIES:  Peripheral Pulses,      acetaminophen     Tablet .. 650 milliGRAM(s) Oral every 6 hours PRN  aluminum hydroxide/magnesium hydroxide/simethicone Suspension 30 milliLiter(s) Oral every 4 hours PRN  amLODIPine   Tablet 10 milliGRAM(s) Oral daily  calcitriol   Capsule 0.5 MICROGram(s) Oral daily  carvedilol 6.25 milliGRAM(s) Oral every 12 hours  chlorhexidine 2% Cloths 1 Application(s) Topical daily  clopidogrel Tablet 75 milliGRAM(s) Oral daily  cyanocobalamin 1000 MICROGram(s) Oral daily  dextrose 5% + lactated ringers. 1000 milliLiter(s) IV Continuous <Continuous>  ergocalciferol 01424 Unit(s) Oral every week  ferrous    sulfate 325 milliGRAM(s) Oral daily  folic acid 1 milliGRAM(s) Oral daily  heparin   Injectable 2000 Unit(s) IV Push every 6 hours PRN  heparin  Infusion. 400 Unit(s)/Hr IV Continuous <Continuous>  magnesium hydroxide Suspension 30 milliLiter(s) Oral daily PRN  melatonin 3 milliGRAM(s) Oral at bedtime PRN  mirtazapine 7.5 milliGRAM(s) Oral at bedtime  multivitamin 1 Tablet(s) Oral daily  mycophenolate mofetil 500 milliGRAM(s) Oral two times a day  Nephro-casie 1 Tablet(s) Oral daily  ondansetron Injectable 4 milliGRAM(s) IV Push every 8 hours PRN  pantoprazole   Suspension 40 milliGRAM(s) Oral two times a day  predniSONE   Tablet 5 milliGRAM(s) Oral daily  psyllium Powder 1 Packet(s) Oral daily  sodium bicarbonate 1950 milliGRAM(s) Oral two times a day  tacrolimus 2 milliGRAM(s) Oral every 12 hours  tamsulosin 0.4 milliGRAM(s) Oral at bedtime      LABS:                          9.1    5.58  )-----------( 204      ( 18 May 2024 05:11 )             28.5     05-18    142  |  104  |  75.9<H>  ----------------------------<  76  4.9   |  25.0  |  6.27<H>    Ca    7.8<L>      18 May 2024 05:11        PTT - ( 18 May 2024 05:11 )  PTT:58.1 sec      Urinalysis Basic - ( 18 May 2024 05:11 )    Color: x / Appearance: x / SG: x / pH: x  Gluc: 76 mg/dL / Ketone: x  / Bili: x / Urobili: x   Blood: x / Protein: x / Nitrite: x   Leuk Esterase: x / RBC: x / WBC x   Sq Epi: x / Non Sq Epi: x / Bacteria: x      CAPILLARY BLOOD GLUCOSE          RADIOLOGY & ADDITIONAL TESTS:      Consultant notes reviewed    Case discussed with consultant/provider/ family /patient

## 2024-05-18 NOTE — PROGRESS NOTE ADULT - ASSESSMENT
84y F w/ hx ESRD s/p renal transplant on mycophenolate and tacrolimus, CVA, HTN, HLD, anemia, dementia, lung nodule, RUE dvt, Coumadin toxicity, GI Bleed  presented from [facility] for RUE swelling with hx of  RUE DVT, diarrhea (started on metronidazole 05/06, pending cdif). Per transfer papers, pt had RUE US done on 3/15 that showed RUE DVT involving internal jugular and subclavian veins, for which she was started on Coumadin. Was recently admitted to this hospital with anemia and elevated INR, found to have new lung nodule rec biopsy, seen by hem/onc . Family decided against further anticoagulation at that time given bleeding risk/biopsy, seen by GI, unable to do scope for poor prep, rec out pt  follow up. Pt had RUE US done on 5/8 that again showed +DVT in right jugular vein, so pt was sent back to the ED.     RUE DVT   -US Duplex Venous Upper Ext Ltd, Right (05.09.24) thrombus identified within the right internal jugular, subclavian and axillary, cephalic and basilic veins.  -Heparin Infusion was started after discussing risks vs benefits with family   -hem/onc consult noted   -Monitor CBC closely given history of GIB    GLO on Advanced CKD 5 with Metabolic acidosis   Worsening renal function likely multifactorial   -H/O Renal Transplant in 2012 ([facility])   -Was having diarrhea at rehab now improving   -discussed with nephrologist Continue on PO sodium bicarbonate 1950 mg PO BID   -cont tacrolimus, mycophenolate and prednisone (can be weaned down if started on HD per Nephro)   -continue Gee catheter   -nephrology  discussion with family decision to start RRT - will establish access Monday and start HD   -Update from pt and son today: pt wants to go forward with HD but family will come in later tonight and discuss with her further     Diarrhea --Per [facility], C. Diff was negative at facility   -Now improving   -GI PCR negative  -Added Metamucil     Recent GI bleed   -cont PPI  -Monitor Hgb closely     HTN   -norvasc, coreg      Lung nodule +Liver lesion   -suspected malignancy  -family wants to hold off on biopsy   -reviewed ct chest/abd /pelvis from prior admission    Hypocalcemia   -Replete     DVT Prophylaxis -- Heparin Infusion    Full Code. Prognosis guarded. Palliative Care following. family wants to take patient to [place] when improved

## 2024-05-19 LAB — APTT BLD: 69.8 SEC — HIGH (ref 24.5–35.6)

## 2024-05-19 PROCEDURE — 99232 SBSQ HOSP IP/OBS MODERATE 35: CPT

## 2024-05-19 RX ORDER — CARVEDILOL PHOSPHATE 80 MG/1
3.12 CAPSULE, EXTENDED RELEASE ORAL ONCE
Refills: 0 | Status: COMPLETED | OUTPATIENT
Start: 2024-05-19 | End: 2024-05-19

## 2024-05-19 RX ADMIN — MYCOPHENOLATE MOFETIL 500 MILLIGRAM(S): 250 CAPSULE ORAL at 16:20

## 2024-05-19 RX ADMIN — CLOPIDOGREL BISULFATE 75 MILLIGRAM(S): 75 TABLET, FILM COATED ORAL at 13:14

## 2024-05-19 RX ADMIN — HEPARIN SODIUM 500 UNIT(S)/HR: 5000 INJECTION INTRAVENOUS; SUBCUTANEOUS at 19:38

## 2024-05-19 RX ADMIN — CALCITRIOL 0.5 MICROGRAM(S): 0.5 CAPSULE ORAL at 13:14

## 2024-05-19 RX ADMIN — HEPARIN SODIUM 500 UNIT(S)/HR: 5000 INJECTION INTRAVENOUS; SUBCUTANEOUS at 22:22

## 2024-05-19 RX ADMIN — Medication 1 MILLIGRAM(S): at 13:14

## 2024-05-19 RX ADMIN — Medication 1950 MILLIGRAM(S): at 16:16

## 2024-05-19 RX ADMIN — PANTOPRAZOLE SODIUM 40 MILLIGRAM(S): 20 TABLET, DELAYED RELEASE ORAL at 05:42

## 2024-05-19 RX ADMIN — Medication 325 MILLIGRAM(S): at 13:14

## 2024-05-19 RX ADMIN — TAMSULOSIN HYDROCHLORIDE 0.4 MILLIGRAM(S): 0.4 CAPSULE ORAL at 22:13

## 2024-05-19 RX ADMIN — HEPARIN SODIUM 500 UNIT(S)/HR: 5000 INJECTION INTRAVENOUS; SUBCUTANEOUS at 04:18

## 2024-05-19 RX ADMIN — Medication 5 MILLIGRAM(S): at 05:42

## 2024-05-19 RX ADMIN — Medication 1950 MILLIGRAM(S): at 05:42

## 2024-05-19 RX ADMIN — PANTOPRAZOLE SODIUM 40 MILLIGRAM(S): 20 TABLET, DELAYED RELEASE ORAL at 16:16

## 2024-05-19 RX ADMIN — Medication 1 TABLET(S): at 13:14

## 2024-05-19 RX ADMIN — TACROLIMUS 2 MILLIGRAM(S): 5 CAPSULE ORAL at 22:13

## 2024-05-19 RX ADMIN — MIRTAZAPINE 7.5 MILLIGRAM(S): 45 TABLET, ORALLY DISINTEGRATING ORAL at 22:13

## 2024-05-19 RX ADMIN — MYCOPHENOLATE MOFETIL 500 MILLIGRAM(S): 250 CAPSULE ORAL at 05:42

## 2024-05-19 RX ADMIN — Medication 1 PACKET(S): at 13:15

## 2024-05-19 RX ADMIN — CARVEDILOL PHOSPHATE 3.12 MILLIGRAM(S): 80 CAPSULE, EXTENDED RELEASE ORAL at 22:46

## 2024-05-19 RX ADMIN — HEPARIN SODIUM 500 UNIT(S)/HR: 5000 INJECTION INTRAVENOUS; SUBCUTANEOUS at 07:29

## 2024-05-19 RX ADMIN — AMLODIPINE BESYLATE 10 MILLIGRAM(S): 2.5 TABLET ORAL at 05:41

## 2024-05-19 RX ADMIN — TACROLIMUS 2 MILLIGRAM(S): 5 CAPSULE ORAL at 08:56

## 2024-05-19 RX ADMIN — CARVEDILOL PHOSPHATE 6.25 MILLIGRAM(S): 80 CAPSULE, EXTENDED RELEASE ORAL at 05:41

## 2024-05-19 RX ADMIN — PREGABALIN 1000 MICROGRAM(S): 225 CAPSULE ORAL at 13:17

## 2024-05-19 RX ADMIN — CHLORHEXIDINE GLUCONATE 1 APPLICATION(S): 213 SOLUTION TOPICAL at 13:16

## 2024-05-19 RX ADMIN — CARVEDILOL PHOSPHATE 6.25 MILLIGRAM(S): 80 CAPSULE, EXTENDED RELEASE ORAL at 16:16

## 2024-05-19 NOTE — PROGRESS NOTE ADULT - SUBJECTIVE AND OBJECTIVE BOX
LIZY JOE Patient is a 84y old  Female who presents with a chief complaint of RUE DVT, acute on ckd, diarrhea (18 May 2024 14:44)     HPI:  84y F w/ hx ESRD s/p renal transplant on mycophenolate and tacrolimus,Prior hx of  HD , CVA, HTN, HLD, anemia, dementia,lung nodule,RUE dvt,caumadin toxicity, GI Bleed  presents from [facility] for RUE swelling with hx of  RUE DVT,diarrhea(started on metronidazole 05/06,pending cdif).Pt reports few days of R arm swelling, no pain.Pt denies n/v/d,abd pain,cp,sob,active bleed.  Per transfer papers, pt had RUE US done on 3/15 that showed RUE DVT involving internal jugular and subclavian veins, for which she was started on Coumadin. Was recently admitted to this hospital with anemia and elevated INR,found new lung nodule rec biopsy,seen by hem/onc . Family decided against further anticoagulation at that time given bleeding risk/biopsy,seen by GI,unable to do scope for poor prep,rec out pt f/u.. Pt  had RUE US done on 5/8 that again showed +DVT in right jugular vein, so pt was sent back to the ED.ED spoke with son,wants to start AC now.      4/19/24- CT A/P-Distended gallbladder with cholelithiasis. Decompressed colon with additional suggestion of submucosal edema involving the descending and sigmoid colon suggesting colitis, ischemic colitis cannot be excluded. Disseminated innumerable pulmonary nodules are worrisome for metastatic disease. Subcapsular segment 8 liver lesion measuring 1.8 cm.  4/20/24- CT-C- The bilateral pulmonary nodules are indeterminate ( right middle lobe is a 7 mm noncalcified nodule, left lower lobe is a 9 x 6 mm noncalcified nodule)Small bilateral pleural effusions and atelectasis. thyroid gland is heterogeneous with bilobar nodules and   calcifications.    PMH: ESRD s/p renal transplant on mycophenolate and tacrolimus, CVA, HTN, HLD, anemia, dementia,lung nodule,RUE dvt,caumadin toxicity, GI Bleed  presents from [facility] for RUE swelling with hx of  RUE DVT,lung nodule.AVF ,HX of HD       The patient was seen and evaluated frail elderly- bryan is discussing with son -no decision yet on HD as per my discussion with renal dr Vivar   The patient is in no acute distress.  Denied any fever chest pain, palpitations, shortness of breath, abdominal pain,       I&O's Summary    18 May 2024 07:01  -  19 May 2024 07:00  --------------------------------------------------------  IN: 505 mL / OUT: 300 mL / NET: 205 mL      Allergies    No Known Allergies    Intolerances      HEALTH ISSUES - PROBLEM Dx:  Deep vein thrombosis (DVT)    Renal failure, acute    Diarrhea    Acute colitis    History of renal transplant    CVA (cerebrovascular accident)    GI bleed    Anemia due to acute blood loss          PAST MEDICAL & SURGICAL HISTORY:          Vital Signs Last 24 Hrs  T(C): 36.7 (19 May 2024 07:58), Max: 36.7 (19 May 2024 07:58)  T(F): 98.1 (19 May 2024 07:58), Max: 98.1 (19 May 2024 07:58)  HR: 92 (19 May 2024 07:58) (92 - 102)  BP: 147/86 (19 May 2024 07:58) (145/89 - 160/82)  BP(mean): --  RR: 20 (19 May 2024 07:58) (16 - 20)  SpO2: 100% (19 May 2024 07:58) (98% - 100%)    Parameters below as of 19 May 2024 05:21  Patient On (Oxygen Delivery Method): room air    T(C): 36.7 (05-19-24 @ 07:58), Max: 36.7 (05-19-24 @ 07:58)  HR: 92 (05-19-24 @ 07:58) (92 - 102)  BP: 147/86 (05-19-24 @ 07:58) (145/89 - 160/82)  RR: 20 (05-19-24 @ 07:58) (16 - 20)  SpO2: 100% (05-19-24 @ 07:58) (98% - 100%)  Wt(kg): --    PHYSICAL EXAM:    GENERAL: NAD, frail elderly- sitting supported in bed with blanket all over her head and all  HEAD:  Atraumatic, Normocephalic  EYES: EOMI,  conjunctiva and sclera clear  ENMT:  Moist mucous membranes,    NERVOUS SYSTEM:  Alert & Oriented X3,  Moves upper and lower extremities; CNS-II-XII  CHEST/LUNG: Clear to auscultation bilaterally; No rales, rhonchi, wheezing,   HEART: Regular rate and rhythm; No murmurs,   ABDOMEN: Soft, Nontender, Nondistended; Bowel sounds present  Psychiatry- mood and affect appropriate, Insight and judgement intact     acetaminophen     Tablet .. 650 milliGRAM(s) Oral every 6 hours PRN  aluminum hydroxide/magnesium hydroxide/simethicone Suspension 30 milliLiter(s) Oral every 4 hours PRN  amLODIPine   Tablet 10 milliGRAM(s) Oral daily  calcitriol   Capsule 0.5 MICROGram(s) Oral daily  carvedilol 6.25 milliGRAM(s) Oral every 12 hours  chlorhexidine 2% Cloths 1 Application(s) Topical daily  clopidogrel Tablet 75 milliGRAM(s) Oral daily  cyanocobalamin 1000 MICROGram(s) Oral daily  dextrose 5% + lactated ringers. 1000 milliLiter(s) IV Continuous <Continuous>  ergocalciferol 63856 Unit(s) Oral every week  ferrous    sulfate 325 milliGRAM(s) Oral daily  folic acid 1 milliGRAM(s) Oral daily  heparin   Injectable 2000 Unit(s) IV Push every 6 hours PRN  heparin  Infusion. 400 Unit(s)/Hr IV Continuous <Continuous>  magnesium hydroxide Suspension 30 milliLiter(s) Oral daily PRN  melatonin 3 milliGRAM(s) Oral at bedtime PRN  mirtazapine 7.5 milliGRAM(s) Oral at bedtime  multivitamin 1 Tablet(s) Oral daily  mycophenolate mofetil 500 milliGRAM(s) Oral two times a day  Nephro-casie 1 Tablet(s) Oral daily  ondansetron Injectable 4 milliGRAM(s) IV Push every 8 hours PRN  pantoprazole   Suspension 40 milliGRAM(s) Oral two times a day  predniSONE   Tablet 5 milliGRAM(s) Oral daily  psyllium Powder 1 Packet(s) Oral daily  sodium bicarbonate 1950 milliGRAM(s) Oral two times a day  tacrolimus 2 milliGRAM(s) Oral every 12 hours  tamsulosin 0.4 milliGRAM(s) Oral at bedtime      LABS:                          9.1    5.58  )-----------( 204      ( 18 May 2024 05:11 )             28.5     05-18    142  |  104  |  75.9<H>  ----------------------------<  76  4.9   |  25.0  |  6.27<H>    Ca    7.8<L>      18 May 2024 05:11        PTT - ( 19 May 2024 03:52 )  PTT:69.8 sec      Urinalysis Basic - ( 18 May 2024 05:11 )    Color: x / Appearance: x / SG: x / pH: x  Gluc: 76 mg/dL / Ketone: x  / Bili: x / Urobili: x   Blood: x / Protein: x / Nitrite: x   Leuk Esterase: x / RBC: x / WBC x   Sq Epi: x / Non Sq Epi: x / Bacteria: x      CAPILLARY BLOOD GLUCOSE          RADIOLOGY & ADDITIONAL TESTS:      Consultant notes reviewed    Case discussed with consultant/provider/ family /patient

## 2024-05-19 NOTE — PROGRESS NOTE ADULT - ASSESSMENT
84y F w/ hx ESRD s/p renal transplant on mycophenolate and tacrolimus, CVA, HTN, HLD, anemia, dementia, lung nodule, RUE dvt, Coumadin toxicity, GI Bleed  presented from [facility] for RUE swelling with hx of  RUE DVT, diarrhea (started on metronidazole 05/06, pending cdif). Per transfer papers, pt had RUE US done on 3/15 that showed RUE DVT involving internal jugular and subclavian veins, for which she was started on Coumadin. Was recently admitted to this hospital with anemia and elevated INR, found to have new lung nodule rec biopsy, seen by hem/onc . Family decided against further anticoagulation at that time given bleeding risk/biopsy, seen by GI, unable to do scope for poor prep, rec out pt  follow up. Pt had RUE US done on 5/8 that again showed +DVT in right jugular vein, so pt was sent back to the ED.     RUE DVT   -US Duplex Venous Upper Ext Ltd, Right (05.09.24) thrombus identified within the right internal jugular, subclavian and axillary, cephalic and basilic veins.  -Heparin Infusion was started after discussing risks vs benefits with family   -hem/onc consult noted   -Monitor CBC closely given history of GIB    GLO on Advanced CKD 5 with Metabolic acidosis - family in continuing discussions with renal for hD??  Worsening renal function likely multifactorial   -H/O Renal Transplant in 2012 ([facility])   -Was having diarrhea at rehab now improving   -discussed with nephrologist Continue on PO sodium bicarbonate 1950 mg PO BID   -cont tacrolimus, mycophenolate and prednisone (can be weaned down if started on HD per Nephro)   -continue Gee catheter   -nephrology  discussion with family decision to start RRT -if in agreement then renal to discuss will establish access Monday and start HD        Diarrhea --Per [facility], C. Diff was negative at facility   -Now improving   -GI PCR negative  -Added Metamucil     Recent GI bleed   -cont PPI  -Monitor Hgb closely     HTN   -norvasc, coreg      Lung nodule +Liver lesion   -suspected malignancy  -family wants to hold off on biopsy   -reviewed ct chest/abd /pelvis from prior admission    Hypocalcemia   -Replete     DVT Prophylaxis -- Heparin Infusion    Full Code. Prognosis guarded. Palliative Care following. family wants to take patient to [place] when improved

## 2024-05-20 LAB
ALBUMIN SERPL ELPH-MCNC: 2.4 G/DL — LOW (ref 3.3–5.2)
ALP SERPL-CCNC: 56 U/L — SIGNIFICANT CHANGE UP (ref 40–120)
ALT FLD-CCNC: 11 U/L — SIGNIFICANT CHANGE UP
ANION GAP SERPL CALC-SCNC: 17 MMOL/L — SIGNIFICANT CHANGE UP (ref 5–17)
APPEARANCE UR: ABNORMAL
APTT BLD: 70.3 SEC — HIGH (ref 24.5–35.6)
AST SERPL-CCNC: 32 U/L — HIGH
BACTERIA # UR AUTO: ABNORMAL /HPF
BASOPHILS # BLD AUTO: 0.04 K/UL — SIGNIFICANT CHANGE UP (ref 0–0.2)
BASOPHILS NFR BLD AUTO: 1 % — SIGNIFICANT CHANGE UP (ref 0–2)
BILIRUB SERPL-MCNC: 0.3 MG/DL — LOW (ref 0.4–2)
BILIRUB UR-MCNC: NEGATIVE — SIGNIFICANT CHANGE UP
BUN SERPL-MCNC: 74.3 MG/DL — HIGH (ref 8–20)
CALCIUM SERPL-MCNC: 7.9 MG/DL — LOW (ref 8.4–10.5)
CHLORIDE SERPL-SCNC: 104 MMOL/L — SIGNIFICANT CHANGE UP (ref 96–108)
CO2 SERPL-SCNC: 22 MMOL/L — SIGNIFICANT CHANGE UP (ref 22–29)
COLOR SPEC: YELLOW — SIGNIFICANT CHANGE UP
CREAT SERPL-MCNC: 6.6 MG/DL — HIGH (ref 0.5–1.3)
DIFF PNL FLD: ABNORMAL
EGFR: 6 ML/MIN/1.73M2 — LOW
EOSINOPHIL # BLD AUTO: 0.15 K/UL — SIGNIFICANT CHANGE UP (ref 0–0.5)
EOSINOPHIL NFR BLD AUTO: 3.7 % — SIGNIFICANT CHANGE UP (ref 0–6)
GLUCOSE SERPL-MCNC: 78 MG/DL — SIGNIFICANT CHANGE UP (ref 70–99)
GLUCOSE UR QL: NEGATIVE MG/DL — SIGNIFICANT CHANGE UP
HCT VFR BLD CALC: 29.9 % — LOW (ref 34.5–45)
HGB BLD-MCNC: 9.4 G/DL — LOW (ref 11.5–15.5)
IMM GRANULOCYTES NFR BLD AUTO: 1 % — HIGH (ref 0–0.9)
KETONES UR-MCNC: NEGATIVE MG/DL — SIGNIFICANT CHANGE UP
LEUKOCYTE ESTERASE UR-ACNC: ABNORMAL
LYMPHOCYTES # BLD AUTO: 0.64 K/UL — LOW (ref 1–3.3)
LYMPHOCYTES # BLD AUTO: 15.6 % — SIGNIFICANT CHANGE UP (ref 13–44)
MAGNESIUM SERPL-MCNC: 2 MG/DL — SIGNIFICANT CHANGE UP (ref 1.6–2.6)
MCHC RBC-ENTMCNC: 30.2 PG — SIGNIFICANT CHANGE UP (ref 27–34)
MCHC RBC-ENTMCNC: 31.4 GM/DL — LOW (ref 32–36)
MCV RBC AUTO: 96.1 FL — SIGNIFICANT CHANGE UP (ref 80–100)
MONOCYTES # BLD AUTO: 0.55 K/UL — SIGNIFICANT CHANGE UP (ref 0–0.9)
MONOCYTES NFR BLD AUTO: 13.4 % — SIGNIFICANT CHANGE UP (ref 2–14)
NEUTROPHILS # BLD AUTO: 2.68 K/UL — SIGNIFICANT CHANGE UP (ref 1.8–7.4)
NEUTROPHILS NFR BLD AUTO: 65.3 % — SIGNIFICANT CHANGE UP (ref 43–77)
NITRITE UR-MCNC: NEGATIVE — SIGNIFICANT CHANGE UP
PH UR: 8 — SIGNIFICANT CHANGE UP (ref 5–8)
PHOSPHATE SERPL-MCNC: 6.1 MG/DL — HIGH (ref 2.4–4.7)
PLATELET # BLD AUTO: 192 K/UL — SIGNIFICANT CHANGE UP (ref 150–400)
POTASSIUM SERPL-MCNC: 4.6 MMOL/L — SIGNIFICANT CHANGE UP (ref 3.5–5.3)
POTASSIUM SERPL-SCNC: 4.6 MMOL/L — SIGNIFICANT CHANGE UP (ref 3.5–5.3)
PROT SERPL-MCNC: 5.1 G/DL — LOW (ref 6.6–8.7)
PROT UR-MCNC: 300 MG/DL
RBC # BLD: 3.11 M/UL — LOW (ref 3.8–5.2)
RBC # FLD: 15.6 % — HIGH (ref 10.3–14.5)
RBC CASTS # UR COMP ASSIST: 4 /HPF — SIGNIFICANT CHANGE UP (ref 0–4)
SODIUM SERPL-SCNC: 143 MMOL/L — SIGNIFICANT CHANGE UP (ref 135–145)
SP GR SPEC: 1.01 — SIGNIFICANT CHANGE UP (ref 1–1.03)
SQUAMOUS # UR AUTO: 6 /HPF — HIGH (ref 0–5)
UROBILINOGEN FLD QL: 0.2 MG/DL — SIGNIFICANT CHANGE UP (ref 0.2–1)
WBC # BLD: 4.1 K/UL — SIGNIFICANT CHANGE UP (ref 3.8–10.5)
WBC # FLD AUTO: 4.1 K/UL — SIGNIFICANT CHANGE UP (ref 3.8–10.5)
WBC UR QL: >998 /HPF — HIGH (ref 0–5)

## 2024-05-20 PROCEDURE — 99232 SBSQ HOSP IP/OBS MODERATE 35: CPT

## 2024-05-20 PROCEDURE — 99233 SBSQ HOSP IP/OBS HIGH 50: CPT

## 2024-05-20 PROCEDURE — 99223 1ST HOSP IP/OBS HIGH 75: CPT

## 2024-05-20 RX ORDER — CEFTRIAXONE 500 MG/1
INJECTION, POWDER, FOR SOLUTION INTRAMUSCULAR; INTRAVENOUS
Refills: 0 | Status: DISCONTINUED | OUTPATIENT
Start: 2024-05-20 | End: 2024-05-20

## 2024-05-20 RX ORDER — CEFTRIAXONE 500 MG/1
1000 INJECTION, POWDER, FOR SOLUTION INTRAMUSCULAR; INTRAVENOUS EVERY 24 HOURS
Refills: 0 | Status: COMPLETED | OUTPATIENT
Start: 2024-05-21 | End: 2024-05-23

## 2024-05-20 RX ORDER — CEFTRIAXONE 500 MG/1
INJECTION, POWDER, FOR SOLUTION INTRAMUSCULAR; INTRAVENOUS
Refills: 0 | Status: COMPLETED | OUTPATIENT
Start: 2024-05-20 | End: 2024-05-24

## 2024-05-20 RX ORDER — CEFTRIAXONE 500 MG/1
1000 INJECTION, POWDER, FOR SOLUTION INTRAMUSCULAR; INTRAVENOUS ONCE
Refills: 0 | Status: COMPLETED | OUTPATIENT
Start: 2024-05-20 | End: 2024-05-20

## 2024-05-20 RX ADMIN — Medication 325 MILLIGRAM(S): at 13:24

## 2024-05-20 RX ADMIN — PREGABALIN 1000 MICROGRAM(S): 225 CAPSULE ORAL at 13:26

## 2024-05-20 RX ADMIN — Medication 1 TABLET(S): at 13:24

## 2024-05-20 RX ADMIN — Medication 5 MILLIGRAM(S): at 06:32

## 2024-05-20 RX ADMIN — AMLODIPINE BESYLATE 10 MILLIGRAM(S): 2.5 TABLET ORAL at 06:32

## 2024-05-20 RX ADMIN — Medication 1 TABLET(S): at 13:26

## 2024-05-20 RX ADMIN — CARVEDILOL PHOSPHATE 6.25 MILLIGRAM(S): 80 CAPSULE, EXTENDED RELEASE ORAL at 06:32

## 2024-05-20 RX ADMIN — Medication 1 PACKET(S): at 13:25

## 2024-05-20 RX ADMIN — CALCITRIOL 0.5 MICROGRAM(S): 0.5 CAPSULE ORAL at 13:25

## 2024-05-20 RX ADMIN — MIRTAZAPINE 7.5 MILLIGRAM(S): 45 TABLET, ORALLY DISINTEGRATING ORAL at 21:37

## 2024-05-20 RX ADMIN — MYCOPHENOLATE MOFETIL 500 MILLIGRAM(S): 250 CAPSULE ORAL at 06:32

## 2024-05-20 RX ADMIN — Medication 1950 MILLIGRAM(S): at 06:32

## 2024-05-20 RX ADMIN — PANTOPRAZOLE SODIUM 40 MILLIGRAM(S): 20 TABLET, DELAYED RELEASE ORAL at 06:32

## 2024-05-20 RX ADMIN — MYCOPHENOLATE MOFETIL 500 MILLIGRAM(S): 250 CAPSULE ORAL at 16:51

## 2024-05-20 RX ADMIN — TACROLIMUS 2 MILLIGRAM(S): 5 CAPSULE ORAL at 21:37

## 2024-05-20 RX ADMIN — TACROLIMUS 2 MILLIGRAM(S): 5 CAPSULE ORAL at 10:37

## 2024-05-20 RX ADMIN — CLOPIDOGREL BISULFATE 75 MILLIGRAM(S): 75 TABLET, FILM COATED ORAL at 13:25

## 2024-05-20 RX ADMIN — PANTOPRAZOLE SODIUM 40 MILLIGRAM(S): 20 TABLET, DELAYED RELEASE ORAL at 17:50

## 2024-05-20 RX ADMIN — HEPARIN SODIUM 500 UNIT(S)/HR: 5000 INJECTION INTRAVENOUS; SUBCUTANEOUS at 20:07

## 2024-05-20 RX ADMIN — CEFTRIAXONE 1000 MILLIGRAM(S): 500 INJECTION, POWDER, FOR SOLUTION INTRAMUSCULAR; INTRAVENOUS at 06:38

## 2024-05-20 RX ADMIN — Medication 1 MILLIGRAM(S): at 13:24

## 2024-05-20 RX ADMIN — Medication 1950 MILLIGRAM(S): at 17:51

## 2024-05-20 RX ADMIN — HEPARIN SODIUM 500 UNIT(S)/HR: 5000 INJECTION INTRAVENOUS; SUBCUTANEOUS at 07:32

## 2024-05-20 RX ADMIN — CARVEDILOL PHOSPHATE 6.25 MILLIGRAM(S): 80 CAPSULE, EXTENDED RELEASE ORAL at 17:51

## 2024-05-20 RX ADMIN — CHLORHEXIDINE GLUCONATE 1 APPLICATION(S): 213 SOLUTION TOPICAL at 13:32

## 2024-05-20 RX ADMIN — TAMSULOSIN HYDROCHLORIDE 0.4 MILLIGRAM(S): 0.4 CAPSULE ORAL at 21:38

## 2024-05-20 NOTE — PROGRESS NOTE ADULT - SUBJECTIVE AND OBJECTIVE BOX
LIZY JOE Patient is a 84y old  Female who presents with a chief complaint of RUE DVT,acute on ckd,diarrhea (20 May 2024 12:36)     HPI:  84y F w/ hx ESRD s/p renal transplant on mycophenolate and tacrolimus,Prior hx of  HD , CVA, HTN, HLD, anemia, dementia,lung nodule,RUE dvt,caumadin toxicity, GI Bleed  presents from [facility] for RUE swelling with hx of  RUE DVT,diarrhea(started on metronidazole 05/06,pending cdif).Pt reports few days of R arm swelling, no pain.Pt denies n/v/d,abd pain,cp,sob,active bleed.  Per transfer papers, pt had RUE US done on 3/15 that showed RUE DVT involving internal jugular and subclavian veins, for which she was started on Coumadin. Was recently admitted to this hospital with anemia and elevated INR,found new lung nodule rec biopsy,seen by hem/onc . Family decided against further anticoagulation at that time given bleeding risk/biopsy,seen by GI,unable to do scope for poor prep,rec out pt f/u.. Pt  had RUE US done on 5/8 that again showed +DVT in right jugular vein, so pt was sent back to the ED.ED spoke with son,wants to start AC now.      4/19/24- CT A/P-Distended gallbladder with cholelithiasis. Decompressed colon with additional suggestion of submucosal edema involving the descending and sigmoid colon suggesting colitis, ischemic colitis cannot be excluded. Disseminated innumerable pulmonary nodules are worrisome for metastatic disease. Subcapsular segment 8 liver lesion measuring 1.8 cm.  4/20/24- CT-C- The bilateral pulmonary nodules are indeterminate ( right middle lobe is a 7 mm noncalcified nodule, left lower lobe is a 9 x 6 mm noncalcified nodule)Small bilateral pleural effusions and atelectasis. thyroid gland is heterogeneous with bilobar nodules and   calcifications.    PMH: ESRD s/p renal transplant on mycophenolate and tacrolimus, CVA, HTN, HLD, anemia, dementia,lung nodule,RUE dvt,caumadin toxicity, GI Bleed  presents from [facility] for RUE swelling with hx of  RUE DVT,lung nodule.AVF ,HX of HD     SH: Pt denies smoking/alcohol/illicit drug use  FH: no hx cva,strokeheart disease-deonte with son       (09 May 2024 09:13)    The patient was seen and evaluated sitting supported in bed - feeding herself breakfast - states has decided on HD - conformed with renal - called IR for vascula access  The patient is in no acute distress.  Denied any fever chest pain, palpitations, shortness of breath, abdominal pain, fever, dysuria, cough, edema       I&O's Summary    19 May 2024 07:01  -  20 May 2024 07:00  --------------------------------------------------------  IN: 260 mL / OUT: 700 mL / NET: -440 mL    20 May 2024 07:01  -  20 May 2024 16:14  --------------------------------------------------------  IN: 160 mL / OUT: 0 mL / NET: 160 mL      Allergies    No Known Allergies    Intolerances      HEALTH ISSUES - PROBLEM Dx:  Deep vein thrombosis (DVT)    Renal failure, acute    Diarrhea    Acute colitis    History of renal transplant    CVA (cerebrovascular accident)    GI bleed    Anemia due to acute blood loss          PAST MEDICAL & SURGICAL HISTORY:          Vital Signs Last 24 Hrs  T(C): 36.6 (20 May 2024 12:00), Max: 36.6 (19 May 2024 22:07)  T(F): 97.8 (20 May 2024 12:00), Max: 97.9 (19 May 2024 22:07)  HR: 90 (20 May 2024 12:00) (84 - 100)  BP: 152/70 (20 May 2024 12:00) (152/70 - 167/97)  BP(mean): --  RR: 18 (20 May 2024 12:00) (17 - 20)  SpO2: 100% (20 May 2024 12:00) (95% - 100%)    Parameters below as of 20 May 2024 12:00  Patient On (Oxygen Delivery Method): room air    T(C): 36.6 (05-20-24 @ 12:00), Max: 36.6 (05-19-24 @ 22:07)  HR: 90 (05-20-24 @ 12:00) (84 - 100)  BP: 152/70 (05-20-24 @ 12:00) (152/70 - 167/97)  RR: 18 (05-20-24 @ 12:00) (17 - 20)  SpO2: 100% (05-20-24 @ 12:00) (95% - 100%)  Wt(kg): --    PHYSICAL EXAM:    GENERAL: NAD, frail elderly understands - waiting for son-   HEAD:  Atraumatic, Normocephalic  EYES: EOMI,  conjunctiva and sclera clear  ENMT:  Moist mucous membranes,    NERVOUS SYSTEM:  Alert & Oriented X3,  Moves upper and lower extremities; CNS-II-XII  CHEST/LUNG: Clear to auscultation bilaterally; No rales, rhonchi, wheezing,   HEART: Regular rate and rhythm;   ABDOMEN: Soft, Nontender, Nondistended;   EXTREMITIES:  Peripheral Pulses,   psychiatry- mood and affect appropriate,    acetaminophen     Tablet .. 650 milliGRAM(s) Oral every 6 hours PRN  aluminum hydroxide/magnesium hydroxide/simethicone Suspension 30 milliLiter(s) Oral every 4 hours PRN  amLODIPine   Tablet 10 milliGRAM(s) Oral daily  calcitriol   Capsule 0.5 MICROGram(s) Oral daily  carvedilol 6.25 milliGRAM(s) Oral every 12 hours  cefTRIAXone Injectable.      chlorhexidine 2% Cloths 1 Application(s) Topical daily  clopidogrel Tablet 75 milliGRAM(s) Oral daily  cyanocobalamin 1000 MICROGram(s) Oral daily  dextrose 5% + lactated ringers. 1000 milliLiter(s) IV Continuous <Continuous>  ergocalciferol 31012 Unit(s) Oral every week  ferrous    sulfate 325 milliGRAM(s) Oral daily  folic acid 1 milliGRAM(s) Oral daily  heparin   Injectable 2000 Unit(s) IV Push every 6 hours PRN  heparin  Infusion. 400 Unit(s)/Hr IV Continuous <Continuous>  magnesium hydroxide Suspension 30 milliLiter(s) Oral daily PRN  melatonin 3 milliGRAM(s) Oral at bedtime PRN  mirtazapine 7.5 milliGRAM(s) Oral at bedtime  multivitamin 1 Tablet(s) Oral daily  mycophenolate mofetil 500 milliGRAM(s) Oral two times a day  Nephro-casie 1 Tablet(s) Oral daily  ondansetron Injectable 4 milliGRAM(s) IV Push every 8 hours PRN  pantoprazole   Suspension 40 milliGRAM(s) Oral two times a day  predniSONE   Tablet 5 milliGRAM(s) Oral daily  psyllium Powder 1 Packet(s) Oral daily  sodium bicarbonate 1950 milliGRAM(s) Oral two times a day  tacrolimus 2 milliGRAM(s) Oral every 12 hours  tamsulosin 0.4 milliGRAM(s) Oral at bedtime      LABS:                          9.4    4.10  )-----------( 192      ( 20 May 2024 07:16 )             29.9     05-20    143  |  104  |  74.3<H>  ----------------------------<  78  4.6   |  22.0  |  6.60<H>    Ca    7.9<L>      20 May 2024 07:16  Phos  6.1     05-20  Mg     2.0     05-20    TPro  5.1<L>  /  Alb  2.4<L>  /  TBili  0.3<L>  /  DBili  x   /  AST  32<H>  /  ALT  11  /  AlkPhos  56  05-20    LIVER FUNCTIONS - ( 20 May 2024 07:16 )  Alb: 2.4 g/dL / Pro: 5.1 g/dL / ALK PHOS: 56 U/L / ALT: 11 U/L / AST: 32 U/L / GGT: x           PTT - ( 20 May 2024 07:16 )  PTT:70.3 sec      Urinalysis Basic - ( 20 May 2024 07:16 )    Color: x / Appearance: x / SG: x / pH: x  Gluc: 78 mg/dL / Ketone: x  / Bili: x / Urobili: x   Blood: x / Protein: x / Nitrite: x   Leuk Esterase: x / RBC: x / WBC x   Sq Epi: x / Non Sq Epi: x / Bacteria: x      CAPILLARY BLOOD GLUCOSE          RADIOLOGY & ADDITIONAL TESTS:      Consultant notes reviewed    Case discussed with consultant/provider/ family /patient

## 2024-05-20 NOTE — PROGRESS NOTE ADULT - SUBJECTIVE AND OBJECTIVE BOX
CC:  Follow up GOC , Symptoms    OVERNIGHT EVENTS:  weekend events reviewed  increased Creatinine    Present Symptoms:   Dyspnea:  No    Nausea/Vomiting:  No    Anxiety:  No   Yes    Depression:  No    Fatigue:  Yes     Loss of appetite:    Fair    Constipation: No    Pain: No               Location            Duration            Character            Severity            Factors            Effect    Pain AD Score:  http://geriatrictoolkit.[facility]/cog/painad.pdf (press ctrl + left click to view)    Review of Systems: Reviewed as above  All others negative    MEDICATIONS  (STANDING):  amLODIPine   Tablet 10 milliGRAM(s) Oral daily  calcitriol   Capsule 0.5 MICROGram(s) Oral daily  carvedilol 6.25 milliGRAM(s) Oral every 12 hours  cefTRIAXone Injectable.      chlorhexidine 2% Cloths 1 Application(s) Topical daily  clopidogrel Tablet 75 milliGRAM(s) Oral daily  cyanocobalamin 1000 MICROGram(s) Oral daily  dextrose 5% + lactated ringers. 1000 milliLiter(s) (40 mL/Hr) IV Continuous <Continuous>  ergocalciferol 12942 Unit(s) Oral every week  ferrous    sulfate 325 milliGRAM(s) Oral daily  folic acid 1 milliGRAM(s) Oral daily  heparin  Infusion. 400 Unit(s)/Hr (4 mL/Hr) IV Continuous <Continuous>  mirtazapine 7.5 milliGRAM(s) Oral at bedtime  multivitamin 1 Tablet(s) Oral daily  mycophenolate mofetil 500 milliGRAM(s) Oral two times a day  Nephro-casie 1 Tablet(s) Oral daily  pantoprazole   Suspension 40 milliGRAM(s) Oral two times a day  predniSONE   Tablet 5 milliGRAM(s) Oral daily  psyllium Powder 1 Packet(s) Oral daily  sodium bicarbonate 1950 milliGRAM(s) Oral two times a day  tacrolimus 2 milliGRAM(s) Oral every 12 hours  tamsulosin 0.4 milliGRAM(s) Oral at bedtime    MEDICATIONS  (PRN):  acetaminophen     Tablet .. 650 milliGRAM(s) Oral every 6 hours PRN Temp greater or equal to 38C (100.4F), Mild Pain (1 - 3)  aluminum hydroxide/magnesium hydroxide/simethicone Suspension 30 milliLiter(s) Oral every 4 hours PRN Dyspepsia  heparin   Injectable 2000 Unit(s) IV Push every 6 hours PRN For aPTT between 40 - 57  magnesium hydroxide Suspension 30 milliLiter(s) Oral daily PRN Constipation  melatonin 3 milliGRAM(s) Oral at bedtime PRN Insomnia  ondansetron Injectable 4 milliGRAM(s) IV Push every 8 hours PRN Nausea and/or Vomiting      PHYSICAL EXAM:    Vital Signs Last 24 Hrs  T(C): 36.5 (20 May 2024 07:36), Max: 36.6 (19 May 2024 22:07)  T(F): 97.7 (20 May 2024 07:36), Max: 97.9 (19 May 2024 22:07)  HR: 91 (20 May 2024 07:36) (84 - 100)  BP: 159/81 (20 May 2024 07:36) (154/78 - 167/97)  BP(mean): --  RR: 20 (20 May 2024 07:36) (17 - 20)  SpO2: 100% (20 May 2024 07:36) (95% - 100%)    Parameters below as of 20 May 2024 07:36  Patient On (Oxygen Delivery Method): room air    Karnofsky: 40%  General: Elderly woman frail appearing NAD         HEENT:  NCAT       Lungs: comfortable  non labored  CV:  RR  GI:  soft NTND  MSK: weakness  Skin:  warm/dry  Neuro  alert no focal deficits  Psych calm cooperative    LABS:                          9.4    4.10  )-----------( 192      ( 20 May 2024 07:16 )             29.9     05-20    143  |  104  |  74.3<H>  ----------------------------<  78  4.6   |  22.0  |  6.60<H>    Ca    7.9<L>      20 May 2024 07:16  Phos  6.1     05-20  Mg     2.0     05-20    TPro  5.1<L>  /  Alb  2.4<L>  /  TBili  0.3<L>  /  DBili  x   /  AST  32<H>  /  ALT  11  /  AlkPhos  56  05-20    PTT - ( 20 May 2024 07:16 )  PTT:70.3 sec  Urinalysis Basic - ( 20 May 2024 07:16 )    Color: x / Appearance: x / SG: x / pH: x  Gluc: 78 mg/dL / Ketone: x  / Bili: x / Urobili: x   Blood: x / Protein: x / Nitrite: x   Leuk Esterase: x / RBC: x / WBC x   Sq Epi: x / Non Sq Epi: x / Bacteria: x      I&O's Summary    19 May 2024 07:01  -  20 May 2024 07:00  --------------------------------------------------------  IN: 260 mL / OUT: 700 mL / NET: -440 mL    20 May 2024 07:01  -  20 May 2024 12:36  --------------------------------------------------------  IN: 160 mL / OUT: 0 mL / NET: 160 mL        ADVANCE DIRECTIVE PREFERENCES    Full Code

## 2024-05-20 NOTE — CONSULT NOTE ADULT - REASON FOR ADMISSION
RUE DVT,acute on ckd,diarrhea
RUE DVT,acute on ckd,diarrhea
RUE DVT, acute on ckd, diarrhea
RUE DVT,acute on ckd,diarrhea

## 2024-05-20 NOTE — PROGRESS NOTE ADULT - ASSESSMENT
84y F w/ hx ESRD s/p renal transplant on mycophenolate and tacrolimus, CVA, HTN, HLD, anemia, dementia, lung nodule, RUE dvt, Coumadin toxicity, GI Bleed  presented from [facility] for RUE swelling with hx of  RUE DVT, diarrhea (started on metronidazole 05/06, pending cdif). Per transfer papers, pt had RUE US done on 3/15 that showed RUE DVT involving internal jugular and subclavian veins, for which she was started on Coumadin. Was recently admitted to this hospital with anemia and elevated INR, found to have new lung nodule rec biopsy, seen by hem/onc . Family decided against further anticoagulation at that time given bleeding risk/biopsy, seen by GI, unable to do scope for poor prep, rec out pt  follow up. Pt had RUE US done on 5/8 that again showed +DVT in right jugular vein, so pt was sent back to the ED.     Lung nodule +Liver lesion --suspected malignancy  -family wants to hold off on biopsy   -reviewed ct chest/abd /pelvis from prior admission    urinalaysis from new catheter sample positive - already on Rocephin discussed with ID - will consult regards need for culture     GLO on Advanced CKD 5 with Metabolic acidosis - family in continuing discussions with renal for hD??  Worsening renal function likely multifactorial   -H/O Renal Transplant in 2012 ([facility])   -Was having diarrhea at rehab now improving   -discussed with nephrologist Continue on PO sodium bicarbonate 1950 mg PO BID   -cont tacrolimus, mycophenolate and prednisone (can be weaned down if started on HD per Nephro)   -continue Gee catheter   -nephrology discussion with family decision to start RRT - in agreement then renal to discuss called IR to establish access  and start HD also ongoing parallel discussions with son and Dr Rivera palliative     RUE DVT   -US Duplex Venous Upper Ext Ltd, Right (05.09.24) thrombus identified within the right internal jugular, subclavian and axillary, cephalic and basilic veins.  -Heparin Infusion was started after discussing risks vs benefits with family   -hem/onc consult noted   -Monitor CBC closely given history of GIB      Diarrhea --Per [facility], C. Diff was negative at facility   -Now improving   -GI PCR negative-Added Metamucil     Recent GI bleed   -cont PPI  -Monitor Hgb closely     HTN   -norvasc, coreg        Hypocalcemia   -Replete     DVT Prophylaxis -- Heparin Infusion    Full Code. Prognosis guarded. Palliative Care following. family wants to take patient to [place] when improved

## 2024-05-20 NOTE — PROGRESS NOTE ADULT - SUBJECTIVE AND OBJECTIVE BOX
Reason for visit:     Subjective: No acute overnight event. Patient denied any cardiac or urinary complains. No fever/chills.     ROS: All systems were reviewed in detail pertinent positive and negative mentioned above, rest are negative.    Physical Exam:  Gen: no acute distress  MS: alert, conversing normally  Eyes: EOMI, no icterus  HENT: NCAT, MMM  CV: rhythm reg reg, rate normal, no m/g/r  Chest: CTAB, no w/r/r,  Abd: soft, NT, ND  Extremities: No edema    =======================================================  Vital Signs Last 24 Hrs  T(C): 36.8 (20 May 2024 20:35), Max: 36.8 (20 May 2024 20:35)  T(F): 98.2 (20 May 2024 20:35), Max: 98.2 (20 May 2024 20:35)  HR: 98 (20 May 2024 20:35) (84 - 100)  BP: 164/99 (20 May 2024 20:35) (145/79 - 164/99)  BP(mean): --  RR: 17 (20 May 2024 20:35) (17 - 20)  SpO2: 99% (20 May 2024 20:35) (96% - 100%)    Parameters below as of 20 May 2024 20:35  Patient On (Oxygen Delivery Method): room air      I&O's Summary    19 May 2024 07:01  -  20 May 2024 07:00  --------------------------------------------------------  IN: 260 mL / OUT: 700 mL / NET: -440 mL    20 May 2024 07:01  -  20 May 2024 22:26  --------------------------------------------------------  IN: 360 mL / OUT: 300 mL / NET: 60 mL      =======================================================  Current Antibiotics:  cefTRIAXone Injectable.        Other medications:  amLODIPine   Tablet 10 milliGRAM(s) Oral daily  calcitriol   Capsule 0.5 MICROGram(s) Oral daily  carvedilol 6.25 milliGRAM(s) Oral every 12 hours  chlorhexidine 2% Cloths 1 Application(s) Topical daily  clopidogrel Tablet 75 milliGRAM(s) Oral daily  cyanocobalamin 1000 MICROGram(s) Oral daily  dextrose 5% + lactated ringers. 1000 milliLiter(s) IV Continuous <Continuous>  ergocalciferol 20484 Unit(s) Oral every week  ferrous    sulfate 325 milliGRAM(s) Oral daily  folic acid 1 milliGRAM(s) Oral daily  heparin  Infusion. 400 Unit(s)/Hr IV Continuous <Continuous>  mirtazapine 7.5 milliGRAM(s) Oral at bedtime  multivitamin 1 Tablet(s) Oral daily  mycophenolate mofetil 500 milliGRAM(s) Oral two times a day  Nephro-casie 1 Tablet(s) Oral daily  pantoprazole   Suspension 40 milliGRAM(s) Oral two times a day  predniSONE   Tablet 5 milliGRAM(s) Oral daily  psyllium Powder 1 Packet(s) Oral daily  sodium bicarbonate 1950 milliGRAM(s) Oral two times a day  tacrolimus 2 milliGRAM(s) Oral every 12 hours  tamsulosin 0.4 milliGRAM(s) Oral at bedtime    =======================================================  05-20    143  |  104  |  74.3<H>  ----------------------------<  78  4.6   |  22.0  |  6.60<H>    Ca    7.9<L>      20 May 2024 07:16  Phos  6.1     05-20  Mg     2.0     05-20    TPro  5.1<L>  /  Alb  2.4<L>  /  TBili  0.3<L>  /  DBili  x   /  AST  32<H>  /  ALT  11  /  AlkPhos  56  05-20    Creatinine: 6.60 mg/dL (05-20-24 @ 07:16)  Creatinine: 6.27 mg/dL (05-18-24 @ 05:11)  Creatinine: 6.41 mg/dL (05-17-24 @ 07:27)  Creatinine: 6.53 mg/dL (05-16-24 @ 10:30)    =======================================================       Reason for visit: Advanced CKD    Subjective: No acute overnight event. Patient denied any cardiac or urinary complains. No fever/chills.     ROS: All systems were reviewed in detail pertinent positive and negative mentioned above, rest are negative.    Physical Exam:  Gen: no acute distress  MS: alert, conversing normally  Eyes: EOMI, no icterus  HENT: NCAT, MMM  CV: rhythm reg reg, rate normal, no m/g/r  Chest: CTAB, no w/r/r,  Abd: soft, NT, ND  Extremities: No edema    =======================================================  Vital Signs Last 24 Hrs  T(C): 36.8 (20 May 2024 20:35), Max: 36.8 (20 May 2024 20:35)  T(F): 98.2 (20 May 2024 20:35), Max: 98.2 (20 May 2024 20:35)  HR: 98 (20 May 2024 20:35) (84 - 100)  BP: 164/99 (20 May 2024 20:35) (145/79 - 164/99)  BP(mean): --  RR: 17 (20 May 2024 20:35) (17 - 20)  SpO2: 99% (20 May 2024 20:35) (96% - 100%)    Parameters below as of 20 May 2024 20:35  Patient On (Oxygen Delivery Method): room air      I&O's Summary    19 May 2024 07:01  -  20 May 2024 07:00  --------------------------------------------------------  IN: 260 mL / OUT: 700 mL / NET: -440 mL    20 May 2024 07:01  -  20 May 2024 22:26  --------------------------------------------------------  IN: 360 mL / OUT: 300 mL / NET: 60 mL      =======================================================  Current Antibiotics:  cefTRIAXone Injectable.        Other medications:  amLODIPine   Tablet 10 milliGRAM(s) Oral daily  calcitriol   Capsule 0.5 MICROGram(s) Oral daily  carvedilol 6.25 milliGRAM(s) Oral every 12 hours  chlorhexidine 2% Cloths 1 Application(s) Topical daily  clopidogrel Tablet 75 milliGRAM(s) Oral daily  cyanocobalamin 1000 MICROGram(s) Oral daily  dextrose 5% + lactated ringers. 1000 milliLiter(s) IV Continuous <Continuous>  ergocalciferol 24261 Unit(s) Oral every week  ferrous    sulfate 325 milliGRAM(s) Oral daily  folic acid 1 milliGRAM(s) Oral daily  heparin  Infusion. 400 Unit(s)/Hr IV Continuous <Continuous>  mirtazapine 7.5 milliGRAM(s) Oral at bedtime  multivitamin 1 Tablet(s) Oral daily  mycophenolate mofetil 500 milliGRAM(s) Oral two times a day  Nephro-casie 1 Tablet(s) Oral daily  pantoprazole   Suspension 40 milliGRAM(s) Oral two times a day  predniSONE   Tablet 5 milliGRAM(s) Oral daily  psyllium Powder 1 Packet(s) Oral daily  sodium bicarbonate 1950 milliGRAM(s) Oral two times a day  tacrolimus 2 milliGRAM(s) Oral every 12 hours  tamsulosin 0.4 milliGRAM(s) Oral at bedtime    =======================================================  05-20    143  |  104  |  74.3<H>  ----------------------------<  78  4.6   |  22.0  |  6.60<H>    Ca    7.9<L>      20 May 2024 07:16  Phos  6.1     05-20  Mg     2.0     05-20    TPro  5.1<L>  /  Alb  2.4<L>  /  TBili  0.3<L>  /  DBili  x   /  AST  32<H>  /  ALT  11  /  AlkPhos  56  05-20    Creatinine: 6.60 mg/dL (05-20-24 @ 07:16)  Creatinine: 6.27 mg/dL (05-18-24 @ 05:11)  Creatinine: 6.41 mg/dL (05-17-24 @ 07:27)  Creatinine: 6.53 mg/dL (05-16-24 @ 10:30)    =======================================================

## 2024-05-20 NOTE — CONSULT NOTE ADULT - SUBJECTIVE AND OBJECTIVE BOX
INFECTIOUS DISEASES AND INTERNAL MEDICINE at [place]  =======================================================  Jeison Curtis MD  Diplomates American Board of Internal Medicine and Infectious Diseases  Telephone [phone]  Fax            [phone]  =======================================================    LIZY [id number]      HPI:  84y F w/ hx ESRD s/p renal transplant on mycophenolate and tacrolimus,Prior hx of  HD , CVA, HTN, HLD, anemia, dementia,lung nodule,RUE dvt,caumadin toxicity, GI Bleed  presents from [facility] nursing home for RUE swelling with hx of  RUE DVT,diarrhea(started on metronidazole 05/06,pending cdif).Pt reports few days of R arm swelling, no pain.Pt denies n/v/d,abd pain,cp,sob,active bleed.  Per transfer papers, pt had RUE US done on 3/15 that showed RUE DVT involving internal jugular and subclavian veins, for which she was started on Coumadin. Was recently admitted to this hospital with anemia and elevated INR,found new lung nodule rec biopsy,seen by hem/onc . Family decided against further anticoagulation at that time given bleeding risk/biopsy,seen by GI,unable to do scope for poor prep,rec out pt f/u.. Pt  had RUE US done on 5/8 that again showed +DVT in right jugular vein, so pt was sent back to the ED.ED spoke with son,wants to start AC now.      4/19/24- CT A/P-Distended gallbladder with cholelithiasis. Decompressed colon with additional suggestion of submucosal edema involving the descending and sigmoid colon suggesting colitis, ischemic colitis cannot be excluded. Disseminated innumerable pulmonary nodules are worrisome for metastatic disease. Subcapsular segment 8 liver lesion measuring 1.8 cm.  4/20/24- CT-C- The bilateral pulmonary nodules are indeterminate ( right middle lobe is a 7 mm noncalcified nodule, left lower lobe is a 9 x 6 mm noncalcified nodule)Small bilateral pleural effusions and atelectasis. thyroid gland is heterogeneous with bilobar nodules and   calcifications.    PMH: ESRD s/p renal transplant on mycophenolate and tacrolimus, CVA, HTN, HLD, anemia, dementia,lung nodule,RUE dvt,caumadin toxicity, GI Bleed  presents from [facility] nursing home for RUE swelling with hx of  RUE DVT,lung nodule.AVF ,HX of HD     SH: Pt denies smoking/alcohol/illicit drug use  FH: no hx cva,strokeheart disease-verufy with son       (09 May 2024 09:13)      PAST MEDICAL & SURGICAL HISTORY:      ANTIBIOTICS  cefTRIAXone Injectable.          Allergies    No Known Allergies    Intolerances        SOCIAL HISTORY:     FAMILY HX   FAMILY HISTORY:      Vital Signs Last 24 Hrs  T(C): 36.7 (20 May 2024 16:38), Max: 36.7 (20 May 2024 16:38)  T(F): 98.1 (20 May 2024 16:38), Max: 98.1 (20 May 2024 16:38)  HR: 92 (20 May 2024 16:38) (84 - 100)  BP: 145/79 (20 May 2024 16:38) (145/79 - 167/97)  BP(mean): --  RR: 18 (20 May 2024 16:38) (17 - 20)  SpO2: 97% (20 May 2024 16:38) (95% - 100%)    Parameters below as of 20 May 2024 16:38  Patient On (Oxygen Delivery Method): room air      Drug Dosing Weight  Height (cm): 162.6 (08 May 2024 20:31)  Weight (kg): 51.4 (09 May 2024 06:00)  BMI (kg/m2): 19.4 (09 May 2024 06:00)  BSA (m2): 1.54 (09 May 2024 06:00)      REVIEW OF SYSTEMS:    CONSTITUTIONAL:  As per HPI.    HEENT:  Eyes:  No diplopia or blurred vision. ENT:  No earache, sore throat or runny nose.    CARDIOVASCULAR:  No pressure, squeezing, strangling, tightness, heaviness or aching about the chest, neck, axilla or epigastrium.    RESPIRATORY:  No cough, shortness of breath, PND or orthopnea.    GASTROINTESTINAL:  No nausea, vomiting or diarrhea.    GENITOURINARY:  No dysuria, frequency or urgency.    MUSCULOSKELETAL:  As per HPI.    SKIN:  No change in skin, hair or nails.    NEUROLOGIC:  No paresthesias, fasciculations, seizures or weakness.                  PHYSICAL EXAMINATION:    GENERAL: The patient is a _____in no apparent distress. ___     VITAL SIGNS: T(C): 36.7 (05-20-24 @ 16:38), Max: 36.7 (05-20-24 @ 16:38)  HR: 92 (05-20-24 @ 16:38) (84 - 100)  BP: 145/79 (05-20-24 @ 16:38) (145/79 - 167/97)  RR: 18 (05-20-24 @ 16:38) (17 - 20)  SpO2: 97% (05-20-24 @ 16:38) (95% - 100%)  Wt(kg): --    HEENT: Head is normocephalic and atraumatic.  ANICTERIC  NECK: Supple. No carotid bruits.  No lymphadenopathy or thyromegaly.    LUNGS:COARSE BREATH SOUNDS    HEART: Regular rate and rhythm without murmur.    ABDOMEN: Soft, nontender, and nondistended.  Positive bowel sounds.  No hepatosplenomegaly was noted. NO REBOUND NO GUARDING    EXTREMITIES: NO EDEMA NO ERYTHEMA    NEUROLOGIC: NON FOCAL      SKIN: No ulceration or induration present. NO RASH        BLOOD CULTURES       URINE CX          LABS:                        9.4    4.10  )-----------( 192      ( 20 May 2024 07:16 )             29.9     05-20    143  |  104  |  74.3<H>  ----------------------------<  78  4.6   |  22.0  |  6.60<H>    Ca    7.9<L>      20 May 2024 07:16  Phos  6.1     05-20  Mg     2.0     05-20    TPro  5.1<L>  /  Alb  2.4<L>  /  TBili  0.3<L>  /  DBili  x   /  AST  32<H>  /  ALT  11  /  AlkPhos  56  05-20    PTT - ( 20 May 2024 07:16 )  PTT:70.3 sec  Urinalysis Basic - ( 20 May 2024 07:16 )    Color: x / Appearance: x / SG: x / pH: x  Gluc: 78 mg/dL / Ketone: x  / Bili: x / Urobili: x   Blood: x / Protein: x / Nitrite: x   Leuk Esterase: x / RBC: x / WBC x   Sq Epi: x / Non Sq Epi: x / Bacteria: x        RADIOLOGY & ADDITIONAL STUDIES:      ASSESSMENT/PLAN                  KATEY STERN MD INFECTIOUS DISEASES AND INTERNAL MEDICINE at [place]  =======================================================  Jeison Curtis MD  Diplomates American Board of Internal Medicine and Infectious Diseases  Telephone [phone]  Fax            [phone]  =======================================================    LIZY [id number]      HPI:  84y F w/ hx ESRD s/p renal transplant on mycophenolate and tacrolimus,Prior hx of  HD , CVA, HTN, HLD, anemia, dementia,lung nodule,RUE dvt,caumadin toxicity, GI Bleed  presents from [facility] nursing home for RUE swelling with hx of  RUE DVT,diarrhea(started on metronidazole 05/06,pending cdif).Pt reports few days of R arm swelling, no pain.Pt denies n/v/d,abd pain,cp,sob,active bleed.  Per transfer papers, pt had RUE US done on 3/15 that showed RUE DVT involving internal jugular and subclavian veins, for which she was started on Coumadin. Was recently admitted to this hospital with anemia and elevated INR,found new lung nodule rec biopsy,seen by hem/onc . Family decided against further anticoagulation at that time given bleeding risk/biopsy,seen by GI,unable to do scope for poor prep,rec out pt f/u.. Pt  had RUE US done on 5/8 that again showed +DVT in right jugular vein, so pt was sent back to the ED.ED spoke with son,wants to start AC now.  AS ABOVE ADMITTED HERE 5/9/24  FROM NH WITH RUE SWELLING PLACED ON AC  UNDECIDED ABOUT STARTING DIALYSIS AGAIN  DECISION TO RESTART  PT WITH ABNL APPEARING URINE IN DAVIS  DAVIS DISCONTINUED   ASKED TO EVALUATE FROM ID STANDPOINT           PMH: ESRD s/p renal transplant on mycophenolate and tacrolimus, CVA, HTN, HLD, anemia, dementia,lung nodule,RUE dvt,caumadin toxicity, GI Bleed  presents from [facility] nursing home for RUE swelling with hx of  RUE DVT,lung nodule.AVF ,HX of HD     SH: Pt denies smoking/alcohol/illicit drug use  FH: no hx cva,strokeheart disease-verufy with son       (09 May 2024 09:13)      PAST MEDICAL & SURGICAL HISTORY:      ANTIBIOTICS  cefTRIAXone Injectable.          Allergies    No Known Allergies    Intolerances        SOCIAL HISTORY:     FAMILY HX   FAMILY HISTORY:      Vital Signs Last 24 Hrs  T(C): 36.7 (20 May 2024 16:38), Max: 36.7 (20 May 2024 16:38)  T(F): 98.1 (20 May 2024 16:38), Max: 98.1 (20 May 2024 16:38)  HR: 92 (20 May 2024 16:38) (84 - 100)  BP: 145/79 (20 May 2024 16:38) (145/79 - 167/97)  BP(mean): --  RR: 18 (20 May 2024 16:38) (17 - 20)  SpO2: 97% (20 May 2024 16:38) (95% - 100%)    Parameters below as of 20 May 2024 16:38  Patient On (Oxygen Delivery Method): room air      Drug Dosing Weight  Height (cm): 162.6 (08 May 2024 20:31)  Weight (kg): 51.4 (09 May 2024 06:00)  BMI (kg/m2): 19.4 (09 May 2024 06:00)  BSA (m2): 1.54 (09 May 2024 06:00)      REVIEW OF SYSTEMS:    CONSTITUTIONAL:  As per HPI.    HEENT:  Eyes:  No diplopia or blurred vision. ENT:  No earache, sore throat or runny nose.    CARDIOVASCULAR:  No pressure, squeezing, strangling, tightness, heaviness or aching about the chest, neck, axilla or epigastrium.    RESPIRATORY:  No cough, shortness of breath, PND or orthopnea.    GASTROINTESTINAL:  No nausea, vomiting or diarrhea.    GENITOURINARY:  No dysuria, frequency or urgency.    MUSCULOSKELETAL:  As per HPI.    SKIN:  No change in skin, hair or nails.    NEUROLOGIC:   weakness.                  PHYSICAL EXAMINATION:    GENERAL: The patient is a _____in no apparent distress. __cachetic    VITAL SIGNS: T(C): 36.7 (05-20-24 @ 16:38), Max: 36.7 (05-20-24 @ 16:38)  HR: 92 (05-20-24 @ 16:38) (84 - 100)  BP: 145/79 (05-20-24 @ 16:38) (145/79 - 167/97)  RR: 18 (05-20-24 @ 16:38) (17 - 20)  SpO2: 97% (05-20-24 @ 16:38) (95% - 100%)  Wt(kg): --    HEENT: Head is normocephalic and atraumatic.  ANICTERIC  NECK: Supple. No carotid bruits.  No lymphadenopathy or thyromegaly.    LUNGS:COARSE BREATH SOUNDS    HEART: Regular rate and rhythm without murmur.    ABDOMEN: Soft, nontender, and nondistended.  Positive bowel sounds.  No hepatosplenomegaly was noted. NO REBOUND NO GUARDING    EXTREMITIES: NO EDEMA NO ERYTHEMA    NEUROLOGIC: NON FOCAL AWAKE       SKIN: No ulceration or induration present. NO RASH        BLOOD CULTURES       URINE CX          LABS:                        9.4    4.10  )-----------( 192      ( 20 May 2024 07:16 )             29.9     05-20    143  |  104  |  74.3<H>  ----------------------------<  78  4.6   |  22.0  |  6.60<H>    Ca    7.9<L>      20 May 2024 07:16  Phos  6.1     05-20  Mg     2.0     05-20    TPro  5.1<L>  /  Alb  2.4<L>  /  TBili  0.3<L>  /  DBili  x   /  AST  32<H>  /  ALT  11  /  AlkPhos  56  05-20    PTT - ( 20 May 2024 07:16 )  PTT:70.3 sec  Urinalysis Basic - ( 20 May 2024 07:16 )    Color: x / Appearance: x / SG: x / pH: x  Gluc: 78 mg/dL / Ketone: x  / Bili: x / Urobili: x   Blood: x / Protein: x / Nitrite: x   Leuk Esterase: x / RBC: x / WBC x   Sq Epi: x / Non Sq Epi: x / Bacteria: x        RADIOLOGY & ADDITIONAL STUDIES:      ASSESSMENT/PLAN      84y F w/ hx ESRD s/p renal transplant on mycophenolate and tacrolimus,Prior hx of  HD , CVA, HTN, HLD, anemia, dementia,lung nodule,RUE dvt,caumadin toxicity, GI Bleed  presents from [facility] nursing [place] for RUE swelling with hx of  RUE DVT,diarrhea(started on metronidazole 05/06,pending cdif).Pt reports few days of R arm swelling, no pain.Pt denies n/v/d,abd pain,cp,sob,active bleed.  Per transfer papers, pt had RUE US done on 3/15 that showed RUE DVT involving internal jugular and subclavian veins, for which she was started on Coumadin. Was recently admitted to this hospital with anemia and elevated INR,found new lung nodule rec biopsy,seen by hem/onc . Family decided against further anticoagulation at that time given bleeding risk/biopsy,seen by GI,unable to do scope for poor prep,rec out pt f/u.. Pt  had RUE US done on 5/8 that again showed +DVT in right jugular vein, so pt was sent back to the ED.ED spoke with son,wants to start AC now.  AS ABOVE ADMITTED HERE 5/9/24  FROM NH WITH RUE SWELLING PLACED ON AC  UNDECIDED ABOUT STARTING DIALYSIS AGAIN  DECISION TO RESTART  PT WITH ABNL APPEARING URINE IN DAVIS  DAVIS DISCONTINUED   PT AWAKE ALERT NON TOXIC   NO FEVERS OR CHILLS NO DYSURIA  WOULD DEFER URINE CX  IF PT DEVELOPS FEVERS OR  SX WOULD RECOMMEND STRAIGHT CATH SAMPLE  PT STARTED ON ROCEPHIN UNCLEAR IF NEEDED AT THIS TIME   WILL D/W HOSPITALIST              KATEY STERN MD

## 2024-05-20 NOTE — PROGRESS NOTE ADULT - ASSESSMENT
84 YOM ESRD s/p renal transplant, HTN, CVA, dementia presents from NH with RUE swelling (history of DVT in RUE on AC)     1. Advanced Chronic kidney disease stage 5:  -Transplant status; s/p DDKT 2012 at [facility]  -Baseline allograft function ; GFR < 12 since December 2023  -Serum creatinine 6.25- 6.5 GFR 6.    -Patient is agreeable to start RRT at this time. D/w palliative and primary team.  -Need TDC insertion , will start HD once access is established      2. Immunosuppression:  -Patient is on tacrolimus 2 mg twice daily,  mg twice daily, prednisone 5 mg daily  -Continue immunosuppression- can be weaned down if started on HD    3. Hypocalcemia; stable    4. Metabolic acidosis:  -Continue on PO sodium bicarbonate 1950 mg PO BID for now    5. BMD w/ CKD: Renal diet, continue on calcitriol    6. RUE swelling and DVT: On anticoagulation.    7. Anemia; start ALEXANDRA with HD

## 2024-05-20 NOTE — PROGRESS NOTE ADULT - ASSESSMENT
84F with hx of ESRD was on HD and s/p transplant with now CKD, prior CVA, HTN, dementia, RUE DVT ( not on AC 2/2 GIB), recent admission for acute anemia/GI bleed / new bilateral lung nodules concerning for mets now admitted worsening RUE edema with doppler confirming +DVT and diarrhea 2/2 C difficile colitis with  renal failure. Palliative consulted for support and goals of care.     ESRD  avoid nephrotoxic agents  Patient continued to be undecided about HD    RUE DVT  - on Heparin  - monitor Hg,  and transfuse PRN    Bilateral Lung Nodules  - noted on CT on 4/19 and son declined biopsy during last admission and to follow up heme/onc outpatient    Debility  Advancing Dementia  - CTH from 4/19 noted diffuse cerebral volume loss and microvascular ischemic changes   - likely Vascular given prior CVA  - Patient verbal, per PT needs moderate assistance  - please assist with ADLs, safety precautions    Palliative Care Encounter   Spoke with patient this am and inquired about her thoughts about HD.  She could not elaborate- still undecided.  Patient not further engaging in conversation - becomes irritable with furhter discussions   Spoke to son Moe.  He states he spoke to his mother last night and she seemed willing to proceed.  Informed him of my discussion with her today for which she is undecided.  Mother did not remember their conversation last night.  Reminded him  HD comes also with risks.  Mother would need to be committed to HD if to proceed.   Moe states he will speak with her today.   Do Not feel patient has full capacity to understand 84F with hx of ESRD was on HD and s/p transplant with now CKD, prior CVA, HTN, dementia, RUE DVT ( not on AC 2/2 GIB), recent admission for acute anemia/GI bleed / new bilateral lung nodules concerning for mets now admitted worsening RUE edema with doppler confirming +DVT and diarrhea 2/2 C difficile colitis with  renal failure. Palliative consulted for support and goals of care.     ESRD  avoid nephrotoxic agents  Patient continued to be undecided about HD    RUE DVT  - on Heparin  - monitor Hg,  and transfuse PRN    Bilateral Lung Nodules  - noted on CT on 4/19 and son declined biopsy during last admission and to follow up heme/onc outpatient    Debility  Advancing Dementia  - CTH from 4/19 noted diffuse cerebral volume loss and microvascular ischemic changes   - likely Vascular given prior CVA  - Patient verbal, per PT needs moderate assistance  - please assist with ADLs, safety precautions    Palliative Care Encounter   Spoke with patient this am and inquired about her thoughts about HD.  She could not elaborate- still undecided.  Patient not further engaging in conversation - becomes irritable with furhter discussions   Spoke to son Moe.  He states he spoke to his mother last night and she seemed willing to proceed.  Informed him of my discussion with her today for which she is undecided.  Mother did not remember their conversation last night.  Reminded him HD comes also with risks.  Mother would need to be committed if to proceed HD.   Moe states he will speak with her today.    84F with hx of ESRD was on HD and s/p transplant with now CKD, prior CVA, HTN, dementia, RUE DVT ( not on AC 2/2 GIB), recent admission for acute anemia/GI bleed / new bilateral lung nodules concerning for mets now admitted worsening RUE edema with doppler confirming +DVT and diarrhea 2/2 C difficile colitis with  renal failure. Palliative consulted for support and goals of care.     ESRD  avoid nephrotoxic agents  Patient continued to be undecided about HD    RUE DVT  - on Heparin  - monitor Hg,  and transfuse PRN    Bilateral Lung Nodules  - noted on CT on 4/19 and son declined biopsy during last admission and to follow up heme/onc outpatient    Debility  Advancing Dementia  - CTH from 4/19 noted diffuse cerebral volume loss and microvascular ischemic changes   - likely Vascular given prior CVA  - Patient verbal, per PT needs moderate assistance  - please assist with ADLs, safety precautions    Palliative Care Encounter   Spoke with patient this am and inquired about her thoughts about HD.  She could not elaborate- still undecided.  Patient not further engaging in conversation - becomes irritable with furhter discussions   Spoke to son Moe.  He states he spoke to his mother last night and she seemed willing to proceed.  Informed him of my discussion with her today for which she is undecided.  Mother did not remember their conversation last night.  Reminded him HD comes also with risks.  Mother would need to be committed if to proceed HD.   Moe states he will speak with her today.     ADDENDUM:  Spoke with patient.  Son and AMMY Burton NP present  Patient states she wishes to proceed with HD.  Proceeded to inform her about HD- states she knows all  about it.

## 2024-05-21 LAB
ALBUMIN SERPL ELPH-MCNC: 2.7 G/DL — LOW (ref 3.3–5.2)
ALP SERPL-CCNC: 63 U/L — SIGNIFICANT CHANGE UP (ref 40–120)
ALT FLD-CCNC: 13 U/L — SIGNIFICANT CHANGE UP
ANION GAP SERPL CALC-SCNC: 15 MMOL/L — SIGNIFICANT CHANGE UP (ref 5–17)
APTT BLD: 60 SEC — HIGH (ref 24.5–35.6)
AST SERPL-CCNC: 27 U/L — SIGNIFICANT CHANGE UP
BILIRUB SERPL-MCNC: 0.2 MG/DL — LOW (ref 0.4–2)
BUN SERPL-MCNC: 74.2 MG/DL — HIGH (ref 8–20)
CALCIUM SERPL-MCNC: 7.9 MG/DL — LOW (ref 8.4–10.5)
CHLORIDE SERPL-SCNC: 103 MMOL/L — SIGNIFICANT CHANGE UP (ref 96–108)
CO2 SERPL-SCNC: 25 MMOL/L — SIGNIFICANT CHANGE UP (ref 22–29)
CREAT SERPL-MCNC: 6.22 MG/DL — HIGH (ref 0.5–1.3)
EGFR: 6 ML/MIN/1.73M2 — LOW
GLUCOSE SERPL-MCNC: 79 MG/DL — SIGNIFICANT CHANGE UP (ref 70–99)
HCT VFR BLD CALC: 29.7 % — LOW (ref 34.5–45)
HGB BLD-MCNC: 9.3 G/DL — LOW (ref 11.5–15.5)
INR BLD: 0.87 RATIO — SIGNIFICANT CHANGE UP (ref 0.85–1.18)
MCHC RBC-ENTMCNC: 29.6 PG — SIGNIFICANT CHANGE UP (ref 27–34)
MCHC RBC-ENTMCNC: 31.3 GM/DL — LOW (ref 32–36)
MCV RBC AUTO: 94.6 FL — SIGNIFICANT CHANGE UP (ref 80–100)
PLATELET # BLD AUTO: 197 K/UL — SIGNIFICANT CHANGE UP (ref 150–400)
POTASSIUM SERPL-MCNC: 4.8 MMOL/L — SIGNIFICANT CHANGE UP (ref 3.5–5.3)
POTASSIUM SERPL-SCNC: 4.8 MMOL/L — SIGNIFICANT CHANGE UP (ref 3.5–5.3)
PROT SERPL-MCNC: 5.3 G/DL — LOW (ref 6.6–8.7)
PROTHROM AB SERPL-ACNC: 9.7 SEC — SIGNIFICANT CHANGE UP (ref 9.5–13)
RBC # BLD: 3.14 M/UL — LOW (ref 3.8–5.2)
RBC # FLD: 15.6 % — HIGH (ref 10.3–14.5)
SODIUM SERPL-SCNC: 143 MMOL/L — SIGNIFICANT CHANGE UP (ref 135–145)
WBC # BLD: 4.57 K/UL — SIGNIFICANT CHANGE UP (ref 3.8–10.5)
WBC # FLD AUTO: 4.57 K/UL — SIGNIFICANT CHANGE UP (ref 3.8–10.5)

## 2024-05-21 PROCEDURE — 99232 SBSQ HOSP IP/OBS MODERATE 35: CPT

## 2024-05-21 PROCEDURE — 99497 ADVNCD CARE PLAN 30 MIN: CPT

## 2024-05-21 RX ADMIN — MIRTAZAPINE 7.5 MILLIGRAM(S): 45 TABLET, ORALLY DISINTEGRATING ORAL at 21:35

## 2024-05-21 RX ADMIN — HEPARIN SODIUM 500 UNIT(S)/HR: 5000 INJECTION INTRAVENOUS; SUBCUTANEOUS at 21:38

## 2024-05-21 RX ADMIN — CARVEDILOL PHOSPHATE 6.25 MILLIGRAM(S): 80 CAPSULE, EXTENDED RELEASE ORAL at 18:01

## 2024-05-21 RX ADMIN — CHLORHEXIDINE GLUCONATE 1 APPLICATION(S): 213 SOLUTION TOPICAL at 11:06

## 2024-05-21 RX ADMIN — CLOPIDOGREL BISULFATE 75 MILLIGRAM(S): 75 TABLET, FILM COATED ORAL at 11:04

## 2024-05-21 RX ADMIN — Medication 1 MILLIGRAM(S): at 11:04

## 2024-05-21 RX ADMIN — Medication 1 PACKET(S): at 11:05

## 2024-05-21 RX ADMIN — MYCOPHENOLATE MOFETIL 500 MILLIGRAM(S): 250 CAPSULE ORAL at 06:05

## 2024-05-21 RX ADMIN — MYCOPHENOLATE MOFETIL 500 MILLIGRAM(S): 250 CAPSULE ORAL at 18:02

## 2024-05-21 RX ADMIN — CARVEDILOL PHOSPHATE 6.25 MILLIGRAM(S): 80 CAPSULE, EXTENDED RELEASE ORAL at 06:02

## 2024-05-21 RX ADMIN — TAMSULOSIN HYDROCHLORIDE 0.4 MILLIGRAM(S): 0.4 CAPSULE ORAL at 21:37

## 2024-05-21 RX ADMIN — HEPARIN SODIUM 500 UNIT(S)/HR: 5000 INJECTION INTRAVENOUS; SUBCUTANEOUS at 19:36

## 2024-05-21 RX ADMIN — AMLODIPINE BESYLATE 10 MILLIGRAM(S): 2.5 TABLET ORAL at 06:19

## 2024-05-21 RX ADMIN — TACROLIMUS 2 MILLIGRAM(S): 5 CAPSULE ORAL at 21:36

## 2024-05-21 RX ADMIN — TACROLIMUS 2 MILLIGRAM(S): 5 CAPSULE ORAL at 09:04

## 2024-05-21 RX ADMIN — Medication 1950 MILLIGRAM(S): at 18:02

## 2024-05-21 RX ADMIN — Medication 1 TABLET(S): at 11:06

## 2024-05-21 RX ADMIN — Medication 1950 MILLIGRAM(S): at 06:01

## 2024-05-21 RX ADMIN — Medication 1 TABLET(S): at 11:04

## 2024-05-21 RX ADMIN — HEPARIN SODIUM 500 UNIT(S)/HR: 5000 INJECTION INTRAVENOUS; SUBCUTANEOUS at 07:21

## 2024-05-21 RX ADMIN — CEFTRIAXONE 1000 MILLIGRAM(S): 500 INJECTION, POWDER, FOR SOLUTION INTRAMUSCULAR; INTRAVENOUS at 06:19

## 2024-05-21 RX ADMIN — HEPARIN SODIUM 500 UNIT(S)/HR: 5000 INJECTION INTRAVENOUS; SUBCUTANEOUS at 11:02

## 2024-05-21 RX ADMIN — PANTOPRAZOLE SODIUM 40 MILLIGRAM(S): 20 TABLET, DELAYED RELEASE ORAL at 18:01

## 2024-05-21 RX ADMIN — Medication 325 MILLIGRAM(S): at 11:04

## 2024-05-21 RX ADMIN — PREGABALIN 1000 MICROGRAM(S): 225 CAPSULE ORAL at 11:05

## 2024-05-21 RX ADMIN — Medication 5 MILLIGRAM(S): at 06:02

## 2024-05-21 RX ADMIN — CALCITRIOL 0.5 MICROGRAM(S): 0.5 CAPSULE ORAL at 11:05

## 2024-05-21 RX ADMIN — PANTOPRAZOLE SODIUM 40 MILLIGRAM(S): 20 TABLET, DELAYED RELEASE ORAL at 06:02

## 2024-05-21 NOTE — PROGRESS NOTE ADULT - ASSESSMENT
Patient Name: Arnol Beatty  : 5/3/1989  MRN: [id number]  Patient Address: [facility] [address]      Coronavirus Disease 2019 (COVID-19)     [facility] is committed to the safety and well-being of our patients, symptoms carefully. If your symptoms get worse, call your healthcare provider immediately. 3. Get rest and stay hydrated. 4. If you have a medical appointment, call the healthcare provider ahead of time and tell them that you have or may have COVID-19. without the use of fever-reducing medications; and  · Improvement in respiratory symptoms (e.g., cough, shortness of breath); and  · At least 10 days have passed since symptoms first appeared OR if asymptomatic patient or date of symptom onset is unclear t 84y F w/ hx ESRD s/p renal transplant on mycophenolate and tacrolimus,Prior hx of  HD , CVA, HTN, HLD, anemia, dementia,lung nodule,RUE dvt,caumadin toxicity, GI Bleed  presents from [facility] nursing home for RUE swelling with hx of  RUE DVT,diarrhea(started on metronidazole 05/06,pending cdif).Pt reports few days of R arm swelling, no pain.Pt denies n/v/d,abd pain,cp,sob,active bleed.  Per transfer papers, pt had RUE US done on 3/15 that showed RUE DVT involving internal jugular and subclavian veins, for which she was started on Coumadin. Was recently admitted to this hospital with anemia and elevated INR,found new lung nodule rec biopsy,seen by hem/onc . Family decided against further anticoagulation at that time given bleeding risk/biopsy,seen by GI,unable to do scope for poor prep,rec out pt f/u.. Pt  had RUE US done on 5/8 that again showed +DVT in right jugular vein, so pt was sent back to the ED.ED spoke with son,wants to start AC now.  AS ABOVE ADMITTED HERE 5/9/24  FROM NH WITH RUE SWELLING PLACED ON AC  UNDECIDED ABOUT STARTING DIALYSIS AGAIN  DECISION TO RESTART  PT WITH ABNL APPEARING URINE IN DAVIS  DAVIS DISCONTINUED   PT AWAKE ALERT NON TOXIC   NO FEVERS OR CHILLS NO DYSURIA  WOULD DEFER URINE CX  IF PT DEVELOPS FEVERS OR  SX WOULD RECOMMEND STRAIGHT CATH SAMPLE  PT STARTED ON ROCEPHIN UNCLEAR IF NEEDED AT THIS TIME    WILL FOLLOWUP  convalescent plasma donors must:    · Have had a confirmed diagnosis of COVID-19  · Be symptom-free for at least 14 days*    *Some people will be required to have a repeat COVID-19 test in order to be eligible to donate.  If you’re instructed by Cirilo solis 84y F w/ hx ESRD s/p renal transplant on mycophenolate and tacrolimus,Prior hx of  HD , CVA, HTN, HLD, anemia, dementia,lung nodule,RUE dvt,caumadin toxicity, GI Bleed  presents from [facility] nursing home for RUE swelling with hx of  RUE DVT,diarrhea(started on metronidazole 05/06,pending cdif).Pt reports few days of R arm swelling, no pain.Pt denies n/v/d,abd pain,cp,sob,active bleed.  Per transfer papers, pt had RUE US done on 3/15 that showed RUE DVT involving internal jugular and subclavian veins, for which she was started on Coumadin. Was recently admitted to this hospital with anemia and elevated INR,found new lung nodule rec biopsy,seen by hem/onc . Family decided against further anticoagulation at that time given bleeding risk/biopsy,seen by GI,unable to do scope for poor prep,rec out pt f/u.. Pt  had RUE US done on 5/8 that again showed +DVT in right jugular vein, so pt was sent back to the ED.ED spoke with son,wants to start AC now.  AS ABOVE ADMITTED HERE 5/9/24  FROM NH WITH RUE SWELLING PLACED ON AC  UNDECIDED ABOUT STARTING DIALYSIS AGAIN  DECISION TO RESTART  PT WITH ABNL APPEARING URINE IN DAVIS  DAVIS DISCONTINUED   PT AWAKE ALERT NON TOXIC   NO FEVERS OR CHILLS NO DYSURIA  WOULD DEFER URINE CX  IF PT DEVELOPS FEVERS OR  SX WOULD RECOMMEND STRAIGHT CATH SAMPLE  PT STARTED ON ROCEPHIN UNCLEAR IF NEEDED AT THIS TIME    WILL FOLLOWUP  as NEEDED   PLEASE CALL

## 2024-05-21 NOTE — PROGRESS NOTE ADULT - SUBJECTIVE AND OBJECTIVE BOX
INFECTIOUS DISEASES AND INTERNAL MEDICINE at [place]  =======================================================  Jeison Curtis MD  Diplomates American Board of Internal Medicine and Infectious Diseases  Telephone [phone]  Fax            [phone]  =======================================================    LIZY JOE [id number]    Follow up: POS U/A    Allergies:  No Known Allergies      Medications:  acetaminophen     Tablet .. 650 milliGRAM(s) Oral every 6 hours PRN  aluminum hydroxide/magnesium hydroxide/simethicone Suspension 30 milliLiter(s) Oral every 4 hours PRN  amLODIPine   Tablet 10 milliGRAM(s) Oral daily  calcitriol   Capsule 0.5 MICROGram(s) Oral daily  carvedilol 6.25 milliGRAM(s) Oral every 12 hours  cefTRIAXone Injectable. 1000 milliGRAM(s) IV Push every 24 hours  cefTRIAXone Injectable.      chlorhexidine 2% Cloths 1 Application(s) Topical daily  clopidogrel Tablet 75 milliGRAM(s) Oral daily  cyanocobalamin 1000 MICROGram(s) Oral daily  dextrose 5% + lactated ringers. 1000 milliLiter(s) IV Continuous <Continuous>  ergocalciferol 20699 Unit(s) Oral every week  ferrous    sulfate 325 milliGRAM(s) Oral daily  folic acid 1 milliGRAM(s) Oral daily  heparin   Injectable 2000 Unit(s) IV Push every 6 hours PRN  heparin  Infusion. 400 Unit(s)/Hr IV Continuous <Continuous>  magnesium hydroxide Suspension 30 milliLiter(s) Oral daily PRN  melatonin 3 milliGRAM(s) Oral at bedtime PRN  mirtazapine 7.5 milliGRAM(s) Oral at bedtime  multivitamin 1 Tablet(s) Oral daily  mycophenolate mofetil 500 milliGRAM(s) Oral two times a day  Nephro-casie 1 Tablet(s) Oral daily  ondansetron Injectable 4 milliGRAM(s) IV Push every 8 hours PRN  pantoprazole   Suspension 40 milliGRAM(s) Oral two times a day  predniSONE   Tablet 5 milliGRAM(s) Oral daily  psyllium Powder 1 Packet(s) Oral daily  sodium bicarbonate 1950 milliGRAM(s) Oral two times a day  tacrolimus 2 milliGRAM(s) Oral every 12 hours  tamsulosin 0.4 milliGRAM(s) Oral at bedtime    SOCIAL       FAMILY   FAMILY HISTORY:    REVIEW OF SYSTEMS:  CONSTITUTIONAL:  No Fever or chills  HEENT:   No diplopia or blurred vision.  No earache, sore throat or runny nose.  CARDIOVASCULAR:  No pressure, squeezing, strangling, tightness, heaviness or aching about the chest, neck, axilla or epigastrium.  RESPIRATORY:  No cough, shortness of breath, PND or orthopnea.  GASTROINTESTINAL:  No nausea, vomiting or diarrhea.  GENITOURINARY:  No dysuria, frequency or urgency. No Blood in urine  MUSCULOSKELETAL:   moves all joints  SKIN:  No change in skin, hair or nails.  NEUROLOGIC:  No paresthesias, fasciculations, seizures or weakness.  PSYCHIATRIC:  No disorder of thought or mood.  ENDOCRINE:  No heat or cold intolerance, polyuria or polydipsia.  HEMATOLOGICAL:  No easy bruising or bleeding.            Physical Exam:  ICU Vital Signs Last 24 Hrs  T(C): 36.5 (21 May 2024 07:49), Max: 36.8 (20 May 2024 20:35)  T(F): 97.7 (21 May 2024 07:49), Max: 98.2 (20 May 2024 20:35)  HR: 86 (21 May 2024 07:49) (84 - 98)  BP: 140/100 (21 May 2024 07:49) (140/100 - 175/95)  BP(mean): --  ABP: --  ABP(mean): --  RR: 20 (21 May 2024 07:49) (17 - 20)  SpO2: 100% (21 May 2024 07:49) (95% - 100%)    O2 Parameters below as of 21 May 2024 07:49  Patient On (Oxygen Delivery Method): room air          GEN: NAD, THIN  HEENT: normocephalic and atraumatic. EOMI. BOB.    NECK: Supple. No carotid bruits.  No lymphadenopathy or thyromegaly.  LUNGS: Clear to auscultation.  HEART: Regular rate and rhythm without murmur.  ABDOMEN: Soft, nontender, and nondistended.  Positive bowel sounds.    : No CVA tenderness  EXTREMITIES: Without any cyanosis, clubbing, rash, lesions or edema.  MSK: no joint swelling  NEUROLOGIC:  AWAKE ANSWERS QUESTIONS      Labs:  Vitals:  ============  T(F): 97.7 (21 May 2024 07:49), Max: 98.2 (20 May 2024 20:35)  HR: 86 (21 May 2024 07:49)  BP: 140/100 (21 May 2024 07:49)  RR: 20 (21 May 2024 07:49)  SpO2: 100% (21 May 2024 07:49) (95% - 100%)  temp max in last 48H T(F): , Max: 98.2 (05-20-24 @ 20:35)    =======================================================  Current Antibiotics:  cefTRIAXone Injectable. 1000 milliGRAM(s) IV Push every 24 hours  cefTRIAXone Injectable.        Other medications:  amLODIPine   Tablet 10 milliGRAM(s) Oral daily  calcitriol   Capsule 0.5 MICROGram(s) Oral daily  carvedilol 6.25 milliGRAM(s) Oral every 12 hours  chlorhexidine 2% Cloths 1 Application(s) Topical daily  clopidogrel Tablet 75 milliGRAM(s) Oral daily  cyanocobalamin 1000 MICROGram(s) Oral daily  dextrose 5% + lactated ringers. 1000 milliLiter(s) IV Continuous <Continuous>  ergocalciferol 91181 Unit(s) Oral every week  ferrous    sulfate 325 milliGRAM(s) Oral daily  folic acid 1 milliGRAM(s) Oral daily  heparin  Infusion. 400 Unit(s)/Hr IV Continuous <Continuous>  mirtazapine 7.5 milliGRAM(s) Oral at bedtime  multivitamin 1 Tablet(s) Oral daily  mycophenolate mofetil 500 milliGRAM(s) Oral two times a day  Nephro-casie 1 Tablet(s) Oral daily  pantoprazole   Suspension 40 milliGRAM(s) Oral two times a day  predniSONE   Tablet 5 milliGRAM(s) Oral daily  psyllium Powder 1 Packet(s) Oral daily  sodium bicarbonate 1950 milliGRAM(s) Oral two times a day  tacrolimus 2 milliGRAM(s) Oral every 12 hours  tamsulosin 0.4 milliGRAM(s) Oral at bedtime      =======================================================  Labs:                        9.3    4.57  )-----------( 197      ( 21 May 2024 06:58 )             29.7     05-21    143  |  103  |  74.2<H>  ----------------------------<  79  4.8   |  25.0  |  6.22<H>    Ca    7.9<L>      21 May 2024 06:58  Phos  6.1     05-20  Mg     2.0     05-20    TPro  5.3<L>  /  Alb  2.7<L>  /  TBili  0.2<L>  /  DBili  x   /  AST  27  /  ALT  13  /  AlkPhos  63  05-21      Culture - Blood (collected 04-18-24 @ 23:25)  Source: .Blood Blood-Peripheral  Final Report (04-24-24 @ 06:00):    No growth at 5 days    Culture - Blood (collected 04-18-24 @ 23:20)  Source: .Blood Blood-Peripheral  Gram Stain (04-20-24 @ 04:17):    Growth in aerobic bottle: Gram Positive Cocci in Clusters  Final Report (04-20-24 @ 19:56):    Growth in aerobic bottle: Staphylococcus hominis    Isolation of Coagulase negative Staphylococcus from single blood culture    sets may represent    contamination. Contact the Microbiology Department at [phone] if    susceptibility testing is    clinically indicated.    Direct identification is available within approximately 3-5    hours either by Blood Panel Multiplexed PCR or Direct    MALDI-TOF. Details: [email or web address]  Organism: Blood Culture PCR (04-20-24 @ 19:56)  Organism: Blood Culture PCR (04-20-24 @ 19:56)    Sensitivities:      Method Type: PCR      -  Coagulase negative Staphylococcus: Detec    Culture - Urine (collected 04-18-24 @ 09:30)  Source: Clean Catch Clean Catch (Midstream)  Final Report (04-20-24 @ 11:09):    No growth      Creatinine: 6.22 mg/dL (05-21-24 @ 06:58)  Creatinine: 6.60 mg/dL (05-20-24 @ 07:16)  Creatinine: 6.27 mg/dL (05-18-24 @ 05:11)  Creatinine: 6.41 mg/dL (05-17-24 @ 07:27)            WBC Count: 4.57 K/uL (05-21-24 @ 06:58)  WBC Count: 4.10 K/uL (05-20-24 @ 07:16)  WBC Count: 5.58 K/uL (05-18-24 @ 05:11)  WBC Count: 4.42 K/uL (05-17-24 @ 07:27)        Alkaline Phosphatase: 63 U/L (05-21-24 @ 06:58)  Alkaline Phosphatase: 56 U/L (05-20-24 @ 07:16)  Alanine Aminotransferase (ALT/SGPT): 13 U/L (05-21-24 @ 06:58)  Alanine Aminotransferase (ALT/SGPT): 11 U/L (05-20-24 @ 07:16)  Aspartate Aminotransferase (AST/SGOT): 27 U/L (05-21-24 @ 06:58)  Aspartate Aminotransferase (AST/SGOT): 32 U/L (05-20-24 @ 07:16)  Bilirubin Total: 0.2 mg/dL (05-21-24 @ 06:58)  Bilirubin Total: 0.3 mg/dL (05-20-24 @ 07:16)   Antoni Patel(Attending)

## 2024-05-21 NOTE — PROGRESS NOTE ADULT - ASSESSMENT
84y F w/ hx ESRD s/p renal transplant on mycophenolate and tacrolimus, CVA, HTN, HLD, anemia, dementia, lung nodule, RUE dvt, Coumadin toxicity, GI Bleed  presented from [facility] for RUE swelling with hx of  RUE DVT, diarrhea (started on metronidazole 05/06, pending cdif). Per transfer papers, pt had RUE US done on 3/15 that showed RUE DVT involving internal jugular and subclavian veins, for which she was started on Coumadin. Was recently admitted to this hospital with anemia and elevated INR, found to have new lung nodule rec biopsy, seen by hem/onc . Family decided against further anticoagulation at that time given bleeding risk/biopsy, seen by GI, unable to do scope for poor prep, rec out pt  follow up. Pt had RUE US done on 5/8 that again showed +DVT in right jugular vein, so pt was sent back to the ED.     Lung nodule +Liver lesion --suspected malignancy  -family wants to hold off on biopsy   -reviewed ct chest/abd /pelvis from prior admission    urinalaysis from new catheter sample positive - already on Rocephin discussed with ID - will consult regards need for culture     GLO on Advanced CKD 5 with Metabolic acidosis - family in continuing discussions with renal for hD??  Worsening renal function likely multifactorial   -H/O Renal Transplant in 2012 ([facility])   Discussed with patient's son Moe at length about her declining overall and risks associated with focussing solely on renal failure and HD -  he is understanding the overall decline and hopefully will choose no further hospitalizations- will continue discussion tomorrow   -discussed with nephrologist Continue on PO sodium bicarbonate 1950 mg PO BID   -cont tacrolimus, mycophenolate and prednisone (can be weaned down if started on HD per Nephro)   -continue Gee catheter   -nephrology discussion with family decision to start RRT - in agreement then renal to discuss called IR to establish access  and start HD also ongoing parallel discussions with son and Dr Rivera palliative     RUE DVT   -US Duplex Venous Upper Ext Ltd, Right (05.09.24) thrombus identified within the right internal jugular, subclavian and axillary, cephalic and basilic veins.  -Heparin Infusion    -hem/onc consult  -Monitor CBC closely given history of GIB    Diarrhea --Per [facility], C. Diff was negative at facility   -GI PCR negative- Metamucil     Recent GI bleed   -cont PPI  -Monitor Hgb closely     HTN   -norvasc, coreg      Hypocalcemia   -Replete     DVT Prophylaxis -- Heparin Infusion    Full Code. Prognosis guarded.

## 2024-05-21 NOTE — PROGRESS NOTE ADULT - SUBJECTIVE AND OBJECTIVE BOX
LIZY JOE Patient is a 84y old  Female who presents with a chief complaint of RUE DVT,acute on ckd,diarrhea (21 May 2024 11:57)     HPI:  84y F w/ hx ESRD s/p renal transplant on mycophenolate and tacrolimus,Prior hx of  HD , CVA, HTN, HLD, anemia, dementia,lung nodule,RUE dvt,caumadin toxicity, GI Bleed  presents from [facility] for RUE swelling with hx of  RUE DVT,diarrhea(started on metronidazole 05/06,pending cdif).Pt reports few days of R arm swelling, no pain.Pt denies n/v/d,abd pain,cp,sob,active bleed.  Per transfer papers, pt had RUE US done on 3/15 that showed RUE DVT involving internal jugular and subclavian veins, for which she was started on Coumadin. Was recently admitted to this hospital with anemia and elevated INR,found new lung nodule rec biopsy,seen by hem/onc . Family decided against further anticoagulation at that time given bleeding risk/biopsy,seen by GI,unable to do scope for poor prep,rec out pt f/u.. Pt  had RUE US done on 5/8 that again showed +DVT in right jugular vein, so pt was sent back to the ED.ED spoke with son,wants to start AC now.      4/19/24- CT A/P-Distended gallbladder with cholelithiasis. Decompressed colon with additional suggestion of submucosal edema involving the descending and sigmoid colon suggesting colitis, ischemic colitis cannot be excluded. Disseminated innumerable pulmonary nodules are worrisome for metastatic disease. Subcapsular segment 8 liver lesion measuring 1.8 cm.  4/20/24- CT-C- The bilateral pulmonary nodules are indeterminate ( right middle lobe is a 7 mm noncalcified nodule, left lower lobe is a 9 x 6 mm noncalcified nodule)Small bilateral pleural effusions and atelectasis. thyroid gland is heterogeneous with bilobar nodules and   calcifications.    PMH: ESRD s/p renal transplant on mycophenolate and tacrolimus, CVA, HTN, HLD, anemia, dementia,lung nodule,RUE dvt,caumadin toxicity, GI Bleed  presents from [facility] for RUE swelling with hx of  RUE DVT,lung nodule.AVF ,HX of HD     SH: Pt denies smoking/alcohol/illicit drug use  FH: no hx cva,strokeheart disease-deonte with son       (09 May 2024 09:13)    The patient was seen and evaluated sitting in bed inclined supported- didn't really want to talk but pleasant   The patient is in no acute distress.  Denied any fever chest pain, palpitations, shortness of breath, abdominal pain, fever, dysuria, cough, edema       I&O's Summary    20 May 2024 07:01  -  21 May 2024 07:00  --------------------------------------------------------  IN: 360 mL / OUT: 300 mL / NET: 60 mL    21 May 2024 07:01  -  21 May 2024 17:42  --------------------------------------------------------  IN: 280 mL / OUT: 0 mL / NET: 280 mL      Allergies    No Known Allergies    Intolerances      HEALTH ISSUES - PROBLEM Dx:  Deep vein thrombosis (DVT)    Renal failure, acute    Diarrhea    Acute colitis    History of renal transplant    CVA (cerebrovascular accident)    GI bleed    Anemia due to acute blood loss          PAST MEDICAL & SURGICAL HISTORY:          Vital Signs Last 24 Hrs  T(C): 36.7 (21 May 2024 16:46), Max: 36.8 (20 May 2024 20:35)  T(F): 98.1 (21 May 2024 16:46), Max: 98.2 (20 May 2024 20:35)  HR: 94 (21 May 2024 16:46) (84 - 98)  BP: 147/69 (21 May 2024 16:46) (140/100 - 175/95)  BP(mean): --  RR: 18 (21 May 2024 16:46) (17 - 20)  SpO2: 96% (21 May 2024 16:46) (95% - 100%)    Parameters below as of 21 May 2024 16:46  Patient On (Oxygen Delivery Method): room air    T(C): 36.7 (05-21-24 @ 16:46), Max: 36.8 (05-20-24 @ 20:35)  HR: 94 (05-21-24 @ 16:46) (84 - 98)  BP: 147/69 (05-21-24 @ 16:46) (140/100 - 175/95)  RR: 18 (05-21-24 @ 16:46) (17 - 20)  SpO2: 96% (05-21-24 @ 16:46) (95% - 100%)  Wt(kg): --    PHYSICAL EXAM:    GENERAL: NAD, frail elderly female sitting with support   HEAD:  Atraumatic, Normocephalic  EYES:  conjunctiva and sclera clear  ENMT:  Moist mucous membranes,    NECK: Supple, No JVD,  NERVOUS SYSTEM:  Alert & in bed barely Moves upper and lower extremities; CNS-II-XII  CHEST/LUNG: Clear to auscultation bilaterally  HEART: Regular rate and rhythm;   ABDOMEN: Soft, Nontender, Nondistended;  EXTREMITIES:  Peripheral Pulses,  psychiatry- mood and affect appropriate,    acetaminophen     Tablet .. 650 milliGRAM(s) Oral every 6 hours PRN  aluminum hydroxide/magnesium hydroxide/simethicone Suspension 30 milliLiter(s) Oral every 4 hours PRN  amLODIPine   Tablet 10 milliGRAM(s) Oral daily  calcitriol   Capsule 0.5 MICROGram(s) Oral daily  carvedilol 6.25 milliGRAM(s) Oral every 12 hours  cefTRIAXone Injectable. 1000 milliGRAM(s) IV Push every 24 hours  cefTRIAXone Injectable.      chlorhexidine 2% Cloths 1 Application(s) Topical daily  clopidogrel Tablet 75 milliGRAM(s) Oral daily  cyanocobalamin 1000 MICROGram(s) Oral daily  dextrose 5% + lactated ringers. 1000 milliLiter(s) IV Continuous <Continuous>  ergocalciferol 38557 Unit(s) Oral every week  ferrous    sulfate 325 milliGRAM(s) Oral daily  folic acid 1 milliGRAM(s) Oral daily  heparin   Injectable 2000 Unit(s) IV Push every 6 hours PRN  heparin  Infusion. 400 Unit(s)/Hr IV Continuous <Continuous>  magnesium hydroxide Suspension 30 milliLiter(s) Oral daily PRN  melatonin 3 milliGRAM(s) Oral at bedtime PRN  mirtazapine 7.5 milliGRAM(s) Oral at bedtime  multivitamin 1 Tablet(s) Oral daily  mycophenolate mofetil 500 milliGRAM(s) Oral two times a day  Nephro-casie 1 Tablet(s) Oral daily  ondansetron Injectable 4 milliGRAM(s) IV Push every 8 hours PRN  pantoprazole   Suspension 40 milliGRAM(s) Oral two times a day  predniSONE   Tablet 5 milliGRAM(s) Oral daily  psyllium Powder 1 Packet(s) Oral daily  sodium bicarbonate 1950 milliGRAM(s) Oral two times a day  tacrolimus 2 milliGRAM(s) Oral every 12 hours  tamsulosin 0.4 milliGRAM(s) Oral at bedtime      LABS:                          9.3    4.57  )-----------( 197      ( 21 May 2024 06:58 )             29.7     05-21    143  |  103  |  74.2<H>  ----------------------------<  79  4.8   |  25.0  |  6.22<H>    Ca    7.9<L>      21 May 2024 06:58  Phos  6.1     05-20  Mg     2.0     05-20    TPro  5.3<L>  /  Alb  2.7<L>  /  TBili  0.2<L>  /  DBili  x   /  AST  27  /  ALT  13  /  AlkPhos  63  05-21    LIVER FUNCTIONS - ( 21 May 2024 06:58 )  Alb: 2.7 g/dL / Pro: 5.3 g/dL / ALK PHOS: 63 U/L / ALT: 13 U/L / AST: 27 U/L / GGT: x           PT/INR - ( 21 May 2024 06:59 )   PT: 9.7 sec;   INR: 0.87 ratio         PTT - ( 21 May 2024 06:59 )  PTT:60.0 sec      Urinalysis Basic - ( 21 May 2024 06:58 )    Color: x / Appearance: x / SG: x / pH: x  Gluc: 79 mg/dL / Ketone: x  / Bili: x / Urobili: x   Blood: x / Protein: x / Nitrite: x   Leuk Esterase: x / RBC: x / WBC x   Sq Epi: x / Non Sq Epi: x / Bacteria: x      CAPILLARY BLOOD GLUCOSE          RADIOLOGY & ADDITIONAL TESTS:      Consultant notes reviewed    Case discussed with consultant/provider/ family /patient

## 2024-05-22 LAB
ALBUMIN SERPL ELPH-MCNC: 2.5 G/DL — LOW (ref 3.3–5.2)
ALP SERPL-CCNC: 68 U/L — SIGNIFICANT CHANGE UP (ref 40–120)
ALT FLD-CCNC: 13 U/L — SIGNIFICANT CHANGE UP
ANION GAP SERPL CALC-SCNC: 12 MMOL/L — SIGNIFICANT CHANGE UP (ref 5–17)
APTT BLD: 109.3 SEC — HIGH (ref 24.5–35.6)
APTT BLD: 44.9 SEC — HIGH (ref 24.5–35.6)
APTT BLD: 51.2 SEC — HIGH (ref 24.5–35.6)
AST SERPL-CCNC: 24 U/L — SIGNIFICANT CHANGE UP
BILIRUB SERPL-MCNC: <0.2 MG/DL — LOW (ref 0.4–2)
BUN SERPL-MCNC: 72.9 MG/DL — HIGH (ref 8–20)
CALCIUM SERPL-MCNC: 8 MG/DL — LOW (ref 8.4–10.5)
CHLORIDE SERPL-SCNC: 105 MMOL/L — SIGNIFICANT CHANGE UP (ref 96–108)
CO2 SERPL-SCNC: 26 MMOL/L — SIGNIFICANT CHANGE UP (ref 22–29)
CREAT SERPL-MCNC: 6.3 MG/DL — HIGH (ref 0.5–1.3)
EGFR: 6 ML/MIN/1.73M2 — LOW
GLUCOSE SERPL-MCNC: 89 MG/DL — SIGNIFICANT CHANGE UP (ref 70–99)
HAV IGM SER-ACNC: SIGNIFICANT CHANGE UP
HBV CORE IGM SER-ACNC: SIGNIFICANT CHANGE UP
HBV SURFACE AB SER-ACNC: ABNORMAL
HBV SURFACE AG SER-ACNC: SIGNIFICANT CHANGE UP
HCT VFR BLD CALC: 27.3 % — LOW (ref 34.5–45)
HCV AB S/CO SERPL IA: 0.07 S/CO — SIGNIFICANT CHANGE UP (ref 0–0.99)
HCV AB SERPL-IMP: SIGNIFICANT CHANGE UP
HGB BLD-MCNC: 8.6 G/DL — LOW (ref 11.5–15.5)
MCHC RBC-ENTMCNC: 30.2 PG — SIGNIFICANT CHANGE UP (ref 27–34)
MCHC RBC-ENTMCNC: 31.5 GM/DL — LOW (ref 32–36)
MCV RBC AUTO: 95.8 FL — SIGNIFICANT CHANGE UP (ref 80–100)
PLATELET # BLD AUTO: 190 K/UL — SIGNIFICANT CHANGE UP (ref 150–400)
POTASSIUM SERPL-MCNC: 4.8 MMOL/L — SIGNIFICANT CHANGE UP (ref 3.5–5.3)
POTASSIUM SERPL-SCNC: 4.8 MMOL/L — SIGNIFICANT CHANGE UP (ref 3.5–5.3)
PROT SERPL-MCNC: 4.9 G/DL — LOW (ref 6.6–8.7)
RBC # BLD: 2.85 M/UL — LOW (ref 3.8–5.2)
RBC # FLD: 15.6 % — HIGH (ref 10.3–14.5)
SODIUM SERPL-SCNC: 143 MMOL/L — SIGNIFICANT CHANGE UP (ref 135–145)
WBC # BLD: 4.08 K/UL — SIGNIFICANT CHANGE UP (ref 3.8–10.5)
WBC # FLD AUTO: 4.08 K/UL — SIGNIFICANT CHANGE UP (ref 3.8–10.5)

## 2024-05-22 PROCEDURE — 99232 SBSQ HOSP IP/OBS MODERATE 35: CPT

## 2024-05-22 RX ORDER — HYDRALAZINE HCL 50 MG
10 TABLET ORAL ONCE
Refills: 0 | Status: COMPLETED | OUTPATIENT
Start: 2024-05-22 | End: 2024-05-22

## 2024-05-22 RX ADMIN — MYCOPHENOLATE MOFETIL 500 MILLIGRAM(S): 250 CAPSULE ORAL at 17:11

## 2024-05-22 RX ADMIN — CARVEDILOL PHOSPHATE 6.25 MILLIGRAM(S): 80 CAPSULE, EXTENDED RELEASE ORAL at 17:10

## 2024-05-22 RX ADMIN — HEPARIN SODIUM 2000 UNIT(S): 5000 INJECTION INTRAVENOUS; SUBCUTANEOUS at 23:35

## 2024-05-22 RX ADMIN — PREGABALIN 1000 MICROGRAM(S): 225 CAPSULE ORAL at 10:05

## 2024-05-22 RX ADMIN — Medication 3 MILLIGRAM(S): at 21:31

## 2024-05-22 RX ADMIN — Medication 10 MILLIGRAM(S): at 21:31

## 2024-05-22 RX ADMIN — HEPARIN SODIUM 600 UNIT(S)/HR: 5000 INJECTION INTRAVENOUS; SUBCUTANEOUS at 23:32

## 2024-05-22 RX ADMIN — CARVEDILOL PHOSPHATE 6.25 MILLIGRAM(S): 80 CAPSULE, EXTENDED RELEASE ORAL at 05:28

## 2024-05-22 RX ADMIN — HEPARIN SODIUM 500 UNIT(S)/HR: 5000 INJECTION INTRAVENOUS; SUBCUTANEOUS at 16:21

## 2024-05-22 RX ADMIN — Medication 1 TABLET(S): at 10:05

## 2024-05-22 RX ADMIN — TACROLIMUS 2 MILLIGRAM(S): 5 CAPSULE ORAL at 10:04

## 2024-05-22 RX ADMIN — Medication 1950 MILLIGRAM(S): at 05:28

## 2024-05-22 RX ADMIN — Medication 1 MILLIGRAM(S): at 10:05

## 2024-05-22 RX ADMIN — CLOPIDOGREL BISULFATE 75 MILLIGRAM(S): 75 TABLET, FILM COATED ORAL at 10:05

## 2024-05-22 RX ADMIN — Medication 1 PACKET(S): at 10:05

## 2024-05-22 RX ADMIN — CALCITRIOL 0.5 MICROGRAM(S): 0.5 CAPSULE ORAL at 10:05

## 2024-05-22 RX ADMIN — HEPARIN SODIUM 2000 UNIT(S): 5000 INJECTION INTRAVENOUS; SUBCUTANEOUS at 07:31

## 2024-05-22 RX ADMIN — PANTOPRAZOLE SODIUM 40 MILLIGRAM(S): 20 TABLET, DELAYED RELEASE ORAL at 05:28

## 2024-05-22 RX ADMIN — Medication 1 TABLET(S): at 10:04

## 2024-05-22 RX ADMIN — MYCOPHENOLATE MOFETIL 500 MILLIGRAM(S): 250 CAPSULE ORAL at 05:29

## 2024-05-22 RX ADMIN — CHLORHEXIDINE GLUCONATE 1 APPLICATION(S): 213 SOLUTION TOPICAL at 10:06

## 2024-05-22 RX ADMIN — Medication 325 MILLIGRAM(S): at 10:05

## 2024-05-22 RX ADMIN — MIRTAZAPINE 7.5 MILLIGRAM(S): 45 TABLET, ORALLY DISINTEGRATING ORAL at 21:31

## 2024-05-22 RX ADMIN — TACROLIMUS 2 MILLIGRAM(S): 5 CAPSULE ORAL at 21:31

## 2024-05-22 RX ADMIN — TAMSULOSIN HYDROCHLORIDE 0.4 MILLIGRAM(S): 0.4 CAPSULE ORAL at 21:31

## 2024-05-22 RX ADMIN — HEPARIN SODIUM 600 UNIT(S)/HR: 5000 INJECTION INTRAVENOUS; SUBCUTANEOUS at 07:30

## 2024-05-22 RX ADMIN — Medication 1950 MILLIGRAM(S): at 17:10

## 2024-05-22 RX ADMIN — AMLODIPINE BESYLATE 10 MILLIGRAM(S): 2.5 TABLET ORAL at 05:28

## 2024-05-22 RX ADMIN — CEFTRIAXONE 1000 MILLIGRAM(S): 500 INJECTION, POWDER, FOR SOLUTION INTRAMUSCULAR; INTRAVENOUS at 05:29

## 2024-05-22 RX ADMIN — Medication 5 MILLIGRAM(S): at 05:28

## 2024-05-22 RX ADMIN — HEPARIN SODIUM 500 UNIT(S)/HR: 5000 INJECTION INTRAVENOUS; SUBCUTANEOUS at 19:27

## 2024-05-22 NOTE — PROGRESS NOTE ADULT - SUBJECTIVE AND OBJECTIVE BOX
Reason for visit: Advanced CKD    Subjective: No acute overnight event. Patient denied any cardiac or urinary complains. No fever/chills.     ROS: All systems were reviewed in detail pertinent positive and negative mentioned above, rest are negative.    Physical Exam:  Gen: no acute distress  MS: alert, conversing normally  Eyes: EOMI, no icterus  HENT: NCAT, MMM  CV: rhythm reg reg, rate normal, no m/g/r  Chest: CTAB, no w/r/r,  Abd: soft, NT, ND  Extremities: No edema    I&O's Summary    21 May 2024 07:01  -  22 May 2024 07:00  --------------------------------------------------------  IN: 335 mL / OUT: 400 mL / NET: -65 mL    22 May 2024 07:01  -  22 May 2024 17:17  --------------------------------------------------------  IN: 240 mL / OUT: 0 mL / NET: 240 mL    T(C): 36.4 (22 May 2024 16:38), Max: 36.8 (21 May 2024 21:30)  T(F): 97.5 (22 May 2024 16:38), Max: 98.2 (21 May 2024 21:30)  HR: 99 (22 May 2024 16:38) (94 - 100)  BP: 176/89 (22 May 2024 16:38) (151/84 - 176/89)  RR: 18 (22 May 2024 16:38) (17 - 18)  SpO2: 97% (22 May 2024 16:38) (96% - 98%)  Patient On (Oxygen Delivery Method): room air             8.6    4.08  )-----------( 190      ( 22 May 2024 06:17 )             27.3     05-22    143  |  105  |  72.9<H>  ----------------------------<  89  4.8   |  26.0  |  6.30<H>    Ca    8.0<L>      22 May 2024 06:17    TPro  4.9<L>  /  Alb  2.5<L>  /  TBili  <0.2<L>  /  DBili  x   /  AST  24  /  ALT  13  /  AlkPhos  68  05-22

## 2024-05-22 NOTE — PROVIDER CONTACT NOTE (OTHER) - REASON
both arms pink banded, pt needing AM lab draws re-ordered
Stage 2 Present on Patient
Pt HR is sustaining in the 110's

## 2024-05-22 NOTE — PROGRESS NOTE ADULT - ASSESSMENT
84 YOM ESRD s/p renal transplant, HTN, CVA, dementia presents from NH with RUE swelling (history of DVT in RUE on AC)     1. Advanced Chronic kidney disease stage 5:  -Transplant status; s/p DDKT 2012 at [facility]  -Baseline allograft function ; GFR < 12 since December 2023  -Serum creatinine 6.25- 6.5 GFR 6.    -Patient/Son in discussion with palliative and primary team. They are not wanting HD for now. They understand the consequences.      2. Immunosuppression:  -Patient is on tacrolimus 2 mg twice daily,  mg twice daily, prednisone 5 mg daily  -Continue immunosuppression- can be weaned down if started on HD    3. Hypocalcemia; stable    4. Metabolic acidosis:  -Continue on PO sodium bicarbonate 1950 mg PO BID for now    5. BMD w/ CKD: Renal diet, continue on calcitriol    6. RUE swelling and DVT: On anticoagulation.    7. Lung nodules, liver lesion, thyroid nodules and suspected malignancy: patiuent/family not opting for biopsy at present.    ?possible home with hospice. Will follow    7. Anemia; start ALEXANDRA with HD

## 2024-05-22 NOTE — PROGRESS NOTE ADULT - ASSESSMENT
84y F w/ hx ESRD s/p renal transplant on mycophenolate and tacrolimus, CVA, HTN, HLD, anemia, dementia, lung nodule, RUE dvt, Coumadin toxicity, GI Bleed  presented from [facility] for RUE swelling with hx of  RUE DVT, diarrhea (started on metronidazole 05/06, pending cdif). Per transfer papers, pt had RUE US done on 3/15 that showed RUE DVT involving internal jugular and subclavian veins, for which she was started on Coumadin. Was recently admitted to this hospital with anemia and elevated INR, found to have new lung nodule rec biopsy, seen by hem/onc . Family decided against further anticoagulation at that time given bleeding risk/biopsy, seen by GI, unable to do scope for poor prep, rec out pt  follow up. Pt had RUE US done on 5/8 that again showed +DVT in right jugular vein, so pt was sent back to the ED.     Lung nodules +Liver lesion + thyroid nodules -suspected malignancy  -family wants to hold off on biopsy discussed again with son- as per son - he doesn't think she will be ok with any cancer treatment so why even blither to biopsy  -reviewed ct chest/abd /pelvis from prior admission    urinalaysis from new catheter sample positive - already on Rocephin discussed with ID - will consult regards need for culture     GLO on Advanced CKD 5 with Metabolic acidosis - family in continuing discussions for now NO PLAN FOR HD  Worsening renal function likely multifactorial - family understands that HD alone will not changehe prognosis given dementia ad muliptle other medical comorbidities   -H/O Renal Transplant in 2012 ([facility])   Discussed with patient's son Moe at length about her declining overall and risks associated with focussing solely on renal failure and HD -  he is understanding the overall decline and hopefully will choose no further hospitalizations- will continue discussion tomorrow   -discussed with nephrologist Continue on PO sodium bicarbonate 1950 mg PO BID   -cont tacrolimus, mycophenolate and prednisone (can be weaned down if started on HD per Nephro)   -continue Gee catheter   -nephrology discussion with family     RUE DVT   -US Duplex Venous Upper Ext Ltd, Right (05.09.24) thrombus identified within the right internal jugular, subclavian and axillary, cephalic and basilic veins.  -Heparin Infusion    -hem/onc consult  -Monitor CBC closely given history of GIB    Diarrhea --Per [facility], C. Diff was negative at facility   -GI PCR negative- Metamucil     Recent GI bleed   -cont PPI  -Monitor Hgb closely     HTN   -norvasc, coreg      Hypocalcemia   -Replete     DVT Prophylaxis -- Heparin Infusion    Full Code. Prognosis guarded.

## 2024-05-22 NOTE — PROVIDER CONTACT NOTE (OTHER) - ASSESSMENT
Pt asymptomatic, on heparin gtt VS stable
pink bands on both extremities labeled not to use.  RUE DVT and old LAVF no longer in use
On assessment stage 2 located on coccyx.  [employer]

## 2024-05-22 NOTE — PROVIDER CONTACT NOTE (OTHER) - RECOMMENDATIONS
Wound care orders
continue to monitor HR, notify for HR sustained over 120
MD to review chart and clarify with nephrology if its okay to use L arm

## 2024-05-23 LAB
APTT BLD: 66.9 SEC — HIGH (ref 24.5–35.6)
APTT BLD: 70.8 SEC — HIGH (ref 24.5–35.6)

## 2024-05-23 PROCEDURE — 99232 SBSQ HOSP IP/OBS MODERATE 35: CPT

## 2024-05-23 PROCEDURE — 99233 SBSQ HOSP IP/OBS HIGH 50: CPT

## 2024-05-23 RX ADMIN — HEPARIN SODIUM 600 UNIT(S)/HR: 5000 INJECTION INTRAVENOUS; SUBCUTANEOUS at 19:20

## 2024-05-23 RX ADMIN — CHLORHEXIDINE GLUCONATE 1 APPLICATION(S): 213 SOLUTION TOPICAL at 10:11

## 2024-05-23 RX ADMIN — TACROLIMUS 2 MILLIGRAM(S): 5 CAPSULE ORAL at 10:10

## 2024-05-23 RX ADMIN — TAMSULOSIN HYDROCHLORIDE 0.4 MILLIGRAM(S): 0.4 CAPSULE ORAL at 22:10

## 2024-05-23 RX ADMIN — MYCOPHENOLATE MOFETIL 500 MILLIGRAM(S): 250 CAPSULE ORAL at 17:01

## 2024-05-23 RX ADMIN — CARVEDILOL PHOSPHATE 6.25 MILLIGRAM(S): 80 CAPSULE, EXTENDED RELEASE ORAL at 05:19

## 2024-05-23 RX ADMIN — Medication 1 TABLET(S): at 10:10

## 2024-05-23 RX ADMIN — PREGABALIN 1000 MICROGRAM(S): 225 CAPSULE ORAL at 10:09

## 2024-05-23 RX ADMIN — Medication 1950 MILLIGRAM(S): at 17:01

## 2024-05-23 RX ADMIN — Medication 325 MILLIGRAM(S): at 10:15

## 2024-05-23 RX ADMIN — HEPARIN SODIUM 600 UNIT(S)/HR: 5000 INJECTION INTRAVENOUS; SUBCUTANEOUS at 18:04

## 2024-05-23 RX ADMIN — TACROLIMUS 2 MILLIGRAM(S): 5 CAPSULE ORAL at 22:10

## 2024-05-23 RX ADMIN — CEFTRIAXONE 1000 MILLIGRAM(S): 500 INJECTION, POWDER, FOR SOLUTION INTRAMUSCULAR; INTRAVENOUS at 05:18

## 2024-05-23 RX ADMIN — AMLODIPINE BESYLATE 10 MILLIGRAM(S): 2.5 TABLET ORAL at 05:19

## 2024-05-23 RX ADMIN — PANTOPRAZOLE SODIUM 40 MILLIGRAM(S): 20 TABLET, DELAYED RELEASE ORAL at 17:01

## 2024-05-23 RX ADMIN — MYCOPHENOLATE MOFETIL 500 MILLIGRAM(S): 250 CAPSULE ORAL at 05:20

## 2024-05-23 RX ADMIN — HEPARIN SODIUM 600 UNIT(S)/HR: 5000 INJECTION INTRAVENOUS; SUBCUTANEOUS at 11:12

## 2024-05-23 RX ADMIN — Medication 1 MILLIGRAM(S): at 10:09

## 2024-05-23 RX ADMIN — CLOPIDOGREL BISULFATE 75 MILLIGRAM(S): 75 TABLET, FILM COATED ORAL at 10:10

## 2024-05-23 RX ADMIN — PANTOPRAZOLE SODIUM 40 MILLIGRAM(S): 20 TABLET, DELAYED RELEASE ORAL at 05:19

## 2024-05-23 RX ADMIN — HEPARIN SODIUM 600 UNIT(S)/HR: 5000 INJECTION INTRAVENOUS; SUBCUTANEOUS at 07:17

## 2024-05-23 RX ADMIN — Medication 5 MILLIGRAM(S): at 10:10

## 2024-05-23 RX ADMIN — Medication 1 PACKET(S): at 10:11

## 2024-05-23 RX ADMIN — Medication 1950 MILLIGRAM(S): at 05:19

## 2024-05-23 RX ADMIN — CALCITRIOL 0.5 MICROGRAM(S): 0.5 CAPSULE ORAL at 10:09

## 2024-05-23 RX ADMIN — MIRTAZAPINE 7.5 MILLIGRAM(S): 45 TABLET, ORALLY DISINTEGRATING ORAL at 22:10

## 2024-05-23 RX ADMIN — CARVEDILOL PHOSPHATE 6.25 MILLIGRAM(S): 80 CAPSULE, EXTENDED RELEASE ORAL at 17:01

## 2024-05-23 NOTE — PROGRESS NOTE ADULT - NUTRITIONAL ASSESSMENT
This patient has been assessed with a concern for Malnutrition and has been determined to have a diagnosis/diagnoses of Severe protein-calorie malnutrition.    This patient is being managed with:   Diet Renal Restrictions-  For patients receiving Renal Replacement - No Protein Restr No Conc K No Conc Phos Low Sodium  1200mL Fluid Restriction ([id number])  Banatrol TF     Qty per Day:  2  Supplement Feeding Modality:  Oral  Nepro Cans or Servings Per Day:  2       Frequency:  Two Times a day  Entered: May 15 2024  2:58PM  
This patient has been assessed with a concern for Malnutrition and has been determined to have a diagnosis/diagnoses of Severe protein-calorie malnutrition.    This patient is being managed with:   Diet Renal Restrictions-  For patients receiving Renal Replacement - No Protein Restr No Conc K No Conc Phos Low Sodium  1200mL Fluid Restriction ([id number])  Banatrol TF     Qty per Day:  2  Supplement Feeding Modality:  Oral  Nepro Cans or Servings Per Day:  2       Frequency:  Two Times a day  Entered: May 15 2024  2:58PM  
This patient has been assessed with a concern for Malnutrition and has been determined to have a diagnosis/diagnoses of Severe protein-calorie malnutrition.    This patient is being managed with:   Diet Renal Restrictions-  For patients receiving Renal Replacement - No Protein Restr No Conc K No Conc Phos Low Sodium  1200mL Fluid Restriction ([id number])  Entered: May 11 2024  7:45AM  
This patient has been assessed with a concern for Malnutrition and has been determined to have a diagnosis/diagnoses of Severe protein-calorie malnutrition.    This patient is being managed with:   Diet Renal Restrictions-  For patients receiving Renal Replacement - No Protein Restr No Conc K No Conc Phos Low Sodium  1200mL Fluid Restriction ([id number])  Banatrol TF     Qty per Day:  2  Supplement Feeding Modality:  Oral  Nepro Cans or Servings Per Day:  2       Frequency:  Two Times a day  Entered: May 15 2024  2:58PM  
This patient has been assessed with a concern for Malnutrition and has been determined to have a diagnosis/diagnoses of Severe protein-calorie malnutrition.    This patient is being managed with:   Diet Renal Restrictions-  For patients receiving Renal Replacement - No Protein Restr No Conc K No Conc Phos Low Sodium  1200mL Fluid Restriction ([id number])  Banatrol TF     Qty per Day:  2  Supplement Feeding Modality:  Oral  Nepro Cans or Servings Per Day:  2       Frequency:  Two Times a day  Entered: May 15 2024  2:58PM  
This patient has been assessed with a concern for Malnutrition and has been determined to have a diagnosis/diagnoses of Severe protein-calorie malnutrition.    This patient is being managed with:   Diet Renal Restrictions-  For patients receiving Renal Replacement - No Protein Restr No Conc K No Conc Phos Low Sodium  1200mL Fluid Restriction ([id number])  Banatrol TF     Qty per Day:  2  Supplement Feeding Modality:  Oral  Nepro Cans or Servings Per Day:  2       Frequency:  Two Times a day  Entered: May 15 2024  2:58PM  
This patient has been assessed with a concern for Malnutrition and has been determined to have a diagnosis/diagnoses of Severe protein-calorie malnutrition.    This patient is being managed with:   Diet Renal Restrictions-  For patients receiving Renal Replacement - No Protein Restr No Conc K No Conc Phos Low Sodium  1200mL Fluid Restriction ([id number])  Banatrol TF     Qty per Day:  2  Supplement Feeding Modality:  Oral  Nepro Cans or Servings Per Day:  2       Frequency:  Two Times a day  Entered: May 15 2024  2:58PM  
This patient has been assessed with a concern for Malnutrition and has been determined to have a diagnosis/diagnoses of Severe protein-calorie malnutrition.    This patient is being managed with:   Diet Renal Restrictions-  For patients receiving Renal Replacement - No Protein Restr No Conc K No Conc Phos Low Sodium  1200mL Fluid Restriction ([id number])  Banatrol TF     Qty per Day:  2  Supplement Feeding Modality:  Oral  Nepro Cans or Servings Per Day:  2       Frequency:  Two Times a day  Entered: May 15 2024  2:58PM  
This patient has been assessed with a concern for Malnutrition and has been determined to have a diagnosis/diagnoses of Severe protein-calorie malnutrition.    This patient is being managed with:   Diet Renal Restrictions-  For patients receiving Renal Replacement - No Protein Restr No Conc K No Conc Phos Low Sodium  1200mL Fluid Restriction ([id number])  Banatrol TF     Qty per Day:  2  Supplement Feeding Modality:  Oral  Nepro Cans or Servings Per Day:  2       Frequency:  Two Times a day  Entered: May 15 2024  2:58PM  
This patient has been assessed with a concern for Malnutrition and has been determined to have a diagnosis/diagnoses of Severe protein-calorie malnutrition.    This patient is being managed with:   Diet Renal Restrictions-  For patients receiving Renal Replacement - No Protein Restr No Conc K No Conc Phos Low Sodium  1200mL Fluid Restriction ([id number])  Banatrol TF     Qty per Day:  2  Supplement Feeding Modality:  Oral  Nepro Cans or Servings Per Day:  2       Frequency:  Two Times a day  Entered: May 15 2024  2:58PM  
This patient has been assessed with a concern for Malnutrition and has been determined to have a diagnosis/diagnoses of Severe protein-calorie malnutrition.    This patient is being managed with:   Diet Renal Restrictions-  For patients receiving Renal Replacement - No Protein Restr No Conc K No Conc Phos Low Sodium  1200mL Fluid Restriction ([id number])  Entered: May 11 2024  7:45AM

## 2024-05-23 NOTE — PROGRESS NOTE ADULT - ASSESSMENT
84y F w/ hx ESRD s/p renal transplant on mycophenolate and tacrolimus, CVA, HTN, HLD, anemia, dementia, lung nodule, RUE dvt, Coumadin toxicity, GI Bleed  presented from [facility] for RUE swelling with hx of  RUE DVT, diarrhea (started on metronidazole 05/06, pending cdif). Per transfer papers, pt had RUE US done on 3/15 that showed RUE DVT involving internal jugular and subclavian veins, for which she was started on Coumadin. Was recently admitted to this hospital with anemia and elevated INR, found to have new lung nodule rec biopsy, seen by hem/onc . Family decided against further anticoagulation at that time given bleeding risk/biopsy, seen by GI, unable to do scope for poor prep, rec out pt  follow up. Pt had RUE US done on 5/8 that again showed +DVT in right jugular vein, so pt was sent back to the ED.     Lung nodules +Liver lesion + thyroid nodules -suspected malignancy  -family wants to hold off on biopsy discussed again with son- as per son - he doesn't think she will be ok with any cancer treatment so why even blither to biopsy  -reviewed ct chest/abd /pelvis from prior admission    urinalaysis from new catheter sample positive - already on Rocephin discussed with ID - will consult regards need for culture     GLO on Advanced CKD 5 with Metabolic acidosis - family in continuing discussions for now NO PLAN FOR HD  Worsening renal function likely multifactorial - family understands that HD alone will not changehe prognosis given dementia ad muliptle other medical comorbidities   -H/O Renal Transplant in 2012 ([facility])   Yesterday 5/22 I Discussed with patient's son Moe with RN present at patient bedside -at length about her overall declining condition and  overall and risks associated with HD -for renal failure and he is understands the overall decline and said that he understands that she cant go through a lot more testing and trips to HD will be difficult to continue and understands that she has been declining and has been limited to bed -NO plans for HD -  RRT-discussed with nephrologist Continue on PO sodium bicarbonate 1950 mg PO BID   -cont tacrolimus, mycophenolate and prednisone (can be weaned down if started on HD per Nephro)   -continue Gee catheter   -nephrology discussion with family     RUE DVT   -US Duplex Venous Upper Ext Ltd, Right (05.09.24) thrombus identified within the right internal jugular, subclavian and axillary, cephalic and basilic veins.  -Heparin Infusion    -hem/onc consult -Monitor CBC closely given history of GIB    Anemia- stool occult blood ordered , cont PPI BID    Diarrhea --Per [facility], C. Diff was negative at facility   -GI PCR negative- Metamucil     Recent GI bleed   -cont PPI  -Monitor Hgb closely     HTN   -norvasc, coreg      Hypocalcemia   -Replete     DVT Prophylaxis -- Heparin Infusion    Full Code. Prognosis guarded

## 2024-05-23 NOTE — PROGRESS NOTE ADULT - SUBJECTIVE AND OBJECTIVE BOX
CC:  Follow up GOC , Symptoms    OVERNIGHT EVENTS:  no reported events    Present Symptoms:   Dyspnea:  No    Nausea/Vomiting:  No  Anxiety:  No    Depression:  No    Fatigue:  Yes    Loss of appetite:  Yes     Constipation: Not Reported      Pain: No              Location            Duration            Character            Severity            Factors            Effect    Pain AD Score:  [email or web address] (press ctrl + left click to view)    Review of Systems: Reviewed as above  All others negative      MEDICATIONS  (STANDING):  amLODIPine   Tablet 10 milliGRAM(s) Oral daily  calcitriol   Capsule 0.5 MICROGram(s) Oral daily  carvedilol 6.25 milliGRAM(s) Oral every 12 hours  chlorhexidine 2% Cloths 1 Application(s) Topical daily  clopidogrel Tablet 75 milliGRAM(s) Oral daily  cyanocobalamin 1000 MICROGram(s) Oral daily  dextrose 5% + lactated ringers. 1000 milliLiter(s) (40 mL/Hr) IV Continuous <Continuous>  ergocalciferol 70181 Unit(s) Oral every week  ferrous    sulfate 325 milliGRAM(s) Oral daily  folic acid 1 milliGRAM(s) Oral daily  heparin  Infusion. 400 Unit(s)/Hr (4 mL/Hr) IV Continuous <Continuous>  mirtazapine 7.5 milliGRAM(s) Oral at bedtime  multivitamin 1 Tablet(s) Oral daily  mycophenolate mofetil 500 milliGRAM(s) Oral two times a day  Nephro-casie 1 Tablet(s) Oral daily  pantoprazole   Suspension 40 milliGRAM(s) Oral two times a day  predniSONE   Tablet 5 milliGRAM(s) Oral daily  psyllium Powder 1 Packet(s) Oral daily  sodium bicarbonate 1950 milliGRAM(s) Oral two times a day  tacrolimus 2 milliGRAM(s) Oral every 12 hours  tamsulosin 0.4 milliGRAM(s) Oral at bedtime    MEDICATIONS  (PRN):  acetaminophen     Tablet .. 650 milliGRAM(s) Oral every 6 hours PRN Temp greater or equal to 38C (100.4F), Mild Pain (1 - 3)  aluminum hydroxide/magnesium hydroxide/simethicone Suspension 30 milliLiter(s) Oral every 4 hours PRN Dyspepsia  heparin   Injectable 2000 Unit(s) IV Push every 6 hours PRN For aPTT between 40 - 57  magnesium hydroxide Suspension 30 milliLiter(s) Oral daily PRN Constipation  melatonin 3 milliGRAM(s) Oral at bedtime PRN Insomnia  ondansetron Injectable 4 milliGRAM(s) IV Push every 8 hours PRN Nausea and/or Vomiting      PHYSICAL EXAM:    Vital Signs Last 24 Hrs  T(C): 36.5 (23 May 2024 12:50), Max: 36.6 (22 May 2024 20:58)  T(F): 97.7 (23 May 2024 12:50), Max: 97.8 (22 May 2024 20:58)  HR: 101 (23 May 2024 12:50) (87 - 101)  BP: 147/83 (23 May 2024 12:50) (147/83 - 189/76)  BP(mean): --  RR: 16 (23 May 2024 12:50) (16 - 18)  SpO2: 95% (23 May 2024 12:50) (95% - 97%)    Parameters below as of 23 May 2024 12:50  Patient On (Oxygen Delivery Method): room air      Karnofsky:  30%  General:   F frail NAD   HEENT:  NCAT      Lungs: comfortable  non labored  CV:  RR  GI:  soft NTND  MSK: weak  Skin:  warm/dry  Neuro alert no focal deficits   Psych  little irratable    LABS:                          8.6    4.08  )-----------( 190      ( 22 May 2024 06:17 )             27.3     05-22    143  |  105  |  72.9<H>  ----------------------------<  89  4.8   |  26.0  |  6.30<H>    Ca    8.0<L>      22 May 2024 06:17    TPro  4.9<L>  /  Alb  2.5<L>  /  TBili  <0.2<L>  /  DBili  x   /  AST  24  /  ALT  13  /  AlkPhos  68  05-22    PTT - ( 23 May 2024 10:36 )  PTT:66.9 sec  Urinalysis Basic - ( 22 May 2024 06:17 )    Color: x / Appearance: x / SG: x / pH: x  Gluc: 89 mg/dL / Ketone: x  / Bili: x / Urobili: x   Blood: x / Protein: x / Nitrite: x   Leuk Esterase: x / RBC: x / WBC x   Sq Epi: x / Non Sq Epi: x / Bacteria: x      I&O's Summary    22 May 2024 07:01  -  23 May 2024 07:00  --------------------------------------------------------  IN: 308 mL / OUT: 300 mL / NET: 8 mL    ADVANCE DIRECTIVE PREFERENCES    Full Code

## 2024-05-23 NOTE — PROGRESS NOTE ADULT - CONVERSATION DETAILS
discussed the overall poor prognosis and he understands and states he needs time to digest the inofrmationa nd then will consider DNR DNI - but ofr now will hold off on HD
Discussed with patient's son alexis at length about her declining overall and risks associated with focussing solely on renal failure and HD
Spoke to son Moe.  He states he spoke to  Dr. Guerrero and wishes to hold on HD.  He just wants to take her home, allow her to enjoy the outside, and spend time together.  He is also considering taking her to [place].  He also states he was discussed hospice.  Reviewed hospice services. If at some point he wishes to move her to [place], hospice services can be set up there.
Patient and family were discussed the  likely eventual need for HD and the risks surrounding it given patient's comorbidities and advance age.  Discussed hospice if they decide not pursue HD and wish to focus on her comfort.  Son stated he would want to take his mother to [place] where he resides.     We  discussed advanced directives - CPR, intubation  and mechanical ventilation.  Informed of limited benefit in consideration of patient's current and comorbid medical conditions and advance age    Family needs time to think about things. Psychosocial support.

## 2024-05-23 NOTE — PROGRESS NOTE ADULT - SUBJECTIVE AND OBJECTIVE BOX
LIZY JOE Patient is a 84y old  Female who presents with a chief complaint of RUE DVT,acute on ckd,diarrhea (23 May 2024 14:01)     HPI:  84y F w/ hx ESRD s/p renal transplant on mycophenolate and tacrolimus,Prior hx of  HD , CVA, HTN, HLD, anemia, dementia,lung nodule,RUE dvt,caumadin toxicity, GI Bleed  presents from [facility] for RUE swelling with hx of  RUE DVT,diarrhea(started on metronidazole 05/06,pending cdif).Pt reports few days of R arm swelling, no pain.Pt denies n/v/d,abd pain,cp,sob,active bleed.  Per transfer papers, pt had RUE US done on 3/15 that showed RUE DVT involving internal jugular and subclavian veins, for which she was started on Coumadin. Was recently admitted to this hospital with anemia and elevated INR,found new lung nodule rec biopsy,seen by hem/onc . Family decided against further anticoagulation at that time given bleeding risk/biopsy,seen by GI,unable to do scope for poor prep,rec out pt f/u.. Pt  had RUE US done on 5/8 that again showed +DVT in right jugular vein, so pt was sent back to the ED.ED spoke with son,wants to start AC now.      4/19/24- CT A/P-Distended gallbladder with cholelithiasis. Decompressed colon with additional suggestion of submucosal edema involving the descending and sigmoid colon suggesting colitis, ischemic colitis cannot be excluded. Disseminated innumerable pulmonary nodules are worrisome for metastatic disease. Subcapsular segment 8 liver lesion measuring 1.8 cm.  4/20/24- CT-C- The bilateral pulmonary nodules are indeterminate ( right middle lobe is a 7 mm noncalcified nodule, left lower lobe is a 9 x 6 mm noncalcified nodule)Small bilateral pleural effusions and atelectasis. thyroid gland is heterogeneous with bilobar nodules and   calcifications.    PMH: ESRD s/p renal transplant on mycophenolate and tacrolimus, CVA, HTN, HLD, anemia, dementia,lung nodule,RUE dvt,caumadin toxicity, GI Bleed  presents from [facility] for RUE swelling with hx of  RUE DVT,lung nodule.AVF ,HX of HD     SH: Pt denies smoking/alcohol/illicit drug use  FH: no hx cva,strokeheart disease-deonte with son       (09 May 2024 09:13)    The patient was seen and evaluated lying in bed - supported- no family at bedside - called son several times - no answer   The patient is in no acute distress.  Denied any fever chest pain, palpitations, shortness of breath, abdominal pain, fever, dysuria, cough, edema       I&O's Summary    22 May 2024 07:01  -  23 May 2024 07:00  --------------------------------------------------------  IN: 308 mL / OUT: 300 mL / NET: 8 mL      Allergies    No Known Allergies    Intolerances      HEALTH ISSUES - PROBLEM Dx:  Deep vein thrombosis (DVT)    Renal failure, acute    Diarrhea    Acute colitis    History of renal transplant    CVA (cerebrovascular accident)    GI bleed    Anemia due to acute blood loss          PAST MEDICAL & SURGICAL HISTORY:          Vital Signs Last 24 Hrs  T(C): 36.4 (23 May 2024 16:36), Max: 36.6 (22 May 2024 20:58)  T(F): 97.6 (23 May 2024 16:36), Max: 97.8 (22 May 2024 20:58)  HR: 97 (23 May 2024 16:36) (87 - 101)  BP: 143/81 (23 May 2024 16:36) (143/81 - 189/76)  BP(mean): --  RR: 18 (23 May 2024 16:36) (16 - 18)  SpO2: 96% (23 May 2024 16:36) (95% - 97%)    Parameters below as of 23 May 2024 16:36  Patient On (Oxygen Delivery Method): room air    T(C): 36.4 (05-23-24 @ 16:36), Max: 36.6 (05-22-24 @ 20:58)  HR: 97 (05-23-24 @ 16:36) (87 - 101)  BP: 143/81 (05-23-24 @ 16:36) (143/81 - 189/76)  RR: 18 (05-23-24 @ 16:36) (16 - 18)  SpO2: 96% (05-23-24 @ 16:36) (95% - 97%)  Wt(kg): --    PHYSICAL EXAM:    GENERAL: NAD, frail elderly   HEAD:  Atraumatic, Normocephalic  EYES:  conjunctiva and sclera clear  ENMT:  Moist mucous membranes,    NERVOUS SYSTEM:  Alert & Oriented X1 barely  Moves upper and lower extremities; CNS-II-XII  CHEST/LUNG: Clear to auscultation bilaterally;  HEART: Regular rate and rhythm; No murmurs,   ABDOMEN: Soft, Nontender, Nondistended; Bowel sounds present  EXTREMITIES:  Peripheral Pulses  psychiatry- mood and affect appropriate,     acetaminophen     Tablet .. 650 milliGRAM(s) Oral every 6 hours PRN  aluminum hydroxide/magnesium hydroxide/simethicone Suspension 30 milliLiter(s) Oral every 4 hours PRN  amLODIPine   Tablet 10 milliGRAM(s) Oral daily  calcitriol   Capsule 0.5 MICROGram(s) Oral daily  carvedilol 6.25 milliGRAM(s) Oral every 12 hours  chlorhexidine 2% Cloths 1 Application(s) Topical daily  clopidogrel Tablet 75 milliGRAM(s) Oral daily  cyanocobalamin 1000 MICROGram(s) Oral daily  dextrose 5% + lactated ringers. 1000 milliLiter(s) IV Continuous <Continuous>  ergocalciferol 84835 Unit(s) Oral every week  ferrous    sulfate 325 milliGRAM(s) Oral daily  folic acid 1 milliGRAM(s) Oral daily  heparin   Injectable 2000 Unit(s) IV Push every 6 hours PRN  heparin  Infusion. 400 Unit(s)/Hr IV Continuous <Continuous>  magnesium hydroxide Suspension 30 milliLiter(s) Oral daily PRN  melatonin 3 milliGRAM(s) Oral at bedtime PRN  mirtazapine 7.5 milliGRAM(s) Oral at bedtime  multivitamin 1 Tablet(s) Oral daily  mycophenolate mofetil 500 milliGRAM(s) Oral two times a day  Nephro-casie 1 Tablet(s) Oral daily  ondansetron Injectable 4 milliGRAM(s) IV Push every 8 hours PRN  pantoprazole   Suspension 40 milliGRAM(s) Oral two times a day  predniSONE   Tablet 5 milliGRAM(s) Oral daily  psyllium Powder 1 Packet(s) Oral daily  sodium bicarbonate 1950 milliGRAM(s) Oral two times a day  tacrolimus 2 milliGRAM(s) Oral every 12 hours  tamsulosin 0.4 milliGRAM(s) Oral at bedtime      LABS:                          8.6    4.08  )-----------( 190      ( 22 May 2024 06:17 )             27.3     05-22    143  |  105  |  72.9<H>  ----------------------------<  89  4.8   |  26.0  |  6.30<H>    Ca    8.0<L>      22 May 2024 06:17    TPro  4.9<L>  /  Alb  2.5<L>  /  TBili  <0.2<L>  /  DBili  x   /  AST  24  /  ALT  13  /  AlkPhos  68  05-22    LIVER FUNCTIONS - ( 22 May 2024 06:17 )  Alb: 2.5 g/dL / Pro: 4.9 g/dL / ALK PHOS: 68 U/L / ALT: 13 U/L / AST: 24 U/L / GGT: x           PTT - ( 23 May 2024 10:36 )  PTT:66.9 sec      Urinalysis Basic - ( 22 May 2024 06:17 )    Color: x / Appearance: x / SG: x / pH: x  Gluc: 89 mg/dL / Ketone: x  / Bili: x / Urobili: x   Blood: x / Protein: x / Nitrite: x   Leuk Esterase: x / RBC: x / WBC x   Sq Epi: x / Non Sq Epi: x / Bacteria: x      CAPILLARY BLOOD GLUCOSE          RADIOLOGY & ADDITIONAL TESTS:      Consultant notes reviewed    Case discussed with consultant/provider/ family /patient

## 2024-05-23 NOTE — PROGRESS NOTE ADULT - CONVERSATION/DISCUSSION
Diagnosis/Prognosis
Diagnosis/Prognosis/Palliative Care Referral
Diagnosis/Prognosis/Treatment Options
Diagnosis/Prognosis/Treatment Options

## 2024-05-23 NOTE — PROGRESS NOTE ADULT - ASSESSMENT
84F with hx of ESRD was on HD and s/p transplant with now CKD, prior CVA, HTN, dementia, RUE DVT ( not on AC 2/2 GIB), recent admission for acute anemia/GI bleed / new bilateral lung nodules concerning for mets now admitted worsening RUE edema with doppler confirming +DVT and diarrhea 2/2 C difficile colitis with  renal failure. Palliative consulted for support and goals of care.     ESRD  avoid nephrotoxic agents  Noted discussion on 5/22- HD on hol    Bilateral Lung Nodules  - noted on CT on 4/19 and son declined biopsy during last admission and to follow up heme/onc outpatient    Debility  Advancing Dementia  - CTH from 4/19 noted diffuse cerebral volume loss and microvascular ischemic changes   - likely Vascular given prior CVA  - Patient verbal, per PT needs moderate assistance  - please assist with ADLs, safety precautions    Palliative Care Encounter   Noted [facility]OC 5/22 by primary team - HD on hold   Spoke with patient this am to follow up GOC  Patient and son have been discussed risks of HD given patient's frail condition and comorbidities  Patient non engaging  becomes irritable with further discussion. Concern if she would be committed with HD.   Called son - received voice mail.  Will reach out again later today   84F with hx of ESRD was on HD and s/p transplant with now CKD, prior CVA, HTN, dementia, RUE DVT ( not on AC 2/2 GIB), recent admission for acute anemia/GI bleed / new bilateral lung nodules concerning for mets now admitted worsening RUE edema with doppler confirming +DVT and diarrhea 2/2 C difficile colitis with  renal failure. Palliative consulted for support and goals of care.     ESRD  avoid nephrotoxic agents  Noted discussion on 5/22- HD on hol    Bilateral Lung Nodules  - noted on CT on 4/19 and son declined biopsy during last admission and to follow up heme/onc outpatient    Debility  Advancing Dementia  - CTH from 4/19 noted diffuse cerebral volume loss and microvascular ischemic changes   - likely Vascular given prior CVA  - Patient verbal, per PT needs moderate assistance  - please assist with ADLs, safety precautions    Palliative Care Encounter   Noted [facility] 5/22 by primary team - HD on hold   Spoke with patient this am to follow up GOC  Patient and son have been discussed risks of HD given patient's frail condition and comorbidities  Patient non engaging  becomes irritable with further discussion. Concern if she would be committed with HD.    Spoke to son - See GOC note above for details.   1. Moe confirms he spoke to his mother and they want to put HD on hold  2.  Agreeable to  home hospice services   Psychosocial support  HospiceReferral made       84F with hx of ESRD was on HD and s/p transplant with now CKD, prior CVA, HTN, dementia, RUE DVT ( not on AC 2/2 GIB), recent admission for acute anemia/GI bleed / new bilateral lung nodules concerning for mets now admitted worsening RUE edema with doppler confirming +DVT and diarrhea 2/2 C difficile colitis with  renal failure. Palliative consulted for support and goals of care.     ESRD  avoid nephrotoxic agents  Noted discussion on 5/22- HD on hol    Bilateral Lung Nodules  - noted on CT on 4/19 and son declined biopsy during last admission and to follow up heme/onc outpatient    Debility  Advancing Dementia  - CTH from 4/19 noted diffuse cerebral volume loss and microvascular ischemic changes   - likely Vascular given prior CVA  - Patient verbal, per PT needs moderate assistance  - please assist with ADLs, safety precautions    Palliative Care Encounter   Noted [facility] 5/22 by primary team - HD on hold   Spoke with patient this am to follow up GOC  Patient and son have been discussed risks of HD given patient's frail condition and comorbidities  Patient non engaging  becomes irritable with further discussion. Concern if she would be committed with HD.    Spoke to son - See GOC note above for details.   1. Moe confirms he spoke to his mother and they want to put HD on hold  2.  Agreeable to  home hospice services   Psychosocial support  Hospice Referral made

## 2024-05-24 VITALS
HEART RATE: 92 BPM | RESPIRATION RATE: 18 BRPM | SYSTOLIC BLOOD PRESSURE: 163 MMHG | TEMPERATURE: 98 F | OXYGEN SATURATION: 96 % | DIASTOLIC BLOOD PRESSURE: 90 MMHG

## 2024-05-24 LAB
ALBUMIN SERPL ELPH-MCNC: 2.4 G/DL — LOW (ref 3.3–5.2)
ALP SERPL-CCNC: 54 U/L — SIGNIFICANT CHANGE UP (ref 40–120)
ALT FLD-CCNC: 14 U/L — SIGNIFICANT CHANGE UP
ANION GAP SERPL CALC-SCNC: 15 MMOL/L — SIGNIFICANT CHANGE UP (ref 5–17)
APTT BLD: 95.5 SEC — HIGH (ref 24.5–35.6)
AST SERPL-CCNC: 23 U/L — SIGNIFICANT CHANGE UP
BILIRUB SERPL-MCNC: <0.2 MG/DL — LOW (ref 0.4–2)
BUN SERPL-MCNC: 68.3 MG/DL — HIGH (ref 8–20)
CALCIUM SERPL-MCNC: 7.5 MG/DL — LOW (ref 8.4–10.5)
CHLORIDE SERPL-SCNC: 103 MMOL/L — SIGNIFICANT CHANGE UP (ref 96–108)
CO2 SERPL-SCNC: 26 MMOL/L — SIGNIFICANT CHANGE UP (ref 22–29)
CREAT SERPL-MCNC: 6.1 MG/DL — HIGH (ref 0.5–1.3)
EGFR: 6 ML/MIN/1.73M2 — LOW
GLUCOSE SERPL-MCNC: 78 MG/DL — SIGNIFICANT CHANGE UP (ref 70–99)
HCT VFR BLD CALC: 25.7 % — LOW (ref 34.5–45)
HGB BLD-MCNC: 8.3 G/DL — LOW (ref 11.5–15.5)
MCHC RBC-ENTMCNC: 30.1 PG — SIGNIFICANT CHANGE UP (ref 27–34)
MCHC RBC-ENTMCNC: 32.3 GM/DL — SIGNIFICANT CHANGE UP (ref 32–36)
MCV RBC AUTO: 93.1 FL — SIGNIFICANT CHANGE UP (ref 80–100)
PLATELET # BLD AUTO: 180 K/UL — SIGNIFICANT CHANGE UP (ref 150–400)
POTASSIUM SERPL-MCNC: 4.5 MMOL/L — SIGNIFICANT CHANGE UP (ref 3.5–5.3)
POTASSIUM SERPL-SCNC: 4.5 MMOL/L — SIGNIFICANT CHANGE UP (ref 3.5–5.3)
PROT SERPL-MCNC: 4.7 G/DL — LOW (ref 6.6–8.7)
RBC # BLD: 2.76 M/UL — LOW (ref 3.8–5.2)
RBC # FLD: 15.6 % — HIGH (ref 10.3–14.5)
SODIUM SERPL-SCNC: 144 MMOL/L — SIGNIFICANT CHANGE UP (ref 135–145)
WBC # BLD: 3.05 K/UL — LOW (ref 3.8–10.5)
WBC # FLD AUTO: 3.05 K/UL — LOW (ref 3.8–10.5)

## 2024-05-24 PROCEDURE — 80048 BASIC METABOLIC PNL TOTAL CA: CPT

## 2024-05-24 PROCEDURE — 85025 COMPLETE CBC W/AUTO DIFF WBC: CPT

## 2024-05-24 PROCEDURE — 99239 HOSP IP/OBS DSCHRG MGMT >30: CPT

## 2024-05-24 PROCEDURE — 81001 URINALYSIS AUTO W/SCOPE: CPT

## 2024-05-24 PROCEDURE — 83540 ASSAY OF IRON: CPT

## 2024-05-24 PROCEDURE — 80074 ACUTE HEPATITIS PANEL: CPT

## 2024-05-24 PROCEDURE — 85730 THROMBOPLASTIN TIME PARTIAL: CPT

## 2024-05-24 PROCEDURE — 86706 HEP B SURFACE ANTIBODY: CPT

## 2024-05-24 PROCEDURE — 93971 EXTREMITY STUDY: CPT

## 2024-05-24 PROCEDURE — 86900 BLOOD TYPING SEROLOGIC ABO: CPT

## 2024-05-24 PROCEDURE — 82330 ASSAY OF CALCIUM: CPT

## 2024-05-24 PROCEDURE — 80053 COMPREHEN METABOLIC PANEL: CPT

## 2024-05-24 PROCEDURE — 99285 EMERGENCY DEPT VISIT HI MDM: CPT

## 2024-05-24 PROCEDURE — 82272 OCCULT BLD FECES 1-3 TESTS: CPT

## 2024-05-24 PROCEDURE — 76776 US EXAM K TRANSPL W/DOPPLER: CPT

## 2024-05-24 PROCEDURE — 83735 ASSAY OF MAGNESIUM: CPT

## 2024-05-24 PROCEDURE — 87641 MR-STAPH DNA AMP PROBE: CPT

## 2024-05-24 PROCEDURE — 97163 PT EVAL HIGH COMPLEX 45 MIN: CPT

## 2024-05-24 PROCEDURE — 99232 SBSQ HOSP IP/OBS MODERATE 35: CPT

## 2024-05-24 PROCEDURE — 87640 STAPH A DNA AMP PROBE: CPT

## 2024-05-24 PROCEDURE — 87507 IADNA-DNA/RNA PROBE TQ 12-25: CPT

## 2024-05-24 PROCEDURE — 84100 ASSAY OF PHOSPHORUS: CPT

## 2024-05-24 PROCEDURE — 86901 BLOOD TYPING SEROLOGIC RH(D): CPT

## 2024-05-24 PROCEDURE — 83550 IRON BINDING TEST: CPT

## 2024-05-24 PROCEDURE — 86850 RBC ANTIBODY SCREEN: CPT

## 2024-05-24 PROCEDURE — 36415 COLL VENOUS BLD VENIPUNCTURE: CPT

## 2024-05-24 PROCEDURE — 84466 ASSAY OF TRANSFERRIN: CPT

## 2024-05-24 PROCEDURE — 85610 PROTHROMBIN TIME: CPT

## 2024-05-24 PROCEDURE — 71045 X-RAY EXAM CHEST 1 VIEW: CPT

## 2024-05-24 PROCEDURE — 97110 THERAPEUTIC EXERCISES: CPT

## 2024-05-24 PROCEDURE — 85027 COMPLETE CBC AUTOMATED: CPT

## 2024-05-24 RX ORDER — OXYCODONE HYDROCHLORIDE 5 MG/1
1 TABLET ORAL
Qty: 12 | Refills: 0
Start: 2024-05-24 | End: 2024-05-26

## 2024-05-24 RX ORDER — MIRTAZAPINE 45 MG/1
1 TABLET, ORALLY DISINTEGRATING ORAL
Qty: 30 | Refills: 0
Start: 2024-05-24 | End: 2024-06-22

## 2024-05-24 RX ORDER — METRONIDAZOLE 500 MG
1 TABLET ORAL
Refills: 0 | DISCHARGE

## 2024-05-24 RX ORDER — ACETAMINOPHEN 500 MG
1 TABLET ORAL
Qty: 12 | Refills: 0
Start: 2024-05-24 | End: 2024-05-26

## 2024-05-24 RX ORDER — OXYCODONE HYDROCHLORIDE 5 MG/1
5 TABLET ORAL EVERY 6 HOURS
Refills: 0 | Status: DISCONTINUED | OUTPATIENT
Start: 2024-05-24 | End: 2024-05-24

## 2024-05-24 RX ORDER — CARVEDILOL PHOSPHATE 80 MG/1
6.25 CAPSULE, EXTENDED RELEASE ORAL ONCE
Refills: 0 | Status: COMPLETED | OUTPATIENT
Start: 2024-05-24 | End: 2024-05-24

## 2024-05-24 RX ORDER — APIXABAN 2.5 MG/1
1 TABLET, FILM COATED ORAL
Qty: 60 | Refills: 0
Start: 2024-05-24 | End: 2024-06-22

## 2024-05-24 RX ORDER — SODIUM BICARBONATE 1 MEQ/ML
3 SYRINGE (ML) INTRAVENOUS
Refills: 0 | DISCHARGE

## 2024-05-24 RX ORDER — SODIUM BICARBONATE 1 MEQ/ML
3 SYRINGE (ML) INTRAVENOUS
Qty: 180 | Refills: 0
Start: 2024-05-24 | End: 2024-06-22

## 2024-05-24 RX ORDER — APIXABAN 2.5 MG/1
2.5 TABLET, FILM COATED ORAL
Refills: 0 | Status: DISCONTINUED | OUTPATIENT
Start: 2024-05-24 | End: 2024-05-24

## 2024-05-24 RX ORDER — MIRTAZAPINE 45 MG/1
0.5 TABLET, ORALLY DISINTEGRATING ORAL
Refills: 0 | DISCHARGE

## 2024-05-24 RX ORDER — PROCHLORPERAZINE MALEATE 5 MG
1 TABLET ORAL
Qty: 6 | Refills: 0
Start: 2024-05-24 | End: 2024-05-26

## 2024-05-24 RX ORDER — CARVEDILOL PHOSPHATE 80 MG/1
1 CAPSULE, EXTENDED RELEASE ORAL
Qty: 60 | Refills: 0
Start: 2024-05-24 | End: 2024-06-22

## 2024-05-24 RX ORDER — CARVEDILOL PHOSPHATE 80 MG/1
12.5 CAPSULE, EXTENDED RELEASE ORAL EVERY 12 HOURS
Refills: 0 | Status: DISCONTINUED | OUTPATIENT
Start: 2024-05-24 | End: 2024-05-24

## 2024-05-24 RX ORDER — CARVEDILOL PHOSPHATE 80 MG/1
1 CAPSULE, EXTENDED RELEASE ORAL
Refills: 0 | DISCHARGE

## 2024-05-24 RX ADMIN — APIXABAN 2.5 MILLIGRAM(S): 2.5 TABLET, FILM COATED ORAL at 12:14

## 2024-05-24 RX ADMIN — CARVEDILOL PHOSPHATE 6.25 MILLIGRAM(S): 80 CAPSULE, EXTENDED RELEASE ORAL at 16:01

## 2024-05-24 RX ADMIN — CALCITRIOL 0.5 MICROGRAM(S): 0.5 CAPSULE ORAL at 08:56

## 2024-05-24 RX ADMIN — PANTOPRAZOLE SODIUM 40 MILLIGRAM(S): 20 TABLET, DELAYED RELEASE ORAL at 05:33

## 2024-05-24 RX ADMIN — Medication 1 MILLIGRAM(S): at 08:57

## 2024-05-24 RX ADMIN — Medication 1950 MILLIGRAM(S): at 18:02

## 2024-05-24 RX ADMIN — CARVEDILOL PHOSPHATE 12.5 MILLIGRAM(S): 80 CAPSULE, EXTENDED RELEASE ORAL at 18:45

## 2024-05-24 RX ADMIN — Medication 325 MILLIGRAM(S): at 08:58

## 2024-05-24 RX ADMIN — Medication 5 MILLIGRAM(S): at 05:34

## 2024-05-24 RX ADMIN — TACROLIMUS 2 MILLIGRAM(S): 5 CAPSULE ORAL at 08:57

## 2024-05-24 RX ADMIN — MYCOPHENOLATE MOFETIL 500 MILLIGRAM(S): 250 CAPSULE ORAL at 18:01

## 2024-05-24 RX ADMIN — Medication 1950 MILLIGRAM(S): at 05:33

## 2024-05-24 RX ADMIN — PANTOPRAZOLE SODIUM 40 MILLIGRAM(S): 20 TABLET, DELAYED RELEASE ORAL at 18:01

## 2024-05-24 RX ADMIN — Medication 1 PACKET(S): at 08:59

## 2024-05-24 RX ADMIN — HEPARIN SODIUM 600 UNIT(S)/HR: 5000 INJECTION INTRAVENOUS; SUBCUTANEOUS at 06:15

## 2024-05-24 RX ADMIN — Medication 1 TABLET(S): at 08:57

## 2024-05-24 RX ADMIN — PREGABALIN 1000 MICROGRAM(S): 225 CAPSULE ORAL at 08:59

## 2024-05-24 RX ADMIN — CARVEDILOL PHOSPHATE 6.25 MILLIGRAM(S): 80 CAPSULE, EXTENDED RELEASE ORAL at 05:33

## 2024-05-24 RX ADMIN — HEPARIN SODIUM 600 UNIT(S)/HR: 5000 INJECTION INTRAVENOUS; SUBCUTANEOUS at 07:28

## 2024-05-24 RX ADMIN — AMLODIPINE BESYLATE 10 MILLIGRAM(S): 2.5 TABLET ORAL at 05:34

## 2024-05-24 RX ADMIN — Medication 1 TABLET(S): at 08:58

## 2024-05-24 RX ADMIN — MYCOPHENOLATE MOFETIL 500 MILLIGRAM(S): 250 CAPSULE ORAL at 05:34

## 2024-05-24 RX ADMIN — CLOPIDOGREL BISULFATE 75 MILLIGRAM(S): 75 TABLET, FILM COATED ORAL at 08:58

## 2024-05-24 RX ADMIN — ERGOCALCIFEROL 50000 UNIT(S): 1.25 CAPSULE ORAL at 18:02

## 2024-05-24 RX ADMIN — CHLORHEXIDINE GLUCONATE 1 APPLICATION(S): 213 SOLUTION TOPICAL at 08:57

## 2024-05-24 NOTE — PROGRESS NOTE ADULT - SUBJECTIVE AND OBJECTIVE BOX
Reason for visit: Advanced CKD    Subjective: No acute overnight event. Patient denied any cardiac or urinary complains. No fever/chills.     ROS: All systems were reviewed in detail pertinent positive and negative mentioned above, rest are negative.    Physical Exam:  Gen: no acute distress  MS: alert, conversing normally  Eyes: EOMI, no icterus  HENT: NCAT, MMM  CV: rhythm reg reg, rate normal, no m/g/r  Chest: CTAB, no w/r/r,  Abd: soft, NT, ND  Extremities: No edema    Vital Signs Last 24 Hrs  T(C): 36.4 (24 May 2024 12:05), Max: 36.6 (23 May 2024 20:25)  T(F): 97.5 (24 May 2024 12:05), Max: 97.8 (23 May 2024 20:25)  HR: 86 (24 May 2024 12:05) (86 - 102)  BP: 165/89 (24 May 2024 12:05) (113/78 - 180/90)  BP(mean): --  RR: 18 (24 May 2024 12:05) (16 - 18)  SpO2: 95% (24 May 2024 12:05) (95% - 99%)    Parameters below as of 24 May 2024 12:05  Patient On (Oxygen Delivery Method): room air      I&O's Summary    23 May 2024 07:01  -  24 May 2024 07:00  --------------------------------------------------------  IN: 0 mL / OUT: 300 mL / NET: -300 mL    24 May 2024 07:01  -  24 May 2024 12:29  --------------------------------------------------------  IN: 120 mL / OUT: 0 mL / NET: 120 mL      Current Antibiotics:    Other medications:  amLODIPine   Tablet 10 milliGRAM(s) Oral daily  apixaban 2.5 milliGRAM(s) Oral two times a day  calcitriol   Capsule 0.5 MICROGram(s) Oral daily  carvedilol 6.25 milliGRAM(s) Oral every 12 hours  chlorhexidine 2% Cloths 1 Application(s) Topical daily  clopidogrel Tablet 75 milliGRAM(s) Oral daily  cyanocobalamin 1000 MICROGram(s) Oral daily  ergocalciferol 69904 Unit(s) Oral every week  ferrous    sulfate 325 milliGRAM(s) Oral daily  folic acid 1 milliGRAM(s) Oral daily  mirtazapine 7.5 milliGRAM(s) Oral at bedtime  multivitamin 1 Tablet(s) Oral daily  mycophenolate mofetil 500 milliGRAM(s) Oral two times a day  Nephro-casie 1 Tablet(s) Oral daily  pantoprazole   Suspension 40 milliGRAM(s) Oral two times a day  predniSONE   Tablet 5 milliGRAM(s) Oral daily  psyllium Powder 1 Packet(s) Oral daily  sodium bicarbonate 1950 milliGRAM(s) Oral two times a day  tacrolimus 2 milliGRAM(s) Oral every 12 hours  tamsulosin 0.4 milliGRAM(s) Oral at bedtime    05-24    144  |  103  |  68.3<H>  ----------------------------<  78  4.5   |  26.0  |  6.10<H>    Ca    7.5<L>      24 May 2024 05:15    TPro  4.7<L>  /  Alb  2.4<L>  /  TBili  <0.2<L>  /  DBili  x   /  AST  23  /  ALT  14  /  AlkPhos  54  05-24    Creatinine: 6.10 mg/dL (05-24-24 @ 05:15)  Creatinine: 6.30 mg/dL (05-22-24 @ 06:17)  Creatinine: 6.22 mg/dL (05-21-24 @ 06:58)  Creatinine: 6.60 mg/dL (05-20-24 @ 07:16)

## 2024-05-24 NOTE — PROGRESS NOTE ADULT - ASSESSMENT
84 YOM ESRD s/p renal transplant, HTN, CVA, dementia presents from NH with RUE swelling (history of DVT in RUE on AC)     1. Advanced Chronic kidney disease stage 5:  -Transplant status; s/p DDKT 2012 at [facility]  -Baseline allograft function ; GFR < 12 since December 2023  -Serum creatinine 6.25- 6.5 GFR 6.    -Patient/Son in discussion with palliative and primary team. They are not wanting HD for now. They understand the consequences.      2. Immunosuppression:  -Patient is on tacrolimus 2 mg twice daily,  mg twice daily, prednisone 5 mg daily  -Continue immunosuppression- can be weaned down if going to home w/ hospice (to be discussed with primary transplant team post discharge by family)    3. Hypocalcemia; stable    4. Metabolic acidosis:  -Continue on PO sodium bicarbonate 1950 mg PO BID for now    5. BMD w/ CKD: Renal diet, continue on calcitriol    6. RUE swelling and DVT: On anticoagulation.    7. Lung nodules, liver lesion, thyroid nodules and suspected malignancy: patient/family not opting for biopsy at present.    ?possible home with hospice. Will follow. d/w Dr Fischer.    7. Anemia; start ALEXANDRA with HD

## 2024-05-24 NOTE — DISCHARGE NOTE PROVIDER - HOSPITAL COURSE
84F with hx of ESRD was on HD and s/p transplant with now CKD, prior CVA, HTN, dementia, RUE DVT ( not on AC 2/2 GIB), recent admission for acute anemia/GI bleed / new bilateral lung nodules concerning for mets now admitted worsening RUE edema with doppler confirming +DVT and diarrhea 2/2 C difficile colitis with  renal failure. Palliative consulted for support and goals of care. Discussions held and family opted for no further work up of malignancy and no dialysis at this time. All agreeable to home hospice. Patient to follow up with Renal transplant team regarding meds and any adjustments that may be needed. Discussed with Son Geraldo. We also discussed transitioning to oral eliquis for dvt and the risk of bleeding.

## 2024-05-24 NOTE — CHART NOTE - NSCHARTNOTEFT_GEN_A_CORE
Palliative care social work note.    SW contacted son Moe to introduce service as well as gather his understanding of patients condition. Son reports that he came from [facility] and will be here for awhile as he is trying to find out more details about patient condition. Son reports all he knows is from ER doctor upon admission but is receptive to meeting with physicians further to gather information. Patients son will be here today after 5 but will be in hospital tomorrow in afternoon. Discussed with palliative care physicians DR Summers as it appears patient will need dialysis and further goals of care discussions with son.
Source: Patient, chart, staff     Current Diet: Diet, Renal Restrictions:   For patients receiving Renal Replacement - No Protein Restr, No Conc K, No Conc Phos, Low Sodium  1200mL Fluid Restriction ([id number])  Banatrol TF     Qty per Day:  2  Supplement Feeding Modality:  Oral  Nepro Cans or Servings Per Day:  2       Frequency:  Two Times a day (05-15-24 @ 15:02)    PO intake: %      Source for PO intake: patient, chart, staff    Current Weight:   5/9 113.3 lbs    Generalized 1+ edema   Unable to obtain updated bed scale weight at this time     Pertinent Medications: MEDICATIONS  (STANDING):  calcitriol   Capsule 0.5 MICROGram(s) Oral daily  cyanocobalamin 1000 MICROGram(s) Oral daily  ergocalciferol 38163 Unit(s) Oral every week  ferrous    sulfate 325 milliGRAM(s) Oral daily  folic acid 1 milliGRAM(s) Oral daily  multivitamin 1 Tablet(s) Oral daily  Nephro-casie 1 Tablet(s) Oral daily  pantoprazole   Suspension 40 milliGRAM(s) Oral two times a day  predniSONE   Tablet 5 milliGRAM(s) Oral daily  sodium bicarbonate 1950 milliGRAM(s) Oral two times a day    MEDICATIONS  (PRN):  magnesium hydroxide Suspension 30 milliLiter(s) Oral daily PRN Constipation  ondansetron Injectable 4 milliGRAM(s) IV Push every 8 hours PRN Nausea and/or Vomiting    Pertinent Labs: 05-24 Na144 mmol/L Glu 78 mg/dL K+ 4.5 mmol/L Cr  6.10 mg/dL<H> BUN 68.3 mg/dL<H>    Nutrition focused physical exam conducted previously with signs of malnutrition present    Subcutaneous fat loss: [x] Orbital fat pads region, [ ]Buccal fat region, [ ]Triceps region,  [x]Ribs region    Muscle wasting: [ ]Temples region, [x]Clavicle region, [x]Shoulder region, [ ]Scapula region, [ ]Interosseous region,  [ ]thigh region, [ ]Calf region    Estimated Needs:   [x] no change since previous assessment  4460-9053 kcal (based on 30-35 kcal.kg 51.2kg)  61-72 (based on 1.2-1.4g/kg 51.2kg)    Current Nutrition Diagnosis: Chronic severe malnutrition related to inability to meet sufficient protein-energy in the setting of ESRD s/p renal transplant as evidenced by severe muscle/fat wasting and PO intake <75% EER >1 month    Patient continuing to tolerate diet well with good appetite/PO intake. Pt mumbling during nutrition interview and very lethargic. Spoke with nursing staff. Reports pt ate ~75% of breakfast tray this AM. Unopened Nepro at bedside - unsure if pt is drinking supplement. No c/o N/V/C. Per PCA pt with small amount of diarrhea this AM. Banatrol BID in place to add bulk to stool. Will continue to monitor and follow up as needed. RD remains available.     Recommendations:   1) Continue diet as tolerated.   2) Encourage po intake, monitor diet tolerance, and provide assistance at meals as needed.   3) Continue Nepro BID to optimize PO intake, encourage intake.  4) Continue Nephrovite daily   5) Obtain daily weights to monitor trends.   6) Continue Banatrol BID (contains prebiotics and soluble fiber to add bulk to stool).    Monitoring and Evaluation:   [x] PO intake [x] Tolerance to diet prescription [X] Weights  [X] Follow up per protocol [X] Labs: renal labs.
Source: Staff, chart     Current Diet: Diet, Renal Restrictions:   For patients receiving Renal Replacement - No Protein Restr, No Conc K, No Conc Phos, Low Sodium  1200mL Fluid Restriction ([id number])  Banatrol TF     Qty per Day:  2  Supplement Feeding Modality:  Oral  Nepro Cans or Servings Per Day:  2       Frequency:  Two Times a day (05-15-24 @ 15:02)    PO intake: 50-75%    Source for PO intake: staff, chart     Current Weight:   5/9 113.3 lbs    Generalized 1+ edema  Unable to obtain updated bed scale weight at this time    Pertinent Medications: MEDICATIONS  (STANDING):  amLODIPine   Tablet 10 milliGRAM(s) Oral daily  calcitriol   Capsule 0.5 MICROGram(s) Oral daily  carvedilol 6.25 milliGRAM(s) Oral every 12 hours  clopidogrel Tablet 75 milliGRAM(s) Oral daily  cyanocobalamin 1000 MICROGram(s) Oral daily  dextrose 5% + lactated ringers. 1000 milliLiter(s) (40 mL/Hr) IV Continuous <Continuous>  ergocalciferol 54245 Unit(s) Oral every week  ferrous    sulfate 325 milliGRAM(s) Oral daily  folic acid 1 milliGRAM(s) Oral daily  mirtazapine 7.5 milliGRAM(s) Oral at bedtime  multivitamin 1 Tablet(s) Oral daily  mycophenolate mofetil 500 milliGRAM(s) Oral two times a day  Nephro-casie 1 Tablet(s) Oral daily  pantoprazole   Suspension 40 milliGRAM(s) Oral two times a day  predniSONE   Tablet 5 milliGRAM(s) Oral daily  psyllium Powder 1 Packet(s) Oral daily  sodium bicarbonate 1950 milliGRAM(s) Oral two times a day  tacrolimus 2 milliGRAM(s) Oral every 12 hours    MEDICATIONS  (PRN):  magnesium hydroxide Suspension 30 milliLiter(s) Oral daily PRN Constipation  ondansetron Injectable 4 milliGRAM(s) IV Push every 8 hours PRN Nausea and/or Vomiting    Pertinent Labs: 05-18 Na142 mmol/L Glu 76 mg/dL K+ 4.9 mmol/L Cr  6.27 mg/dL<H> BUN 75.9 mg/dL<H>     Nutrition focused physical exam conducted previously with signs of malnutrition present    Subcutaneous fat loss: [x] Orbital fat pads region, [ ]Buccal fat region, [ ]Triceps region,  [x]Ribs region    Muscle wasting: [ ]Temples region, [x]Clavicle region, [x]Shoulder region, [ ]Scapula region, [ ]Interosseous region,  [ ]thigh region, [ ]Calf region    Estimated Needs:   [x] no change since previous assessment  2258-8527 kcal (based on 30-35 kcal.kg 51.2kg)  61-72 (based on 1.2-1.4g/kg 51.2kg)    Current Nutrition Diagnosis: Chronic severe malnutrition related to inability to meet sufficient protein-energy in the setting of ESRD s/p renal transplant as evidenced by severe muscle/fat wasting and PO intake <75% EER >1 month    Patient sleeping soundly upon visit today - spoke with nursing staff. Pt continuing to tolerate diet well with fair appetite/PO intake. Per flowsheets pt consuming 50-75% of meals. No current c/o N/V/C/D. Pt previously c/o diarrhea and receiving Banatrol BID  which contains prebiotics and soluble fiber to add bulk to stool.  Will continue to monitor and follow up as needed. RD remains available.    Recommendations:   1) Continue diet as tolerated.   2) Encourage po intake, monitor diet tolerance, and provide assistance at meals as needed.   3) Continue Nepro BID to optimize PO intake.  4) Continue Nephrovite daily   5) Obtain daily weights to monitor trends.   6) Continue Banatrol BID (contains prebiotics and soluble fiber to add bulk to stool).    Monitoring and Evaluation:   [x] PO intake [x] Tolerance to diet prescription [X] Weights  [X] Follow up per protocol [X] Labs: renal labs

## 2024-05-24 NOTE — DISCHARGE NOTE PROVIDER - NSDCCPCAREPLAN_GEN_ALL_CORE_FT
PRINCIPAL DISCHARGE DIAGNOSIS  Diagnosis: Deep vein thrombosis (DVT) of right upper extremity  Assessment and Plan of Treatment: Low dose eliquis  patient noted to have possible malignancy discussions held and opted for no further work up. dishcarged on home hospice      SECONDARY DISCHARGE DIAGNOSES  Diagnosis: Renal failure, acute  Assessment and Plan of Treatment: patient and family opted for no dialysis  cont home meds  follow up with Renal transplant team for medication adjustments  cont sodium bicarb     PRINCIPAL DISCHARGE DIAGNOSIS  Diagnosis: Deep vein thrombosis (DVT) of right upper extremity  Assessment and Plan of Treatment: Low dose eliquis  patient noted to have possible malignancy discussions held and opted for no further work up. dishcarged on home hospice  oxycodone as needed for pain      SECONDARY DISCHARGE DIAGNOSES  Diagnosis: Renal failure, acute  Assessment and Plan of Treatment: patient and family opted for no dialysis  cont home meds  follow up with Renal transplant team for medication adjustments  cont sodium bicarb

## 2024-05-24 NOTE — DISCHARGE NOTE PROVIDER - ATTENDING DISCHARGE PHYSICAL EXAMINATION:
Vital Signs Last 24 Hrs  T(C): 36.4 (24 May 2024 12:05), Max: 36.6 (23 May 2024 20:25)  T(F): 97.5 (24 May 2024 12:05), Max: 97.8 (23 May 2024 20:25)  HR: 86 (24 May 2024 12:05) (86 - 102)  BP: 165/89 (24 May 2024 12:05) (113/78 - 180/90)  BP(mean): --  RR: 18 (24 May 2024 12:05) (18 - 18)  SpO2: 95% (24 May 2024 12:05) (95% - 99%)    Parameters below as of 24 May 2024 12:05  Patient On (Oxygen Delivery Method): room air    PHYSICAL EXAM:  Constitutional: No acute distress, alert and oriented by 3  HEENT: AT/NC, EOMI, PERRLA, Normal conjunctiva, no pharyngeal erythema, moist oral mucosa  Respiratory: decreased breath sounds in the bases  Cardiovascular: RRR, trace edema  Gastrointestinal: soft, Non-tender, Non-distended + Bowel sounds, no rebound or guarding  Genitourinary: no foely  Musculoskeletal: No joint edema  Psychiatric: normal mood and affect

## 2024-05-24 NOTE — DISCHARGE NOTE NURSING/CASE MANAGEMENT/SOCIAL WORK - PATIENT PORTAL LINK FT
You can access the FollowMyHealth Patient Portal offered by [facility] by registering at the following website: [email or web address]. By joining [employer]’s FollowMyHealth portal, you will also be able to view your health information using other applications (apps) compatible with our system.

## 2024-05-24 NOTE — PROGRESS NOTE ADULT - PROVIDER SPECIALTY LIST ADULT
Hospitalist
Hospitalist
Nephrology
Palliative Care
Palliative Care
Hospitalist
Infectious Disease
Internal Medicine
Nephrology
Nephrology
Internal Medicine
Nephrology
Palliative Care
Palliative Care
Hospitalist
Palliative Care
Hospitalist
Palliative Care

## 2024-05-24 NOTE — PROGRESS NOTE ADULT - ASSESSMENT
84F with hx of ESRD was on HD and s/p transplant with now CKD, prior CVA, HTN, dementia, RUE DVT ( not on AC 2/2 GIB), recent admission for acute anemia/GI bleed / new bilateral lung nodules concerning for mets now admitted worsening RUE edema with doppler confirming +DVT and diarrhea 2/2 C difficile colitis with  renal failure. Palliative consulted for support and goals of care.     ESRD  avoid nephrotoxic agents  Noted discussion on 5/22- HD on hold    Bilateral Lung Nodules  - noted on CT on 4/19 and son declined biopsy during last admission and to follow up heme/onc outpatient    Debility  Advancing Dementia  - CTH from 4/19 noted diffuse cerebral volume loss and microvascular ischemic changes   - likely Vascular given prior CVA  - Patient verbal, per PT needs moderate assistance  - please assist with ADLs, safety precautions    Palliative Care Encounter   Noted GOC 5/22 by primary team - HD on hold   [facility] open to home hospice.  Hospice SW has reached out to son  Rec continued discussions on advance directives - CPR, intubation.   Patient with limited capacity - disengaged in conversations

## 2024-05-24 NOTE — PROGRESS NOTE ADULT - REASON FOR ADMISSION
RUE DVT,acute on ckd,diarrhea
RUE DVT, acute on ckd, diarrhea
RUE DVT,acute on ckd,diarrhea

## 2024-05-24 NOTE — PROGRESS NOTE ADULT - TIME BILLING
Time spent with review of chart documents, labs, imaging. Direct patient assessment,  formulation of care plan. Discussion with  Interdisciplinary  team Dr. Yarbrough
Time spent with review of chart documents, labs, imaging. Direct patient assessment,  formulation of care plan. Discussion with  Interdisciplinary  team  Dr. Osullivan
Reviewing charts and coordinating care.  Called [facility] to get results for C. Diff.  Interpreted labs and ordered calcium gluconate and repeat labs for morning.
Time spent reviewing the chart documentation, reviewing labs and imaging studies, evaluating the patient, discussing the plan of care with the consultants & medical team, and documenting.
Time spent reviewing the chart documentation, reviewing labs and imaging studies, evaluating the patient, discussing the plan of care with the consultants & medical team, and documenting.
Time spent with review of chart documents, labs, imaging. Direct patient assessment,  formulation of care plan. Discussion with  Interdisciplinary  team  LANIE Burton, Nephrology
Time spent with review of chart documents, labs, imaging. Direct patient assessment,  formulation of care plan. Discussion with  Interdisciplinary  team  Dr. Abran Vivar  Including ACP  18   minutes  This time is exclusive of the encounter
Time spent with review of chart documents, labs, imaging. Direct patient assessment,  formulation of care plan. Discussion with  Interdisciplinary  team

## 2024-05-24 NOTE — PROGRESS NOTE ADULT - SUBJECTIVE AND OBJECTIVE BOX
CC:  Follow up GOC , Symptoms    OVERNIGHT EVENTS:  no reported events    Present Symptoms:   Dyspnea:  No    Nausea/Vomiting:  No  Anxiety:  No       Depression:    Unable  Fatigue:  Yes    Loss of appetite:   NA   Constipation: Not Reported      Pain: No Signs            Location            Duration            Character            Severity            Factors            Effect    Pain AD Score:  [email or web address] (press ctrl + left click to view)    Review of Systems: Reviewed as above  Further ROS unable limited to  obtain due to poor historian      MEDICATIONS  (STANDING):  amLODIPine   Tablet 10 milliGRAM(s) Oral daily  apixaban 2.5 milliGRAM(s) Oral two times a day  calcitriol   Capsule 0.5 MICROGram(s) Oral daily  carvedilol 6.25 milliGRAM(s) Oral every 12 hours  chlorhexidine 2% Cloths 1 Application(s) Topical daily  clopidogrel Tablet 75 milliGRAM(s) Oral daily  cyanocobalamin 1000 MICROGram(s) Oral daily  ergocalciferol 63464 Unit(s) Oral every week  ferrous    sulfate 325 milliGRAM(s) Oral daily  folic acid 1 milliGRAM(s) Oral daily  mirtazapine 7.5 milliGRAM(s) Oral at bedtime  multivitamin 1 Tablet(s) Oral daily  mycophenolate mofetil 500 milliGRAM(s) Oral two times a day  Nephro-casie 1 Tablet(s) Oral daily  pantoprazole   Suspension 40 milliGRAM(s) Oral two times a day  predniSONE   Tablet 5 milliGRAM(s) Oral daily  psyllium Powder 1 Packet(s) Oral daily  sodium bicarbonate 1950 milliGRAM(s) Oral two times a day  tacrolimus 2 milliGRAM(s) Oral every 12 hours  tamsulosin 0.4 milliGRAM(s) Oral at bedtime    MEDICATIONS  (PRN):  acetaminophen     Tablet .. 650 milliGRAM(s) Oral every 6 hours PRN Temp greater or equal to 38C (100.4F), Mild Pain (1 - 3)  aluminum hydroxide/magnesium hydroxide/simethicone Suspension 30 milliLiter(s) Oral every 4 hours PRN Dyspepsia  magnesium hydroxide Suspension 30 milliLiter(s) Oral daily PRN Constipation  melatonin 3 milliGRAM(s) Oral at bedtime PRN Insomnia  ondansetron Injectable 4 milliGRAM(s) IV Push every 8 hours PRN Nausea and/or Vomiting      PHYSICAL EXAM:    Vital Signs Last 24 Hrs  T(C): 36.4 (24 May 2024 12:05), Max: 36.6 (23 May 2024 20:25)  T(F): 97.5 (24 May 2024 12:05), Max: 97.8 (23 May 2024 20:25)  HR: 86 (24 May 2024 12:05) (86 - 102)  BP: 165/89 (24 May 2024 12:05) (113/78 - 180/90)  BP(mean): --  RR: 18 (24 May 2024 12:05) (18 - 18)  SpO2: 95% (24 May 2024 12:05) (95% - 99%)    Parameters below as of 24 May 2024 12:05  Patient On (Oxygen Delivery Method): room air    Karnofsky:  40%  General:  F frail        HEENT:  NCAT       Lungs: comfortable  non labored  CV:  RR  GI: soft NTND  MSK: weak bedbound  Skin:  warm/dry  Neuro sleepy - awakens to voice, no focal deficits  Psych  disengaged    LABS:                          8.3    3.05  )-----------( 180      ( 24 May 2024 05:15 )             25.7     05-24    144  |  103  |  68.3<H>  ----------------------------<  78  4.5   |  26.0  |  6.10<H>    Ca    7.5<L>      24 May 2024 05:15    TPro  4.7<L>  /  Alb  2.4<L>  /  TBili  <0.2<L>  /  DBili  x   /  AST  23  /  ALT  14  /  AlkPhos  54  05-24    PTT - ( 24 May 2024 05:15 )  PTT:95.5 sec  Urinalysis Basic - ( 24 May 2024 05:15 )    Color: x / Appearance: x / SG: x / pH: x  Gluc: 78 mg/dL / Ketone: x  / Bili: x / Urobili: x   Blood: x / Protein: x / Nitrite: x   Leuk Esterase: x / RBC: x / WBC x   Sq Epi: x / Non Sq Epi: x / Bacteria: x      I&O's Summary    23 May 2024 07:01  -  24 May 2024 07:00  --------------------------------------------------------  IN: 0 mL / OUT: 300 mL / NET: -300 mL    24 May 2024 07:01  -  24 May 2024 12:52  --------------------------------------------------------  IN: 120 mL / OUT: 0 mL / NET: 120 mL

## 2024-05-24 NOTE — DISCHARGE NOTE NURSING/CASE MANAGEMENT/SOCIAL WORK - NSDCPEFALRISK_GEN_ALL_CORE
For information on Fall & Injury Prevention, visit: [email or web address] OR  [email or web address] OR  https://www.cdc.gov/steadi/patient.html

## 2024-05-24 NOTE — DISCHARGE NOTE PROVIDER - NSDCMRMEDTOKEN_GEN_ALL_CORE_FT
Plan: Pt is only flaring in small areas, will increase her topical steroid to clobetasol, but told her if she flares all over to call and not apply clobetasol over large surface areas. Continue Regimen: Hydroxyzine Initiate Treatment: Clobetasol acetaminophen 650 mg oral tablet, extended release: 1 tab(s) orally every 6 hours as needed for  fever and/or mild pain.  amLODIPine 10 mg oral tablet: 1 tab(s) orally once a day  apixaban 2.5 mg oral tablet: 1 tab(s) orally 2 times a day  bisacodyl 10 mg rectal suppository: 1 suppository(ies) rectally once a day  calcitriol 0.25 mcg oral capsule: 2 cap(s) orally once a day  carvedilol 12.5 mg oral tablet: 1 tab(s) orally every 12 hours  clopidogrel 75 mg oral tablet: 1 tab(s) orally once a day  ergocalciferol 1.25 mg (50,000 intl units) oral capsule: 1 cap(s) orally once a week  ferrous sulfate 324 mg (65 mg elemental iron) oral tablet: 1 tab(s) orally once a day  LORazepam 0.5 mg oral tablet: 1 tab(s) orally every 6 hours as needed for -for anxiety MDD: 4 tablets  mirtazapine 7.5 mg oral tablet: 1 tab(s) orally once a day (at bedtime)  Multiple Vitamins oral tablet: 1 tab(s) orally once a day  mycophenolate mofetil 250 mg oral capsule: 2 cap(s) orally every 12 hours  pantoprazole 40 mg oral delayed release tablet: 1 tab(s) orally 2 times a day  [place] Milk of Magnesia 1200 mg/15 mL oral liquid: 30 milliliter(s) orally once a day as needed  predniSONE 5 mg oral tablet: 1 tab(s) orally once a day  prochlorperazine 10 mg oral tablet: 1 tab(s) orally every 12 hours as needed for  nausea/vomiting  sodium bicarbonate 650 mg oral tablet: 3 tab(s) orally 2 times a day  tacrolimus 1 mg oral capsule: 2 cap(s) orally every 12 hours  tamsulosin 0.4 mg oral capsule: 1 cap(s) orally once a day (at bedtime)   Detail Level: Zone acetaminophen 650 mg oral tablet, extended release: 1 tab(s) orally every 6 hours as needed for  fever and/or mild pain.  amLODIPine 10 mg oral tablet: 1 tab(s) orally once a day  apixaban 2.5 mg oral tablet: 1 tab(s) orally 2 times a day  bisacodyl 10 mg rectal suppository: 1 suppository(ies) rectally once a day  calcitriol 0.25 mcg oral capsule: 2 cap(s) orally once a day  carvedilol 12.5 mg oral tablet: 1 tab(s) orally every 12 hours  clopidogrel 75 mg oral tablet: 1 tab(s) orally once a day  ergocalciferol 1.25 mg (50,000 intl units) oral capsule: 1 cap(s) orally once a week  ferrous sulfate 324 mg (65 mg elemental iron) oral tablet: 1 tab(s) orally once a day  LORazepam 0.5 mg oral tablet: 1 tab(s) orally every 6 hours as needed for -for anxiety MDD: 4 tablets  mirtazapine 7.5 mg oral tablet: 1 tab(s) orally once a day (at bedtime)  Multiple Vitamins oral tablet: 1 tab(s) orally once a day  mycophenolate mofetil 250 mg oral capsule: 2 cap(s) orally every 12 hours  oxyCODONE 5 mg oral tablet: 1 tab(s) orally every 6 hours as needed for Severe Pain (7 - 10) MDD: 4 tabs  pantoprazole 40 mg oral delayed release tablet: 1 tab(s) orally 2 times a day  [place] Milk of Magnesia 1200 mg/15 mL oral liquid: 30 milliliter(s) orally once a day as needed  predniSONE 5 mg oral tablet: 1 tab(s) orally once a day  prochlorperazine 10 mg oral tablet: 1 tab(s) orally every 12 hours as needed for  nausea/vomiting  sodium bicarbonate 650 mg oral tablet: 3 tab(s) orally 2 times a day  tacrolimus 1 mg oral capsule: 2 cap(s) orally every 12 hours  tamsulosin 0.4 mg oral capsule: 1 cap(s) orally once a day (at bedtime)